# Patient Record
Sex: FEMALE | Race: WHITE | NOT HISPANIC OR LATINO | Employment: OTHER | URBAN - METROPOLITAN AREA
[De-identification: names, ages, dates, MRNs, and addresses within clinical notes are randomized per-mention and may not be internally consistent; named-entity substitution may affect disease eponyms.]

---

## 2017-01-06 ENCOUNTER — OFFICE VISIT (OUTPATIENT)
Dept: ALLERGY | Facility: CLINIC | Age: 47
End: 2017-01-06
Payer: MEDICARE

## 2017-01-06 VITALS
DIASTOLIC BLOOD PRESSURE: 56 MMHG | SYSTOLIC BLOOD PRESSURE: 116 MMHG | HEART RATE: 78 BPM | WEIGHT: 149.06 LBS | OXYGEN SATURATION: 98 % | BODY MASS INDEX: 28.14 KG/M2 | HEIGHT: 61 IN

## 2017-01-06 DIAGNOSIS — J44.89 CHRONIC OBSTRUCTIVE AIRWAY DISEASE WITH ASTHMA: ICD-10-CM

## 2017-01-06 DIAGNOSIS — Z51.6 NEED FOR DESENSITIZATION TO ALLERGENS: ICD-10-CM

## 2017-01-06 DIAGNOSIS — Z72.0 TOBACCO ABUSE: ICD-10-CM

## 2017-01-06 DIAGNOSIS — H10.45 CHRONIC ALLERGIC CONJUNCTIVITIS: Chronic | ICD-10-CM

## 2017-01-06 DIAGNOSIS — L50.8 OTHER SPECIFIED URTICARIA: Chronic | ICD-10-CM

## 2017-01-06 DIAGNOSIS — K21.9 GASTROESOPHAGEAL REFLUX DISEASE, ESOPHAGITIS PRESENCE NOT SPECIFIED: ICD-10-CM

## 2017-01-06 DIAGNOSIS — J30.81 ALLERGIC RHINITIS DUE TO ANIMAL (CAT) (DOG) HAIR AND DANDER: Primary | Chronic | ICD-10-CM

## 2017-01-06 DIAGNOSIS — L29.8 OTHER SPECIFIED PRURITIC CONDITIONS: Chronic | ICD-10-CM

## 2017-01-06 PROBLEM — L29.89 OTHER SPECIFIED PRURITIC CONDITIONS: Chronic | Status: ACTIVE | Noted: 2017-01-06

## 2017-01-06 PROCEDURE — 99214 OFFICE O/P EST MOD 30 MIN: CPT | Mod: S$GLB,,, | Performed by: ALLERGY & IMMUNOLOGY

## 2017-01-06 PROCEDURE — 99999 PR PBB SHADOW E&M-EST. PATIENT-LVL III: CPT | Mod: PBBFAC,,, | Performed by: ALLERGY & IMMUNOLOGY

## 2017-01-06 PROCEDURE — 99499 UNLISTED E&M SERVICE: CPT | Mod: S$GLB,,, | Performed by: ALLERGY & IMMUNOLOGY

## 2017-01-06 PROCEDURE — 1159F MED LIST DOCD IN RCRD: CPT | Mod: S$GLB,,, | Performed by: ALLERGY & IMMUNOLOGY

## 2017-01-06 RX ORDER — EPINEPHRINE 0.3 MG/.3ML
1 INJECTION SUBCUTANEOUS ONCE
Qty: 2 DEVICE | Refills: 1 | Status: ON HOLD | OUTPATIENT
Start: 2017-01-06 | End: 2017-08-04 | Stop reason: HOSPADM

## 2017-01-06 NOTE — PATIENT INSTRUCTIONS
1.  Once again I have reviewed the importance of smoking cessation with this patient.  I think the patient finally understands that cigarette smoke is an important contribution to her level of respiratory symptoms.  And she has voiced the desire to commit to a smoking cessation program.  2.  Patient will start allergen injections when her vaccine is available.  She will continue these injections weekly until her maintenance dose is achieved.  3.  Patient was given the immunotherapy consent to read at her convenience and the risks and benefits were reviewed.  Patient understands that she should not get an allergen injection if she has any wheezing within 48 hours of this injection.  4.  I requested a revisit in 3 months.  The patient will call if there are problems before this revisit.

## 2017-01-06 NOTE — PROGRESS NOTES
Referring physician: No ref. provider found    Primary Care Physician: Emma Subramanian MD    Chief Complaint: Follow-up (discuss allergy injections)    Patient is known to me. The last visit was 11/11/2016  Patient is accompanied: No  Diagnosis at previous visit:  1.  ALLERGIC rhinitis to Dog and cat allergen allergen  2.  Chronic ALLERGIC conjunctivitis  3.  Severe pruritus of both eyes  4.  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  Chronic obstructive airway disease with asthma  6.  Patient is still smoking  7.  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 Spaulding    Subjective:         HPI    Emily Pina is a 46 y.o. female here for a follow-up visit related to discussing the benefits of immunotherapy related to her symptoms.  This patient has multiple respiratory and skin issues which require follow-up.    Patient reports that she is continues to have intermittent bouts of urticaria despite the use of Allegra 180 mg every 24 hours.  These symptoms initially started in September 2016.  She has not been able to identify a trigger for these symptoms.    She reports that her ocular symptoms have been improved using Zaditor and 1% hydrocortisone twice a day.    She reports that with the onset of cold weather today she has been wheezing.  She is also still smoking.  She is currently on Advair /21 2 puffs inhaled twice a day and Singulair 10 mg every 24 hours..  She is still needing her albuterol on a fairly regular basis.    She is currently on Protonix 40 mg every 24 hours.  In addition to this she uses Gaviscon extra strength syrup as needed.  She states that her reflux has been doing better the past several weeks.    Her nasal symptoms have relatively quiescent on Nasonex 2 sprays in each nostril every 24 hours.      Review of Systems  Constitutional: Negative for changes in appetite, unintentional weight loss, fever, chills and fatigue.    HENT: Negative for facial pain, nose bleeds, nasal congestion, postnasal drip, throat clearing, sinus pressure, and voice change. Negative for ear discharge, ear pain, facial swelling, sore throat and trouble swallowing.  Eyes: Negative for occular discharge, redness,and visual disturbance. Postive for itching  Respiratory: Negative for chest tightness, shortness of breath, dyspnea on exertion, sputum production and cough. Positive for wheezing  Cardiovascular: Negative for chest pain, palpatations and leg swelling.  Gastrointestinal: Negative for abdominal distension, abdominal pain, constipation, diarrhea, nausea,and vomiting.   Genitourinary: Negative for difficulty urinating.   Musculoskeletal: Negative for arthralgias, gait problems, joint swelling, myalgias and back pain.   Neurological: Negative for dizziness, syncope, weakness, light-headedness, and headaches.   Hematological: Negative for adenopathy, does not bruise or bleed easily.  Psychiatric/Behavioral: Negative for agitation, anxiety, behavioral problems, confusion, and insomnia.  Skin: Positive for hives and itching    PMH:  Reviewed with the patient and is current for this visit  Unchanged from previous visit.    Family History  Reviewed with the patient and is current for this visit:  Unchanged from previous visit.    Social History:   Reviewed with the patient and is current for this visit  Unchanged from previous visit.  Smoking history: Patient is still smoking    Environmental History:  Patient has a cat and Her mother has a dog.  Environmental history reviewed with the patient and is current for this visit  Unchanged from previous visit.          Objective:      Skin Test results: Positive prick skin test to cat and dog    Immunotherapy: Patient has not been on immunotherapy    Physical Exam  General:patient is well developed and well nourished, in no acute distress.  Mental Status:  Alert, oriented and cooperative  Head and Face:  normocephalic    Allergic shiners: No  Eyes:   Pupils: ERRLA: Scleral conjunctiva: clear; Cornea: clear; Palprebal conjunctiva: normal: Eyelid Skin: normal  Ears:Tympanic membrane Left:intact and normal light reflex; Right:intact and normal light reflex; External canals normal bilaterally.  Nose:  Nares: patent; Mucosa :pink and moist; Nasal septum: midline;Turbinates are of normal size bilaterally and do not occlude the nasal airway.  Mouth/Pharynx: tonsils present; posterior pharyngeal wall normal; tongue normal; teeth normal; voice quality normal.  Neck:  Cervical lymph nodes: small, non-tender, freely moveable both anterior and posterior cervical chain; Trachea: midline; Masses: none  Lungs: Air movement is good; respiratory effort is good; no respiratory distress; breath sounds are vesicular in all lung fields; no wheezing; normal expiratory time; no cough.  Heart: regular rate and rhythm with mild respiratory variation; A1 and P2 are normal; no murmurs or gallops.  Abdomen:exam not done  Extremities: no cyanosis, clubbing or edema  Skin:no rashes or lesions present; skin hydrated and supple.    Skin Test Results:    Laboratory Results:    Radiology Results:          Assessment:       1. Allergic rhinitis due to animal (cat) (dog) hair and dander     2. Chronic allergic conjunctivitis     3. Chronic obstructive airway disease with asthma     4. Tobacco abuse     5. Gastroesophageal reflux disease, esophagitis presence not specified     6. Recurrent urticaria     7. Other specified pruritic conditions     8. Need for desensitization to allergens             Plan:         Return for re-visit: Return in about 3 months (around 4/6/2017).    Immunotherapy: Yes    Lab or X-ray: No    Outside medical records requested: No        Patient Instructions   1.  Once again I have reviewed the importance of smoking cessation with this patient.  I think the patient finally understands that cigarette smoke is an important  contribution to her level of respiratory symptoms.  And she has voiced the desire to commit to a smoking cessation program.  2.  Patient will start allergen injections when her vaccine is available.  She will continue these injections weekly until her maintenance dose is achieved.  3.  Patient was given the immunotherapy consent to read at her convenience and the risks and benefits were reviewed.  Patient understands that she should not get an allergen injection if she has any wheezing within 48 hours of this injection.  4.  I requested a revisit in 3 months.  The patient will call if there are problems before this revisit.      25 minutes spent face to face with this patient. 50% of time spent counseling this patient about the medical conditions (pathophysiology), the options and strategies for treatments, and the risks and benefits of treatments.    Patient education content included: The role of cigarette smoking in the production for respiratory symptoms along with the importance of smoking cessation.  The risks and benefits of immunotherapy were reviewed including local reactions as well as systemic reactions.  Also discussed was the additional risk of asthmatic patients in having a systemic asthmatic reaction.  The immunotherapy protocol was reviewed as well as the importance of bringing her EpiPen to clinic for each injection.        Letter and copy of t.his visit sent to referring physician/PCP:            Ana Mancera MD

## 2017-01-06 NOTE — LETTER
January 6, 2017        Emma Subramanian MD  [address]  [place] [address]             [facility] - Allergy/ Immunology  [address]  [place] [address]  Phone: [phone]   Patient: Emily Pina   MR Number: [id number]   YOB: 1970   Date of Visit: 1/6/2017       Dear Dr. Subramanian:    I saw your patient today for a follow-up visit with respect to the following conditions:    1. Allergic rhinitis due to animal (cat) (dog) hair and dander     2. Chronic allergic conjunctivitis     3. Chronic obstructive airway disease with asthma     4. Tobacco abuse     5. Gastroesophageal reflux disease, esophagitis presence not specified     6. Recurrent urticaria     7. Other specified pruritic conditions     8. Need for desensitization to allergens         I have made the following changes in medications:    Patient Instructions   1.  Once again I have reviewed the importance of smoking cessation with this patient.  I think the patient finally understands that cigarette smoke is an important contribution to her level of respiratory symptoms.  And she has voiced the desire to commit to a smoking cessation program.  2.  Patient will start allergen injections when her vaccine is available.  She will continue these injections weekly until her maintenance dose is achieved.  3.  Patient was given the immunotherapy consent to read at her convenience and the risks and benefits were reviewed.  Patient understands that she should not get an allergen injection if she has any wheezing within 48 hours of this injection.  4.  I requested a revisit in 3 months.  The patient will call if there are problems before this revisit.      I have requested a follow-up visit in 3 months to asess this patient's progress.    I hope you find this information helpful in the care of your patient. If you have questions about the above, please do not hesitate to contact me.    Sincerely,    Ana Mancera MD

## 2017-01-13 DIAGNOSIS — Z12.31 OTHER SCREENING MAMMOGRAM: ICD-10-CM

## 2017-01-23 RX ORDER — PANTOPRAZOLE SODIUM 40 MG/1
TABLET, DELAYED RELEASE ORAL
Qty: 90 TABLET | Refills: 3 | Status: SHIPPED | OUTPATIENT
Start: 2017-01-23 | End: 2018-03-01 | Stop reason: SDUPTHER

## 2017-01-24 ENCOUNTER — TELEPHONE (OUTPATIENT)
Dept: ALLERGY | Facility: CLINIC | Age: 47
End: 2017-01-24

## 2017-01-24 NOTE — TELEPHONE ENCOUNTER
Called patient, no answer, left msg on answering machine that extracts are in at [facility] and provided number for her to call to schedule appt at [facility].

## 2017-02-01 ENCOUNTER — TELEPHONE (OUTPATIENT)
Dept: ALLERGY | Facility: CLINIC | Age: 47
End: 2017-02-01

## 2017-02-01 NOTE — TELEPHONE ENCOUNTER
----- Message from Taylor Hernandez sent at 2/1/2017  3:42 PM CST -----  Contact: Self/[phone]  Pt said that she is calling in regards to she is scheduled to get her first Allergy Injection on 2/3/2017 and she wants to know if she needs to stop any medication in advance. Please call and advise            Thank you

## 2017-02-01 NOTE — TELEPHONE ENCOUNTER
Spoke with patient, informed her she does not need to stop any medication for her injection.  She verbalized understanding.

## 2017-02-16 ENCOUNTER — PATIENT MESSAGE (OUTPATIENT)
Dept: FAMILY MEDICINE | Facility: CLINIC | Age: 47
End: 2017-02-16

## 2017-02-16 ENCOUNTER — OFFICE VISIT (OUTPATIENT)
Dept: FAMILY MEDICINE | Facility: CLINIC | Age: 47
End: 2017-02-16
Payer: MEDICARE

## 2017-02-16 ENCOUNTER — APPOINTMENT (OUTPATIENT)
Dept: RADIOLOGY | Facility: HOSPITAL | Age: 47
End: 2017-02-16
Attending: FAMILY MEDICINE
Payer: MEDICARE

## 2017-02-16 VITALS
HEART RATE: 109 BPM | WEIGHT: 141.13 LBS | TEMPERATURE: 99 F | OXYGEN SATURATION: 97 % | RESPIRATION RATE: 18 BRPM | DIASTOLIC BLOOD PRESSURE: 88 MMHG | SYSTOLIC BLOOD PRESSURE: 124 MMHG | BODY MASS INDEX: 26.65 KG/M2 | HEIGHT: 61 IN

## 2017-02-16 DIAGNOSIS — R05.9 COUGH: ICD-10-CM

## 2017-02-16 DIAGNOSIS — J45.901 ASTHMA EXACERBATION: Primary | ICD-10-CM

## 2017-02-16 PROCEDURE — 99999 PR PBB SHADOW E&M-EST. PATIENT-LVL III: CPT | Mod: PBBFAC,,, | Performed by: FAMILY MEDICINE

## 2017-02-16 PROCEDURE — 99214 OFFICE O/P EST MOD 30 MIN: CPT | Mod: S$GLB,,, | Performed by: FAMILY MEDICINE

## 2017-02-16 PROCEDURE — 71020 XR CHEST PA AND LATERAL: CPT | Mod: 26,,, | Performed by: RADIOLOGY

## 2017-02-16 PROCEDURE — 71020 XR CHEST PA AND LATERAL: CPT | Mod: TC,PN

## 2017-02-16 PROCEDURE — 99499 UNLISTED E&M SERVICE: CPT | Mod: S$GLB,,, | Performed by: FAMILY MEDICINE

## 2017-02-16 RX ORDER — CYCLOBENZAPRINE HCL 5 MG
TABLET ORAL
Refills: 3 | COMMUNITY
Start: 2017-01-10 | End: 2017-10-09 | Stop reason: SDUPTHER

## 2017-02-16 RX ORDER — CETIRIZINE HYDROCHLORIDE 10 MG/1
10 TABLET ORAL DAILY
COMMUNITY
End: 2021-04-05

## 2017-02-16 RX ORDER — AZITHROMYCIN 250 MG/1
250 TABLET, FILM COATED ORAL DAILY
COMMUNITY
End: 2017-03-06 | Stop reason: ALTCHOICE

## 2017-02-16 RX ORDER — BENZONATATE 200 MG/1
200 CAPSULE ORAL 3 TIMES DAILY PRN
COMMUNITY
End: 2017-03-06 | Stop reason: ALTCHOICE

## 2017-02-16 RX ORDER — FEXOFENADINE HCL AND PSEUDOEPHEDRINE HCI 60; 120 MG/1; MG/1
1 TABLET, EXTENDED RELEASE ORAL 2 TIMES DAILY
COMMUNITY
End: 2017-07-18

## 2017-02-16 RX ORDER — PREDNISONE 20 MG/1
60 TABLET ORAL DAILY
Qty: 15 TABLET | Refills: 0 | Status: SHIPPED | OUTPATIENT
Start: 2017-02-16 | End: 2017-02-21

## 2017-02-16 RX ORDER — CODEINE PHOSPHATE AND GUAIFENESIN 10; 100 MG/5ML; MG/5ML
5 SOLUTION ORAL 3 TIMES DAILY PRN
Qty: 118 ML | Refills: 0 | Status: SHIPPED | OUTPATIENT
Start: 2017-02-16 | End: 2017-02-26

## 2017-02-16 NOTE — MR AVS SNAPSHOT
[facility]  [address]  [place] [address]  Phone: [phone]                  Emily Jacobson Yovani   2017 2:40 PM   Office Visit    Description:  Female : 1970   Provider:  Kathy Mcqueen MD   Department:  [facility]           Reason for Visit     Urticaria           Diagnoses this Visit        Comments    Cough    -  Primary     Asthma exacerbation                To Do List           Future Appointments        Provider Department Dept Phone    4/10/2017 1:40 PM Ad Spaulding MD [facility] - Rheumatology [phone]      Goals (5 Years of Data)     None       These Medications        Disp Refills Start End    guaifenesin-codeine 100-10 mg/5 ml (CHERATUSSIN AC)  mg/5 mL syrup 118 mL 0 2017    Take 5 mLs by mouth 3 (three) times daily as needed for Cough. - Oral    Pharmacy: [facility]/pharmacy #[id number] - [place] - [address] Ph #: [phone]       predniSONE (DELTASONE) 20 MG tablet 15 tablet 0 2017    Take 3 tablets (60 mg total) by mouth once daily. - Oral    Pharmacy: [facility]/pharmacy #[id number] - [place] - [address] Ph #: [phone]         [facility] On Call     [facility] On Call Nurse Care Line - 24/7 Assistance  Registered nurses in the [facility] On Call Center provide clinical advisement, health education, appointment booking, and other advisory services.  Call for this free service at [phone].             Medications           Message regarding Medications     Verify the changes and/or additions to your medication regime listed below are the same as discussed with your clinician today.  If any of these changes or additions are incorrect, please notify your healthcare provider.        START taking these NEW medications        Refills    guaifenesin-codeine 100-10 mg/5 ml (CHERATUSSIN AC)  mg/5 mL syrup 0    Sig: Take 5 mLs by mouth 3 (three) times daily as needed for  Cough.    Class: Print    Route: Oral    predniSONE (DELTASONE) 20 MG tablet 0    Sig: Take 3 tablets (60 mg total) by mouth once daily.    Class: Normal    Route: Oral           Verify that the below list of medications is an accurate representation of the medications you are currently taking.  If none reported, the list may be blank. If incorrect, please contact your healthcare provider. Carry this list with you in case of emergency.           Current Medications     albuterol (PROVENTIL) 2.5 mg /3 mL (0.083 %) nebulizer solution Take 3 mLs (2.5 mg total) by nebulization every 6 (six) hours as needed for Wheezing.    aluminum hydrox-magnesium carb (GAVISCON EXTRA STRENGTH) 254-237.5 mg/5 mL Susp Take by mouth as needed.     azithromycin (Z-GIGI) 250 MG tablet Take 250 mg by mouth once daily.    benzonatate (TESSALON) 200 MG capsule Take 200 mg by mouth 3 (three) times daily as needed for Cough.    cetirizine (ZYRTEC) 10 MG tablet Take 10 mg by mouth once daily.    cyclobenzaprine (FLEXERIL) 5 MG tablet TAKE 1 TABLET (5 MG TOTAL) BY MOUTH 3 (THREE) TIMES DAILY AS NEEDED.    desonide (DESOWEN) 0.05 % lotion Apply topically 2 (two) times daily.    epinephrine (EPIPEN 2-GIGI) 0.3 mg/0.3 mL AtIn Inject 0.3 mLs (0.3 mg total) into the muscle once.    fexofenadine-pseudoephedrine  mg (ALLEGRA-D)  mg per tablet Take 1 tablet by mouth 2 (two) times daily.    fluticasone (FLONASE) 50 mcg/actuation nasal spray 2 sprays by Each Nare route once daily.    fluticasone-salmeterol (ADVAIR HFA) 115-21 mcg/actuation HFAA Inhale 2 puffs into the lungs 2 (two) times daily.    gabapentin (NEURONTIN) 600 MG tablet Take 1 tablet (600 mg total) by mouth once daily.    guaifenesin-codeine 100-10 mg/5 ml (CHERATUSSIN AC)  mg/5 mL syrup Take 5 mLs by mouth 3 (three) times daily as needed for Cough.    hydrocodone-acetaminophen 7.5-325mg (NORCO) 7.5-325 mg per tablet Take 1 tablet by mouth every 6 (six) hours as needed.     "hydroxychloroquine (PLAQUENIL) 200 mg tablet Take 1 tablet (200 mg total) by mouth once daily.    LACTOBACILLUS ACIDOPHILUS (PROBIOTIC ORAL) Take 1 tablet by mouth once daily.    levothyroxine (SYNTHROID) 100 MCG tablet TAKE ONE TABLET DAILY EXCEPT ON SUNDAYS TAKE 1/2 TABLET    montelukast (SINGULAIR) 10 mg tablet TAKE 1 TABLET EVERY EVENING    nicotine (NICODERM CQ) 7 mg/24 hr Place 1 patch onto the skin once daily. Please dispense brand name only (Nicoderm CQ).    pantoprazole (PROTONIX) 40 MG tablet TAKE 1 TABLET BY MOUTH ONCE DAILY    predniSONE (DELTASONE) 20 MG tablet Take 3 tablets (60 mg total) by mouth once daily.    PROAIR HFA 90 mcg/actuation inhaler INHALE 2 PUFFS BY MOUTH EVERY 4 HOURS AS NEEDED FOR WHEEZING           Clinical Reference Information           Your Vitals Were     BP Pulse Temp Resp Height Weight    124/88 (BP Location: Left arm, Patient Position: Sitting, BP Method: Manual) 109 98.7 °F (37.1 °C) 18 5' 1" (1.549 m) 64 kg (141 lb 1.5 oz)    Last Period SpO2 BMI          05/14/2014 (Exact Date) 97% 26.66 kg/m2        Blood Pressure          Most Recent Value    BP  124/88      Allergies as of 2/16/2017     Bactrim [Sulfamethoxazole-trimethoprim]      Immunizations Administered on Date of Encounter - 2/16/2017     None      Orders Placed During Today's Visit     Future Labs/Procedures Expected by Expires    X-Ray Chest PA And Lateral  2/16/2017 2/16/2018      Smoking Cessation     If you would like to quit smoking:   You may be eligible for free services if you are a [place] resident and started smoking cigarettes before September 1, 1988.  Call the [employer]oking [employer]essation [employer] ([employer]) toll free at ([phone] [phone] or [phone] [phone].   Call 2-073-QUIT-NOW if you do not meet the above criteria.            Language Assistance Services     ATTENTION: Language assistance services are available, free of charge. Please call [phone].      ATENCIÓN: Si ric ngo a campos disposición " servicios gratuitos de asistencia lingüística. Lesli rick [phone].     [facility] Ý: N?u b?n nói Ti?ng Vi?t, có các d?ch v? h? tr? ngôn ng? mi?n phí dành cho b?n. G?i s? [phone].         [facility] complies with applicable Federal civil rights laws and does not discriminate on the basis of race, color, national origin, age, disability, or sex.

## 2017-02-16 NOTE — PROGRESS NOTES
"Routine Office Visit    Patient Name: Emily Pina    : 1970  MRN: [id number]    Subjective:  Emily is a 46 y.o. female who presents today for:    1. Thursday - started getting hives again.  This has been an on/off issue for the past month.  She's been trying to start allergy shots but she is not able to get them when she has active hives.  Then Saturday - got a deep cough.  Monday - running a fever.  Felt weak, and went to urgent care.  They said she had sinusitis - gave her a steroid shot, zpack - Tuesday, and cough pills.  She ran fevers still, and then today - she hasn't had a fever.  But she has a cough, and worse hives today.  +rattle in her chest, and wheezing. She's been giving herself breathing treatments at night for the last 3-4 nights.  +shortness of breath.  Take zyrtec, singulair.  Allergic to dogs, cats.  She has 2 cats but "they're my babies, I don't have kids so I can't give them away".  Has asthma.  +smoker.    Past Medical History  Past Medical History   Diagnosis Date    Asthma in remission     COPD (chronic obstructive pulmonary disease)     Fibromyalgia     Lupus     Recurrent urticaria 2017    Subacute cutaneous lupus erythematosus        Past Surgical History  Past Surgical History   Procedure Laterality Date    Tonsillectomy         Family History  Family History   Problem Relation Age of Onset    Hypertension Mother     Thyroid disease Mother     Arthritis Father     Hyperlipidemia Father     Cancer Father     Lupus Sister     Asthma Sister     No Known Problems Brother     No Known Problems Maternal Aunt     No Known Problems Maternal Uncle     No Known Problems Paternal Aunt     No Known Problems Paternal Uncle     No Known Problems Maternal Grandmother     No Known Problems Maternal Grandfather     No Known Problems Paternal Grandmother     No Known Problems Paternal Grandfather     Amblyopia Neg Hx     Blindness Neg Hx     Cataracts Neg " Hx     Diabetes Neg Hx     Glaucoma Neg Hx     Macular degeneration Neg Hx     Retinal detachment Neg Hx     Strabismus Neg Hx     Stroke Neg Hx        Social History  Social History     Social History    Marital status:      Spouse name: N/A    Number of children: N/A    Years of education: N/A     Occupational History    Not on file.     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     Drug use: No    Sexual activity: Yes     Partners: Male     Other Topics Concern    Not on file     Social History Narrative       Current Medications  Current Outpatient Prescriptions on File Prior to Visit   Medication Sig Dispense Refill    aluminum hydrox-magnesium carb (GAVISCON EXTRA STRENGTH) 254-237.5 mg/5 mL Susp Take by mouth as needed.       fluticasone (FLONASE) 50 mcg/actuation nasal spray 2 sprays by Each Nare route once daily. 16 g 2    fluticasone-salmeterol (ADVAIR HFA) 115-21 mcg/actuation HFAA Inhale 2 puffs into the lungs 2 (two) times daily. 1 Inhaler 6    gabapentin (NEURONTIN) 600 MG tablet Take 1 tablet (600 mg total) by mouth once daily. 90 tablet 3    hydrocodone-acetaminophen 7.5-325mg (NORCO) 7.5-325 mg per tablet Take 1 tablet by mouth every 6 (six) hours as needed. 60 tablet 0    hydroxychloroquine (PLAQUENIL) 200 mg tablet Take 1 tablet (200 mg total) by mouth once daily. 90 tablet 3    LACTOBACILLUS ACIDOPHILUS (PROBIOTIC ORAL) Take 1 tablet by mouth once daily.      levothyroxine (SYNTHROID) 100 MCG tablet TAKE ONE TABLET DAILY EXCEPT ON SUNDAYS TAKE 1/2 TABLET 85 tablet 1    montelukast (SINGULAIR) 10 mg tablet TAKE 1 TABLET EVERY EVENING 90 tablet 3    nicotine (NICODERM CQ) 7 mg/24 hr Place 1 patch onto the skin once daily. Please dispense brand name only (Nicoderm CQ). 14 patch 1  "   pantoprazole (PROTONIX) 40 MG tablet TAKE 1 TABLET BY MOUTH ONCE DAILY 90 tablet 3    PROAIR HFA 90 mcg/actuation inhaler INHALE 2 PUFFS BY MOUTH EVERY 4 HOURS AS NEEDED FOR WHEEZING 8.5 g 3    albuterol (PROVENTIL) 2.5 mg /3 mL (0.083 %) nebulizer solution Take 3 mLs (2.5 mg total) by nebulization every 6 (six) hours as needed for Wheezing. 180 mL 1    desonide (DESOWEN) 0.05 % lotion Apply topically 2 (two) times daily. 60 mL 0    epinephrine (EPIPEN 2-GIGI) 0.3 mg/0.3 mL AtIn Inject 0.3 mLs (0.3 mg total) into the muscle once. 2 Device 1     No current facility-administered medications on file prior to visit.        Allergies   Review of patient's allergies indicates:   Allergen Reactions    Bactrim [sulfamethoxazole-trimethoprim] Rash       Review of Systems (Pertinent positives)  Constititutional: Weight loss, excess fatigue, chills, fever, night sweats, weakness, loss of appetite  Ears: Earache, ringing in ears, discharge, hearing loss, hearing aid, popping, infection Nose: stuffy nose, mouth breathing, post-nasal drip,   Lungs: Cough, sputum, cough up blood, wheeze, frequent URI, SOA, Asthma  Heart: Chest pain, angina, palpitations, extra heart beats  Stomach/Intestine: Heartburn, Nausea, vomiting, diarrhea, indigestion, bloating, constipation    Visit Vitals    /88 (BP Location: Left arm, Patient Position: Sitting, BP Method: Manual)    Pulse 109    Temp 98.7 °F (37.1 °C)    Resp 18    Ht 5' 1" (1.549 m)    Wt 64 kg (141 lb 1.5 oz)    LMP 05/14/2014 (Exact Date)    SpO2 97%    BMI 26.66 kg/m2       GENERAL APPEARANCE: in no apparent distress and well developed and well nourished  HEENT: PERRLA, EOMI, Sclera clear, anicteric, Oropharynx clear, no lesions, Neck supple with midline trachea  RESPIRATORY: appears well, vitals normal, no respiratory distress, acyanotic, normal RR, chest clear, +mild-mod expiratory wheezing in lower lobes, crepitations, rhonchi, normal symmetric air " entry  HEART: regular rate and rhythm, S1, S2 normal, no murmur, click, rub or gallop.    NEUROLOGIC: normal without focal findings, CN II-XII are intact.    Extremities: warm/well perfused.  No abnormal hair patterns.  No clubbing, cyanosis or edema.    Skin: +erythematous splotchy patches on back, anterior neck, blanching.  PSYCH: Alert, oriented x 3, thought content appropriate, speech normal, pleasant and cooperative, good eye contact, well groomed, recall good, concentration/judgement good and apparently average intelligence.    Assessment/Plan:  Emily Jacobson Yovani is a 46 y.o. female who presents today for :    Emily was seen today for urticaria.    Diagnoses and all orders for this visit:    Asthma exacerbation  -     predniSONE (DELTASONE) 20 MG tablet; Take 3 tablets (60 mg total) by mouth once daily.  -     X-Ray Chest PA And Lateral; Future  -     guaifenesin-codeine 100-10 mg/5 ml (CHERATUSSIN AC)  mg/5 mL syrup; Take 5 mLs by mouth 3 (three) times daily as needed for Cough.  -     Is already on anti-histamines - zyrtec, singulair, daily Advair, already taking zpack (last dose Friday)    Patient also has lupus, and is on plaquenil.  Possibly also hives could be a flare up of lupus or allergies.  In any event, she was told of the long term consequences of taking high doses of steroids - decreased immune system, weight gain, hormonal imbalance. She understands these risks.

## 2017-02-17 ENCOUNTER — TELEPHONE (OUTPATIENT)
Dept: FAMILY MEDICINE | Facility: CLINIC | Age: 47
End: 2017-02-17

## 2017-02-17 NOTE — TELEPHONE ENCOUNTER
----- Message from Stalin Lara sent at 2/16/2017  4:32 PM CST -----  Contact: self  Pt calling to ask if she should continue taking the cough pills along with the syrup.  Please call pt at .    Thanks

## 2017-02-20 ENCOUNTER — TELEPHONE (OUTPATIENT)
Dept: SMOKING CESSATION | Facility: CLINIC | Age: 47
End: 2017-02-20

## 2017-02-21 ENCOUNTER — TELEPHONE (OUTPATIENT)
Dept: SMOKING CESSATION | Facility: CLINIC | Age: 47
End: 2017-02-21

## 2017-02-22 ENCOUNTER — TELEPHONE (OUTPATIENT)
Dept: SMOKING CESSATION | Facility: CLINIC | Age: 47
End: 2017-02-22

## 2017-03-06 ENCOUNTER — OFFICE VISIT (OUTPATIENT)
Dept: FAMILY MEDICINE | Facility: CLINIC | Age: 47
End: 2017-03-06
Payer: MEDICARE

## 2017-03-06 ENCOUNTER — HOSPITAL ENCOUNTER (OUTPATIENT)
Dept: RADIOLOGY | Facility: HOSPITAL | Age: 47
Discharge: HOME | End: 2017-03-06
Attending: FAMILY MEDICINE
Payer: MEDICARE

## 2017-03-06 VITALS
BODY MASS INDEX: 26.91 KG/M2 | SYSTOLIC BLOOD PRESSURE: 140 MMHG | WEIGHT: 142.5 LBS | DIASTOLIC BLOOD PRESSURE: 80 MMHG | HEIGHT: 61 IN | OXYGEN SATURATION: 98 % | HEART RATE: 76 BPM

## 2017-03-06 DIAGNOSIS — Z12.31 OTHER SCREENING MAMMOGRAM: ICD-10-CM

## 2017-03-06 DIAGNOSIS — E03.9 HYPOTHYROIDISM, UNSPECIFIED TYPE: Primary | ICD-10-CM

## 2017-03-06 DIAGNOSIS — R00.2 HEART PALPITATIONS: ICD-10-CM

## 2017-03-06 DIAGNOSIS — Z12.31 ENCOUNTER FOR SCREENING MAMMOGRAM FOR BREAST CANCER: ICD-10-CM

## 2017-03-06 PROCEDURE — 93005 ELECTROCARDIOGRAM TRACING: CPT | Mod: S$GLB,,, | Performed by: INTERNAL MEDICINE

## 2017-03-06 PROCEDURE — 77063 BREAST TOMOSYNTHESIS BI: CPT | Mod: 26,,, | Performed by: RADIOLOGY

## 2017-03-06 PROCEDURE — 93010 ELECTROCARDIOGRAM REPORT: CPT | Mod: S$GLB,,, | Performed by: INTERNAL MEDICINE

## 2017-03-06 PROCEDURE — 77067 SCR MAMMO BI INCL CAD: CPT | Mod: 26,,, | Performed by: RADIOLOGY

## 2017-03-06 PROCEDURE — 99999 PR PBB SHADOW E&M-EST. PATIENT-LVL IV: CPT | Mod: PBBFAC,,, | Performed by: INTERNAL MEDICINE

## 2017-03-06 PROCEDURE — 99214 OFFICE O/P EST MOD 30 MIN: CPT | Mod: S$GLB,,, | Performed by: INTERNAL MEDICINE

## 2017-03-06 PROCEDURE — 77067 SCR MAMMO BI INCL CAD: CPT | Mod: TC

## 2017-03-06 PROCEDURE — 1160F RVW MEDS BY RX/DR IN RCRD: CPT | Mod: S$GLB,,, | Performed by: INTERNAL MEDICINE

## 2017-03-06 PROCEDURE — 99499 UNLISTED E&M SERVICE: CPT | Mod: S$GLB,,, | Performed by: INTERNAL MEDICINE

## 2017-03-06 NOTE — MR AVS SNAPSHOT
[facility]  [address]  [place] [address]  Phone: [phone]  Fax: [phone]                  Emily Tabaresblanc   3/6/2017 1:40 PM   Office Visit    Description:  Female : 1970   Provider:  Sharif Cage MD   Department:  [facility] - [facility]           Reason for Visit     feels like heart racing     Fatigue           Diagnoses this Visit        Comments    Hypothyroidism, unspecified type    -  Primary     Heart palpitations         Encounter for screening mammogram for breast cancer         Other screening mammogram                To Do List           Future Appointments        Provider Department Dept Phone    4/10/2017 1:40 PM Ad Spaulding MD [facility] - Rheumatology [phone]      Goals (5 Years of Data)     None      [facility]     [facility] -  Assistance  Registered nurses in the [facility] Center provide clinical advisement, health education, appointment booking, and other advisory services.  Call for this free service at [phone].             Medications           Message regarding Medications     Verify the changes and/or additions to your medication regime listed below are the same as discussed with your clinician today.  If any of these changes or additions are incorrect, please notify your healthcare provider.        STOP taking these medications     azithromycin (Z-GIGI) 250 MG tablet Take 250 mg by mouth once daily.    benzonatate (TESSALON) 200 MG capsule Take 200 mg by mouth 3 (three) times daily as needed for Cough.    nicotine (NICODERM CQ) 7 mg/24 hr Place 1 patch onto the skin once daily. Please dispense brand name only (Nicoderm CQ).           Verify that the below list of medications is an accurate representation of the medications you are currently taking.  If none reported, the list may be blank. If incorrect, please contact your healthcare provider. Carry this list with you in case  "of emergency.           Current Medications     albuterol (PROVENTIL) 2.5 mg /3 mL (0.083 %) nebulizer solution Take 3 mLs (2.5 mg total) by nebulization every 6 (six) hours as needed for Wheezing.    cetirizine (ZYRTEC) 10 MG tablet Take 10 mg by mouth once daily.    cyclobenzaprine (FLEXERIL) 5 MG tablet TAKE 1 TABLET (5 MG TOTAL) BY MOUTH 3 (THREE) TIMES DAILY AS NEEDED.    fexofenadine-pseudoephedrine  mg (ALLEGRA-D)  mg per tablet Take 1 tablet by mouth 2 (two) times daily.    fluticasone (FLONASE) 50 mcg/actuation nasal spray 2 sprays by Each Nare route once daily.    fluticasone-salmeterol (ADVAIR HFA) 115-21 mcg/actuation HFAA Inhale 2 puffs into the lungs 2 (two) times daily.    gabapentin (NEURONTIN) 600 MG tablet Take 1 tablet (600 mg total) by mouth once daily.    hydrocodone-acetaminophen 7.5-325mg (NORCO) 7.5-325 mg per tablet Take 1 tablet by mouth every 6 (six) hours as needed.    hydroxychloroquine (PLAQUENIL) 200 mg tablet Take 1 tablet (200 mg total) by mouth once daily.    levothyroxine (SYNTHROID) 100 MCG tablet TAKE ONE TABLET DAILY EXCEPT ON SUNDAYS TAKE 1/2 TABLET    montelukast (SINGULAIR) 10 mg tablet TAKE 1 TABLET EVERY EVENING    pantoprazole (PROTONIX) 40 MG tablet TAKE 1 TABLET BY MOUTH ONCE DAILY    aluminum hydrox-magnesium carb (GAVISCON EXTRA STRENGTH) 254-237.5 mg/5 mL Susp Take by mouth as needed.     desonide (DESOWEN) 0.05 % lotion Apply topically 2 (two) times daily.    epinephrine (EPIPEN 2-GIGI) 0.3 mg/0.3 mL AtIn Inject 0.3 mLs (0.3 mg total) into the muscle once.    LACTOBACILLUS ACIDOPHILUS (PROBIOTIC ORAL) Take 1 tablet by mouth once daily.    PROAIR HFA 90 mcg/actuation inhaler INHALE 2 PUFFS BY MOUTH EVERY 4 HOURS AS NEEDED FOR WHEEZING           Clinical Reference Information           Your Vitals Were     BP Pulse Height Weight Last Period SpO2    140/80 (BP Location: Left arm, Patient Position: Sitting) 76 5' 1" (1.549 m) 64.7 kg (142 lb 8.4 oz) 05/14/2014 " (Exact Date) 98%    BMI                26.93 kg/m2          Blood Pressure          Most Recent Value    BP  (!)  140/80      Allergies as of 3/6/2017     Bactrim [Sulfamethoxazole-trimethoprim]      Immunizations Administered on Date of Encounter - 3/6/2017     None      Orders Placed During Today's Visit      Normal Orders This Visit    EKG 12-lead     Mammo Digital Screening Bilat with CAD     Future Labs/Procedures Expected by Expires    Basic metabolic panel  3/6/2017 3/6/2018    CBC auto differential  3/6/2017 3/6/2018    TSH  3/6/2017 3/6/2018      Instructions      What is Holter Monitoring?  Holter monitoring is a painless way to record your heartbeat away from your doctors office. It is a small, battery-operated electrocardiogram (ECG) machine that you carry with you. The device is about the size of a small, digital camera. Holter monitoring records your heartbeat for your doctor to review at a later time. You can receive your heart monitor in a hospital, test center, or doctors office.    Your Holter monitor  When you receive a Holter monitor, your health care provider places small, painless pads (electrodes) on your chest. These connect to the lightweight unit, which attaches to a belt or shoulder strap. You need to keep the device on for at least 24 to 48 hours hours and complete a diary. While wearing the monitor, follow these tips:  · Try to sleep on your back.  · Dont take a shower. A sponge bath is OK.  · Follow your normal routine. Dont avoid stress, work, or exercise. It is important to record your heartbeat during your normal activities.  · If an electrode falls off or the unit makes noise, call your doctor to see what you should do.       When using a Holter monitor  Stay away from electric blankets, magnets, metal detectors, and high-voltage areas such as power lines. They may affect the recording.   Holter monitor diary  · Write in the time of day for each entry you make.  · Note each  change in activity, including when you take medicine.  · Note any symptoms you feel.    Date Last Reviewed: 2/26/2014  © 9209-7598 [employer]. [address] [place] [address], [place] [address]. All rights reserved. This information is not intended as a substitute for professional medical care. Always follow your healthcare professional's instructions.             Smoking Cessation     If you would like to quit smoking:   You may be eligible for free services if you are a [place] resident and started smoking cigarettes before September 1, 1988.  Call the [employer]oking [employer]essation [employer] (S[employer]) toll free at (029) [phone] or (262) 772-2573.   Call 7-468-QUIT-NOW if you do not meet the above criteria.            Language Assistance Services     ATTENTION: Language assistance services are available, free of charge. Please call [phone].      ATENCIÓN: Si habla anny, tiene a campos disposición servicios gratuitos de asistencia lingüística. Llame al [phone].     CHÚ Ý: N?u b?n nói Ti?ng Vi?t, có các d?ch v? h? tr? ngôn ng? mi?n phí dành cho b?n. G?i s? [phone].         [facility] complies with applicable Federal civil rights laws and does not discriminate on the basis of race, color, national origin, age, disability, or sex.

## 2017-03-06 NOTE — PROGRESS NOTES
"Assessment & Plan  Problem List Items Addressed This Visit        Endocrine    Hypothyroidism - Primary - check TSH today.    Relevant Orders    TSH      Other Visit Diagnoses     Heart palpitations    - not orthostatic. normal exam.  Check labs.  If all normal - 48 hour holter monitor.    Relevant Orders    CBC auto differential    Basic metabolic panel    TSH    EKG 12-lead    Encounter for screening mammogram for breast cancer        Other screening mammogram    - mammo today.          Medications Discontinued During This Encounter   Medication Reason    azithromycin (Z-GIGI) 250 MG tablet Therapy completed    benzonatate (TESSALON) 200 MG capsule Therapy completed    nicotine (NICODERM CQ) 7 mg/24 hr Therapy completed       Follow-up: No Follow-up on file.      =================================================================      No chief complaint on file.      HPI  Emily is a 46 y.o. female, last appointment with this clinic was 10/7/2016.    Patient's last menstrual period was 05/14/2014 (exact date).    Feels like her heart is racing.  Makes it hard for her to fall asleep.  Pounding comes and goes - unclear the trigger.  Not related to caffeine.  Can happen at night.  Daytime as well. Frequently around bedtime.  Duration of episodes - maybe a few minutes.  When these epsides occur make her feel tired, dizzy, weak.  Has not been exercising x months.  The last time she was on a treadmill - a few months ago - got dizzy and so she stopped.  Right now she feels "tired".  More than usual.  Feels occasional dizziness/room spinning.  Last episode this morning.  Unsure if this is temporally related to the pounding sensation.  No new meds.  Increasing frequency in the past weeks.  Had a few episodes of some pain in the chest area at rest, lasting a few minutes, no accompanying nausea/diaphoresis, not associated with exertion or meals.    Amenorrheic. Does note occasional blood after intercourse.    Takes Advair " regularly.  Albuterol - more recent with sinusitis.  Did not use today.    Off the CPAP - had stopped it when she got sick and did not restart and the ins stopped coverage.  Off x months.    EtOH - none.  Caffeine 1 a day.      Last TSH 6/2016 WNL.    Patient Care Team:  Emma Subramanian MD as PCP - General (Family Medicine)    Patient Active Problem List    Diagnosis Date Noted    Recurrent urticaria 01/06/2017    Other specified pruritic conditions 01/06/2017    Chronic allergic conjunctivitis 11/13/2016    Eczematous dermatitis of upper and lower eyelids of both eyes 11/13/2016    Allergic rhinitis due to animal (cat) (dog) hair and dander 11/13/2016    Hypothyroidism 11/10/2014    Unspecified hereditary and idiopathic peripheral neuropathy 09/25/2014    Amenorrhea 10/25/2013    Chronic obstructive airway disease with asthma 10/29/2012    Tobacco abuse 10/29/2012    GERD (gastroesophageal reflux disease) 10/29/2012    Subacute cutaneous lupus erythematosus      3/30/16 per Rheumatology History of present illness: 46-year-old female with (subacute cutaneous lupus). She has had no evidence of systemic lupus. She remains on Plaquenil.       Fibromyalgia        PAST MEDICAL HISTORY:  Past Medical History:   Diagnosis Date    Asthma in remission     COPD (chronic obstructive pulmonary disease)     Fibromyalgia     Lupus     Recurrent urticaria 1/6/2017    Subacute cutaneous lupus erythematosus        PAST SURGICAL HISTORY:  Past Surgical History:   Procedure Laterality Date    TONSILLECTOMY         SOCIAL HISTORY:  Social History     Social History    Marital status:      Spouse name: N/A    Number of children: N/A    Years of education: N/A     Occupational History    Not on file.     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     Drug use: No    Sexual activity: Yes     Partners: Male     Other Topics Concern    Not on file     Social History Narrative    No narrative on file       ALLERGIES AND MEDICATIONS: updated and reviewed.  Review of patient's allergies indicates:   Allergen Reactions    Bactrim [sulfamethoxazole-trimethoprim] Rash     Current Outpatient Prescriptions   Medication Sig Dispense Refill    albuterol (PROVENTIL) 2.5 mg /3 mL (0.083 %) nebulizer solution Take 3 mLs (2.5 mg total) by nebulization every 6 (six) hours as needed for Wheezing. 180 mL 1    cetirizine (ZYRTEC) 10 MG tablet Take 10 mg by mouth once daily.      cyclobenzaprine (FLEXERIL) 5 MG tablet TAKE 1 TABLET (5 MG TOTAL) BY MOUTH 3 (THREE) TIMES DAILY AS NEEDED.  3    fexofenadine-pseudoephedrine  mg (ALLEGRA-D)  mg per tablet Take 1 tablet by mouth 2 (two) times daily.      fluticasone (FLONASE) 50 mcg/actuation nasal spray 2 sprays by Each Nare route once daily. 16 g 2    fluticasone-salmeterol (ADVAIR HFA) 115-21 mcg/actuation HFAA Inhale 2 puffs into the lungs 2 (two) times daily. 1 Inhaler 6    gabapentin (NEURONTIN) 600 MG tablet Take 1 tablet (600 mg total) by mouth once daily. 90 tablet 3    hydrocodone-acetaminophen 7.5-325mg (NORCO) 7.5-325 mg per tablet Take 1 tablet by mouth every 6 (six) hours as needed. 60 tablet 0    hydroxychloroquine (PLAQUENIL) 200 mg tablet Take 1 tablet (200 mg total) by mouth once daily. 90 tablet 3    levothyroxine (SYNTHROID) 100 MCG tablet TAKE ONE TABLET DAILY EXCEPT ON SUNDAYS TAKE 1/2 TABLET 85 tablet 1    montelukast (SINGULAIR) 10 mg tablet TAKE 1 TABLET EVERY EVENING 90 tablet 3    pantoprazole (PROTONIX) 40 MG tablet TAKE 1 TABLET BY MOUTH ONCE DAILY 90 tablet 3    aluminum hydrox-magnesium carb (GAVISCON EXTRA STRENGTH) 254-237.5 mg/5 mL Susp Take by mouth as needed.       desonide (DESOWEN) 0.05 % lotion Apply topically 2 (two) times daily.  "60 mL 0    epinephrine (EPIPEN 2-GIGI) 0.3 mg/0.3 mL AtIn Inject 0.3 mLs (0.3 mg total) into the muscle once. 2 Device 1    LACTOBACILLUS ACIDOPHILUS (PROBIOTIC ORAL) Take 1 tablet by mouth once daily.      PROAIR HFA 90 mcg/actuation inhaler INHALE 2 PUFFS BY MOUTH EVERY 4 HOURS AS NEEDED FOR WHEEZING 8.5 g 3     No current facility-administered medications for this visit.        Review of Systems   Constitutional: Negative for fever, malaise/fatigue and weight loss.   HENT: Negative for congestion.    Eyes: Negative for blurred vision and pain.   Respiratory: Negative for shortness of breath and wheezing.    Cardiovascular: Positive for chest pain and palpitations. Negative for leg swelling.   Gastrointestinal: Negative for abdominal pain, blood in stool, constipation, diarrhea and heartburn.   Genitourinary: Negative for dysuria, hematuria and urgency.   Musculoskeletal: Negative for joint pain.   Neurological: Positive for dizziness. Negative for tingling, focal weakness, weakness and headaches.   Psychiatric/Behavioral: Negative for depression. The patient is not nervous/anxious.        Physical Exam   Vitals:    03/06/17 1353 03/06/17 1414 03/06/17 1415   BP: 108/74 120/80 (!) 140/80   BP Location:  Left arm Left arm   Patient Position:  Lying Sitting   Pulse: 84 72 76   SpO2: 98%     Weight: 64.7 kg (142 lb 8.4 oz)     Height: 5' 1" (1.549 m)      There is no height or weight on file to calculate BMI.      Height: 5' 1" (154.9 cm)     Physical Exam   Constitutional: She is oriented to person, place, and time. She appears well-developed and well-nourished. No distress.   Eyes: EOM are normal.   Cardiovascular: Normal rate, regular rhythm and normal heart sounds.    No murmur heard.  No carotid bruits   Pulmonary/Chest: Effort normal and breath sounds normal.   Musculoskeletal: Normal range of motion.   Neurological: She is alert and oriented to person, place, and time. Coordination normal.   CN 2-12 intact.  "  5/5.  Patella DTR 1+ symmetric.  Josefina Hallpike negative   Skin: Skin is warm and dry.   Psychiatric: She has a normal mood and affect. Her behavior is normal. Thought content normal.       EKG done today - personally reviewed - rate 75, normal intervals, no ST/T wave abn, normal rhythm.

## 2017-03-06 NOTE — PATIENT INSTRUCTIONS
What is Holter Monitoring?  Holter monitoring is a painless way to record your heartbeat away from your doctors office. It is a small, battery-operated electrocardiogram (ECG) machine that you carry with you. The device is about the size of a small, digital camera. Holter monitoring records your heartbeat for your doctor to review at a later time. You can receive your heart monitor in a hospital, test center, or doctors office.    Your Holter monitor  When you receive a Holter monitor, your health care provider places small, painless pads (electrodes) on your chest. These connect to the lightweight unit, which attaches to a belt or shoulder strap. You need to keep the device on for at least 24 to 48 hours hours and complete a diary. While wearing the monitor, follow these tips:  · Try to sleep on your back.  · Dont take a shower. A sponge bath is OK.  · Follow your normal routine. Dont avoid stress, work, or exercise. It is important to record your heartbeat during your normal activities.  · If an electrode falls off or the unit makes noise, call your doctor to see what you should do.       When using a Holter monitor  Stay away from electric blankets, magnets, metal detectors, and high-voltage areas such as power lines. They may affect the recording.   Holter monitor diary  · Write in the time of day for each entry you make.  · Note each change in activity, including when you take medicine.  · Note any symptoms you feel.    Date Last Reviewed: 2/26/2014  © 8701-1204 [employer]. [address] [place] [address], [place] [address]. All rights reserved. This information is not intended as a substitute for professional medical care. Always follow your healthcare professional's instructions.

## 2017-03-22 ENCOUNTER — OFFICE VISIT (OUTPATIENT)
Dept: FAMILY MEDICINE | Facility: CLINIC | Age: 47
End: 2017-03-22
Payer: MEDICARE

## 2017-03-22 ENCOUNTER — HOSPITAL ENCOUNTER (OUTPATIENT)
Dept: RADIOLOGY | Facility: HOSPITAL | Age: 47
Discharge: HOME | End: 2017-03-22
Attending: INTERNAL MEDICINE
Payer: MEDICARE

## 2017-03-22 VITALS
HEIGHT: 61 IN | TEMPERATURE: 98 F | HEART RATE: 105 BPM | WEIGHT: 144.5 LBS | SYSTOLIC BLOOD PRESSURE: 115 MMHG | DIASTOLIC BLOOD PRESSURE: 80 MMHG | OXYGEN SATURATION: 99 % | BODY MASS INDEX: 27.28 KG/M2

## 2017-03-22 DIAGNOSIS — S93.602A FOOT SPRAIN, LEFT, INITIAL ENCOUNTER: ICD-10-CM

## 2017-03-22 DIAGNOSIS — S93.602A FOOT SPRAIN, LEFT, INITIAL ENCOUNTER: Primary | ICD-10-CM

## 2017-03-22 PROCEDURE — 99214 OFFICE O/P EST MOD 30 MIN: CPT | Mod: S$GLB,,, | Performed by: INTERNAL MEDICINE

## 2017-03-22 PROCEDURE — 73630 X-RAY EXAM OF FOOT: CPT | Mod: 26,LT,, | Performed by: RADIOLOGY

## 2017-03-22 PROCEDURE — 99499 UNLISTED E&M SERVICE: CPT | Mod: S$GLB,,, | Performed by: INTERNAL MEDICINE

## 2017-03-22 PROCEDURE — 73630 X-RAY EXAM OF FOOT: CPT | Mod: TC,PO,LT

## 2017-03-22 PROCEDURE — 99999 PR PBB SHADOW E&M-EST. PATIENT-LVL III: CPT | Mod: PBBFAC,,, | Performed by: INTERNAL MEDICINE

## 2017-03-22 PROCEDURE — 1160F RVW MEDS BY RX/DR IN RCRD: CPT | Mod: S$GLB,,, | Performed by: INTERNAL MEDICINE

## 2017-03-22 NOTE — PROGRESS NOTES
Assessment & Plan  Foot sprain, left, initial encounter - suspect ligament strain.  Does not seem c/w flexor tendonitis.  XR foot, look for dislocation, occult stress fx, though no swelling.  Recommended OTC post op shoe if necessary; else, rest, NSAIDS OTC, cool packs.  Expect slow but steady improvement.  -     X-Ray Foot Complete Left; Future; Expected date: 3/22/17    palpitations - plan is to let foot improve, then get holter and see if we can catch any arrhythmias/events    Medications Discontinued During This Encounter   Medication Reason    desonide (DESOWEN) 0.05 % lotion Patient no longer taking       Follow-up: No Follow-up on file.      =================================================================      Chief Complaint   Patient presents with    pain in foot     left foot       BRADEN Diaz is a 46 y.o. female, last appointment with this clinic was 3/6/2017.    Patient's last menstrual period was 05/14/2014 (exact date).    I previously saw her earlier this month.  Palpitations, sensation of heart racing.  Labs were normal.  Plan was to monitor.  Hypothyroid, labs done earlier this month were normal.  Amenorrhea.  Sleep apnea off of CPAP.  Smoker.  C/o left foot pain.  No preceding event but possibly started with a popping sensation.  It's the top of the foot and feels like it's deep to the surface.  Pointing to the midfoot.  duration 7 days.  Pain to get out of bed, pain getting in/out of a car, Icing and heating it - no improvement.  No swelling no bruising.  No discoloration.  No previous injury    Has not gotten the holter ordered yet. Was waiting for her foot to heal so she could challenge herself on treadmill to see if that would precipitate palpitations.    Patient Care Team:  Emma Subramanian MD as PCP - General (Family Medicine)    Patient Active Problem List    Diagnosis Date Noted    Recurrent urticaria 01/06/2017    Other specified pruritic conditions 01/06/2017    Chronic allergic  conjunctivitis 11/13/2016    Eczematous dermatitis of upper and lower eyelids of both eyes 11/13/2016    Allergic rhinitis due to animal (cat) (dog) hair and dander 11/13/2016    Hypothyroidism 11/10/2014    Unspecified hereditary and idiopathic peripheral neuropathy 09/25/2014    Amenorrhea 10/25/2013    Chronic obstructive airway disease with asthma 10/29/2012    Tobacco abuse 10/29/2012    GERD (gastroesophageal reflux disease) 10/29/2012    Subacute cutaneous lupus erythematosus      3/30/16 per Rheumatology History of present illness: 46-year-old female with (subacute cutaneous lupus). She has had no evidence of systemic lupus. She remains on Plaquenil.       Fibromyalgia        PAST MEDICAL HISTORY:  Past Medical History:   Diagnosis Date    Asthma in remission     COPD (chronic obstructive pulmonary disease)     Fibromyalgia     Lupus     Recurrent urticaria 1/6/2017    Subacute cutaneous lupus erythematosus        PAST SURGICAL HISTORY:  Past Surgical History:   Procedure Laterality Date    TONSILLECTOMY         SOCIAL HISTORY:  Social History     Social History    Marital status:      Spouse name: N/A    Number of children: N/A    Years of education: N/A     Occupational History    Not on file.     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     Drug use: No    Sexual activity: Yes     Partners: Male     Other Topics Concern    Not on file     Social History Narrative       ALLERGIES AND MEDICATIONS: updated and reviewed.  Review of patient's allergies indicates:   Allergen Reactions    Bactrim [sulfamethoxazole-trimethoprim] Rash     Current Outpatient Prescriptions   Medication Sig Dispense Refill    albuterol (PROVENTIL) 2.5 mg /3 mL (0.083 %) nebulizer solution Take 3 mLs (2.5 mg  total) by nebulization every 6 (six) hours as needed for Wheezing. 180 mL 1    aluminum hydrox-magnesium carb (GAVISCON EXTRA STRENGTH) 254-237.5 mg/5 mL Susp Take by mouth as needed.       cetirizine (ZYRTEC) 10 MG tablet Take 10 mg by mouth once daily.      cyclobenzaprine (FLEXERIL) 5 MG tablet TAKE 1 TABLET (5 MG TOTAL) BY MOUTH 3 (THREE) TIMES DAILY AS NEEDED.  3    fexofenadine-pseudoephedrine  mg (ALLEGRA-D)  mg per tablet Take 1 tablet by mouth 2 (two) times daily.      fluticasone (FLONASE) 50 mcg/actuation nasal spray 2 sprays by Each Nare route once daily. 16 g 2    fluticasone-salmeterol (ADVAIR HFA) 115-21 mcg/actuation HFAA Inhale 2 puffs into the lungs 2 (two) times daily. 1 Inhaler 6    gabapentin (NEURONTIN) 600 MG tablet Take 1 tablet (600 mg total) by mouth once daily. 90 tablet 3    hydrocodone-acetaminophen 7.5-325mg (NORCO) 7.5-325 mg per tablet Take 1 tablet by mouth every 6 (six) hours as needed. 60 tablet 0    hydroxychloroquine (PLAQUENIL) 200 mg tablet Take 1 tablet (200 mg total) by mouth once daily. 90 tablet 3    LACTOBACILLUS ACIDOPHILUS (PROBIOTIC ORAL) Take 1 tablet by mouth once daily.      levothyroxine (SYNTHROID) 100 MCG tablet TAKE ONE TABLET DAILY EXCEPT ON SUNDAYS TAKE 1/2 TABLET 85 tablet 1    montelukast (SINGULAIR) 10 mg tablet TAKE 1 TABLET EVERY EVENING 90 tablet 3    pantoprazole (PROTONIX) 40 MG tablet TAKE 1 TABLET BY MOUTH ONCE DAILY 90 tablet 3    PROAIR HFA 90 mcg/actuation inhaler INHALE 2 PUFFS BY MOUTH EVERY 4 HOURS AS NEEDED FOR WHEEZING 8.5 g 3    epinephrine (EPIPEN 2-GIGI) 0.3 mg/0.3 mL AtIn Inject 0.3 mLs (0.3 mg total) into the muscle once. 2 Device 1     No current facility-administered medications for this visit.        Review of Systems   Neurological: Negative for tingling, sensory change and focal weakness.       Physical Exam   Vitals:    03/22/17 1100   BP: 115/80   Pulse: 105   Temp: 97.5 °F (36.4 °C)   TempSrc: Oral  "  SpO2: 99%   Weight: 65.5 kg (144 lb 8.2 oz)   Height: 5' 1" (1.549 m)    Body mass index is 27.31 kg/(m^2).  Weight: 65.5 kg (144 lb 8.2 oz)   Height: 5' 1" (154.9 cm)     Physical Exam   Constitutional: She appears well-developed and well-nourished. No distress.   Musculoskeletal:   Left ankle normal.  Left foot - the dorsum of the foot is tender on deep palpation and it's tender with pssive toe plantar flexion.  No pain with dorsiflexion against resistance.  Palpation of the midfoot - very tender but decrease in tenderness with supporting the bottom of the foot.  Without support - more pain.  Seems to be most painful over the cuneiform bones   Neurological: She exhibits normal muscle tone.   Skin: Skin is warm and dry. No rash noted.     "

## 2017-03-30 RX ORDER — HYDROCODONE BITARTRATE AND ACETAMINOPHEN 7.5; 325 MG/1; MG/1
1 TABLET ORAL EVERY 6 HOURS PRN
Qty: 60 TABLET | Refills: 0 | Status: SHIPPED | OUTPATIENT
Start: 2017-03-30 | End: 2017-07-07 | Stop reason: SDUPTHER

## 2017-03-30 NOTE — TELEPHONE ENCOUNTER
----- Message from Trish Rios sent at 3/30/2017  2:58 PM CDT -----  Contact: PT  Refill hydrocodone-acetaminophen 7.5-325mg (NORCO) 7.5-325 mg per tablet    Pharmacy: [phone]

## 2017-04-10 ENCOUNTER — OFFICE VISIT (OUTPATIENT)
Dept: RHEUMATOLOGY | Facility: CLINIC | Age: 47
End: 2017-04-10
Payer: MEDICARE

## 2017-04-10 ENCOUNTER — LAB VISIT (OUTPATIENT)
Dept: LAB | Facility: HOSPITAL | Age: 47
End: 2017-04-10
Attending: INTERNAL MEDICINE
Payer: MEDICARE

## 2017-04-10 VITALS
BODY MASS INDEX: 24.78 KG/M2 | SYSTOLIC BLOOD PRESSURE: 121 MMHG | DIASTOLIC BLOOD PRESSURE: 85 MMHG | HEIGHT: 64 IN | HEART RATE: 111 BPM | WEIGHT: 145.13 LBS

## 2017-04-10 DIAGNOSIS — M79.7 FIBROMYALGIA: Primary | ICD-10-CM

## 2017-04-10 DIAGNOSIS — L93.1 SUBACUTE CUTANEOUS LUPUS ERYTHEMATOSUS: ICD-10-CM

## 2017-04-10 DIAGNOSIS — M79.672 FOOT PAIN, LEFT: ICD-10-CM

## 2017-04-10 LAB
C3 SERPL-MCNC: 129 MG/DL
C4 SERPL-MCNC: 20 MG/DL
CRP SERPL-MCNC: 3.7 MG/L
ERYTHROCYTE [SEDIMENTATION RATE] IN BLOOD BY WESTERGREN METHOD: 16 MM/HR

## 2017-04-10 PROCEDURE — 99999 PR PBB SHADOW E&M-EST. PATIENT-LVL III: CPT | Mod: PBBFAC,,, | Performed by: INTERNAL MEDICINE

## 2017-04-10 PROCEDURE — 86160 COMPLEMENT ANTIGEN: CPT | Mod: 59

## 2017-04-10 PROCEDURE — 86225 DNA ANTIBODY NATIVE: CPT

## 2017-04-10 PROCEDURE — 36415 COLL VENOUS BLD VENIPUNCTURE: CPT

## 2017-04-10 PROCEDURE — 85651 RBC SED RATE NONAUTOMATED: CPT

## 2017-04-10 PROCEDURE — 99214 OFFICE O/P EST MOD 30 MIN: CPT | Mod: S$GLB,,, | Performed by: INTERNAL MEDICINE

## 2017-04-10 PROCEDURE — 86160 COMPLEMENT ANTIGEN: CPT

## 2017-04-10 PROCEDURE — 86140 C-REACTIVE PROTEIN: CPT

## 2017-04-10 PROCEDURE — 1160F RVW MEDS BY RX/DR IN RCRD: CPT | Mod: S$GLB,,, | Performed by: INTERNAL MEDICINE

## 2017-04-10 RX ORDER — NAPROXEN 500 MG/1
500 TABLET ORAL 2 TIMES DAILY
Qty: 60 TABLET | Refills: 2 | Status: SHIPPED | OUTPATIENT
Start: 2017-04-10 | End: 2020-06-04 | Stop reason: SDUPTHER

## 2017-04-11 LAB — DSDNA AB SER-ACNC: NORMAL [IU]/ML

## 2017-04-12 ENCOUNTER — PATIENT MESSAGE (OUTPATIENT)
Dept: ALLERGY | Facility: CLINIC | Age: 47
End: 2017-04-12

## 2017-04-12 ENCOUNTER — PATIENT MESSAGE (OUTPATIENT)
Dept: RHEUMATOLOGY | Facility: CLINIC | Age: 47
End: 2017-04-12

## 2017-04-12 ENCOUNTER — PATIENT MESSAGE (OUTPATIENT)
Dept: FAMILY MEDICINE | Facility: CLINIC | Age: 47
End: 2017-04-12

## 2017-04-12 RX ORDER — HYDROXYCHLOROQUINE SULFATE 200 MG/1
TABLET, FILM COATED ORAL
Qty: 90 TABLET | Refills: 3 | Status: SHIPPED | OUTPATIENT
Start: 2017-04-12 | End: 2018-04-18 | Stop reason: SDUPTHER

## 2017-04-12 RX ORDER — GABAPENTIN 600 MG/1
TABLET ORAL
Qty: 90 TABLET | Refills: 3 | Status: SHIPPED | OUTPATIENT
Start: 2017-04-12 | End: 2018-04-18 | Stop reason: SDUPTHER

## 2017-04-12 NOTE — PROGRESS NOTES
"History of present illness: 47-year-old female with chronic pain due to fibromyalgia.  She remains on Flexeril and gabapentin.  4 weeks ago she developed pain in the dorsum of her left foot.  She had been on her feet more than usual prior to the onset.  She felt something "pop" in her foot several days later.  She has had no swelling in the foot.  The pain is worse with activity.  It does not wake her up at night.  She has some pain in the morning.  She has no similar pain on the right side.  The pain does not radiate up the leg.  She has had no similar problem in the past.  She is taking Advil or Aleve with some response.  She has not tried topical medications.  Ice has been helping.  Her overall pain is unchanged.    Her lupus rash has been stable.  She remains on Plaquenil.  Her last eye exam was in June.  She complains of intermittent occipital headaches.  She has had some pain in the right shoulder in the lower back.  She has had no joint swelling.    Physical examination:  Skin: She has livedo pattern on the arms and legs.  She has hyperpigmented macules of the upper arm.  Musculoskeletal: She has pain on range of motion of the cervical spine but has full range of motion of the spine.  Shoulders, elbows, wrists, small joints of the hand have no swelling or tenderness.  Lumbar spine has good range of motion.  Straight leg raising is negative.  Hips and knees are unremarkable.  She has tenderness in the dorsum of the left foot.  She has no swelling.  She has pain on plantar flexion and inversion.  Right ankle is unremarkable.  Small joints of feet are within normal limits.    Assessment:  1.  Subacute cutaneous lupus  2.  Fibromyalgia  3.  Probable tendinitis    Plans:  1.  Laboratory studies are obtained  2.  I placed her on Naprosyn 500 mg twice daily  3.  I have referred her to podiatry for her ankle  4.  Continue other medications as before  5.  Return to see me in 6 months  "

## 2017-04-17 RX ORDER — LEVOTHYROXINE SODIUM 100 UG/1
TABLET ORAL
Qty: 85 TABLET | Refills: 1 | OUTPATIENT
Start: 2017-04-17

## 2017-04-19 NOTE — TELEPHONE ENCOUNTER
This patient is having difficulty and i am not sure how to handle this.  According to [facility] she cannot start until hives are gone.  Please advise.

## 2017-04-19 NOTE — TELEPHONE ENCOUNTER
Patient should take take Zyrtec/ cetirizine 10 mg every 24 hours or twice daily to prevent the hives and swelling so she cab get her injections.

## 2017-04-24 ENCOUNTER — PATIENT MESSAGE (OUTPATIENT)
Dept: FAMILY MEDICINE | Facility: CLINIC | Age: 47
End: 2017-04-24

## 2017-04-24 DIAGNOSIS — E05.90 HYPERTHYROIDISM: Primary | ICD-10-CM

## 2017-04-24 RX ORDER — LEVOTHYROXINE SODIUM 100 UG/1
TABLET ORAL
Qty: 85 TABLET | Refills: 1 | Status: SHIPPED | OUTPATIENT
Start: 2017-04-24 | End: 2017-10-12 | Stop reason: SDUPTHER

## 2017-04-30 ENCOUNTER — TELEPHONE (OUTPATIENT)
Dept: FAMILY MEDICINE | Facility: CLINIC | Age: 47
End: 2017-04-30

## 2017-04-30 ENCOUNTER — PATIENT MESSAGE (OUTPATIENT)
Dept: FAMILY MEDICINE | Facility: CLINIC | Age: 47
End: 2017-04-30

## 2017-04-30 NOTE — TELEPHONE ENCOUNTER
----- Message from Yamileth Alcala sent at 4/28/2017 12:26 PM CDT -----  Contact: Self  Patient called to follow up on her previous message. Please call at [phone]

## 2017-05-23 ENCOUNTER — TELEPHONE (OUTPATIENT)
Dept: FAMILY MEDICINE | Facility: CLINIC | Age: 47
End: 2017-05-23

## 2017-05-23 NOTE — TELEPHONE ENCOUNTER
----- Message from Milagros Landeros sent at 5/23/2017  2:23 PM CDT -----  Contact: self [phone]  Pt is requesting a refill on albuterol for her machine. Pls call [facility] [phone], Thanks.......Mckenzie

## 2017-05-24 RX ORDER — ALBUTEROL SULFATE 0.83 MG/ML
2.5 SOLUTION RESPIRATORY (INHALATION) EVERY 6 HOURS PRN
Qty: 180 ML | Refills: 1 | Status: SHIPPED | OUTPATIENT
Start: 2017-05-24 | End: 2017-05-25 | Stop reason: SDUPTHER

## 2017-05-24 NOTE — TELEPHONE ENCOUNTER
----- Message from Shirley Raymundo sent at 5/24/2017 12:02 PM CDT -----  Contact: Self/[phone]  Patient is following up on her medication refill. She states that she's going out of town tomorrow. Thank you.

## 2017-05-25 RX ORDER — ALBUTEROL SULFATE 0.83 MG/ML
2.5 SOLUTION RESPIRATORY (INHALATION) EVERY 6 HOURS PRN
Qty: 180 ML | Refills: 1 | Status: SHIPPED | OUTPATIENT
Start: 2017-05-25 | End: 2019-01-02 | Stop reason: SDUPTHER

## 2017-05-25 NOTE — TELEPHONE ENCOUNTER
----- Message from Linda Alcala sent at 5/25/2017 11:57 AM CDT -----  Patient needed medication albuterol (PROVENTIL) 2.5 mg /3 mL (0.083 %) nebulizer solution sent to [facility] instead of [facility] mail order. Thank you!

## 2017-07-07 ENCOUNTER — PATIENT MESSAGE (OUTPATIENT)
Dept: RHEUMATOLOGY | Facility: CLINIC | Age: 47
End: 2017-07-07

## 2017-07-07 RX ORDER — HYDROCODONE BITARTRATE AND ACETAMINOPHEN 7.5; 325 MG/1; MG/1
1 TABLET ORAL EVERY 6 HOURS PRN
Qty: 60 TABLET | Refills: 0 | Status: ON HOLD | OUTPATIENT
Start: 2017-07-07 | End: 2017-08-04

## 2017-07-17 ENCOUNTER — HOSPITAL ENCOUNTER (EMERGENCY)
Facility: OTHER | Age: 47
Discharge: HOME | End: 2017-07-17
Attending: INTERNAL MEDICINE
Payer: MEDICARE

## 2017-07-17 VITALS
SYSTOLIC BLOOD PRESSURE: 137 MMHG | OXYGEN SATURATION: 94 % | DIASTOLIC BLOOD PRESSURE: 84 MMHG | RESPIRATION RATE: 16 BRPM | TEMPERATURE: 98 F | HEART RATE: 64 BPM

## 2017-07-17 DIAGNOSIS — K80.20 CALCULUS OF GALLBLADDER WITHOUT CHOLECYSTITIS WITHOUT OBSTRUCTION: Primary | ICD-10-CM

## 2017-07-17 DIAGNOSIS — R10.11 RUQ PAIN: ICD-10-CM

## 2017-07-17 LAB
ALBUMIN SERPL-MCNC: 4.2 G/DL (ref 3.3–5.5)
ALBUMIN SERPL-MCNC: 4.6 G/DL (ref 3.3–5.5)
ALP SERPL-CCNC: 44 U/L (ref 42–141)
ALP SERPL-CCNC: 56 U/L (ref 42–141)
BILIRUB SERPL-MCNC: 0.4 MG/DL (ref 0.2–1.6)
BILIRUB SERPL-MCNC: 0.5 MG/DL (ref 0.2–1.6)
BUN SERPL-MCNC: 8 MG/DL (ref 7–22)
CALCIUM SERPL-MCNC: 9.4 MG/DL (ref 8–10.3)
CHLORIDE SERPL-SCNC: 99 MMOL/L (ref 98–108)
CREAT SERPL-MCNC: 1.1 MG/DL (ref 0.6–1.2)
GLUCOSE SERPL-MCNC: 120 MG/DL (ref 73–118)
POC ALT (SGPT): 19 U/L (ref 10–47)
POC ALT (SGPT): 23 U/L (ref 10–47)
POC AMYLASE: 76 U/L (ref 14–97)
POC AST (SGOT): 30 U/L (ref 11–38)
POC AST (SGOT): 33 U/L (ref 11–38)
POC GGT: 7 U/L (ref 5–65)
POC TCO2: 30 MMOL/L (ref 18–33)
POTASSIUM BLD-SCNC: 4 MMOL/L (ref 3.6–5.1)
PROTEIN, POC: 8.4 G/DL (ref 6.4–8.1)
PROTEIN, POC: 8.6 G/DL (ref 6.4–8.1)
SODIUM BLD-SCNC: 142 MMOL/L (ref 128–145)

## 2017-07-17 PROCEDURE — 80053 COMPREHEN METABOLIC PANEL: CPT

## 2017-07-17 PROCEDURE — 85025 COMPLETE CBC W/AUTO DIFF WBC: CPT

## 2017-07-17 PROCEDURE — 99284 EMERGENCY DEPT VISIT MOD MDM: CPT | Mod: 25

## 2017-07-17 PROCEDURE — 63600175 PHARM REV CODE 636 W HCPCS: Performed by: INTERNAL MEDICINE

## 2017-07-17 PROCEDURE — 96372 THER/PROPH/DIAG INJ SC/IM: CPT

## 2017-07-17 PROCEDURE — 82150 ASSAY OF AMYLASE: CPT

## 2017-07-17 PROCEDURE — 96374 THER/PROPH/DIAG INJ IV PUSH: CPT

## 2017-07-17 RX ORDER — KETOROLAC TROMETHAMINE 10 MG/1
10 TABLET, FILM COATED ORAL 3 TIMES DAILY PRN
Qty: 10 TABLET | Refills: 0 | Status: SHIPPED | OUTPATIENT
Start: 2017-07-17 | End: 2017-07-18 | Stop reason: SDUPTHER

## 2017-07-17 RX ORDER — KETOROLAC TROMETHAMINE 30 MG/ML
30 INJECTION, SOLUTION INTRAMUSCULAR; INTRAVENOUS
Status: COMPLETED | OUTPATIENT
Start: 2017-07-17 | End: 2017-07-17

## 2017-07-17 RX ORDER — ONDANSETRON 2 MG/ML
8 INJECTION INTRAMUSCULAR; INTRAVENOUS
Status: COMPLETED | OUTPATIENT
Start: 2017-07-17 | End: 2017-07-17

## 2017-07-17 RX ORDER — ONDANSETRON 4 MG/1
4 TABLET, FILM COATED ORAL EVERY 6 HOURS PRN
Qty: 12 TABLET | Refills: 0 | Status: SHIPPED | OUTPATIENT
Start: 2017-07-17 | End: 2017-07-18 | Stop reason: SDUPTHER

## 2017-07-17 RX ADMIN — ONDANSETRON 8 MG: 2 INJECTION INTRAMUSCULAR; INTRAVENOUS at 05:07

## 2017-07-17 RX ADMIN — KETOROLAC TROMETHAMINE 30 MG: 30 INJECTION, SOLUTION INTRAMUSCULAR; INTRAVENOUS at 05:07

## 2017-07-17 NOTE — ED NOTES
Pt supine in bed, alert and oriented, reports symptoms for the past few hours, pain located on the rt side, 10/10, pt began actively vomiting immediately after the IV was inserted

## 2017-07-17 NOTE — ED PROVIDER NOTES
Encounter Date: 7/17/2017       History     Chief Complaint   Patient presents with    Abdominal Pain     RUQ abd pain starting at 0200. Pain 8/10, pt reports vomiting 4x PTA     47-year-old female presents to the emergency department with right upper quadrant abdominal pain times one day.  Patient states she vomited 4 times prior to arrival to the emergency department.  Denies diarrhea.  Denies fevers/chills      The history is provided by the patient. No  was used.   Abdominal Pain   Illness onset: At 2 AM. The onset of the illness was gradual. The problem has not changed since onset.The abdominal pain is located in the RUQ. The abdominal pain radiates to the epigastric region. The severity of the abdominal pain is 7/10. The other symptoms of the illness include nausea and vomiting. The other symptoms of the illness do not include diarrhea.   Symptoms associated with the illness do not include constipation.     Review of patient's allergies indicates:   Allergen Reactions    Bactrim [sulfamethoxazole-trimethoprim] Rash     Past Medical History:   Diagnosis Date    Asthma in remission     COPD (chronic obstructive pulmonary disease)     Fibromyalgia     Lupus     Recurrent urticaria 1/6/2017    Subacute cutaneous lupus erythematosus      Past Surgical History:   Procedure Laterality Date    TONSILLECTOMY       Family History   Problem Relation Age of Onset    Hypertension Mother     Thyroid disease Mother     Arthritis Father     Hyperlipidemia Father     Cancer Father     Lupus Sister     Asthma Sister     No Known Problems Brother     No Known Problems Maternal Aunt     No Known Problems Maternal Uncle     No Known Problems Paternal Aunt     No Known Problems Paternal Uncle     No Known Problems Maternal Grandmother     No Known Problems Maternal Grandfather     No Known Problems Paternal Grandmother     No Known Problems Paternal Grandfather     Amblyopia Neg Hx      Blindness Neg Hx     Cataracts Neg Hx     Diabetes Neg Hx     Glaucoma Neg Hx     Macular degeneration Neg Hx     Retinal detachment Neg Hx     Strabismus Neg Hx     Stroke Neg Hx      Social History   Substance Use Topics    Smoking status: Current Every Day Smoker     Packs/day: 2.00     Types: Cigarettes    Smokeless tobacco: Former User     Quit date: 7/8/2013    Alcohol use No      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      Review of Systems   Constitutional: Negative.    Respiratory: Negative.    Cardiovascular: Negative.    Gastrointestinal: Positive for abdominal pain, nausea and vomiting. Negative for blood in stool, constipation and diarrhea.   All other systems reviewed and are negative.      Physical Exam     Initial Vitals [07/17/17 0406]   BP Pulse Resp Temp SpO2   (!) 158/90 87 18 98 °F (36.7 °C) 100 %      MAP       112.67         Physical Exam    Nursing note and vitals reviewed.  Constitutional: She appears well-developed and well-nourished. She is not diaphoretic. No distress.   HENT:   Head: Normocephalic and atraumatic.   Right Ear: External ear normal.   Left Ear: External ear normal.   Nose: Nose normal.   Mouth/Throat: Oropharynx is clear and moist.   Eyes: Conjunctivae and EOM are normal.   Neck: Normal range of motion.   Cardiovascular: Normal rate and regular rhythm.   Pulmonary/Chest: Breath sounds normal. No respiratory distress.   Abdominal: Soft. Bowel sounds are normal. She exhibits no distension and no mass. There is tenderness (Right upper quadrant slight pain upon deep palpation). There is no rebound.   Musculoskeletal: Normal range of motion.   Neurological: She is alert. She has normal strength.   Skin: Skin is warm and dry.   Psychiatric: She has a normal mood and affect.         ED Course   Procedures  Labs Reviewed   POCT LIVER PANEL - Abnormal; Notable for the following:        Result Value    Protein 8.4 (*)     All other components within normal limits   POCT CMP -  Abnormal; Notable for the following:     POC Glucose 120 (*)     Protein 8.6 (*)     All other components within normal limits   POCT CBC   POCT CMP   POCT AMYLASE             Medical Decision Making:   Initial Assessment:   47-year-old female presents to the emergency department with right upper quadrant abdominal pain times one day.  Patient states she vomited 4 times prior to arrival to the emergency department.  Denies diarrhea.  Denies fevers/chills  Differential Diagnosis:   Acute gastroenteritis  Cholelithiasis  Cholecystitis  Hepatitis  ED Management:  CBC and CMP were within normal limits.  Ultrasound of the abdomen was ordered to further evaluate source of pain.  Toradol and Zofran were given intravenously.  Ultrasound revealed multiple gallstones.  Patient was given instructions for cholelithiasis and a prescription for Toradol.  She was advised to follow-up with her primary care physician tomorrow for reevaluation.                   ED Course     Clinical Impression:   The primary diagnosis for this encounter is cholelithiasis  Disposition:   Disposition: Discharged  Condition: Stable                        Albino Appiah MD  07/17/17 0634

## 2017-07-18 ENCOUNTER — OFFICE VISIT (OUTPATIENT)
Dept: FAMILY MEDICINE | Facility: CLINIC | Age: 47
End: 2017-07-18
Payer: MEDICARE

## 2017-07-18 VITALS
DIASTOLIC BLOOD PRESSURE: 80 MMHG | WEIGHT: 144.63 LBS | HEART RATE: 90 BPM | TEMPERATURE: 98 F | OXYGEN SATURATION: 97 % | HEIGHT: 64 IN | SYSTOLIC BLOOD PRESSURE: 112 MMHG | BODY MASS INDEX: 24.69 KG/M2

## 2017-07-18 DIAGNOSIS — K80.20 CALCULUS OF GALLBLADDER WITHOUT CHOLECYSTITIS WITHOUT OBSTRUCTION: Primary | ICD-10-CM

## 2017-07-18 PROCEDURE — 99999 PR PBB SHADOW E&M-EST. PATIENT-LVL IV: CPT | Mod: PBBFAC,,, | Performed by: FAMILY MEDICINE

## 2017-07-18 PROCEDURE — 99214 OFFICE O/P EST MOD 30 MIN: CPT | Mod: S$GLB,,, | Performed by: FAMILY MEDICINE

## 2017-07-18 RX ORDER — KETOROLAC TROMETHAMINE 10 MG/1
10 TABLET, FILM COATED ORAL 3 TIMES DAILY PRN
Qty: 20 TABLET | Refills: 0 | Status: SHIPPED | OUTPATIENT
Start: 2017-07-18 | End: 2017-10-09

## 2017-07-18 RX ORDER — NAPROXEN 500 MG/1
TABLET ORAL
Refills: 2 | COMMUNITY
Start: 2017-06-04 | End: 2017-10-26 | Stop reason: SINTOL

## 2017-07-18 RX ORDER — ONDANSETRON 4 MG/1
4 TABLET, FILM COATED ORAL EVERY 6 HOURS PRN
Qty: 24 TABLET | Refills: 0 | Status: SHIPPED | OUTPATIENT
Start: 2017-07-18 | End: 2017-11-02

## 2017-07-18 NOTE — PATIENT INSTRUCTIONS
Cholecystitis (Confirmed)    Your abdominal pain is due to an inflammation and possible infection in the gallbladder. This is called cholecystitis.  The gallbladder is a small sac under the liver that stores and releases bile. Bile is a fluid that helps you digest fat. Eating fatty food stimulates the gallbladder to contract, and release the bile.  A gallstone may form in this sac. Although most people do not have symptoms, when the stone moves and block the passage of bile out of the bladder, it can cause pain and even an infection. Bile sludge without a stone can also cause cholecystitis.  A number of things increase the risk of developing gallstones:  · Gender. Women are more likely to get them.  · Obesity  · Increasing age  · Losing or gaining weight quickly  · High calorie diet  · Pregnancy  · Hormone therapy  · Diabetes  The most common symptoms are:  · Abdominal pain, cramping, and aching  · Nausea or vomiting  · Fever  Many illnesses can cause these symptoms. This pain usually starts in the upper right side of your abdomen. Sometimes it can radiate to your right shoulder, back, and arm. It usually starts suddenly, becomes more intense quickly, and then gradually decreases and disappears over a couple of hours. Older people and people with diabetes may have difficulty showing where the pain is exactly.  Home care  · Rest in bed and follow a clear liquid diet until the pain, nausea, and vomiting have gone away. Antibiotics and other medicine may be prescribed. Take this exactly as directed.  · You may use ibuprofen or naproxen to control pain, unless another medicine was prescribed.  · You can take acetaminophen or ibuprofen for pain, unless you were given a different pain medicine to use. Note: If you have chronic liver or kidney disease or ever had a stomach ulcer or GI bleeding or are taking blood thinner medications, talk with your healthcare provider before using these medicines.  · Fat in your diet  makes the gallbladder contract and may cause increased pain. So don't have any fat over the next 2 days and follow a low-fat diet after that.  Follow-up care  An infection in the gallbladder is a serious problem and must be watched carefully. See your doctor in the next 1 to 2 days, or as directed. Once cholecystitis has occurred, you usually will need to have your gallbladder removed to keep the condition from happening again. You can discuss this at your follow-up visit.  When to seek medical advice  Call your healthcare provider if any of the following occur:  · Repeated vomiting  · Swelling of the abdomen  · Pain that lasts more than 6 hours  · Fever of 100.4°F (38°C) or higher, or as directed by your healthcare provider  · Weakness, dizziness, or fainting  · Dark urine or light-colored stools  · Yellow color of the skin or eyes  · Chest, arm, back, neck, or jaw pain  Date Last Reviewed: 8/23/2015  © 9053-8640 [employer]. [address] [place] [address], [place] [address]. All rights reserved. This information is not intended as a substitute for professional medical care. Always follow your healthcare professional's instructions.

## 2017-07-18 NOTE — PROGRESS NOTES
Routine Office Visit    Patient Name: Emily Pina    : 1970  MRN: [id number]    Subjective:  Emily is a 47 y.o. female who presents today for     1. Gallstones - pt is here for follow-up ER visit for gallstones. She states this is her 4th episodes of right upper quadrant abdominal pain. She does recall eating some velazquez and eggs prior to the symptoms of severe RUQ abdominal pain and cramping occurred. She was given toradol and ondansetron for her pain and nausea and symptoms have been alleviated. She is going out of town and would like a refill on medication. There is still some associated abdominal pain at this time.     Review of Systems   Constitutional: Negative for chills and fever.   HENT: Negative for congestion.    Eyes: Negative for blurred vision.   Respiratory: Negative for cough.    Cardiovascular: Negative for chest pain.   Gastrointestinal: Positive for abdominal pain. Negative for constipation, diarrhea, heartburn, nausea and vomiting.   Genitourinary: Negative for dysuria.   Musculoskeletal: Negative for myalgias.   Skin: Negative for itching and rash.   Neurological: Negative for dizziness and headaches.   Psychiatric/Behavioral: Negative for depression.       Active Problem List  Patient Active Problem List   Diagnosis    Subacute cutaneous lupus erythematosus    Fibromyalgia    Chronic obstructive airway disease with asthma    Tobacco abuse    GERD (gastroesophageal reflux disease)    Amenorrhea    Unspecified hereditary and idiopathic peripheral neuropathy    Hypothyroidism    Chronic allergic conjunctivitis    Eczematous dermatitis of upper and lower eyelids of both eyes    Allergic rhinitis due to animal (cat) (dog) hair and dander    Recurrent urticaria    Other specified pruritic conditions    Calculus of gallbladder without cholecystitis without obstruction       Past Surgical History  Past Surgical History:   Procedure Laterality Date    TONSILLECTOMY          Family History  Family History   Problem Relation Age of Onset    Hypertension Mother     Thyroid disease Mother     Arthritis Father     Hyperlipidemia Father     Cancer Father     Lupus Sister     Asthma Sister     No Known Problems Brother     No Known Problems Maternal Aunt     No Known Problems Maternal Uncle     No Known Problems Paternal Aunt     No Known Problems Paternal Uncle     No Known Problems Maternal Grandmother     No Known Problems Maternal Grandfather     No Known Problems Paternal Grandmother     No Known Problems Paternal Grandfather     Amblyopia Neg Hx     Blindness Neg Hx     Cataracts Neg Hx     Diabetes Neg Hx     Glaucoma Neg Hx     Macular degeneration Neg Hx     Retinal detachment Neg Hx     Strabismus Neg Hx     Stroke Neg Hx        Social History  Social History     Social History    Marital status:      Spouse name: N/A    Number of children: N/A    Years of education: N/A     Occupational History    Not on file.     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     Drug use: No    Sexual activity: Yes     Partners: Male     Other Topics Concern    Not on file     Social History Narrative    No narrative on file       Medications and Allergies  Reviewed and updated.   Current Outpatient Prescriptions   Medication Sig    albuterol (PROVENTIL) 2.5 mg /3 mL (0.083 %) nebulizer solution Take 3 mLs (2.5 mg total) by nebulization every 6 (six) hours as needed for Wheezing.    aluminum hydrox-magnesium carb (GAVISCON EXTRA STRENGTH) 254-237.5 mg/5 mL Susp Take by mouth as needed.     cetirizine (ZYRTEC) 10 MG tablet Take 10 mg by mouth once daily.    cyclobenzaprine (FLEXERIL) 5 MG tablet TAKE 1 TABLET (5 MG TOTAL) BY MOUTH 3 (THREE) TIMES DAILY AS NEEDED.  "   fluticasone (FLONASE) 50 mcg/actuation nasal spray 2 sprays by Each Nare route once daily.    fluticasone-salmeterol (ADVAIR HFA) 115-21 mcg/actuation HFAA Inhale 2 puffs into the lungs 2 (two) times daily.    gabapentin (NEURONTIN) 600 MG tablet TAKE 1 TABLET ONE TIME DAILY    hydrocodone-acetaminophen 7.5-325mg (NORCO) 7.5-325 mg per tablet Take 1 tablet by mouth every 6 (six) hours as needed.    hydroxychloroquine (PLAQUENIL) 200 mg tablet TAKE 1 TABLET ONE TIME DAILY    ketorolac (TORADOL) 10 mg tablet Take 1 tablet (10 mg total) by mouth 3 (three) times daily as needed for Pain.    LACTOBACILLUS ACIDOPHILUS (PROBIOTIC ORAL) Take 1 tablet by mouth once daily.    levothyroxine (SYNTHROID) 100 MCG tablet TAKE ONE TABLET DAILY EXCEPT ON SUNDAYS TAKE 1/2 TABLET    montelukast (SINGULAIR) 10 mg tablet TAKE 1 TABLET EVERY EVENING    naproxen (NAPROSYN) 500 MG tablet TAKE 1 TABLET (500 MG TOTAL) BY MOUTH 2 (TWO) TIMES DAILY.    ondansetron (ZOFRAN) 4 MG tablet Take 1 tablet (4 mg total) by mouth every 6 (six) hours as needed.    pantoprazole (PROTONIX) 40 MG tablet TAKE 1 TABLET BY MOUTH ONCE DAILY    epinephrine (EPIPEN 2-GIGI) 0.3 mg/0.3 mL AtIn Inject 0.3 mLs (0.3 mg total) into the muscle once.     No current facility-administered medications for this visit.        Physical Exam  /80 (BP Location: Right arm, Patient Position: Sitting, BP Method: Manual)   Pulse 90   Temp 97.9 °F (36.6 °C) (Oral)   Ht 5' 4" (1.626 m)   Wt 65.6 kg (144 lb 10 oz)   LMP 05/14/2014 (Exact Date)   SpO2 97%   BMI 24.82 kg/m²   Physical Exam   Constitutional: She is oriented to person, place, and time. She appears well-developed and well-nourished.   HENT:   Head: Normocephalic and atraumatic.   Eyes: Conjunctivae and EOM are normal. Pupils are equal, round, and reactive to light.   Neck: Normal range of motion. Neck supple.   Cardiovascular: Normal rate, regular rhythm and normal heart sounds.  Exam reveals no " gallop and no friction rub.    No murmur heard.  Pulmonary/Chest: Breath sounds normal. No respiratory distress.   Abdominal: Soft. Bowel sounds are normal. She exhibits no distension. There is tenderness in the right upper quadrant.   Musculoskeletal: Normal range of motion.   Lymphadenopathy:     She has no cervical adenopathy.   Neurological: She is alert and oriented to person, place, and time.   Skin: Skin is warm.   Psychiatric: She has a normal mood and affect.         Assessment/Plan:  Emily Jacobson Yovani is a 47 y.o. female who presents today for :    Calculus of gallbladder without cholecystitis without obstruction  -     ondansetron (ZOFRAN) 4 MG tablet; Take 1 tablet (4 mg total) by mouth every 6 (six) hours as needed.  Dispense: 24 tablet; Refill: 0  -     ketorolac (TORADOL) 10 mg tablet; Take 1 tablet (10 mg total) by mouth 3 (three) times daily as needed for Pain.  Dispense: 20 tablet; Refill: 0  -     Ambulatory referral to General Surgery  Recommend to avoid friend fatty foods   Explain causes of gallstones   Recommend f/u with general surgery for conservative treatment vs operative treatment         Return if symptoms worsen or fail to improve.

## 2017-07-20 ENCOUNTER — TELEPHONE (OUTPATIENT)
Dept: FAMILY MEDICINE | Facility: CLINIC | Age: 47
End: 2017-07-20

## 2017-07-21 ENCOUNTER — TELEPHONE (OUTPATIENT)
Dept: FAMILY MEDICINE | Facility: CLINIC | Age: 47
End: 2017-07-21

## 2017-07-21 ENCOUNTER — OFFICE VISIT (OUTPATIENT)
Dept: SURGERY | Facility: CLINIC | Age: 47
End: 2017-07-21
Payer: MEDICARE

## 2017-07-21 VITALS
HEART RATE: 79 BPM | HEIGHT: 64 IN | DIASTOLIC BLOOD PRESSURE: 70 MMHG | SYSTOLIC BLOOD PRESSURE: 121 MMHG | BODY MASS INDEX: 24.69 KG/M2 | WEIGHT: 144.63 LBS | TEMPERATURE: 98 F

## 2017-07-21 DIAGNOSIS — K80.50 BILIARY COLIC: Primary | ICD-10-CM

## 2017-07-21 DIAGNOSIS — K80.20 GALLSTONES: ICD-10-CM

## 2017-07-21 PROCEDURE — 99203 OFFICE O/P NEW LOW 30 MIN: CPT | Mod: S$GLB,,, | Performed by: SURGERY

## 2017-07-21 PROCEDURE — 3008F BODY MASS INDEX DOCD: CPT | Mod: S$GLB,,, | Performed by: SURGERY

## 2017-07-21 PROCEDURE — 99999 PR PBB SHADOW E&M-EST. PATIENT-LVL IV: CPT | Mod: PBBFAC,,, | Performed by: SURGERY

## 2017-07-21 NOTE — LETTER
July 24, 2017      Megan Rice MD  [address]  [place] [address]           [facility]  [address]  [place] [address]  Phone: [phone]          Patient: Emily Pina   MR Number: [id number]   YOB: 1970   Date of Visit: 7/21/2017       Dear Dr. Megan Rice:    Thank you for referring Emily Pina to me for evaluation. Attached you will find relevant portions of my assessment and plan of care.    If you have questions, please do not hesitate to call me. I look forward to following Emily Pina along with you.    Sincerely,        Enclosure  CC:  No Recipients    If you would like to receive this communication electronically, please contact [email or web address] or [phone] to request more information on [facility] access.    For providers and/or their staff who would like to refer a patient to [facility], please contact us through our one-stop-shop provider referral line, Norberto Urena, at [phone].    If you feel you have received this communication in error or would no longer like to receive these types of communications, please e-mail [email or web address]

## 2017-07-21 NOTE — PATIENT INSTRUCTIONS
Gallstones with Biliary Colic    You have abdominal pain due to irritation and spasm of the gallbladder. This is called biliary colic. The gallbladder is a small sac under the liver, which stores and releases a fluid that aids in the digestion of fat. A collection of crystals may form stones inside the gallbladder (gallstones). Gallstones can cause the gallbladder to spasm. If they block the duct out of the gallbladder, they can cause pain and even an infection.   A number of factors increase the risk for having gallstones:  · Being female  · Being severely overweight (obese)  · Older age  · Losing or gaining weight quickly  · Eating a high-calorie diet  · Being pregnant  · Taking hormone therapy  · Having diabetes  Home care  · Rest in bed.  · Drink only clear liquids until you feel better.  · You may have been prescribed medicine for pain or nausea. Take these as directed.  · Fat in your diet makes the gallbladder contract and may cause increased pain. Avoid foods that are high in fat (such as full-fat dairy, fried foods, and fatty meats) for at least two days.  · If you are overweight, talk to your healthcare provider about losing weight.  Follow-up care  Follow up with your healthcare provider or as advised. There is a chance that you will have another episode of pain from your gallstones at some point. Removal of the gallbladder is an option to prevent this. Talk with your healthcare provider about your treatment options.  When to seek medical advice  Call your healthcare provider if any of the following occur:  · Worsening pain or pain lasting for longer than 6 hours  · Pain moving to the right lower abdomen  · Repeated vomiting  · Swollen abdomen  · Fever of 100.4ºF (38ºC) or higher, or as directed by your healthcare provider  · Very dark urine, light colored stools, or yellow color of the skin or eyes  · Chest, arm, back, neck or jaw pain  Date Last Reviewed: 6/18/2015  © 3523-4261 [employer],  [place]. [address] [place] [address]. All rights reserved. This information is not intended as a substitute for professional medical care. Always follow your healthcare professional's instructions.        Cholecystectomy     Clips close off the duct connecting the gallbladder to the bile duct. The gallbladder is then removed.     Youve had painful attacks caused by gallstones. To treat the problem, your healthcare provider wants to remove your gallbladder. This surgery is called cholecystectomy. Removing the gallbladder can relieve pain. It will also prevent future attacks. You can live a healthy life without your gallbladder. You may also be able to go back to eating foods you enjoyed before your gallbladder problems started.  Before your surgery  Be prepared:  · Tell your provider what medicines you take. Include those bought over the counter. Also include herbs or supplements. Be sure to mention if you take prescription blood thinners. This includes warfarin, clopidogrel, and aspirin.  · Have any tests your provider asks for, such as blood tests.  · Dont eat or drink after midnight, the night before your surgery. This includes water, coffee, and mints. However, you may need to take some medicine with sips of water--talk with your healthcare provider.  The day of surgery  When you arrive, you will prepare for surgery:  · An IV line will be put into a vein in your arm or hand. This gives you fluids and medicine.  · An anesthesiologist will talk with you about anesthesia. This is medicine used to prevent pain. You will receive general anesthesia. This puts you into a state like deep sleep through the procedure.  During surgery  There are 2 methods for removing the gallbladder. Your healthcare provider will choose which method is best for you:  · Laparoscopic cholecystectomy. This is most common. During surgery, 2 to 4 small incisions are made. A thin tube with a camera is used. This is called a  laparoscope. The scope is put through one of the incisions. It sends images to a video screen. Surgical tools are put through other incisions. The gallbladder is removed using the scope and these tools.  · Open cholecystectomy. One larger incision is made. The surgeon sees and works through this incision. Open surgery is most often used when scarring or other factors make it a better choice for you.  In some cases, safety requires a change from laparoscopic to open surgery during the procedure.  After surgery  You will be sent to a room to wake up from the anesthesia. You will likely go home the same day. In some cases, an overnight stay is needed. If you had open cholecystectomy, you may need to stay in the hospital for a few days. When you are released to go home, have a family member or friend ready to drive you.  Risks and possible complications of gallbladder surgery  All surgeries have risks. The risks of gallbladder surgery include:  · Bleeding  · Infection  · Injury to the common bile duct or nearby organs  · Blood clots in the legs  · Bile leaks  · Hernia at incision site  · Pnemonia   Date Last Reviewed: 7/1/2016 © 2000-2016 [employer] [address] [place] [address]. All rights reserved. This information is not intended as a substitute for professional medical care. Always follow your healthcare professional's instructions.        Having Laparoscopic Cholecystectomy  Small incisions are made in your belly (abdomen). The scope is put through one of the incisions. Surgical tools are put through other incisions.  Small clips are used to close off the connection between the gallbladder and the bile duct. The gallbladder is then detached from the liver.  The gallbladder is removed through one of the incisions. Bile still flows from the liver to the small intestine.  When the surgery is done, all tools are removed. Incisions are closed with stitches (sutures) or staples. Sometimes, a  laparoscopic surgery may need to be changed to an open surgery. This method uses one large incision. This change may happen because of scar tissue, unusual anatomy, or for some other reason.     Possible incision sites.   Youve had painful attacks caused by gallstones. Because of this, you are having surgery to remove your gallbladder. This is called cholecystectomy. A method called laparoscopy will be used. This allows surgery to be done through a few small cuts (incisions).  Before your surgery  · Tell your provider what medicines you take. This includes prescription medicines, over-the-counter medicines, street drugs, herbs, vitamins, and other supplements. Be sure to mention if you take prescription blood thinners. This includes warfarin, clopidogrel, ibuprofen, and aspirin.  · If you drink alcohol, tell your provider how much you drink. This is very important if you are a heavy drinker or have had alcohol withdrawal symptoms in the past. Alcohol withdrawal can be dangerous. But the symptoms can be safely managed if your healthcare provider knows your alcohol history.  · Have any tests your provider asks for, such as blood tests.  · Dont eat or drink after midnight the night before your surgery. This includes water, coffee, and mints.  The day of surgery  · Your provider may have you take your normal medicine with a sip of water. Check with your provider.   When you arrive, you will prepare for surgery:  · An IV (intravenous) line will be put into a vein in your arm or hand. This gives you fluids and medicine.  · An anesthesiologist will talk with you about anesthesia. This is medicine used to prevent pain. You will receive general anesthesia. This puts you into a deep sleep-like state through the procedure.  During laparoscopic surgery  For this surgery, a thin tube with a tiny camera is used. This is called a laparoscope. The scope sends images from inside your body to a video screen. This lets the surgeon  view and work on your gallbladder:  · Small incisions are made in your belly (abdomen). The scope is put through one of the incisions. Surgical tools are put through other incisions.  · Small clips are used to close off the connection between the gallbladder and the bile duct. The gallbladder is then detached from the liver.  · The gallbladder is removed through one of the incisions. Bile still flows from the liver to the small intestine.  · When the surgery is done, all tools are removed. Incisions are closed with stitches (sutures) or staples. Sometimes, a laparoscopic surgery may need to be changed to an open surgery. This method uses one large incision. This change may happen because of scar tissue, unusual anatomy, or for some other reason.  After surgery  You will be sent to a room to wake up from the anesthesia. You will likely go home the same day. In some cases, an overnight stay is needed. When you are released to go home, have a family member or friend ready to drive you.  Risks and possible complications of gallbladder surgery  All surgeries have risks. The risks of gallbladder surgery include:  · Bleeding  · Infection  · Injury to the common bile duct or nearby organs  · Blood clots in the legs  · Bile leaks  · Hernia at incision site  · Pneumonia   Date Last Reviewed: 7/1/2016  © 9169-2384 [employer], [place]. [address] [place] [address], [place] [address]. All rights reserved. This information is not intended as a substitute for professional medical care. Always follow your healthcare professional's instructions.        After Gallbladder Surgery  You can usually go home the same day as your surgery. In some cases, you may need to stay overnight. After open laparoscopic surgery, you will usually need to stay in the hospital for 2 to 5 days. Once youre at home, be sure to follow all your healthcare providers instructions.  In the hospital  Bandages will cover your incisions and you may have special  boots on your legs to prevent blood clots. To aid recovery, youll be asked to get up and move as soon as possible. You may also be asked to use a device that helps promote deep breathing to keep your lungs clear.  At home  You can get back to your normal routine as soon as you feel able. To speed healing:  · Take any prescribed pain medicines as directed.  · Follow your healthcare providers instructions about bathing and caring for your incisions.  · Walk and move around as often as possible, but follow your healthcare provider's instructions on how much weight you can lift.   · Ask your healthcare provider about driving and going back to work. This is often about 5 to 10 days after surgery.  Eating normally again  Removing the gallbladder doesnt mean you have to be on a special diet. But you may want to start with light meals. It can also take a few weeks for your digestion to adjust. You may have indigestion, loose stools, or diarrhea. This is common and should go away in time.  Following up  Keep follow-up appointments during your recovery. These allow your healthcare provider to check your progress and answer any questions. Be sure to mention if you have any new symptoms. Also mention if you have diarrhea that doesnt go away.     Call your healthcare provider  Contact your healthcare provider if you have any of the following:  · Fever of 100.4º F (38.0ºC) or higher, or as directed by your healthcare provider  · Chills  · Increasing pain, redness, or drainage at an incision site  · Vomiting or nausea that lasts more than 12 hours  · Prolonged diarrhea  · Belly pain  · Leg swelling or trouble breathing  · Difficulty urinating  · Bleeding from the rectum   Date Last Reviewed: 7/1/2016  © 7413-3395 [employer]. [address] [place] [address], [place] [address]. All rights reserved. This information is not intended as a substitute for professional medical care. Always follow your healthcare professional's  instructions.

## 2017-07-21 NOTE — TELEPHONE ENCOUNTER
----- Message from Yamileth Alcala sent at 7/21/2017  1:40 PM CDT -----  Contact: Self   Patient says her insurance is not covering her medication and would like to know if she can get something else. Please call patient  at [phone]    ketorolac (TORADOL) 10 mg tablet

## 2017-07-24 RX ORDER — IBUPROFEN 800 MG/1
800 TABLET ORAL 3 TIMES DAILY
Qty: 40 TABLET | Refills: 0 | Status: SHIPPED | OUTPATIENT
Start: 2017-07-24 | End: 2017-10-26

## 2017-07-31 DIAGNOSIS — K80.50 BILIARY COLIC: ICD-10-CM

## 2017-07-31 RX ORDER — SODIUM CHLORIDE 9 MG/ML
INJECTION, SOLUTION INTRAVENOUS CONTINUOUS
Status: CANCELLED | OUTPATIENT
Start: 2017-07-31

## 2017-08-01 ENCOUNTER — TELEPHONE (OUTPATIENT)
Dept: SURGERY | Facility: CLINIC | Age: 47
End: 2017-08-01

## 2017-08-01 NOTE — TELEPHONE ENCOUNTER
----- Message from Alvarezadán Mathew sent at 8/1/2017 12:57 PM CDT -----  [phone]//pt states that she needs to speak with nurse in ref to having a cold and needs to know what she can take before her surgery//please call//thank you

## 2017-08-01 NOTE — TELEPHONE ENCOUNTER
Pt called to report that she has been feeling like she is getting a sinus infection or a cold.  She has been having a headache as well.  She denies having a fever.  She will call back on Thursday 8/3/17 to discuss if she needs to cancel her Lap Michelle on Friday 8/4/17.

## 2017-08-03 ENCOUNTER — TELEPHONE (OUTPATIENT)
Dept: SURGERY | Facility: CLINIC | Age: 47
End: 2017-08-03

## 2017-08-04 ENCOUNTER — ANESTHESIA (OUTPATIENT)
Dept: SURGERY | Facility: HOSPITAL | Age: 47
End: 2017-08-04
Payer: MEDICARE

## 2017-08-04 ENCOUNTER — SURGERY (OUTPATIENT)
Age: 47
End: 2017-08-04

## 2017-08-04 ENCOUNTER — ANESTHESIA EVENT (OUTPATIENT)
Dept: SURGERY | Facility: HOSPITAL | Age: 47
End: 2017-08-04
Payer: MEDICARE

## 2017-08-04 ENCOUNTER — HOSPITAL ENCOUNTER (OUTPATIENT)
Facility: HOSPITAL | Age: 47
Discharge: HOME | End: 2017-08-04
Attending: SURGERY | Admitting: SURGERY
Payer: MEDICARE

## 2017-08-04 VITALS
RESPIRATION RATE: 18 BRPM | DIASTOLIC BLOOD PRESSURE: 60 MMHG | SYSTOLIC BLOOD PRESSURE: 110 MMHG | TEMPERATURE: 98 F | OXYGEN SATURATION: 100 % | HEART RATE: 61 BPM

## 2017-08-04 DIAGNOSIS — Z90.49 S/P LAPAROSCOPIC CHOLECYSTECTOMY: Primary | ICD-10-CM

## 2017-08-04 DIAGNOSIS — K80.50 BILIARY COLIC: ICD-10-CM

## 2017-08-04 PROCEDURE — D9220A PRA ANESTHESIA: Mod: ANES,,, | Performed by: ANESTHESIOLOGY

## 2017-08-04 PROCEDURE — 25000003 PHARM REV CODE 250: Performed by: PHYSICIAN ASSISTANT

## 2017-08-04 PROCEDURE — 71000039 HC RECOVERY, EACH ADD'L HOUR: Performed by: SURGERY

## 2017-08-04 PROCEDURE — S0020 INJECTION, BUPIVICAINE HYDRO: HCPCS | Performed by: SURGERY

## 2017-08-04 PROCEDURE — 25000003 PHARM REV CODE 250

## 2017-08-04 PROCEDURE — 36000708 HC OR TIME LEV III 1ST 15 MIN: Performed by: SURGERY

## 2017-08-04 PROCEDURE — D9220A PRA ANESTHESIA: Mod: CRNA,,, | Performed by: NURSE ANESTHETIST, CERTIFIED REGISTERED

## 2017-08-04 PROCEDURE — 63600175 PHARM REV CODE 636 W HCPCS: Performed by: NURSE ANESTHETIST, CERTIFIED REGISTERED

## 2017-08-04 PROCEDURE — 25000003 PHARM REV CODE 250: Performed by: SURGERY

## 2017-08-04 PROCEDURE — 63600175 PHARM REV CODE 636 W HCPCS: Performed by: ANESTHESIOLOGY

## 2017-08-04 PROCEDURE — 37000008 HC ANESTHESIA 1ST 15 MINUTES: Performed by: SURGERY

## 2017-08-04 PROCEDURE — 71000016 HC POSTOP RECOV ADDL HR: Performed by: SURGERY

## 2017-08-04 PROCEDURE — 25000003 PHARM REV CODE 250: Performed by: NURSE ANESTHETIST, CERTIFIED REGISTERED

## 2017-08-04 PROCEDURE — 71000033 HC RECOVERY, INTIAL HOUR: Performed by: SURGERY

## 2017-08-04 PROCEDURE — 37000009 HC ANESTHESIA EA ADD 15 MINS: Performed by: SURGERY

## 2017-08-04 PROCEDURE — 47562 LAPAROSCOPIC CHOLECYSTECTOMY: CPT | Mod: ,,, | Performed by: SURGERY

## 2017-08-04 PROCEDURE — 88304 TISSUE EXAM BY PATHOLOGIST: CPT | Mod: 26,,,

## 2017-08-04 PROCEDURE — 63600175 PHARM REV CODE 636 W HCPCS

## 2017-08-04 PROCEDURE — 71000015 HC POSTOP RECOV 1ST HR: Performed by: SURGERY

## 2017-08-04 PROCEDURE — 94760 N-INVAS EAR/PLS OXIMETRY 1: CPT

## 2017-08-04 PROCEDURE — 88304 TISSUE EXAM BY PATHOLOGIST: CPT

## 2017-08-04 PROCEDURE — 27201423 OPTIME MED/SURG SUP & DEVICES STERILE SUPPLY: Performed by: SURGERY

## 2017-08-04 PROCEDURE — 36000709 HC OR TIME LEV III EA ADD 15 MIN: Performed by: SURGERY

## 2017-08-04 RX ORDER — HYDROCODONE BITARTRATE AND ACETAMINOPHEN 7.5; 325 MG/1; MG/1
1 TABLET ORAL EVERY 6 HOURS PRN
Qty: 25 TABLET | Refills: 0 | Status: SHIPPED | OUTPATIENT
Start: 2017-08-04 | End: 2017-09-05 | Stop reason: SDUPTHER

## 2017-08-04 RX ORDER — DEXAMETHASONE SODIUM PHOSPHATE 4 MG/ML
INJECTION, SOLUTION INTRA-ARTICULAR; INTRALESIONAL; INTRAMUSCULAR; INTRAVENOUS; SOFT TISSUE
Status: DISCONTINUED | OUTPATIENT
Start: 2017-08-04 | End: 2017-08-04

## 2017-08-04 RX ORDER — ROCURONIUM BROMIDE 10 MG/ML
INJECTION, SOLUTION INTRAVENOUS
Status: DISCONTINUED | OUTPATIENT
Start: 2017-08-04 | End: 2017-08-04

## 2017-08-04 RX ORDER — MIDAZOLAM HYDROCHLORIDE 1 MG/ML
INJECTION, SOLUTION INTRAMUSCULAR; INTRAVENOUS
Status: DISCONTINUED | OUTPATIENT
Start: 2017-08-04 | End: 2017-08-04

## 2017-08-04 RX ORDER — FENTANYL CITRATE 50 UG/ML
INJECTION, SOLUTION INTRAMUSCULAR; INTRAVENOUS
Status: DISCONTINUED | OUTPATIENT
Start: 2017-08-04 | End: 2017-08-04

## 2017-08-04 RX ORDER — HYDROCODONE BITARTRATE AND ACETAMINOPHEN 5; 325 MG/1; MG/1
1 TABLET ORAL EVERY 4 HOURS PRN
Status: DISCONTINUED | OUTPATIENT
Start: 2017-08-04 | End: 2017-08-04 | Stop reason: HOSPADM

## 2017-08-04 RX ORDER — HYDROMORPHONE HYDROCHLORIDE 1 MG/ML
0.2 INJECTION, SOLUTION INTRAMUSCULAR; INTRAVENOUS; SUBCUTANEOUS EVERY 5 MIN PRN
Status: DISCONTINUED | OUTPATIENT
Start: 2017-08-04 | End: 2017-08-04 | Stop reason: HOSPADM

## 2017-08-04 RX ORDER — HYDROCODONE BITARTRATE AND ACETAMINOPHEN 7.5; 325 MG/1; MG/1
1 TABLET ORAL EVERY 6 HOURS PRN
Qty: 60 TABLET | Refills: 0 | Status: SHIPPED | OUTPATIENT
Start: 2017-08-04 | End: 2017-08-23

## 2017-08-04 RX ORDER — PROPOFOL 10 MG/ML
VIAL (ML) INTRAVENOUS
Status: DISCONTINUED | OUTPATIENT
Start: 2017-08-04 | End: 2017-08-04

## 2017-08-04 RX ORDER — LIDOCAINE HYDROCHLORIDE 10 MG/ML
INJECTION, SOLUTION INTRAVENOUS
Status: DISCONTINUED | OUTPATIENT
Start: 2017-08-04 | End: 2017-08-04

## 2017-08-04 RX ORDER — SODIUM CHLORIDE 9 MG/ML
INJECTION, SOLUTION INTRAVENOUS CONTINUOUS
Status: DISCONTINUED | OUTPATIENT
Start: 2017-08-04 | End: 2017-08-04 | Stop reason: HOSPADM

## 2017-08-04 RX ORDER — SODIUM CHLORIDE 9 MG/ML
INJECTION, SOLUTION INTRAVENOUS CONTINUOUS PRN
Status: DISCONTINUED | OUTPATIENT
Start: 2017-08-04 | End: 2017-08-04

## 2017-08-04 RX ORDER — HYDROCODONE BITARTRATE AND ACETAMINOPHEN 5; 325 MG/1; MG/1
TABLET ORAL
Status: DISCONTINUED
Start: 2017-08-04 | End: 2017-08-04 | Stop reason: HOSPADM

## 2017-08-04 RX ORDER — NEOSTIGMINE METHYLSULFATE 1 MG/ML
INJECTION, SOLUTION INTRAVENOUS
Status: DISCONTINUED | OUTPATIENT
Start: 2017-08-04 | End: 2017-08-04

## 2017-08-04 RX ORDER — LIDOCAINE HYDROCHLORIDE 10 MG/ML
1 INJECTION, SOLUTION EPIDURAL; INFILTRATION; INTRACAUDAL; PERINEURAL ONCE
Status: COMPLETED | OUTPATIENT
Start: 2017-08-04 | End: 2017-08-04

## 2017-08-04 RX ORDER — ONDANSETRON 2 MG/ML
INJECTION INTRAMUSCULAR; INTRAVENOUS
Status: DISCONTINUED | OUTPATIENT
Start: 2017-08-04 | End: 2017-08-04

## 2017-08-04 RX ORDER — ACETAMINOPHEN 10 MG/ML
INJECTION, SOLUTION INTRAVENOUS
Status: DISCONTINUED | OUTPATIENT
Start: 2017-08-04 | End: 2017-08-04

## 2017-08-04 RX ORDER — SODIUM CHLORIDE 0.9 % (FLUSH) 0.9 %
3 SYRINGE (ML) INJECTION
Status: DISCONTINUED | OUTPATIENT
Start: 2017-08-04 | End: 2017-08-04 | Stop reason: HOSPADM

## 2017-08-04 RX ORDER — BUPIVACAINE HYDROCHLORIDE 5 MG/ML
INJECTION, SOLUTION EPIDURAL; INTRACAUDAL
Status: DISCONTINUED | OUTPATIENT
Start: 2017-08-04 | End: 2017-08-04 | Stop reason: HOSPADM

## 2017-08-04 RX ORDER — HYDROMORPHONE HYDROCHLORIDE 1 MG/ML
INJECTION, SOLUTION INTRAMUSCULAR; INTRAVENOUS; SUBCUTANEOUS
Status: COMPLETED
Start: 2017-08-04 | End: 2017-08-04

## 2017-08-04 RX ORDER — GLYCOPYRROLATE 0.2 MG/ML
INJECTION INTRAMUSCULAR; INTRAVENOUS
Status: DISCONTINUED | OUTPATIENT
Start: 2017-08-04 | End: 2017-08-04

## 2017-08-04 RX ADMIN — SODIUM CHLORIDE: 0.9 INJECTION, SOLUTION INTRAVENOUS at 09:08

## 2017-08-04 RX ADMIN — BUPIVACAINE HYDROCHLORIDE 6 ML: 5 INJECTION, SOLUTION EPIDURAL; INTRACAUDAL; PERINEURAL at 10:08

## 2017-08-04 RX ADMIN — DEXAMETHASONE SODIUM PHOSPHATE 4 MG: 4 INJECTION, SOLUTION INTRAMUSCULAR; INTRAVENOUS at 09:08

## 2017-08-04 RX ADMIN — HYDROMORPHONE HYDROCHLORIDE 0.2 MG: 1 INJECTION, SOLUTION INTRAMUSCULAR; INTRAVENOUS; SUBCUTANEOUS at 11:08

## 2017-08-04 RX ADMIN — NEOSTIGMINE METHYLSULFATE 4 MG: 1 INJECTION INTRAVENOUS at 10:08

## 2017-08-04 RX ADMIN — HYDROMORPHONE HYDROCHLORIDE 0.2 MG: 1 INJECTION, SOLUTION INTRAMUSCULAR; INTRAVENOUS; SUBCUTANEOUS at 10:08

## 2017-08-04 RX ADMIN — FENTANYL CITRATE 100 MCG: 50 INJECTION, SOLUTION INTRAMUSCULAR; INTRAVENOUS at 09:08

## 2017-08-04 RX ADMIN — SODIUM CHLORIDE: 0.9 INJECTION, SOLUTION INTRAVENOUS at 08:08

## 2017-08-04 RX ADMIN — LIDOCAINE HYDROCHLORIDE 1 MG: 10 INJECTION, SOLUTION EPIDURAL; INFILTRATION; INTRACAUDAL; PERINEURAL at 08:08

## 2017-08-04 RX ADMIN — LIDOCAINE HYDROCHLORIDE 100 MG: 10 INJECTION, SOLUTION INTRAVENOUS at 09:08

## 2017-08-04 RX ADMIN — ACETAMINOPHEN 1000 MG: 10 INJECTION, SOLUTION INTRAVENOUS at 09:08

## 2017-08-04 RX ADMIN — HYDROCODONE BITARTRATE AND ACETAMINOPHEN 1 TABLET: 5; 325 TABLET ORAL at 11:08

## 2017-08-04 RX ADMIN — ONDANSETRON 4 MG: 2 INJECTION INTRAMUSCULAR; INTRAVENOUS at 09:08

## 2017-08-04 RX ADMIN — ROCURONIUM BROMIDE 50 MG: 10 INJECTION, SOLUTION INTRAVENOUS at 09:08

## 2017-08-04 RX ADMIN — PROPOFOL 200 MG: 10 INJECTION, EMULSION INTRAVENOUS at 09:08

## 2017-08-04 RX ADMIN — GLYCOPYRROLATE 0.4 MG: 0.2 INJECTION, SOLUTION INTRAMUSCULAR; INTRAVENOUS at 10:08

## 2017-08-04 RX ADMIN — MIDAZOLAM HYDROCHLORIDE 2 MG: 1 INJECTION, SOLUTION INTRAMUSCULAR; INTRAVENOUS at 09:08

## 2017-08-04 NOTE — OP NOTE
DATE OF PROCEDURE:  08/04/2017    PREOPERATIVE DIAGNOSIS:  Biliary colic.    POSTOPERATIVE DIAGNOSIS:  Biliary colic.    PROCEDURE:  Laparoscopic cholecystectomy.    SURGEON:  Nik Shrestha M.D.    ASSISTANT:  Jef Lamar M.D. (RES)    ANESTHESIA:  General.    CLINICAL NOTE:  The patient is a 47-year-old female with biliary colic secondary   to gallstones.    PROCEDURE NOTE:  Following induction of adequate general anesthesia, the   patient's abdomen was prepped and draped with ChloraPrep.  I made an   infraumbilical incision, dissected down the linea alba, which was grasped   between clamps and then we incised the linea alba.  I entered the peritoneal   cavity under direct vision and then placed a balloon tip trocar through this   site.  We insufflated creating a pneumoperitoneum of 15 mmHg intra-abdominal   pressure.  I then placed the laparoscope through the port into the peritoneum,   which documented our proper positioning and allowed under direct vision, the   placement of an additional upper midline 11 mm port and two 5 mm subcostal   ports.  Then, the assistant through the subcostal ports retracted a very   distended gallbladder over the dome of the liver and the  through the   upper midline port dissected free the hepatoduodenal ligament.  We clearly   identified both the cystic duct and artery, and obtained the critical view.  We   doubly clipped each of these structures and then transected them.  We then   removed the gallbladder from its attachments to the liver bed with cautery and   brought out through the upper midline port.  We then irrigated and obtained   final hemostasis in the liver bed with cautery and then removed all instruments   and relieved the pneumoperitoneum.  We closed the fascia of both midline port   sites with 0 Vicryl suture and then we infiltrated Marcaine for postoperative   pain control and closed all skin incisions with subcuticular 4-0 Monocryl   suture.  Sterile  dressings were then applied and the procedure was terminated   with the patient tolerating it well.    ESTIMATED BLOOD LOSS:  20 mL.      MCT/YANI  dd: 08/04/2017 11:19:38 (CDT)  td: 08/04/2017 12:46:50 (CDT)  Doc ID   #[id number]  Job ID #[id number]    CC:

## 2017-08-04 NOTE — PROGRESS NOTES
Dr Garza notified regarding intermittent bradycardia. Pt asymptomatic and pt returns to NSR when awake. MD okay with sending to phase 2.

## 2017-08-04 NOTE — ANESTHESIA POSTPROCEDURE EVALUATION
Anesthesia Post Evaluation    Patient: Emily Pina    Procedure(s) Performed: Procedure(s) (LRB):  CHOLECYSTECTOMY-LAPAROSCOPIC (N/A)    Final Anesthesia Type: general  Patient location during evaluation: PACU  Patient participation: Yes- Able to Participate  Level of consciousness: awake and alert and oriented  Post-procedure vital signs: reviewed and stable  Pain management: adequate  Airway patency: patent  PONV status at discharge: No PONV  Anesthetic complications: no      Cardiovascular status: blood pressure returned to baseline  Respiratory status: unassisted  Hydration status: euvolemic  Follow-up not needed.        Visit Vitals  BP (!) 111/59   Pulse (!) 52   Temp 36.4 °C (97.5 °F) (Oral)   Resp 19   LMP 05/14/2014 (Exact Date)   SpO2 96%   Breastfeeding? No       Pain/Qiana Score: Pain Assessment Performed: Yes (8/4/2017 11:56 AM)  Presence of Pain: complains of pain/discomfort (8/4/2017 11:56 AM)  Pain Rating Prior to Med Admin: 7 (8/4/2017 11:19 AM)  Qiana Score: 10 (8/4/2017 11:56 AM)

## 2017-08-04 NOTE — H&P (VIEW-ONLY)
History & Physical    SUBJECTIVE:     History of Present Illness:  Patient is a 47 y.o. female presents with RUQ pain, nausea and vomiting. Onset of symptoms was abrupt starting 1 month ago with gradually worsening course since that time. Patient denies fever or jaundice. Symptoms are aggravated by food. Symptoms improve with time.      Chief Complaint   Patient presents with    Consult       Review of patient's allergies indicates:   Allergen Reactions    Bactrim [sulfamethoxazole-trimethoprim] Rash       No current facility-administered medications for this visit.      No current outpatient prescriptions on file.     Facility-Administered Medications Ordered in Other Visits   Medication Dose Route Frequency Provider Last Rate Last Dose    0.9%  NaCl infusion   Intravenous Continuous Delmy LINDSAY Escobar PA-C        CEFAZOLIN 2G/20 ML SYRINGE (PYXIS) IV syringe 2 g 20 mL  2 g Intravenous On Call Procedure Delmy Escobar PA-C        lidocaine (PF) 10 mg/ml (1%) injection 10 mg  1 mL Intradermal Once Nik Shrestha MD           Past Medical History:   Diagnosis Date    Asthma in remission     COPD (chronic obstructive pulmonary disease)     Fibromyalgia     Lupus     Recurrent urticaria 1/6/2017    Subacute cutaneous lupus erythematosus      Past Surgical History:   Procedure Laterality Date    TONSILLECTOMY       Family History   Problem Relation Age of Onset    Hypertension Mother     Thyroid disease Mother     Arthritis Father     Hyperlipidemia Father     Cancer Father     Lupus Sister     Asthma Sister     No Known Problems Brother     No Known Problems Maternal Aunt     No Known Problems Maternal Uncle     No Known Problems Paternal Aunt     No Known Problems Paternal Uncle     No Known Problems Maternal Grandmother     No Known Problems Maternal Grandfather     No Known Problems Paternal Grandmother     No Known Problems Paternal Grandfather     Amblyopia Neg Hx     Blindness  "Neg Hx     Cataracts Neg Hx     Diabetes Neg Hx     Glaucoma Neg Hx     Macular degeneration Neg Hx     Retinal detachment Neg Hx     Strabismus Neg Hx     Stroke Neg Hx      Social History   Substance Use Topics    Smoking status: Current Every Day Smoker     Packs/day: 2.00     Types: Cigarettes    Smokeless tobacco: Former User     Quit date: 7/8/2013    Alcohol use No      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         Review of Systems:           Review of Systems  Anesthesia Hx:  No problems with previous Anesthesia   Social:  Non-Smoker    Hematology/Oncology:  Hematology Normal   Oncology Normal     EENT/Dental:EENT/Dental Normal   Cardiovascular:  Cardiovascular Normal     Renal/:  Renal/ Normal     Hepatic/GI:  Hepatic/GI Normal    Musculoskeletal:  Musculoskeletal Normal    Neurological:   Neuromuscular Disease,    Dermatological:  Skin Normal    Psych:  Psychiatric Normal           OBJECTIVE:     Vital Signs (Most Recent)  Temp: 98.4 °F (36.9 °C) (07/21/17 1051)  Pulse: 79 (07/21/17 1051)  BP: 121/70 (07/21/17 1051)  5' 4" (1.626 m)  65.6 kg (144 lb 10 oz)     Physical Exam:    Physical Exam  General:  Well nourished    Airway/Jaw/Neck:  AIRWAY FINDINGS: Normal      Eyes/Ears/Nose:  EYES/EARS/NOSE FINDINGS: Normal   Dental:  DENTAL FINDINGS: Normal   Chest/Lungs:  Chest/Lungs Clear    Heart/Vascular:  Heart Findings: Normal Vascular Findings: Normal    Abdomen:  Abdomen Findings:  Tenderness     Musculoskeletal:  Musculoskeletal Findings: Normal   Skin:  Skin Findings: Normal         Laboratory  none    Diagnostic Results:  US: Reviewed  gallstones    ASSESSMENT/PLAN:     Biliary colic    PLAN:Plan     laparosciopic cholecystectomy       "

## 2017-08-04 NOTE — ANESTHESIA PREPROCEDURE EVALUATION
"                                                                                                             08/04/2017  Emily Pina is a 47 y.o., female.    Anesthesia Evaluation    I have reviewed the Patient Summary Reports.    I have reviewed the Nursing Notes.      Review of Systems  Anesthesia Hx:  No problems with previous Anesthesia    Social:  Smoker    Cardiovascular:   Exercise tolerance: good Denies Hypertension.  Denies CAD.     Denies Angina.        Pulmonary:   Asthma (Last inhaler use "several weeks ago") asymptomatic and mild Denies Shortness of breath. Sleep Apnea    Renal/:   Denies Chronic Renal Disease.     Hepatic/GI:   GERD, well controlled Denies Liver Disease. gallstones   Musculoskeletal:   Arthritis  Lupus - only having MSK symptoms, no other disease involvement   Neurological:   Denies CVA. Denies Seizures.    Endocrine:   Denies Diabetes. Hypothyroidism        Physical Exam  General:  Well nourished    Airway/Jaw/Neck:  Airway Findings: Mallampati: II TM Distance: Normal, at least 6 cm  Jaw/Neck Findings:  Neck ROM: Normal ROM       Chest/Lungs:  Chest/Lungs Findings: Normal Respiratory Rate, Clear to auscultation     Heart/Vascular:  Heart Findings: Rate: Normal        Mental Status:  Mental Status Findings:  Cooperative, Alert and Oriented         Anesthesia Plan  Type of Anesthesia, risks & benefits discussed:  Anesthesia Type:  general  Patient's Preference: GA  Intra-op Monitoring Plan: standard ASA monitors  Intra-op Monitoring Plan Comments:   Post Op Pain Control Plan: multimodal analgesia, IV/PO Opiods PRN and per primary service following discharge from PACU  Post Op Pain Control Plan Comments:   Induction:   IV  Beta Blocker:  Patient is not currently on a Beta-Blocker (No further documentation required).       Informed Consent: Patient understands risks and agrees with Anesthesia plan.  Questions answered. Anesthesia consent signed with patient.  ASA " Score: 2     Day of Surgery Review of History & Physical:    H&P update referred to the surgeon.     Anesthesia Plan Notes: The patient's PMH was reviewed and PE was performed  Informed consent obtained  Plan for GETA        Ready For Surgery From Anesthesia Perspective.

## 2017-08-04 NOTE — TRANSFER OF CARE
Anesthesia Transfer of Care Note    Patient: Emily Pina    Procedure(s) Performed: Procedure(s) (LRB):  CHOLECYSTECTOMY-LAPAROSCOPIC (N/A)    Patient location: PACU    Anesthesia Type: general    Transport from OR: Transported from OR on room air with adequate spontaneous ventilation. Transported from OR on 6-10 L/min O2 by face mask with adequate spontaneous ventilation    Post pain: adequate analgesia    Post assessment: no apparent anesthetic complications and tolerated procedure well    Post vital signs: stable    Level of consciousness: awake and alert    Nausea/Vomiting: no nausea/vomiting    Complications: none    Transfer of care protocol was followed      Last vitals:   Visit Vitals  LMP 05/14/2014 (Exact Date)   Breastfeeding? No

## 2017-08-04 NOTE — BRIEF OP NOTE
[facility]  Brief Operative Note     SUMMARY     Surgery Date: 8/4/2017     Surgeon(s) and Role:     * Jef Lamar MD - Resident - Assisting     * Tania Vora MD - Resident - Assisting     * Nik Shrestha MD - Primary      Pre-op Diagnosis:  Biliary colic [K80.50]  Gallstones [K80.20]    Post-op Diagnosis:  Post-Op Diagnosis Codes:     * Biliary colic [K80.50]     * Gallstones [K80.20]    Procedure(s) (LRB):  CHOLECYSTECTOMY-LAPAROSCOPIC (N/A)    Anesthesia: General    Description of the findings of the procedure: laparoscopic cholecystectomy    Findings/Key Components: chronic cholecystitis with significant number of gallstone, cystic duct and artery triply clipped    Estimated Blood Loss: * No values recorded between 8/4/2017  9:33 AM and 8/4/2017 10:31 AM *         Specimens:   Specimen (12h ago through future)    Start     Ordered    08/04/17 1013  Specimen to Pathology - Surgery  Once     Comments:  1.) Gallbladder - PERMANENT      08/04/17 1013          Discharge Note    SUMMARY     Admit Date: 8/4/2017    Discharge Date and Time:  08/04/2017 10:34 AM    Hospital Course (synopsis of major diagnoses, care, treatment, and services provided during the course of the hospital stay): The patient underwent Procedure(s) (LRB):  CHOLECYSTECTOMY-LAPAROSCOPIC (N/A). The patient tolerated the outpatient procedure without issue and was discharged home.       Final Diagnosis: Post-Op Diagnosis Codes:     * Biliary colic [K80.50]     * Gallstones [K80.20]    Disposition: Home or Self Care    Follow Up/Patient Instructions: see below    Medications:  Reconciled Home Medications:   Current Discharge Medication List      START taking these medications    Details   !! hydrocodone-acetaminophen 7.5-325mg (NORCO) 7.5-325 mg per tablet Take 1 tablet by mouth every 6 (six) hours as needed for Pain.  Qty: 25 tablet, Refills: 0       !! - Potential duplicate medications found. Please  discuss with provider.      CONTINUE these medications which have NOT CHANGED    Details   albuterol (PROVENTIL) 2.5 mg /3 mL (0.083 %) nebulizer solution Take 3 mLs (2.5 mg total) by nebulization every 6 (six) hours as needed for Wheezing.  Qty: 180 mL, Refills: 1      cetirizine (ZYRTEC) 10 MG tablet Take 10 mg by mouth once daily.      fluticasone (FLONASE) 50 mcg/actuation nasal spray 2 sprays by Each Nare route once daily.  Qty: 16 g, Refills: 2    Associated Diagnoses: Chronic allergic rhinitis      fluticasone-salmeterol (ADVAIR HFA) 115-21 mcg/actuation HFAA Inhale 2 puffs into the lungs 2 (two) times daily.  Qty: 1 Inhaler, Refills: 6    Associated Diagnoses: Chronic obstructive asthma, unspecified      gabapentin (NEURONTIN) 600 MG tablet TAKE 1 TABLET ONE TIME DAILY  Qty: 90 tablet, Refills: 3      !! hydrocodone-acetaminophen 7.5-325mg (NORCO) 7.5-325 mg per tablet Take 1 tablet by mouth every 6 (six) hours as needed.  Qty: 60 tablet, Refills: 0      hydroxychloroquine (PLAQUENIL) 200 mg tablet TAKE 1 TABLET ONE TIME DAILY  Qty: 90 tablet, Refills: 3      ketorolac (TORADOL) 10 mg tablet Take 1 tablet (10 mg total) by mouth 3 (three) times daily as needed for Pain.  Qty: 20 tablet, Refills: 0    Associated Diagnoses: Calculus of gallbladder without cholecystitis without obstruction      LACTOBACILLUS ACIDOPHILUS (PROBIOTIC ORAL) Take 1 tablet by mouth once daily.      levothyroxine (SYNTHROID) 100 MCG tablet TAKE ONE TABLET DAILY EXCEPT ON SUNDAYS TAKE 1/2 TABLET  Qty: 85 tablet, Refills: 1    Associated Diagnoses: Hyperthyroidism      montelukast (SINGULAIR) 10 mg tablet TAKE 1 TABLET EVERY EVENING  Qty: 90 tablet, Refills: 3    Associated Diagnoses: Rhinitis, unspecified type      ondansetron (ZOFRAN) 4 MG tablet Take 1 tablet (4 mg total) by mouth every 6 (six) hours as needed.  Qty: 24 tablet, Refills: 0    Associated Diagnoses: Calculus of gallbladder without cholecystitis without obstruction       pantoprazole (PROTONIX) 40 MG tablet TAKE 1 TABLET BY MOUTH ONCE DAILY  Qty: 90 tablet, Refills: 3      aluminum hydrox-magnesium carb (GAVISCON EXTRA STRENGTH) 254-237.5 mg/5 mL Susp Take by mouth as needed.       cyclobenzaprine (FLEXERIL) 5 MG tablet TAKE 1 TABLET (5 MG TOTAL) BY MOUTH 3 (THREE) TIMES DAILY AS NEEDED.  Refills: 3      ibuprofen (ADVIL,MOTRIN) 800 MG tablet Take 1 tablet (800 mg total) by mouth 3 (three) times daily.  Qty: 40 tablet, Refills: 0      naproxen (NAPROSYN) 500 MG tablet TAKE 1 TABLET (500 MG TOTAL) BY MOUTH 2 (TWO) TIMES DAILY.  Refills: 2    Associated Diagnoses: Calculus of gallbladder without cholecystitis without obstruction       !! - Potential duplicate medications found. Please discuss with provider.      STOP taking these medications       epinephrine (EPIPEN 2-GIGI) 0.3 mg/0.3 mL AtIn Comments:   Reason for Stopping:               Discharge Procedure Orders  Diet general     Other restrictions (specify):   Order Comments: May resume diet as tolerated.   No heavy lifting or strenuous exercise until cleared by physician.  May shower in 48 hours; no baths or submerging in water (swimming,etc) for at least 2 weeks  Keep incision clean with soap and water.  Leave steri strips in place they will fall off on their own  Monitor for temp > 101.4, bleeding, redness, purulent drainage, or any extreme pain. If any occur, please call MD or go to ER.  Please resume all home meds and take newly prescribed meds.  Follow up with Dr. Shrestha in 2 weeks in clinic for post-op check. If no appointment made in 1 week, please call clinic.     Call MD for:  temperature >100.4     Call MD for:  persistent nausea and vomiting or diarrhea     Call MD for:  severe uncontrolled pain     Call MD for:  redness, tenderness, or signs of infection (pain, swelling, redness, odor or green/yellow discharge around incision site)     Remove dressing in 48 hours     Call MD for:  difficulty breathing or increased  cough       Follow-up Information     Nik Shrestha MD. Schedule an appointment as soon as possible for a visit in 2 weeks.    Specialty:  General Surgery  Why:  s/p akiko carrera  Contact information:  [address]  [facility] [address] [phone]

## 2017-08-04 NOTE — H&P
[facility]  General Surgery  History & Physical    Patient Name: Emily Pina  MRN: [id number]  Admission Date: 8/4/2017  Attending Physician: Nik Shrestha MD   Primary Care Provider: Emma Subramanian MD    Patient information was obtained from patient, past medical records and ER records.     Subjective:     Chief Complaint/Reason for Admission: cholelithiasis    History of Present Illness:  Patient is a 47 y.o. female with history of fibromyalgia who on 7/17 presented to the ED after an episode of RUQ abdominal pain for 1 day with associated nausea and vomiting. She underwent an U/S then which showed 5 mm GB wall thickness with cholelithasis. CBD 0.3 cm. She was d/c from the ED and set up for laparoscopic cholecystectomy for today.    No prior surgical history.     No current facility-administered medications on file prior to encounter.      Current Outpatient Prescriptions on File Prior to Encounter   Medication Sig    albuterol (PROVENTIL) 2.5 mg /3 mL (0.083 %) nebulizer solution Take 3 mLs (2.5 mg total) by nebulization every 6 (six) hours as needed for Wheezing.    aluminum hydrox-magnesium carb (GAVISCON EXTRA STRENGTH) 254-237.5 mg/5 mL Susp Take by mouth as needed.     cetirizine (ZYRTEC) 10 MG tablet Take 10 mg by mouth once daily.    cyclobenzaprine (FLEXERIL) 5 MG tablet TAKE 1 TABLET (5 MG TOTAL) BY MOUTH 3 (THREE) TIMES DAILY AS NEEDED.    epinephrine (EPIPEN 2-GIGI) 0.3 mg/0.3 mL AtIn Inject 0.3 mLs (0.3 mg total) into the muscle once.    fluticasone (FLONASE) 50 mcg/actuation nasal spray 2 sprays by Each Nare route once daily.    fluticasone-salmeterol (ADVAIR HFA) 115-21 mcg/actuation HFAA Inhale 2 puffs into the lungs 2 (two) times daily.    gabapentin (NEURONTIN) 600 MG tablet TAKE 1 TABLET ONE TIME DAILY    hydrocodone-acetaminophen 7.5-325mg (NORCO) 7.5-325 mg per tablet Take 1 tablet by mouth every 6 (six) hours as needed.     hydroxychloroquine (PLAQUENIL) 200 mg tablet TAKE 1 TABLET ONE TIME DAILY    ketorolac (TORADOL) 10 mg tablet Take 1 tablet (10 mg total) by mouth 3 (three) times daily as needed for Pain.    LACTOBACILLUS ACIDOPHILUS (PROBIOTIC ORAL) Take 1 tablet by mouth once daily.    levothyroxine (SYNTHROID) 100 MCG tablet TAKE ONE TABLET DAILY EXCEPT ON SUNDAYS TAKE 1/2 TABLET    montelukast (SINGULAIR) 10 mg tablet TAKE 1 TABLET EVERY EVENING    naproxen (NAPROSYN) 500 MG tablet TAKE 1 TABLET (500 MG TOTAL) BY MOUTH 2 (TWO) TIMES DAILY.    ondansetron (ZOFRAN) 4 MG tablet Take 1 tablet (4 mg total) by mouth every 6 (six) hours as needed.    pantoprazole (PROTONIX) 40 MG tablet TAKE 1 TABLET BY MOUTH ONCE DAILY       Review of patient's allergies indicates:   Allergen Reactions    Bactrim [sulfamethoxazole-trimethoprim] Rash       Past Medical History:   Diagnosis Date    Asthma in remission     COPD (chronic obstructive pulmonary disease)     Fibromyalgia     Lupus     Recurrent urticaria 1/6/2017    Subacute cutaneous lupus erythematosus      Past Surgical History:   Procedure Laterality Date    TONSILLECTOMY       Family History     Problem Relation (Age of Onset)    Arthritis Father    Asthma Sister    Cancer Father    Hyperlipidemia Father    Hypertension Mother    Lupus Sister    No Known Problems Brother, Maternal Aunt, Maternal Uncle, Paternal Aunt, Paternal Uncle, Maternal Grandmother, Maternal Grandfather, Paternal Grandmother, Paternal Grandfather    Thyroid disease Mother        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     Drug use: No    Sexual activity: Yes     Partners: Male     Review of Systems   Constitutional: Positive for appetite change. Negative for activity change, chills and fever.   HENT:  Negative.    Respiratory: Negative for chest tightness and shortness of breath.    Cardiovascular: Negative.  Negative for chest pain and palpitations.   Gastrointestinal: Positive for abdominal distention and abdominal pain. Negative for blood in stool, nausea and vomiting.   Musculoskeletal: Positive for myalgias. Negative for arthralgias.   Skin: Negative.    All other systems reviewed and are negative.    Objective:     Vital Signs (Most Recent):    Vital Signs (24h Range):           There is no height or weight on file to calculate BMI.    Physical Exam   Constitutional: She is oriented to person, place, and time. She appears well-developed and well-nourished. No distress.   HENT:   Head: Normocephalic.   Neck: Normal range of motion. Neck supple.   Cardiovascular: Normal rate and regular rhythm.    Pulmonary/Chest: Effort normal.   Abdominal: Soft. Bowel sounds are normal. She exhibits no distension. There is no tenderness.   Musculoskeletal: Normal range of motion.   Neurological: She is alert and oriented to person, place, and time.   Nursing note and vitals reviewed.      Significant Labs:  CBC: No results for input(s): WBC, RBC, HGB, HCT, PLT, MCV, MCH, MCHC in the last 168 hours.  BMP: No results for input(s): GLU, NA, K, CL, CO2, BUN, CREATININE, CALCIUM, MG in the last 168 hours.  CMP: No results for input(s): GLU, CALCIUM, ALBUMIN, PROT, NA, K, CO2, CL, BUN, CREATININE, ALKPHOS, ALT, AST, BILITOT in the last 168 hours.    Significant Diagnostics:  I have reviewed all pertinent imaging results/findings within the past 24 hours.    Assessment/Plan:     Active Diagnoses:    Diagnosis Date Noted POA    Biliary colic [K80.50] 08/04/2017 Yes      Problems Resolved During this Admission:    Diagnosis Date Noted Date Resolved POA     VTE Risk Mitigation         Ordered     Place sequential compression device  Until discontinued      08/04/17 0805     Place DAIJA hose  Until discontinued      08/04/17 0805       48 y/o female with symptomatic cholelithiasis    - To the OR for laparoscopic cholecystectomy  - Consents obtained  The procedure was described in detail to the patient and all questions were answered. I did inform the patient of the risks that are the most common risks and that there are other less likely risks that are too numerous to elaborate. The patient is aware and has agreed to undergo the procedure as detailed on the consent form.        Jef Lamar MD  General Surgery  [facility]

## 2017-08-04 NOTE — DISCHARGE INSTRUCTIONS
May resume diet as tolerated.   No heavy lifting or strenuous exercise until cleared by physician.  May shower in 48 hours; no baths or submerging in water (swimming,etc) for at least 2 weeks  Keep incision clean with soap and water.  Leave steri strips in place they will fall off on their own  Monitor for temp > 101.4, bleeding, redness, purulent drainage, or any extreme pain. If any occur, please call MD or go to ER.  Please resume all home meds and take newly prescribed meds.  Follow up with Dr. Shrestha in 2 weeks in clinic for post-op check. If no appointment made in 1 week, please call clinic.        Discharge Instructions for Laparoscopic Cholecystectomy  You have had a procedure known as a laparoscopic cholecystectomy. A laparoscopic cholecystectomy is a procedure to remove your gallbladder. People who have this procedure usually recover more quickly and have less pain than with open gallbladder surgery (called open cholecystectomy). Many surgeons recommend a low-fat diet, avoiding fried food in particular, for the first month after surgery.   You can live a full and healthy life without your gallbladder. This includes eating the foods and doing the things you enjoyed before your gallbladder problems started.  Home care  Recommendations for home care include the following:   · Ask someone to drive you to your appointments for the next 3 days. Dont drive until you are no longer taking pain medicine and are able to step on the brake pedal without hesitation.   · Wash the skin around your incision daily with mild soap and water. It's OK to shower the day after your surgery.  · Eat your regular diet. It is wise to stay away from rich, greasy, or spicy food for a few days.  · Remember, it takes at least 1 week for you to get most of your strength and energy back.  · Make an office visit to talk to your healthcare provider if the following symptoms dont go away within a week after your  surgery:  ¨ Fatigue  ¨ Pain around the incision  ¨ Diarrhea or constipation  ¨ Loss of appetite     When to call your healthcare provider  Call your healthcare provider immediately if you have any of the following:  · Yellowing of your eyes or skin (jaundice)  · Chills  · Fever of 100.4°F (38.0°C) or higher, or as directed by your healthcare provider   · Redness, swelling, increasing pain, pus, or a foul smell at the incision site  · Dark or rust-colored urine  · Stool that is jennifer-colored or light in color instead of brown  · Increasing belly pain  · Rectal bleeding  · Leg swelling or shortness of breath   Date Last Reviewed: 7/1/2016  © 1774-7738 [employer]. [address] [place] [address], [place] [address]. All rights reserved. This information is not intended as a substitute for professional medical care. Always follow your healthcare professional's instructions.

## 2017-08-04 NOTE — PLAN OF CARE
Problem: Patient Care Overview  Goal: Plan of Care Review  Outcome: Ongoing (interventions implemented as appropriate)  Vital signs stable but bradycardic at times. Pain controlled with PRN medications. Pain controlled with PRN medications. Denies nausea. Surgical dressings remain CDI.

## 2017-08-04 NOTE — ANESTHESIA RELEASE NOTE
Anesthesia Release from PACU Note    Patient: Emily CentenoKyungAditya    Procedure(s) Performed: Procedure(s) (LRB):  CHOLECYSTECTOMY-LAPAROSCOPIC (N/A)    Anesthesia type: general    Post pain: Adequate analgesia    Post assessment: no apparent anesthetic complications, tolerated procedure well and no evidence of recall    Last Vitals:   Visit Vitals  BP (!) 111/59   Pulse (!) 52   Temp 36.4 °C (97.5 °F) (Oral)   Resp 19   LMP 05/14/2014 (Exact Date)   SpO2 96%   Breastfeeding? No       Post vital signs: stable    Level of consciousness: awake, alert  and oriented    Nausea/Vomiting: no nausea/no vomiting    Complications: none    Airway Patency: patent    Respiratory: unassisted    Cardiovascular: stable and blood pressure at baseline    Hydration: euvolemic

## 2017-08-04 NOTE — PLAN OF CARE
Discharge instructions reviewed with patient and . Verbalizes understanding. Copies of instructions given to . Prescriptions given to . Tolerating PO fluids. Denies nausea. States pain is tolerable.

## 2017-08-06 ENCOUNTER — PATIENT MESSAGE (OUTPATIENT)
Dept: FAMILY MEDICINE | Facility: CLINIC | Age: 47
End: 2017-08-06

## 2017-08-07 ENCOUNTER — TELEPHONE (OUTPATIENT)
Dept: SURGERY | Facility: CLINIC | Age: 47
End: 2017-08-07

## 2017-08-07 NOTE — TELEPHONE ENCOUNTER
Pt called regarding PO instructions.  She may shower directly over the steri-strips on her incision sites.  She is s/p Lap Michelle from last week.  Keep the sites clean and dry and cover with a clean bandage as needed.  She also states that she has not had a BM since surgery.  She reports + flatus, denies nausea/vomiting, or abdominal pain.  She will increase her fluid intake, advance her diet as tolerated, and continue taking stool softeners with the narcotic pain medication.  She verbalized understanding and is agreeable to this plan of care.

## 2017-08-07 NOTE — TELEPHONE ENCOUNTER
----- Message from Shameka Dennis sent at 8/7/2017  1:51 PM CDT -----  Contact: self   Emily States that she needs a call returned by a nurse in reference to how long she can go without a bowel movement, Does she need to cover her strips with gauze?  Please call Emily [phone]. Thanks :)

## 2017-08-21 ENCOUNTER — OFFICE VISIT (OUTPATIENT)
Dept: SURGERY | Facility: CLINIC | Age: 47
End: 2017-08-21
Payer: MEDICARE

## 2017-08-21 VITALS
SYSTOLIC BLOOD PRESSURE: 126 MMHG | WEIGHT: 138.44 LBS | HEART RATE: 78 BPM | TEMPERATURE: 98 F | BODY MASS INDEX: 26.14 KG/M2 | HEIGHT: 61 IN | DIASTOLIC BLOOD PRESSURE: 67 MMHG

## 2017-08-21 DIAGNOSIS — Z09 POSTOP CHECK: Primary | ICD-10-CM

## 2017-08-21 PROCEDURE — 99024 POSTOP FOLLOW-UP VISIT: CPT | Mod: S$GLB,,, | Performed by: SURGERY

## 2017-08-21 PROCEDURE — 99999 PR PBB SHADOW E&M-EST. PATIENT-LVL III: CPT | Mod: PBBFAC,,, | Performed by: SURGERY

## 2017-08-21 RX ORDER — ALBUTEROL SULFATE 90 UG/1
AEROSOL, METERED RESPIRATORY (INHALATION)
Refills: 2 | COMMUNITY
Start: 2017-08-11 | End: 2018-01-03 | Stop reason: SDUPTHER

## 2017-08-21 NOTE — PROGRESS NOTES
"Emily Pina is a 47 y.o. female patient.   1. Postop check      Past Medical History:   Diagnosis Date    Asthma in remission     COPD (chronic obstructive pulmonary disease)     Fibromyalgia     Lupus     Recurrent urticaria 1/6/2017    Subacute cutaneous lupus erythematosus      Past Surgical History Pertinent Negatives:   Procedure Date Noted    ADENOIDECTOMY 10/14/2016    CATARACT EXTRACTION 05/22/2015    CORNEAL TRANSPLANT 05/22/2015    RETINAL DETACHMENT SURGERY 05/22/2015    SINUS SURGERY 10/14/2016    STRABISMUS SURGERY 05/22/2015    TYMPANOSTOMY TUBE PLACEMENT 10/14/2016     Scheduled Meds:  Continuous Infusions:  PRN Meds:    Review of patient's allergies indicates:   Allergen Reactions    Bactrim [sulfamethoxazole-trimethoprim] Rash     There are no hospital problems to display for this patient.    Blood pressure 126/67, pulse 78, temperature 98.2 °F (36.8 °C), height 5' 1" (1.549 m), weight 62.8 kg (138 lb 7.2 oz), last menstrual period 05/14/2014.    Subjective   Doing well since surgery -- reports she hasn't tried fatty foods yet.  States she hasn't been doing her normal activities and is taking it easy at home mostly    Objective   Vitals:    08/21/17 0918   BP: 126/67   Pulse: 78   Temp: 98.2 °F (36.8 °C)     NAD, AAOx3  RRR, no r/m/g  CTAB, no w/r/r  Abd s/nt/nd, BS normoactive  Ext 2+ DPs x4, no c/c/e  Incisions clean and dry -- steris removed    Path:  Chronic cholecystitis     Assessment & Plan     Cont lifting restrictions for 2 more weeks  F/u PRN  Jonathan Schoen, MD  8/21/2017  "

## 2017-08-23 RX ORDER — HYDROCODONE BITARTRATE AND ACETAMINOPHEN 7.5; 325 MG/1; MG/1
1 TABLET ORAL EVERY 6 HOURS PRN
Qty: 25 TABLET | Refills: 0 | OUTPATIENT
Start: 2017-08-23

## 2017-08-23 NOTE — TELEPHONE ENCOUNTER
There are 2 prescriptions in the computer for hydrocodone on August 4th one for 60 tabs of one for 25 tabs.  The staff will inform patient that it is too soon to receive refills.  If she has any concerns she can discuss this with Dr. Spaulding when he returns on Monday.

## 2017-08-30 ENCOUNTER — TELEPHONE (OUTPATIENT)
Dept: SMOKING CESSATION | Facility: CLINIC | Age: 47
End: 2017-08-30

## 2017-08-31 ENCOUNTER — CLINICAL SUPPORT (OUTPATIENT)
Dept: SMOKING CESSATION | Facility: CLINIC | Age: 47
End: 2017-08-31
Payer: COMMERCIAL

## 2017-08-31 DIAGNOSIS — F17.200 NICOTINE DEPENDENCE: Primary | ICD-10-CM

## 2017-08-31 PROCEDURE — 99407 BEHAV CHNG SMOKING > 10 MIN: CPT | Mod: S$GLB,,, | Performed by: INTERNAL MEDICINE

## 2017-09-05 ENCOUNTER — TELEPHONE (OUTPATIENT)
Dept: RHEUMATOLOGY | Facility: CLINIC | Age: 47
End: 2017-09-05

## 2017-09-05 RX ORDER — HYDROCODONE BITARTRATE AND ACETAMINOPHEN 7.5; 325 MG/1; MG/1
1 TABLET ORAL EVERY 6 HOURS PRN
Qty: 60 TABLET | Refills: 0 | Status: SHIPPED | OUTPATIENT
Start: 2017-09-05 | End: 2017-10-05

## 2017-09-15 RX ORDER — CYCLOBENZAPRINE HCL 5 MG
5 TABLET ORAL 3 TIMES DAILY PRN
Qty: 90 TABLET | Refills: 3 | Status: SHIPPED | OUTPATIENT
Start: 2017-09-15 | End: 2019-01-07 | Stop reason: SDUPTHER

## 2017-10-04 DIAGNOSIS — J31.0 RHINITIS: ICD-10-CM

## 2017-10-04 RX ORDER — MONTELUKAST SODIUM 10 MG/1
TABLET ORAL
Qty: 30 TABLET | Refills: 2 | OUTPATIENT
Start: 2017-10-04

## 2017-10-09 ENCOUNTER — OFFICE VISIT (OUTPATIENT)
Dept: RHEUMATOLOGY | Facility: CLINIC | Age: 47
End: 2017-10-09
Payer: MEDICARE

## 2017-10-09 VITALS
WEIGHT: 135.13 LBS | DIASTOLIC BLOOD PRESSURE: 81 MMHG | BODY MASS INDEX: 25.53 KG/M2 | HEART RATE: 98 BPM | RESPIRATION RATE: 18 BRPM | SYSTOLIC BLOOD PRESSURE: 114 MMHG

## 2017-10-09 DIAGNOSIS — M79.7 FIBROMYALGIA: Primary | ICD-10-CM

## 2017-10-09 DIAGNOSIS — L93.1 SUBACUTE CUTANEOUS LUPUS ERYTHEMATOSUS: ICD-10-CM

## 2017-10-09 PROCEDURE — 99999 PR PBB SHADOW E&M-EST. PATIENT-LVL III: CPT | Mod: PBBFAC,,, | Performed by: INTERNAL MEDICINE

## 2017-10-09 PROCEDURE — 99213 OFFICE O/P EST LOW 20 MIN: CPT | Mod: S$GLB,,, | Performed by: INTERNAL MEDICINE

## 2017-10-09 ASSESSMENT — ROUTINE ASSESSMENT OF PATIENT INDEX DATA (RAPID3)
PSYCHOLOGICAL DISTRESS SCORE: 6.6
PAIN SCORE: 9
AM STIFFNESS SCORE: 1, YES
TOTAL RAPID3 SCORE: 7.22
FATIGUE SCORE: 9.5
MDHAQ FUNCTION SCORE: 1.4
PATIENT GLOBAL ASSESSMENT SCORE: 8
WHEN YOU AWAKENED IN THE MORNING OVER THE LAST WEEK, PLEASE INDICATE THE AMOUNT OF TIME IT TAKES UNTIL YOU ARE AS LIMBER AS YOU WILL BE FOR THE DAY: 8 HRS

## 2017-10-11 NOTE — PROGRESS NOTES
History of present illness: 47-year-old female with subacute cutaneous lupus.  She remains on hydroxychloroquine.  Her rashes been doing well.  She underwent a cholecystectomy since her last visit.  She is recovered from that.  I follow her for fibromyalgia.  She complains of increased pain in her hands and feet.  She is still having the foot problem.  She never saw podiatry.  I also placed her on Naprosyn last visit.  She only took it for a month.  She thinks it might of been helping and she tolerated it.  She is taking Flexeril only as needed.  She does take gabapentin every night.  She has not been sleeping well.  She has had no joint swelling.  She has no other recent medical problems.    Physical examination:  Musculoskeletal: Full range of motion of all joints.  No synovitis.  No tender areas to palpation.    Assessment:  1.  Subacute cutaneous lupus  2.  Fibromyalgia    Plans:  1.  Resume Naprosyn 500 mg twice daily  2.  I recommend she take her Flexeril every night  3.  Continue gabapentin  4.  Continue Plaquenil.  She does need her Plaquenil eye exam  5.  I recommend soaking her feet  6.  Return to see me in 6 months

## 2017-10-12 DIAGNOSIS — E05.90 HYPERTHYROIDISM: ICD-10-CM

## 2017-10-13 ENCOUNTER — PATIENT MESSAGE (OUTPATIENT)
Dept: FAMILY MEDICINE | Facility: CLINIC | Age: 47
End: 2017-10-13

## 2017-10-13 RX ORDER — LEVOTHYROXINE SODIUM 100 UG/1
TABLET ORAL
Qty: 15 TABLET | Refills: 0 | Status: SHIPPED | OUTPATIENT
Start: 2017-10-13 | End: 2017-11-07 | Stop reason: SDUPTHER

## 2017-10-21 DIAGNOSIS — J31.0 RHINITIS: ICD-10-CM

## 2017-10-22 RX ORDER — MONTELUKAST SODIUM 10 MG/1
TABLET ORAL
Qty: 30 TABLET | Refills: 2 | OUTPATIENT
Start: 2017-10-22

## 2017-10-25 DIAGNOSIS — E05.90 HYPERTHYROIDISM: ICD-10-CM

## 2017-10-25 RX ORDER — LEVOTHYROXINE SODIUM 100 UG/1
TABLET ORAL
Qty: 15 TABLET | Refills: 0 | OUTPATIENT
Start: 2017-10-25

## 2017-10-26 ENCOUNTER — PATIENT MESSAGE (OUTPATIENT)
Dept: RHEUMATOLOGY | Facility: CLINIC | Age: 47
End: 2017-10-26

## 2017-10-26 RX ORDER — MELOXICAM 15 MG/1
15 TABLET ORAL DAILY
Qty: 30 TABLET | Refills: 6 | Status: SHIPPED | OUTPATIENT
Start: 2017-10-26 | End: 2017-11-25

## 2017-10-26 NOTE — TELEPHONE ENCOUNTER
----- Message from Jason Ryan sent at 10/25/2017  4:14 PM CDT -----  Contact: self [phone]  Patient requesting a call back from the office to discuss when can she start her allergy injection. Please advise . Thanks  ! montelukast (SINGULAIR) 10 mg tablet refill sent to [facility] .

## 2017-10-31 ENCOUNTER — LAB VISIT (OUTPATIENT)
Dept: LAB | Facility: HOSPITAL | Age: 47
End: 2017-10-31
Attending: PODIATRIST
Payer: MEDICARE

## 2017-10-31 DIAGNOSIS — B35.1 ONYCHOMYCOSIS: Primary | ICD-10-CM

## 2017-10-31 LAB
ALBUMIN SERPL BCP-MCNC: 4 G/DL
ALP SERPL-CCNC: 64 U/L
ALT SERPL W/O P-5'-P-CCNC: 16 U/L
AST SERPL-CCNC: 19 U/L
BILIRUB DIRECT SERPL-MCNC: 0.2 MG/DL
BILIRUB SERPL-MCNC: 0.4 MG/DL
PROT SERPL-MCNC: 8.2 G/DL

## 2017-10-31 PROCEDURE — 36415 COLL VENOUS BLD VENIPUNCTURE: CPT | Mod: PO

## 2017-10-31 PROCEDURE — 80076 HEPATIC FUNCTION PANEL: CPT

## 2017-11-01 RX ORDER — MONTELUKAST SODIUM 10 MG/1
10 TABLET ORAL NIGHTLY
Qty: 90 TABLET | Refills: 3 | Status: SHIPPED | OUTPATIENT
Start: 2017-11-01 | End: 2017-11-02 | Stop reason: SDUPTHER

## 2017-11-02 ENCOUNTER — OFFICE VISIT (OUTPATIENT)
Dept: ALLERGY | Facility: CLINIC | Age: 47
End: 2017-11-02
Payer: MEDICARE

## 2017-11-02 VITALS — OXYGEN SATURATION: 99 % | WEIGHT: 136.88 LBS | HEART RATE: 64 BPM | HEIGHT: 61 IN | BODY MASS INDEX: 25.84 KG/M2

## 2017-11-02 DIAGNOSIS — H01.134 ECZEMATOUS DERMATITIS OF UPPER AND LOWER EYELIDS OF BOTH EYES: Chronic | ICD-10-CM

## 2017-11-02 DIAGNOSIS — H10.45 CHRONIC ALLERGIC CONJUNCTIVITIS: Chronic | ICD-10-CM

## 2017-11-02 DIAGNOSIS — H01.131 ECZEMATOUS DERMATITIS OF UPPER AND LOWER EYELIDS OF BOTH EYES: Chronic | ICD-10-CM

## 2017-11-02 DIAGNOSIS — H01.135 ECZEMATOUS DERMATITIS OF UPPER AND LOWER EYELIDS OF BOTH EYES: Chronic | ICD-10-CM

## 2017-11-02 DIAGNOSIS — L93.1 SUBACUTE CUTANEOUS LUPUS ERYTHEMATOSUS: Chronic | ICD-10-CM

## 2017-11-02 DIAGNOSIS — Z72.0 TOBACCO ABUSE: Chronic | ICD-10-CM

## 2017-11-02 DIAGNOSIS — J30.81 CHRONIC ALLERGIC RHINITIS DUE TO ANIMAL HAIR AND DANDER: Chronic | ICD-10-CM

## 2017-11-02 DIAGNOSIS — J45.41 MODERATE PERSISTENT ASTHMA WITH ACUTE EXACERBATION: Primary | Chronic | ICD-10-CM

## 2017-11-02 DIAGNOSIS — H01.132 ECZEMATOUS DERMATITIS OF UPPER AND LOWER EYELIDS OF BOTH EYES: Chronic | ICD-10-CM

## 2017-11-02 DIAGNOSIS — K21.9 GASTROESOPHAGEAL REFLUX DISEASE, ESOPHAGITIS PRESENCE NOT SPECIFIED: Chronic | ICD-10-CM

## 2017-11-02 DIAGNOSIS — Z51.6 NEED FOR DESENSITIZATION TO ALLERGENS: ICD-10-CM

## 2017-11-02 DIAGNOSIS — L29.8 OTHER PRURITUS: Chronic | ICD-10-CM

## 2017-11-02 PROCEDURE — 99214 OFFICE O/P EST MOD 30 MIN: CPT | Mod: S$GLB,,, | Performed by: ALLERGY & IMMUNOLOGY

## 2017-11-02 PROCEDURE — 99499 UNLISTED E&M SERVICE: CPT | Mod: S$GLB,,, | Performed by: ALLERGY & IMMUNOLOGY

## 2017-11-02 PROCEDURE — 99999 PR PBB SHADOW E&M-EST. PATIENT-LVL III: CPT | Mod: PBBFAC,,, | Performed by: ALLERGY & IMMUNOLOGY

## 2017-11-02 RX ORDER — IBUPROFEN 800 MG/1
TABLET ORAL
Refills: 2 | COMMUNITY
Start: 2017-08-25 | End: 2018-03-27 | Stop reason: SDUPTHER

## 2017-11-02 RX ORDER — MONTELUKAST SODIUM 10 MG/1
10 TABLET ORAL NIGHTLY
Qty: 90 TABLET | Refills: 3 | Status: SHIPPED | OUTPATIENT
Start: 2017-11-02 | End: 2018-11-02

## 2017-11-02 RX ORDER — HYDROCODONE BITARTRATE AND ACETAMINOPHEN 7.5; 325 MG/1; MG/1
1 TABLET ORAL EVERY 6 HOURS PRN
Refills: 0 | COMMUNITY
Start: 2017-08-04 | End: 2017-11-06 | Stop reason: SDUPTHER

## 2017-11-02 RX ORDER — MONTELUKAST SODIUM 10 MG/1
10 TABLET ORAL NIGHTLY
Qty: 30 TABLET | Refills: 3 | Status: SHIPPED | OUTPATIENT
Start: 2017-11-02 | End: 2017-11-02 | Stop reason: SDUPTHER

## 2017-11-02 RX ORDER — EFINACONAZOLE 100 MG/ML
SOLUTION TOPICAL
Refills: 10 | COMMUNITY
Start: 2017-10-31 | End: 2019-08-28

## 2017-11-02 NOTE — LETTER
November 12, 2017        Emma Subramanian MD  [address] [facility] [place] [address]             [place] [facility]  [address]  [place] [address]  Phone: [phone]   Patient: Emily Pina   MR Number: [id number]   YOB: 1970   Date of Visit: 11/2/2017       Dear Dr. Subramanian:    I saw your patient today for a follow-up visit with respect to the following conditions:    1. Moderate persistent asthma with acute exacerbation     2. Current smoker     3. Chronic allergic rhinitis due to animal hair and dander     4. Chronic allergic conjunctivitis     5. Eczematous dermatitis of upper and lower eyelids of both eyes     6. Pruritus: Responds to Zyrtec     7. Gastroesophageal reflux disease: On Protonix     8. 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 Need for desensitization to allergens         I have made the following changes in medications:    Patient Instructions   1.  Once again I have reviewed the importance of smoking cessation with this patient.  I think the patient finally understands that cigarette smoke is an important contribution to her level of respiratory symptoms.  And she has voiced the desire to commit to a smoking cessation program.  2.  Patient will start allergen injections when her vaccine is available.  She will continue these injections weekly until her maintenance dose is achieved.  3.  Patient was given the immunotherapy consent to read at her convenience and the risks and benefits were reviewed.  Patient understands that she should not get an allergen injection if she has any wheezing within 48 hours of this injection.  4.  The patient is to continue Advair 115/21 HFA 2 puffs inhaled twice a day as well as Singulair 10 mg every 24 hours.  She has Ventolin to use if she needs it.  5.  Patient is to continue Protonix 40 mg every 24 hours; if she feels that her reflux is causing chest symptoms, I have instructed her to  increase this to twice a day.  6.  I requested a revisit in 3 months.  The patient will call if there are problems before this revisit.         I have requested a follow-up visit in 3 months to assess this patient's progress.    I hope you find this information helpful in the care of your patient. If you have questions about the above, please do not hesitate to contact me.    Sincerely,    MD Delphine Sureshosure

## 2017-11-06 ENCOUNTER — TELEPHONE (OUTPATIENT)
Dept: RHEUMATOLOGY | Facility: CLINIC | Age: 47
End: 2017-11-06

## 2017-11-06 RX ORDER — HYDROCODONE BITARTRATE AND ACETAMINOPHEN 7.5; 325 MG/1; MG/1
1 TABLET ORAL EVERY 6 HOURS PRN
Qty: 30 TABLET | Refills: 0 | Status: SHIPPED | OUTPATIENT
Start: 2017-11-06 | End: 2017-12-07 | Stop reason: SDUPTHER

## 2017-11-07 ENCOUNTER — LAB VISIT (OUTPATIENT)
Dept: LAB | Facility: HOSPITAL | Age: 47
End: 2017-11-07
Attending: FAMILY MEDICINE
Payer: MEDICARE

## 2017-11-07 ENCOUNTER — TELEPHONE (OUTPATIENT)
Dept: FAMILY MEDICINE | Facility: CLINIC | Age: 47
End: 2017-11-07

## 2017-11-07 ENCOUNTER — OFFICE VISIT (OUTPATIENT)
Dept: FAMILY MEDICINE | Facility: CLINIC | Age: 47
End: 2017-11-07
Payer: MEDICARE

## 2017-11-07 VITALS
TEMPERATURE: 99 F | HEIGHT: 61 IN | BODY MASS INDEX: 25.64 KG/M2 | WEIGHT: 135.81 LBS | DIASTOLIC BLOOD PRESSURE: 80 MMHG | OXYGEN SATURATION: 97 % | HEART RATE: 72 BPM | SYSTOLIC BLOOD PRESSURE: 138 MMHG

## 2017-11-07 DIAGNOSIS — E03.9 HYPOTHYROIDISM, UNSPECIFIED TYPE: Primary | ICD-10-CM

## 2017-11-07 DIAGNOSIS — E03.9 HYPOTHYROIDISM, UNSPECIFIED TYPE: ICD-10-CM

## 2017-11-07 DIAGNOSIS — Z23 NEED FOR IMMUNIZATION AGAINST INFLUENZA: ICD-10-CM

## 2017-11-07 LAB
T4 FREE SERPL-MCNC: 1.31 NG/DL
TSH SERPL DL<=0.005 MIU/L-ACNC: 2.67 UIU/ML

## 2017-11-07 PROCEDURE — 84443 ASSAY THYROID STIM HORMONE: CPT

## 2017-11-07 PROCEDURE — 84439 ASSAY OF FREE THYROXINE: CPT

## 2017-11-07 PROCEDURE — 99499 UNLISTED E&M SERVICE: CPT | Mod: S$GLB,,, | Performed by: FAMILY MEDICINE

## 2017-11-07 PROCEDURE — 90686 IIV4 VACC NO PRSV 0.5 ML IM: CPT | Mod: S$GLB,,, | Performed by: FAMILY MEDICINE

## 2017-11-07 PROCEDURE — 99999 PR PBB SHADOW E&M-EST. PATIENT-LVL IV: CPT | Mod: PBBFAC,,, | Performed by: FAMILY MEDICINE

## 2017-11-07 PROCEDURE — 36415 COLL VENOUS BLD VENIPUNCTURE: CPT | Mod: PO

## 2017-11-07 PROCEDURE — G0008 ADMIN INFLUENZA VIRUS VAC: HCPCS | Mod: S$GLB,,, | Performed by: FAMILY MEDICINE

## 2017-11-07 PROCEDURE — 99214 OFFICE O/P EST MOD 30 MIN: CPT | Mod: S$GLB,,, | Performed by: FAMILY MEDICINE

## 2017-11-07 RX ORDER — LEVOTHYROXINE SODIUM 100 UG/1
TABLET ORAL
Qty: 90 TABLET | Refills: 3 | Status: SHIPPED | OUTPATIENT
Start: 2017-11-07 | End: 2018-12-27 | Stop reason: SDUPTHER

## 2017-11-07 RX ORDER — FLUOROMETHOLONE 1 MG/ML
SUSPENSION/ DROPS OPHTHALMIC
Refills: 0 | COMMUNITY
Start: 2017-11-02

## 2017-11-07 RX ORDER — LEVOTHYROXINE SODIUM 100 UG/1
TABLET ORAL
Qty: 30 TABLET | Refills: 0 | Status: SHIPPED | OUTPATIENT
Start: 2017-11-07 | End: 2017-11-07 | Stop reason: SDUPTHER

## 2017-11-07 RX ORDER — HYDROCHLOROTHIAZIDE 25 MG/1
TABLET ORAL
Refills: 6 | COMMUNITY
Start: 2017-10-26 | End: 2019-08-28

## 2017-11-07 RX ORDER — TERBINAFINE HYDROCHLORIDE 250 MG/1
TABLET ORAL
Refills: 0 | COMMUNITY
Start: 2017-10-30 | End: 2018-06-08

## 2017-11-07 NOTE — PROGRESS NOTES
[facility]  Progress Note    SUBJECTIVE:     Chief Complaint   Patient presents with    Thyroid Problem       HPI   Emily Pina  is a 47 y.o. female here for follow up of her thyroid. She has run out of thyroid medications. Doing well otherwise and has no new complaints/problems at this time.     Review of patient's allergies indicates:   Allergen Reactions    Bactrim [sulfamethoxazole-trimethoprim] Rash       Past Medical History:   Diagnosis Date    Asthma in remission     COPD (chronic obstructive pulmonary disease)     Fibromyalgia     Lupus     Recurrent urticaria 1/6/2017    Subacute cutaneous lupus erythematosus      Past Surgical History:   Procedure Laterality Date    TONSILLECTOMY       Family History   Problem Relation Age of Onset    Hypertension Mother     Thyroid disease Mother     Arthritis Father     Hyperlipidemia Father     Cancer Father     Lupus Sister     Asthma Sister     No Known Problems Brother     No Known Problems Maternal Aunt     No Known Problems Maternal Uncle     No Known Problems Paternal Aunt     No Known Problems Paternal Uncle     No Known Problems Maternal Grandmother     No Known Problems Maternal Grandfather     No Known Problems Paternal Grandmother     No Known Problems Paternal Grandfather     Amblyopia Neg Hx     Blindness Neg Hx     Cataracts Neg Hx     Diabetes Neg Hx     Glaucoma Neg Hx     Macular degeneration Neg Hx     Retinal detachment Neg Hx     Strabismus Neg Hx     Stroke Neg Hx      Social History   Substance Use Topics    Smoking status: Current Every Day Smoker     Packs/day: 2.00     Types: Cigarettes    Smokeless tobacco: Former User     Quit date: 7/8/2013    Alcohol use No      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         Review of Systems   Constitutional: Negative for chills, fever and malaise/fatigue.   HENT: Negative.    Respiratory: Negative.  Negative for cough and shortness of breath.     Cardiovascular: Negative.  Negative for chest pain.   Gastrointestinal: Negative.  Negative for abdominal pain, nausea and vomiting.   Genitourinary: Negative.    Neurological: Negative for weakness and headaches.   All other systems reviewed and are negative.    OBJECTIVE:     Vitals:    11/07/17 1410   BP: 138/80   Pulse: 72   Temp: 98.5 °F (36.9 °C)     Body mass index is 25.66 kg/m².    Physical Exam   Constitutional: She is oriented to person, place, and time and well-developed, well-nourished, and in no distress. No distress.   HENT:   Head: Normocephalic and atraumatic.   Nose: Nose normal.   Eyes: Conjunctivae and EOM are normal.   Cardiovascular: Normal rate, regular rhythm and normal heart sounds.  Exam reveals no gallop and no friction rub.    No murmur heard.  Pulmonary/Chest: Effort normal and breath sounds normal. No respiratory distress. She has no wheezes. She has no rales. She exhibits no tenderness.   Abdominal: Soft. Bowel sounds are normal. She exhibits no distension. There is no tenderness. There is no rebound.   Neurological: She is alert and oriented to person, place, and time.   Skin: Skin is warm. She is not diaphoretic.       Old records were reviewed. Labs and/or images were independently reviewed.    ASSESSMENT     1. Hypothyroidism, unspecified type    2. Need for immunization against influenza        PLAN:     Hypothyroidism, unspecified type  -     levothyroxine (SYNTHROID) 100 MCG tablet; Take 1 tablet by mouth Monday - Saturday and take 1/2 tablet on sunday  Dispense: 30 tablet; Refill: 0  -     TSH; Future  -     T4, free; Future  -      Will adjust medications as necessary depending on lab results. Continue current regimen for now. 30 day supply to local pharmacy. If labs look good, will send to [facility] pharmacy for future refills.    Need for immunization against influenza  -     Influenza - Quadrivalent (3 years & older) (PF)      RTC PRFELICE Dodge MD  11/07/2017 3:28  PM

## 2017-11-07 NOTE — PROGRESS NOTES
Influenza vaccine administered, amy well. Instructed to wait 15mins for observation, no reaction noted @ time of discharge.

## 2017-11-12 NOTE — PATIENT INSTRUCTIONS
1.  Once again I have reviewed the importance of smoking cessation with this patient.  I think the patient finally understands that cigarette smoke is an important contribution to her level of respiratory symptoms.  And she has voiced the desire to commit to a smoking cessation program.  2.  Patient will start allergen injections when her vaccine is available.  She will continue these injections weekly until her maintenance dose is achieved.  3.  Patient was given the immunotherapy consent to read at her convenience and the risks and benefits were reviewed.  Patient understands that she should not get an allergen injection if she has any wheezing within 48 hours of this injection.  4.  The patient is to continue Advair 115/21 HFA 2 puffs inhaled twice a day as well as Singulair 10 mg every 24 hours.  She has Ventolin to use if she needs it.  5.  Patient is to continue Protonix 40 mg every 24 hours; if she feels that her reflux is causing chest symptoms, I have instructed her to increase this to twice a day.  6.  I requested a revisit in 3 months.  The patient will call if there are problems before this revisit.

## 2017-11-12 NOTE — PROGRESS NOTES
Referring physician: No ref. provider found    Primary Care Physician: Emma Subramanian MD    Chief Complaint: Follow-up and Asthma (not controlled very well currently,for about a week now. out of singulair about 3 weeks.)    Patient is known to me. The last visit was 1/6/2017  Patient is accompanied: No  Diagnosis at previous visit:will  1. Allergic rhinitis due to animal (cat) (dog) hair and dander      2. Chronic allergic conjunctivitis      3. Chronic obstructive airway disease with asthma      4. Tobacco abuse      5. Gastroesophageal reflux disease, esophagitis presence not specified      6. Recurrent urticaria      7. Other specified pruritic conditions      8. Need for desensitization to allergens         Subjective:         HPI    Emily Pina is a 47 y.o. female here for a follow-up visit related to chronic respiratory symptoms and the desire to start allergen specific immunotherapy.  Patient has not been able to start allergen specific immunotherapy because she has several medical issues which needed to be removed resolved including his shoulder surgery.  She states that she is now very to start immunotherapy.  However she states that her asthma is not under good control at this time.  Her current ACT score is 11.  She states that her asthma is keeping her from getting things done.  She also states that she's had shortness of breath more than once a day for the past 4 weeks.  She is also had wheezing which woke her up at least once a week.  She has had to use her rescue medicine 1 or 2 times a day.  Her current medications include Advair 115/21 HFA 2 puffs inhaled twice a day, Singulair 10 mg every 24 hours, Ventolin HFA 2 puffs every 4-6 hours as needed, Protonix 1 tablet every 24 hours, and Flonase 2 sprays in each nostril every 24 hours.  She also takes Zyrtec 10 mg every 24.  She also was on Plaquenil for her cutaneous lupus.  She takes Synthroid 100 µg every day      Summary of  1/6/2017 visit with me:  Emily CentenoKyungAditya is a 46 y.o. female here for a follow-up visit related to discussing the benefits of immunotherapy related to her symptoms.  This patient has multiple respiratory and skin issues which require follow-up.  Patient reports that she is continues to have intermittent bouts of urticaria despite the use of Allegra 180 mg every 24 hours.  These symptoms initially started in September 2016.  She has not been able to identify a trigger for these symptoms.   She reports that her ocular symptoms have been improved using Zaditor and 1% hydrocortisone twice a day.  She reports that with the onset of cold weather today she has been wheezing.  She is also still smoking.  She is currently on Advair /21 2 puffs inhaled twice a day and Singulair 10 mg every 24 hours..  She is still needing her albuterol on a fairly regular basis.  She is currently on Protonix 40 mg every 24 hours.  In addition to this she uses Gaviscon extra strength syrup as needed.  She states that her reflux has been doing better the past several weeks.  Her nasal symptoms have relatively quiescent on Nasonex 2 sprays in each nostril every 24 hours.  Assessment:  1. Allergic rhinitis due to animal (cat) (dog) hair and dander      2. Chronic allergic conjunctivitis      3. Chronic obstructive airway disease with asthma      4. Tobacco abuse      5. Gastroesophageal reflux disease, esophagitis presence not specified      6. Recurrent urticaria      7. Other specified pruritic conditions      8. Need for desensitization to allergens      Plan:  1.  Once again I have reviewed the importance of smoking cessation with this patient.  I think the patient finally understands that cigarette smoke is an important contribution to her level of respiratory symptoms.  And she has voiced the desire to commit to a smoking cessation program.  2.  Patient will start allergen injections when her vaccine is available.  She  will continue these injections weekly until her maintenance dose is achieved.  3.  Patient was given the immunotherapy consent to read at her convenience and the risks and benefits were reviewed.  Patient understands that she should not get an allergen injection if she has any wheezing within 48 hours of this injection.  4.  I requested a revisit in 3 months.  The patient will call if there are problems before this revisit.     Summary of visit 11/11/2016:  Emily Pina is a 46 y.o. female here for a follow-up visit related to  skin testing for her chronic respiratory symptoms.  Patient reports that on prednisone 3 times a day for 6 days her respiratory symptoms dramatically improved.  However when she stopped her Zaditor ophthalmic drops and the 1% hydrocortisone cream to her eyelids 5 days ago her ocular symptoms have worsened.  She currently complains of having eyelid itching, ocular itching and her eyelid skin has become drier and flaky.  She is still smoking but still trying to stop.  Her chest symptoms are controlled by her Advair 1:15/21 HFA which was increased to 2 puffs twice a day.  Her nasal symptoms are now controlled on Nasacort 1 spray in each nostril twice a day.  She has also continued her Singulair.  At this visit she denies having nasal symptoms including nasal congestion, rhinorrhea, sneezing, postnasal drip, and throat clearing as well as sinus pressure or headache.  At this visit she also denies having any shortness of breath, cough, wheezing, or exertional related symptoms.  Assessment:  1.  ALLERGIC rhinitis to Dog and cat allergen allergen  2.  Chronic ALLERGIC conjunctivitis  3.  Severe pruritus of both eyes  4.  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  Chronic obstructive airway disease with asthma  6.  Patient is still smoking  7.  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 Spaulding  Plan:  1.  She is to restart her Zaditor  OTC 2 drops in each eye twice a day.  She understands that she may use this 3 times a day if needed.  2.  She is also to restart her 1% hydrocortisone to her eyelids twice a day.  3.  Patient understands that she may restart her Allegra 180 every 24 hours to reduce her pruritus.  4.  She is to continue her Advair 115/21 HFA 2 puffs inhaled twice a day.  She is to continue to rinse her mouth afterwards.  She is also to continue Nasacort 1 spray in each nostril twice a day.  Additionally she is to continue Singulair 10 mg every 24 hours.  5.  Since this patient has daily contact with animal dander, I have advised her to consider starting allergen immunotherapy.  6.  I've also advised her that it is important for her to stop smoking.  7.  She is to call if there are problems prior to her next visit.    Summary of 10/24/2016 visit with me:  Emily Pina is a 46 y.o. female here for evaluation of several medical issues.  Last month she she developed acute urticaria with associated pruritus.  She was treated with oral antihistamines daily but several days into this bout of urticaria she also developed a swollen lip.  She was given oral prednisone for this with resolution of her symptoms.  However last week she had day when she developed several hives again.  She took antihistamines for this and again it resolved.  There are no identifiable triggers related to her urticaria.  This is her only episode of urticaria and other than a reaction to Bactrim in the past. (Possibly related to her hives,  she has a diagnosis of lupus, initially diagnosis discoid lupus at age 20.  However it is subsequently become systemic with involvement of joints and muscles.  She on Plaquenil long-term for this and is followed by Dr. Spaulding.)  She also reports that for this past several months she has been having watery, itchy eyes.  She also reports having a persistent eyelid rash with thickening of the eyelid skin and associated  intermittent peeling.  She also reports persistent pruritus with this. She also reports that the lower lids do also peel and she has intermittent suborbital puffiness.  She also has a long history of persistent nasal symptoms which wax and wane.  These symptoms started in her late 20s or early 30s.  She has nasal congestion, sneezing, clear rhinorrhea, postnasal drip, and frequent throat clearing.  She states that they have been getting worse over time.  She has tried Flonase in the past but has had to discontinue this medication because of nosebleeds.  She states Nasacort has not caused a similar problem when she uses in the past.  The patient is a current smoker.  She has been smoking since age 18 and has smoked one half packs per day.  Over the past 4 months she has been trying to quit smoking and she is currently reduced her smoking to 10 cigarettes per day.  She currently has frequent lower respiratory symptoms which include shortness of breath, and frequent wheezing.  She has a dry cough.  And she has nocturnal awakening with shortness of breath.  She also describes exercise-induced wheezing and dyspnea.  She is currently on Advair /21 however she is only using one puff twice a day.  She is currently using her albuterol at least twice a day.  And she has recently been started on Singulair.  Patient states that her chest symptoms are persistent but do seem to worsen with change in seasons.  She states that October is always a bad month for her and is usually ends up in the emergency room around [facility] related to an asthma exacerbation.  Triggers for her respiratory symptoms include change in the season, possibly, and possibly feather pillows.  Assessment:  1. Urticaria: etiology unclear but probably idiopathic      2. Angioedema (initial encounter): associated with urticaria      3. Conjunctivitis of both eyes, unspecified conjunctivitis type      4. Eyelid dermatitis, eczematous, bilateral      5.  Pruritus associated with occular symptoms      6. Chronic rhinitis  montelukast (SINGULAIR) 10 mg tablet   7. Chronic obstructive airway disease with asthma: related to smoking      8. Tobacco use currently still smoking      9. Gastroesophageal reflux disease: symtpomatic despite medication      10. Systemic lupus erythematosus: On Plaquenil     Plan:  1.  Prednisone pulse 20 mg 3 times a day for 6 days.  2.  Continue Advair 115/21 HFA with an increased dose of 2 puffs inhaled twice a day.  Patient has been told to rinse her mouth afterwards.  3.  Start Nasacort 1 spray in each nostril twice a day.  4.  Trial of Zatidor ophthalmic OTC 2 drops in each eye twice a day.  5.  Continue Singulair 10 mg every 24 hours.  6.  Trial of 1% hydrocortisone cream OTC to eyelids twice a day.  7.  Skin testing at revisit.  Patient understands to stop Allegra 5 days prior to this visit.          Review of Systems  Constitutional: Negative for changes in appetite, unintentional weight loss, fever, chills and fatigue.   HENT: Negative for facial pain, nose bleeds, nasal congestion, postnasal drip, throat clearing, sinus pressure, and voice change. Negative for ear discharge, ear pain, facial swelling, sore throat and trouble swallowing.  Eyes: Negative for occular discharge, redness, itching and visual disturbance.  Respiratory: Negative for dyspnea on exertion, sputum production and cough.   Positive for chest tightness, shortness of breath, and wheezing.  Cardiovascular: Negative for chest pain, palpatations and leg swelling.  Gastrointestinal: Negative for abdominal distension, abdominal pain, constipation, diarrhea, nausea,and vomiting.   Genitourinary: Negative for difficulty urinating.   Musculoskeletal: Negative for arthralgias, gait problems, joint swelling, myalgias and back pain.   Neurological: Negative for dizziness, syncope, weakness, light-headedness, and headaches.   Hematological: Negative for adenopathy, does not  bruise or bleed easily.  Psychiatric/Behavioral: Negative for agitation, anxiety, behavioral problems, confusion, and insomnia.  Skin: Negative for rash.     PMH:  Reviewed with the patient and current for this visit    Family History:  Reviewed with the patient and current for this visit    Social History:  Reviewed with the patient and current for this visit     Environmental History:  Reviewed with the patient and current for this visit          Objective:      Skin Test results: prick skin test demonstrated positive skin test to cat dog allergens.    Immunotherapy: although vaccines were mixed, the patient never started allergen specific immunotherapy.    Physical Exam  General:patient is well developed and well nourished, in no acute distress.  Mental Status:  Alert, oriented and cooperative  Head and Face: normocephalic   Allergic shiners: No  Eyes:   Pupils: ERRLA: Scleral conjunctiva: clear; Cornea: clear; Palprebal conjunctiva: normal: Eyelid Skin: normal  Ears:Tympanic membrane Left:intact and normal light reflex; Right:intact and normal light reflex; External canals normal bilaterally.  Nose:  Nares: patent; Mucosa :pink and moist; Nasal septum: midline;Turbinates are of normal size bilaterally and do not occlude the nasal airway.  Mouth/Pharynx: tonsils present; posterior pharyngeal wall normal; tongue normal; teeth normal; voice quality normal.  Neck:  Cervical lymph nodes: small, non-tender, freely moveable both anterior and posterior cervical chain; Trachea: midline; Masses: none  Lungs: Air movement is good; respiratory effort is good; no respiratory distress; breath sounds are vesicular in all lung fields; no wheezing; normal expiratory time; no cough.  Heart: regular rate and rhythm with mild respiratory variation; A1 and P2 are normal; no murmurs or gallops.  Abdomen:exam not done  Extremities: no cyanosis, clubbing or edema  Skin:no rashes or lesions present; skin hydrated and supple.          Assessment:       1. Moderate persistent asthma with acute exacerbation     2. Current smoker     3. Chronic allergic rhinitis due to animal hair and dander     4. Chronic allergic conjunctivitis     5. Eczematous dermatitis of upper and lower eyelids of both eyes     6. Pruritus: Responds to Zyrtec     7. Gastroesophageal reflux disease: On Protonix     8. 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 Need for desensitization to allergens             Plan:         Return for re-visit: Return in about 3 months (around 2018).    Immunotherapy: Yes; I will put a remix into XPS for her vaccine because her current vaccine is about to .  She wishes to continue her allergen immunotherapy at [facility] since [place]    Lab or X-ray: No    Outside medical records requested: No        Patient Instructions   1.  Once again I have reviewed the importance of smoking cessation with this patient.  I think the patient finally understands that cigarette smoke is an important contribution to her level of respiratory symptoms.  And she has voiced the desire to commit to a smoking cessation program.  2.  Patient will start allergen injections when her vaccine is available.  She will continue these injections weekly until her maintenance dose is achieved.  3.  Patient was given the immunotherapy consent to read at her convenience and the risks and benefits were reviewed.  Patient understands that she should not get an allergen injection if she has any wheezing within 48 hours of this injection.  4.  The patient is to continue Advair 115/21 HFA 2 puffs inhaled twice a day as well as Singulair 10 mg every 24 hours.  She has Ventolin to use if she needs it.  5.  Patient is to continue Protonix 40 mg every 24 hours; if she feels that her reflux is causing chest symptoms, I have instructed her to increase this to twice a day.  6.  I requested a revisit in 3 months.  The patient will call if  there are problems before this revisit.       25 minutes spent face to face with this patient. 50% of time spent counseling this patient about the medical conditions (pathophysiology), the options and strategies for treatments, and the risks and benefits of treatments.    Patient education content included:  Asthma pathophysiology was reviewed.  The role of inflammatory changes in the mucosal lining in producing smooth muscle contraction and thus airway narrowing was reviewed.  The role of an inhaled corticosteroids in reducing this airway inflammation was reviewed. Reduction of airway inflammation results in the reduction smooth muscle contraction and thus eliminates airway narrowing and compromise.  The use of albuterol as a rescue inhaler was reviewed; if asthma is well controlled, the issues should be less than 2 puffs inhaled every 2 weeks.  If more albuterol than this is needed, it is an indication that asthma is not well controlled.  The various triggers for asthma symptoms was reviewed including allergen exposure, irritant exposure, viral infections, sinusitis, and reflux.  The handout for understanding asthma and asthma medications was reviewed with this patient.  The patient expressed understanding of these concepts and questions were answered.  Risks and benefits of immunotherapy in consent reviewed with this patient.  Large local reactions are most common, however systemic reactions can occur. Serious, life threatening reactions may occur and result in death. Therefore is important bring an EpiPen to each injection visit.  Progressive increase in allergen dose at each injection until high dose maintenance amount achieved reviewed; explained that this is weekly interval until maintenance dose achieved. Once maintenance dose achieved then the interval may be increased ultimately to monthly.Patient advised to remain on allergen immunotherapy for at least 2 to 2 1/2 years after benefit noted before stopping  injections. Patient voiced understanding of this information and questions were answered.  Consent will be signed prior to first injection.    Letter and copy of this visit sent to referring physician/PCP:            Ana Mancera MD

## 2017-12-05 ENCOUNTER — OFFICE VISIT (OUTPATIENT)
Dept: FAMILY MEDICINE | Facility: CLINIC | Age: 47
End: 2017-12-05
Payer: MEDICARE

## 2017-12-05 VITALS
DIASTOLIC BLOOD PRESSURE: 80 MMHG | WEIGHT: 137.69 LBS | OXYGEN SATURATION: 97 % | SYSTOLIC BLOOD PRESSURE: 138 MMHG | HEART RATE: 73 BPM | BODY MASS INDEX: 26 KG/M2 | HEIGHT: 61 IN

## 2017-12-05 DIAGNOSIS — Z00.00 ENCOUNTER FOR PREVENTIVE HEALTH EXAMINATION: Primary | ICD-10-CM

## 2017-12-05 DIAGNOSIS — L93.1 SUBACUTE CUTANEOUS LUPUS ERYTHEMATOSUS: ICD-10-CM

## 2017-12-05 DIAGNOSIS — J44.89 CHRONIC OBSTRUCTIVE AIRWAY DISEASE WITH ASTHMA: ICD-10-CM

## 2017-12-05 DIAGNOSIS — E03.9 HYPOTHYROIDISM, UNSPECIFIED TYPE: ICD-10-CM

## 2017-12-05 DIAGNOSIS — D89.9 DISORDER OF IMMUNE SYSTEM: ICD-10-CM

## 2017-12-05 DIAGNOSIS — Z72.0 TOBACCO ABUSE: ICD-10-CM

## 2017-12-05 PROCEDURE — 99499 UNLISTED E&M SERVICE: CPT | Mod: S$GLB,,, | Performed by: NURSE PRACTITIONER

## 2017-12-05 PROCEDURE — G0439 PPPS, SUBSEQ VISIT: HCPCS | Mod: S$GLB,,, | Performed by: NURSE PRACTITIONER

## 2017-12-05 PROCEDURE — 99999 PR PBB SHADOW E&M-EST. PATIENT-LVL V: CPT | Mod: PBBFAC,,, | Performed by: NURSE PRACTITIONER

## 2017-12-05 RX ORDER — MELOXICAM 15 MG/1
15 TABLET ORAL DAILY
COMMUNITY
End: 2018-04-18 | Stop reason: ALTCHOICE

## 2017-12-05 NOTE — PATIENT INSTRUCTIONS
Counseling and Referral of Other Preventative  (Italic type indicates deductible and co-insurance are waived)    Patient Name: Emily Pina  Today's Date: 12/5/2017      SERVICE LIMITATIONS RECOMMENDATION    Vaccines    · Pneumococcal (once after 65)    · Influenza (annually)    · Hepatitis B (if medium/high risk)    · Prevnar 13      Hepatitis B medium/high risk factors:       - End-stage renal disease       - Hemophiliacs who received Factor VII or         IX concentrates       - Clients of institutions for the mentally             retarded       - Persons who live in the same house as          a HepB carrier       - Homosexual men       - Illicit injectable drug abusers     Pneumococcal: Done, no repeat necessary     Influenza: Done, repeat in one year     Hepatitis B: N/A     Prevnar 13: N/A    Mammogram (biennial age 50-74)  Annually (age 40 or over)  Done this year, repeat every year    Pap (up to age 70 and after 70 if unknown history or abnormal study last 10 years)    Per patient and PCP (or OB/GYN specialist) recommendations or if experiencing clinical symptoms.             Colorectal cancer screening (to age 75)    · Fecal occult blood test (annual)  · Flexible sigmoidoscopy (5y)  · Screening colonoscopy (10y)  · Barium enema   completed by [facility] April 2016, repeat in 5 years.     Diabetes self-management training (no USPSTF recommendations)  Requires referral by treating physician for patient with diabetes or renal disease. 10 hours of initial DSMT sessions of no less than 30 minutes each in a continuous 12-month period. 2 hours of follow-up DSMT in subsequent years.  followed by [facility]'s endocrinology dept     Bone mass measurements (age 65 & older, biennial)  Requires diagnosis related to osteoporosis or estrogen deficiency. Biennial benefit unless patient has history of long-term glucocorticoid  N/A    Glaucoma screening (no USPSTF recommendation)  Diabetes mellitus, family history    , age 50 or over    American, age 65 or over  Recommend follow up with eye care professional regularly    Medical nutrition therapy for diabetes or renal disease (no recommended schedule)  Requires referral by treating physician for patient with diabetes or renal disease or kidney transplant within the past 3 years.  Can be provided in same year as diabetes self-management training (DSMT), and CMS recommends medical nutrition therapy take place after DSMT. Up to 3 hours for initial year and 2 hours in subsequent years.  N/A    Cardiovascular screening blood tests (every 5 years)  · Fasting lipid panel  Order as a panel if possible  completed June 2016    Diabetes screening tests (at least every 3 years, Medicare covers annually or at 6-month intervals for prediabetic patients)  · Fasting blood sugar (FBS) or glucose tolerance test (GTT)  Patient must be diagnosed with one of the following:       - Hypertension       - Dyslipidemia       - Obesity (BMI 30kg/m2)       - Previous elevated impaired FBS or GTT       ... or any two of the following:       - Overweight (BMI 25 but <30)       - Family history of diabetes       - Age 65 or older       - History of gestational diabetes or birth of baby weighing more than 9 pounds  completed FBS October 2012, follow up CMP and Lipid panel to obtain fasting blood sugar.     HIV screening (annually for increased risk patients)  · HIV-1 and HIV-2 by EIA, or EDIN, rapid antibody test or oral mucosa transudate  Patients must be at increased risk for HIV infection per USPSTF guidelines or pregnant. Tests covered annually for patient at increased risk or as requested by the patient. Pregnant patients may receive up to 3 tests during pregnancy.  no clinical risk factors      Smoking cessation counseling (up to 8 sessions per year)  Patients must be asymptomatic of tobacco-related conditions to receive as a preventative service.  she's working on quitting.      Subsequent annual wellness visit  At least 12 months since last AWV  Return in one year     The following information is provided to all patients.  This information is to help you find resources for any of the problems found today that may be affecting your health:                Living healthy guide: www.[facility].[email or web address].[facility]      Understanding Diabetes: www.diabetes.org      Eating healthy: www.cdc.gov/healthyweight      CDC home safety checklist: www.cdc.gov/steadi/patient.html      Agency on Aging: www.goea.[place].[facility]      Alcoholics anonymous (AA): www.aa.org      Physical Activity: www.shi.nih.gov/vr5mbza      Tobacco use: www.quitwithusla.org

## 2017-12-06 DIAGNOSIS — J30.9 CHRONIC ALLERGIC RHINITIS: ICD-10-CM

## 2017-12-06 PROBLEM — D89.9 DISORDER OF IMMUNE SYSTEM: Status: ACTIVE | Noted: 2017-12-06

## 2017-12-06 NOTE — PROGRESS NOTES
"Emily Pina presented for a  Medicare AWV and comprehensive Health Risk Assessment today. The following components were reviewed and updated:    · Medical history  · Family History  · Social history  · Allergies and Current Medications  · Health Risk Assessment  · Health Maintenance  · Care Team     ** See Completed Assessments for Annual Wellness Visit within the encounter summary.**       The following assessments were completed:  · Living Situation  · CAGE  · Depression Screening  · Timed Get Up and Go  · Whisper Test  · Cognitive Function Screening  · Nutrition Screening  · ADL Screening  · PAQ Screening    Vitals:    12/05/17 1521   BP: 138/80   BP Location: Left arm   Patient Position: Sitting   BP Method: Medium (Manual)   Pulse: 73   SpO2: 97%   Weight: 62.5 kg (137 lb 10.8 oz)   Height: 5' 1" (1.549 m)     Body mass index is 26.01 kg/m².  Physical Exam   Constitutional: She is oriented to person, place, and time.   Cardiovascular: Normal rate and regular rhythm.    Pulmonary/Chest: Effort normal and breath sounds normal.   Musculoskeletal: Normal range of motion.   Neurological: She is alert and oriented to person, place, and time.   Skin: Skin is warm.   Psychiatric: She has a normal mood and affect. Her behavior is normal. Thought content normal.   Vitals reviewed.        Diagnoses and health risks identified today and associated recommendations/orders:    1. Encounter for preventive health examination  Education provided about preventive health examinations and procedures; addressed and discussed patient's health concerns. Additionally, reviewed medical record for risk factors and documented the results during this encounter.    2. Chronic obstructive airway disease with asthma  Stable, managed with albuterol and advair inhalers. We discussed monitoring for worsening symptoms, using home pulse oximeter as a gauge to determine worsening complications.  Continue as advised regarding dietary and " lifestyle modifications.     3. Disorder of immune system  Stable, followed by [facility], denies worsening symptoms; continue as advised.     4. Subacute cutaneous lupus erythematosus  Stable, followed by [facility], denies worsening symptoms; continue as advised.     5. Hypothyroidism, unspecified type  Clinically euthyroid 2.671 (November 2017). Presently at goal. Continue as advised regarding dietary and lifestyle modifications.     6. Tobacco abuse  Reports she will return to the smoking cessation class but not at this time. I will put in a referral.     Reviewed health maintenance with patient, educated about recommended examinations, procedures (labs & images), and immunizations. Reminded patient of "[employer]'s Silver Sneakers benefits with access to fitness centers and classes.       Provided Emily with a 5-10 year written screening schedule and personal prevention plan. Recommendations were developed using the USPSTF age appropriate recommendations. Education, counseling, and referrals were provided as needed. After Visit Summary printed and given to patient which includes a list of additional screenings\tests needed.    No Follow-up on file.    Baljit Tan Jr, NP

## 2017-12-07 RX ORDER — FLUTICASONE PROPIONATE 50 MCG
SPRAY, SUSPENSION (ML) NASAL
Qty: 48 G | Refills: 2 | Status: SHIPPED | OUTPATIENT
Start: 2017-12-07 | End: 2018-08-02 | Stop reason: SINTOL

## 2017-12-07 RX ORDER — HYDROCODONE BITARTRATE AND ACETAMINOPHEN 7.5; 325 MG/1; MG/1
1 TABLET ORAL EVERY 6 HOURS PRN
Qty: 60 TABLET | Refills: 0 | Status: SHIPPED | OUTPATIENT
Start: 2017-12-07 | End: 2018-01-06

## 2017-12-12 ENCOUNTER — CLINICAL SUPPORT (OUTPATIENT)
Dept: ALLERGY | Facility: CLINIC | Age: 47
End: 2017-12-12
Payer: MEDICARE

## 2017-12-12 DIAGNOSIS — J44.89 CHRONIC OBSTRUCTIVE AIRWAY DISEASE WITH ASTHMA: ICD-10-CM

## 2017-12-12 DIAGNOSIS — J30.81 CHRONIC ALLERGIC RHINITIS DUE TO ANIMAL HAIR AND DANDER: Chronic | ICD-10-CM

## 2017-12-12 DIAGNOSIS — H10.45 CHRONIC ALLERGIC CONJUNCTIVITIS: Chronic | ICD-10-CM

## 2017-12-12 PROCEDURE — 99999 PR PBB SHADOW E&M-EST. PATIENT-LVL I: CPT | Mod: PBBFAC,,,

## 2017-12-12 PROCEDURE — 99499 UNLISTED E&M SERVICE: CPT | Mod: S$GLB,,, | Performed by: ALLERGY & IMMUNOLOGY

## 2017-12-12 PROCEDURE — 95165 ANTIGEN THERAPY SERVICES: CPT | Mod: S$GLB,,, | Performed by: ALLERGY & IMMUNOLOGY

## 2017-12-19 DIAGNOSIS — J44.89 ASTHMA-CHRONIC OBSTRUCTIVE PULMONARY DISEASE OVERLAP SYNDROME: ICD-10-CM

## 2017-12-19 RX ORDER — FLUTICASONE PROPIONATE AND SALMETEROL XINAFOATE 115; 21 UG/1; UG/1
2 AEROSOL, METERED RESPIRATORY (INHALATION) 2 TIMES DAILY
Qty: 12 G | Refills: 6 | Status: SHIPPED | OUTPATIENT
Start: 2017-12-19 | End: 2019-11-05 | Stop reason: SDUPTHER

## 2017-12-28 NOTE — PROGRESS NOTES
Patient is candidate for allergen specific immunotherapy because of continuous need for medications to control symptoms, inability to avoid allergen exposure and the desire to avoid the need for long term medications. Risks and benefits of immunotherapy review with patient including the possibility of death. Patient understands the need to have an Epi Pen available for each injection. Written consent review with patient and a copy given to review at home. Patient will sign consent prior to the institution of injections. Vaccine formula entered into XPS for mixing by [facility]. One Vaccine with 52 doses to be mixed.

## 2018-01-03 RX ORDER — ALBUTEROL SULFATE 90 UG/1
AEROSOL, METERED RESPIRATORY (INHALATION)
Qty: 18 INHALER | Refills: 0 | Status: SHIPPED | OUTPATIENT
Start: 2018-01-03 | End: 2018-10-02 | Stop reason: SDUPTHER

## 2018-01-31 ENCOUNTER — OFFICE VISIT (OUTPATIENT)
Dept: FAMILY MEDICINE | Facility: CLINIC | Age: 48
End: 2018-01-31
Payer: MEDICARE

## 2018-01-31 ENCOUNTER — PATIENT MESSAGE (OUTPATIENT)
Dept: ALLERGY | Facility: CLINIC | Age: 48
End: 2018-01-31

## 2018-01-31 VITALS
SYSTOLIC BLOOD PRESSURE: 118 MMHG | WEIGHT: 141 LBS | HEART RATE: 107 BPM | OXYGEN SATURATION: 98 % | DIASTOLIC BLOOD PRESSURE: 80 MMHG | BODY MASS INDEX: 26.62 KG/M2 | TEMPERATURE: 98 F | HEIGHT: 61 IN

## 2018-01-31 DIAGNOSIS — R21 RASH AND NONSPECIFIC SKIN ERUPTION: ICD-10-CM

## 2018-01-31 DIAGNOSIS — T78.40XA ALLERGIC REACTION, INITIAL ENCOUNTER: Primary | ICD-10-CM

## 2018-01-31 PROCEDURE — 99999 PR PBB SHADOW E&M-EST. PATIENT-LVL IV: CPT | Mod: PBBFAC,,, | Performed by: NURSE PRACTITIONER

## 2018-01-31 PROCEDURE — 3008F BODY MASS INDEX DOCD: CPT | Mod: S$GLB,,, | Performed by: NURSE PRACTITIONER

## 2018-01-31 PROCEDURE — 99214 OFFICE O/P EST MOD 30 MIN: CPT | Mod: S$GLB,,, | Performed by: NURSE PRACTITIONER

## 2018-01-31 RX ORDER — METHYLPREDNISOLONE 4 MG/1
TABLET ORAL
Qty: 1 PACKAGE | Refills: 0 | Status: SHIPPED | OUTPATIENT
Start: 2018-01-31 | End: 2018-03-27

## 2018-01-31 RX ORDER — HYDROXYZINE HYDROCHLORIDE 25 MG/1
25 TABLET, FILM COATED ORAL EVERY 12 HOURS PRN
Qty: 30 TABLET | Refills: 0 | Status: SHIPPED | OUTPATIENT
Start: 2018-01-31 | End: 2018-02-27 | Stop reason: SDUPTHER

## 2018-01-31 RX ORDER — DEXAMETHASONE SODIUM PHOSPHATE 4 MG/ML
8 INJECTION, SOLUTION INTRA-ARTICULAR; INTRALESIONAL; INTRAMUSCULAR; INTRAVENOUS; SOFT TISSUE
Status: DISCONTINUED | OUTPATIENT
Start: 2018-01-31 | End: 2019-11-05

## 2018-01-31 NOTE — PATIENT INSTRUCTIONS
General Allergic Reactions  An allergic reaction is a set of symptoms caused by an allergen. An allergen is something that causes a persons immune system to react. When a person comes in contact with an allergen, it causes the body to release chemicals. These include the chemical histamine. Histamine causes swelling and itching. It may affect the entire body. This is called a general allergic reaction. Often symptoms affect only 1 part of the body. This is called a local allergic reaction.  You are having an allergic reaction. Almost anything can cause one. Different people are allergic to different things. It is usually something that you ate or swallowed, came into contact with by getting or putting it on your skin or clothes, or something you breathed in the air. This can be very annoying and sometimes scary.  Most of us think of allergic reactions when we have a rash or itchy skin. Symptoms can include:  · Itching of the eyes, nose, and roof of the mouth  · Runny or stuffy nose  · Watery eyes   · Sneezing or coughing   · A blocked feeling in the ear  · Red, itchy rash called hives  · Red and purple spots  · Rash, redness, welts, blisters  · Itching, burning, stinging, pain  · Dry, flaky, cracking, scaly skin  Severe symptoms include:  · Swelling of the face, lips, or other parts of the body  · Hoarse voice  · Trouble swallowing, feeling like your throat is closing  · Trouble breathing, wheezing  · Nausea, vomiting, diarrhea, stomach cramps  · Feeling faint or lightheaded, rapid heart rate  Sometimes the cause may be obvious. But there are so many things that can cause a reaction that you may not be able to figure out. The most important things to help find your allergen are:  · Remembering when it started  · What you were doing at the time or just before that  · Any activities you were involved in  · Any new products or contacts  Below are some common causes. But remember that almost anything can cause a  reaction. You may not even be aware that you came into contact with one of these things:  · Dust, mold, pollen  · Plants (common ones are poison ivy and poison oak, but there are many others)   · Animals  · Foods such as shrimp, shellfish, peanuts, milk products, gluten, and eggs. Also food colorings, flavorings, and additives.  · Insect bites or stings such as bees, mosquitos, fleas, ticks  · Medicines such as penicillin, sulfa medicines, amoxicillin, aspirin, and ibuprofen. But any medicine can cause a reaction.  · Jewelry such as nickel or gold. This can be new, or something youve worn for a while, including zippers and buttons.  · Latex such as in gloves, clothes, toys, balloons, or some tapes. Some people allergic to latex may also have problems with foods like bananas, avocados, kiwi, papaya, or chestnuts.  · Lotions, perfumes, cosmetics, soaps, shampoos, skincare products, nail products  · Chemicals or dyes in clothing, linen, , hair dyes, soaps, iodine  Many viruses and common colds can cause a rash that is not an allergic reaction. Sometimes it is hard to tell the difference between allergies, sensitivity, or an intolerance to something. This is especially true with food. Many things can cause diarrhea, vomiting, stomach cramps, and skin irritation.  Home care    The goal of treatment is to help relieve the symptoms and get you feeling better. The rash will usually fade over several days. But it can sometimes last a couple of weeks. Over the next couple of days, there may be times when it is gets a little worse, and then better again. Here are some things to do:  · If you know what you are allergic to, stay away from it. Future reactions could be worse than this one.  · Avoid tight clothing and anything that heats up your skin (hot showers or baths, direct sunlight). Heat will make itching worse.  · An ice pack will relieve local areas of intense itching and redness. To make an ice pack, put ice  cubes in a plastic bag that seals at the top. Wrap it in a thin, clean towel. Dont put the ice directly on the skin because it can damage the skin.  · Oral diphenhydramine is an over-the-counter antihistamine sold at pharmacy and grocery stores. Unless a prescription antihistamine was given, diphenhydramine may be used to reduce itching if large areas of the skin are involved. It may make you sleepy. So be careful using it in the daytime or when going to school, working, or driving. Note: Dont use diphenhydramine if you have glaucoma or if you are a man with trouble urinating due to an enlarged prostate. There are other antihistamines that wont make you so sleepy. These are good choices for daytime use. Ask your pharmacist for suggestions.  · Dont use diphenhydramine cream on your skin. It can cause a further reaction in some people.  · To help prevent an infection, don't scratch the affected area. Scratching may worsen the reaction and damage your skin. It can also lead to an infection. Always check the affected for signs of an infection.  · Call your healthcare provider and ask what you can use to help decrease the itching.  · To decrease allergic reactions, try the following:    · Use heat-steam to clean your home  · Use high-efficiency particulate (HEPA) vacuums and filters  · Stay away from food and pet triggers  · Kill any cockroaches  · Clean your house often  Follow-up care  Follow up with your healthcare provider, or as advised. If you had a severe reaction today, or if you have had several mild to medium allergic reactions in the past, ask your provider about allergy testing. This can help you find out what you are allergic to. If your reaction included dizziness, fainting, or trouble breathing or swallowing, ask your provider about carrying auto-injectable epinephrine.  Call 911  Call 911 if any of these occur:  · Trouble breathing or swallowing, wheezing  · Cool, moist, pale skin  · Shortness of  breath  · Hoarse voice or trouble speaking  · Confused   · Very drowsy or trouble awakening  · Fainting or loss of consciousness  · Rapid heart rate  · Feeling of dizziness or weakness or a sudden drop in blood pressure  · Feeling of doom  · Feeling lightheaded  · Severe nausea or vomiting, or diarrhea  · Seizure  · Swelling in the face, eyelids, lips, mouth, throat or tongue  · Drooling  When to seek medical advice  Call your healthcare provider right away if any of these occur:  · Spreading areas of itching, redness or swelling  · Nausea or stomach cramps or abdominal pain  · Continuing or recurring symptoms  · Spreading areas of redness, swelling, or itching  · Signs of infection at the affected site:  ¨ Spreading redness  ¨ Increased pain or swelling  ¨ Fluid or colored drainage from the site  ¨ Fever of 100.4°F (38°C) or above lasting for 24 to 48 hours, or as directed by your provider  Date Last Reviewed: 3/1/2017  © 9903-7411 [employer] [address] [place] [address], [place] [address]. All rights reserved. This information is not intended as a substitute for professional medical care. Always follow your healthcare professional's instructions.

## 2018-01-31 NOTE — PROGRESS NOTES
"Subjective:       Patient ID: Emily Pina is a 47 y.o. female.    Chief Complaint: Urticaria (X 1 week ago and now dizzy,weak and SOB)    Urticaria   This is a new problem. The current episode started 1 to 4 weeks ago. The problem has been gradually worsening since onset. The rash is diffuse. The rash is characterized by redness and itchiness. She was exposed to nothing. Associated symptoms include shortness of breath. Past treatments include antihistamine, topical steroids, anti-itch cream and oral steroids.       Past Medical History:   Diagnosis Date    Asthma in remission     COPD (chronic obstructive pulmonary disease)     Fibromyalgia     Hypothyroidism     Immune disorder     Lupus     Recurrent urticaria 1/6/2017    Subacute cutaneous lupus erythematosus        Social History     Social History    Marital status:      Spouse name: N/A    Number of children: N/A    Years of education: N/A     Occupational History    Not on file.     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     Drug use: No    Sexual activity: Yes     Partners: Male     Other Topics Concern    Not on file     Social History Narrative    No narrative on file       Past Surgical History:   Procedure Laterality Date    CHOLECYSTECTOMY         Review of Systems   Respiratory: Positive for shortness of breath. Negative for wheezing.    Skin: Positive for rash.   All other systems reviewed and are negative.      Objective:   /80 (BP Location: Right arm, Patient Position: Sitting, BP Method: Medium (Manual))   Pulse 107   Temp 98.3 °F (36.8 °C) (Oral)   Ht 5' 1" (1.549 m)   Wt 64 kg (140 lb 15.8 oz)   LMP 05/14/2014 (LMP Unknown)   SpO2 98%   BMI 26.64 kg/m²      Physical Exam   Constitutional: She is " oriented to person, place, and time. She appears well-developed and well-nourished.   HENT:   Head: Normocephalic and atraumatic.   Right Ear: A middle ear effusion is present.   Cardiovascular: Normal rate, regular rhythm and normal heart sounds.    Pulmonary/Chest: Effort normal and breath sounds normal. No respiratory distress. She has no decreased breath sounds. She has no wheezes. She has no rhonchi. She has no rales.   Neurological: She is alert and oriented to person, place, and time.   Skin: Rash noted. Rash is urticarial.   Psychiatric: She has a normal mood and affect. Her speech is normal and behavior is normal.       Assessment:       1. Allergic reaction, initial encounter    2. Rash and nonspecific skin eruption        Plan:       Emily was seen today for urticaria.    Diagnoses and all orders for this visit:    Allergic reaction, initial encounter    Rash and nonspecific skin eruption  -     methylPREDNISolone (MEDROL DOSEPACK) 4 mg tablet; use as directed  -     hydrOXYzine HCl (ATARAX) 25 MG tablet; Take 1 tablet (25 mg total) by mouth every 12 (twelve) hours as needed for Itching.  -     dexamethasone injection 8 mg; Inject 2 mLs (8 mg total) into the muscle one time.  · If you know what you are allergic to, stay away from it. Future reactions could be worse than this one.  · Avoid tight clothing and anything that heats up your skin (hot showers or baths, direct sunlight). Heat will make itching worse.  · An ice pack will relieve local areas of intense itching and redness. To make an ice pack, put ice cubes in a plastic bag that seals at the top. Wrap it in a thin, clean towel. Dont put the ice directly on the skin because it can damage the skin.  · Oral diphenhydramine is an over-the-counter antihistamine sold at pharmacy and grocery stores. Unless a prescription antihistamine was given, diphenhydramine may be used to reduce itching if large areas of the skin are involved. It may make you sleepy.  So be careful using it in the daytime or when going to school, working, or driving. Note: Dont use diphenhydramine if you have glaucoma or if you are a man with trouble urinating due to an enlarged prostate. There are other antihistamines that wont make you so sleepy. These are good choices for daytime use. Ask your pharmacist for suggestions.  · Dont use diphenhydramine cream on your skin. It can cause a further reaction in some people.  · To help prevent an infection, don't scratch the affected area. Scratching may worsen the reaction and damage your skin. It can also lead to an infection. Always check the affected for signs of an infection.  · Call your healthcare provider and ask what you can use to help decrease the itching.  · To decrease allergic reactions, try the following:    · Use heat-steam to clean your home  · Use high-efficiency particulate (HEPA) vacuums and filters  · Stay away from food and pet triggers  · Kill any cockroaches  · Clean your house often      Follow-up if symptoms worsen or fail to improve.

## 2018-02-26 ENCOUNTER — PATIENT MESSAGE (OUTPATIENT)
Dept: ALLERGY | Facility: CLINIC | Age: 48
End: 2018-02-26

## 2018-02-26 DIAGNOSIS — J30.1 CHRONIC SEASONAL ALLERGIC RHINITIS DUE TO POLLEN: Primary | ICD-10-CM

## 2018-02-27 ENCOUNTER — OFFICE VISIT (OUTPATIENT)
Dept: FAMILY MEDICINE | Facility: CLINIC | Age: 48
End: 2018-02-27
Payer: MEDICARE

## 2018-02-27 VITALS
BODY MASS INDEX: 26.82 KG/M2 | SYSTOLIC BLOOD PRESSURE: 108 MMHG | WEIGHT: 142.06 LBS | HEART RATE: 83 BPM | DIASTOLIC BLOOD PRESSURE: 76 MMHG | HEIGHT: 61 IN | TEMPERATURE: 98 F | OXYGEN SATURATION: 96 %

## 2018-02-27 DIAGNOSIS — F32.9 REACTIVE DEPRESSION: Primary | ICD-10-CM

## 2018-02-27 PROCEDURE — 99214 OFFICE O/P EST MOD 30 MIN: CPT | Mod: S$GLB,,, | Performed by: NURSE PRACTITIONER

## 2018-02-27 PROCEDURE — 99499 UNLISTED E&M SERVICE: CPT | Mod: S$GLB,,, | Performed by: NURSE PRACTITIONER

## 2018-02-27 PROCEDURE — 99999 PR PBB SHADOW E&M-EST. PATIENT-LVL V: CPT | Mod: PBBFAC,,, | Performed by: NURSE PRACTITIONER

## 2018-02-27 PROCEDURE — 3008F BODY MASS INDEX DOCD: CPT | Mod: S$GLB,,, | Performed by: NURSE PRACTITIONER

## 2018-02-27 RX ORDER — OXYCODONE AND ACETAMINOPHEN 5; 325 MG/1; MG/1
1 TABLET ORAL EVERY 4 HOURS PRN
Refills: 0 | COMMUNITY
Start: 2017-11-30 | End: 2018-03-27 | Stop reason: SDUPTHER

## 2018-02-27 RX ORDER — IBUPROFEN 600 MG/1
TABLET ORAL
Refills: 0 | COMMUNITY
Start: 2017-11-30 | End: 2018-04-18

## 2018-02-27 RX ORDER — SERTRALINE HYDROCHLORIDE 50 MG/1
50 TABLET, FILM COATED ORAL DAILY
Qty: 30 TABLET | Refills: 2 | Status: SHIPPED | OUTPATIENT
Start: 2018-02-27 | End: 2018-03-27 | Stop reason: SDUPTHER

## 2018-02-27 RX ORDER — HYDROXYZINE HYDROCHLORIDE 25 MG/1
25 TABLET, FILM COATED ORAL EVERY 12 HOURS PRN
Qty: 30 TABLET | Refills: 0 | Status: SHIPPED | OUTPATIENT
Start: 2018-02-27 | End: 2018-05-08 | Stop reason: SDUPTHER

## 2018-02-27 NOTE — PATIENT INSTRUCTIONS
Depression  Depression is one of the most common mental health problems today. It is not just a state of unhappiness or sadness. It is a true disease. The cause seems to be related to a decrease in chemicals that transmit signals in the brain. Having a family history of depression, alcoholism, or suicide increases the risk. Chronic illness, chronic pain, migraine headaches and high emotional stress also increase the risk.  Depression is something we tend to recognize in others, but may have a hard time seeing in ourselves. It can show in many physical and emotional ways:  · Loss of appetite  · Over-eating  · Not being able to sleep  · Sleeping too much  · Tiredness not related to physical exertion  · Restlessness or irritability  · Slowness of movement or speech  · Feeling depressed or withdrawn  · Loss of interest in things you once enjoyed  · Trouble concentrating, poor memory, trouble making decisions  · Thoughts of harming or killing oneself, or thoughts that life is not worth living  · Low self-esteem  The treatment for depression may include both medicine and psychotherapy. Antidepressants can reduce suffering and can improve the ability to function during the depressed period. Therapy can offer emotional support and help you understand emotional factors that may be causing the depression.  Home care  · On-going care and support helps people manage this disease.  Find a healthcare provider and therapist who meet your needs. Seek help when you feel like you may be getting ill.  · Be kind to yourself. Make it a point to do things that you enjoy (gardening, walking in nature, going to a movie, etc.). Reward yourself for small successes.  · Take care of your physical body. Eat a balanced diet (low in saturated fat and high in fruits and vegetables). Exercise at least 3 times a week for 30 minutes. Even mild-moderate exercise (like brisk walking) can make you feel better.  · Avoid alcohol, which can make  depression worse.  · Take medicine as prescribed.  · Tell each of your healthcare providers about all of the prescription drugs, over-the-counter medicines, vitamins, and supplements you take. Certain supplements interact with medicines and can result in dangerous side effects. Ask your pharmacist when you have questions about drug interactions.  · Talk with your family and trusted friends about your feelings and thoughts. Ask them to help you recognize behavior changes early so you can get help and, if needed, medicine can be adjusted.  Follow-up care  Follow up with your healthcare provider, or as advised.  Call 911  Call 911 if you:  · Have suicidal thoughts, a suicide plan, and the means to carry out the plan  · Have trouble breathing  · Are very confused  · Feel very drowsy or have trouble awakening  · Faint or lose consciousness  · Have new chest pain that becomes more severe, lasts longer, or spreads into your shoulder, arm, neck, jaw or back  When to seek medical advice  Call your healthcare provider right away if any of these occur:  · Feeling extreme depression, fear, anxiety, or anger toward yourself or others  · Feeling out of control  · Feeling that you may try to harm yourself or another  · Hearing voices that others do not hear  · Seeing things that others do not see  · Cant sleep or eat for 3 days in a row  · Friends or family express concern over your behavior and ask you to seek help  Date Last Reviewed: 9/29/2015  © 2990-6855 [employer]. [address] [place] [id number]. All rights reserved. This information is not intended as a substitute for professional medical care. Always follow your healthcare professional's instructions.        Depression Affects Your Mind and Body  Everyone feels sad or blue from time to time for a few days or weeks. Depression is when these feelings don't go away and they interfere with daily life.  Depression is a real illness that can develop  at any age. It is one of the most common mental health problems in the [place] Depression makes you feel sad, helpless, and hopeless. It gets in the way of your life and relationships. It inhibits your ability to think and act. But, with help, you can feel better again.     When I was depressed, I felt awful. I was so tired all the time I could hardly think, but at night I couldnt fall asleep. My head hurt. My stomach hurt. I didnt know what was wrong with me.   Depression affects your whole body  Brain chemicals affect your body as well as your mood. So depression may do more than just make you feel low. You may also feel bad physically. Depression can:  · Cause trouble with mental tasks such as remembering, concentrating, or making decisions  · Make you feel nervous and jumpy  · Cause trouble sleeping. Or you may sleep too much  · Change your appetite  · Cause headaches, stomachaches, or other aches and pains  · Drain your body of energy  Depression and other illness  It is common for people who have chronic health problems to also have depression. It can often be hard to tell which one caused the other. A person might become depressed after finding out they have a health problem. But some studies suggest being depressed may make certain health problems more likely. And some depressed people stop taking care of themselves. This may make them more likely to get sick.  Date Last Reviewed: 1/1/2017  © 0979-9403 [employer]. [address] [place] [address], [place] [address]. All rights reserved. This information is not intended as a substitute for professional medical care. Always follow your healthcare professional's instructions.        Know the Signs and Symptoms of Depression  Everyone feels down at times. The blues are a natural part of life. But an unhappy period thats intense or lasts for more than a couple of weeks can be a sign of depression.  Depression is a serious illness. It is not a sign of  "weakness or a "character flaw," and it is not something you can "snap out of." In fact, most people with depression need treatment to get better. Depression can disrupt the lives of family and friends. If you know someone you think may be depressed, find out what you can do to help.    Recognizing signs of depression  People who are depressed may:  · Feel unhappy, sad, blue, down, or miserable nearly every day  · Feel helpless, hopeless, or worthless  · Lose interest in hobbies, friends, and activities that used to give pleasure  · Not sleep well or sleep too much  · Gain or lose weight  · Feel low on energy or constantly tired  · Have a hard time concentrating or making decisions  · Lose interest in sex  · Have physical symptoms, such as stomachaches, headaches, or backaches  Know the serious signals  Never ignore a person's comments about suicide or behaviors that can lead to self-harm. Warning signals for suicide include:  · Threats or talk of suicide  · Statements such as I wont be a problem much longer or Nothing matters  · Giving away possessions or making a will or  arrangements  · Buying a gun or other weapon  · Sudden, unexplained cheerfulness or calm after a period of depression  If you notice any of these signs, get help right away. Call a healthcare professional, mental health clinic, or suicide hotline and ask what action to take. In an emergency, dont hesitate to call the police.  Resources:  · National Institutes of Mental Tljtme338-045-8860mlq.[facility].nih.gov  · National [place] on Mental Saldwgl720-619-1000mtx.jerardo.org   · Mental Health Myqrcgb882-658-7261fqg.nmha.org  · National Suicide Xfkcluy255-155-1668 (800-SUICIDE)  · National Suicide Prevention Tvhmiacw465-322-8462 (792-704-CJJQ)www.suicidepreventionlifeline.org   Date Last Reviewed: 2017  © 6513-3148 [employer]. [address] [place] [address], [place] [address]. All rights reserved. This information is not intended as a " substitute for professional medical care. Always follow your healthcare professional's instructions.        Sertraline tablets  What is this medicine?  SERTRALINE (SER tra jennifer) is used to treat depression. It may also be used to treat obsessive compulsive disorder, panic disorder, post-trauma stress, premenstrual dysphoric disorder (PMDD) or social anxiety.  How should I use this medicine?  Take this medicine by mouth with a glass of water. Follow the directions on the prescription label. You can take it with or without food. Take your medicine at regular intervals. Do not take your medicine more often than directed. Do not stop taking this medicine suddenly except upon the advice of your doctor. Stopping this medicine too quickly may cause serious side effects or your condition may worsen.  A special MedGuide will be given to you by the pharmacist with each prescription and refill. Be sure to read this information carefully each time.  Talk to your pediatrician regarding the use of this medicine in children. While this drug may be prescribed for children as young as 7 years for selected conditions, precautions do apply.  What side effects may I notice from receiving this medicine?  Side effects that you should report to your doctor or health care professional as soon as possible:  · allergic reactions like skin rash, itching or hives, swelling of the face, lips, or tongue  · black or bloody stools, blood in the urine or vomit  · fast, irregular heartbeat  · feeling faint or lightheaded, falls  · hallucination, loss of contact with reality  · seizures  · suicidal thoughts or other mood changes  · unusual bleeding or bruising  · unusually weak or tired  · vomiting  Side effects that usually do not require medical attention (report to your doctor or health care professional if they continue or are bothersome):  · change in appetite  · change in sex drive or performance  · diarrhea  · increased  sweating  · indigestion, nausea  · tremors  What may interact with this medicine?  Do not take this medicine with any of the following medications:  · certain medicines for fungal infections like fluconazole, itraconazole, ketoconazole, posaconazole, voriconazole  · cisapride  · disulfiram  · dofetilide  · linezolid  · MAOIs like Carbex, Eldepryl, Marplan, Nardil, and Parnate  · metronidazole  · methylene blue (injected into a vein)  · pimozide  · thioridazine  · ziprasidone  This medicine may also interact with the following medications:  · alcohol  · aspirin and aspirin-like medicines  · certain medicines for depression, anxiety, or psychotic disturbances  · certain medicines for irregular heart beat like flecainide, propafenone  · certain medicines for migraine headaches like almotriptan, eletriptan, frovatriptan, naratriptan, rizatriptan, sumatriptan, zolmitriptan  · certain medicines for sleep  · certain medicines for seizures like carbamazepine, valproic acid, phenytoin  · certain medicines that treat or prevent blood clots like warfarin, enoxaparin, dalteparin  · cimetidine  · digoxin  · diuretics  · fentanyl  · furazolidone  · isoniazid  · lithium  · NSAIDs, medicines for pain and inflammation, like ibuprofen or naproxen  · other medicines that prolong the QT interval (cause an abnormal heart rhythm)  · procarbazine  · rasagiline  · supplements like Joshua's wort, kava kava, valerian  · tolbutamide  · tramadol  · tryptophan  What if I miss a dose?  If you miss a dose, take it as soon as you can. If it is almost time for your next dose, take only that dose. Do not take double or extra doses.  Where should I keep my medicine?  Keep out of the reach of children.  Store at room temperature between 15 and 30 degrees C (59 and 86 degrees F). Throw away any unused medicine after the expiration date.  What should I tell my health care provider before I take this medicine?  They need to know if you have any of  these conditions:  · bipolar disorder or a family history of bipolar disorder  · diabetes  · glaucoma  · heart disease  · high blood pressure  · history of irregular heartbeat  · history of low levels of calcium, magnesium, or potassium in the blood  · if you often drink alcohol  · liver disease  · receiving electroconvulsive therapy  · seizures  · suicidal thoughts, plans, or attempt; a previous suicide attempt by you or a family member  · thyroid disease  · an unusual or allergic reaction to sertraline, other medicines, foods, dyes, or preservatives  · pregnant or trying to get pregnant  · breast-feeding  What should I watch for while using this medicine?  Tell your doctor if your symptoms do not get better or if they get worse. Visit your doctor or health care professional for regular checks on your progress. Because it may take several weeks to see the full effects of this medicine, it is important to continue your treatment as prescribed by your doctor.  Patients and their families should watch out for new or worsening thoughts of suicide or depression. Also watch out for sudden changes in feelings such as feeling anxious, agitated, panicky, irritable, hostile, aggressive, impulsive, severely restless, overly excited and hyperactive, or not being able to sleep. If this happens, especially at the beginning of treatment or after a change in dose, call your health care professional.  You may get drowsy or dizzy. Do not drive, use machinery, or do anything that needs mental alertness until you know how this medicine affects you. Do not stand or sit up quickly, especially if you are an older patient. This reduces the risk of dizzy or fainting spells. Alcohol may interfere with the effect of this medicine. Avoid alcoholic drinks.  Your mouth may get dry. Chewing sugarless gum or sucking hard candy, and drinking plenty of water may help. Contact your doctor if the problem does not go away or is severe.  NOTE:This  sheet is a summary. It may not cover all possible information. If you have questions about this medicine, talk to your doctor, pharmacist, or health care provider. Copyright© 2017 [employer]

## 2018-02-27 NOTE — PROGRESS NOTES
"Subjective:       Patient ID: Emily Pina is a 47 y.o. female.    Chief Complaint: Stress (pt states stress, overwhelmed and depression)    46 yo female reports overwhelmed and stressed. She reports taking care of her mother-N-law with dementia and alzheimer's, dad has multiple myeloma and mother recently diagnosed cancerous tumor in her duodenum. She has to care for them and takes them to their appointments. She reports more crying and stress. Denies thoughts of HI/SI.         Past Medical History:   Diagnosis Date    Asthma in remission     COPD (chronic obstructive pulmonary disease)     Fibromyalgia     Hypothyroidism     Immune disorder     Lupus     Recurrent urticaria 1/6/2017    Subacute cutaneous lupus erythematosus        Social History     Social History    Marital status:      Spouse name: N/A    Number of children: N/A    Years of education: N/A     Occupational History    Not on file.     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     Drug use: No    Sexual activity: Yes     Partners: Male     Other Topics Concern    Not on file     Social History Narrative    No narrative on file       Past Surgical History:   Procedure Laterality Date    CHOLECYSTECTOMY         Review of Systems   Psychiatric/Behavioral: Positive for sleep disturbance. Negative for confusion, decreased concentration, hallucinations, self-injury and suicidal ideas. The patient is nervous/anxious.        Objective:   /76 (BP Location: Right arm, Patient Position: Sitting, BP Method: Medium (Manual))   Pulse 83   Temp 98.4 °F (36.9 °C) (Oral)   Ht 5' 1" (1.549 m)   Wt 64.4 kg (142 lb 1.4 oz)   LMP 05/14/2014 (LMP Unknown)   SpO2 96%   BMI 26.85 kg/m²      Physical Exam   Constitutional: She is " oriented to person, place, and time. She appears well-developed and well-nourished.   HENT:   Head: Normocephalic and atraumatic.   Cardiovascular: Normal rate, regular rhythm and normal heart sounds.    Pulmonary/Chest: Effort normal and breath sounds normal. No respiratory distress. She has no decreased breath sounds. She has no wheezes. She has no rhonchi. She has no rales.   Neurological: She is alert and oriented to person, place, and time.   Skin: She is not diaphoretic. No pallor.   Psychiatric: Her speech is normal and behavior is normal. She exhibits a depressed mood (crying).       Assessment:       1. Reactive depression        Plan:       Emily was seen today for stress.    Diagnoses and all orders for this visit:    Reactive depression  -     sertraline (ZOLOFT) 50 MG tablet; Take 1 tablet (50 mg total) by mouth once daily.  Home care  · On-going care and support helps people manage this disease.  Find a healthcare provider and therapist who meet your needs. Seek help when you feel like you may be getting ill.  · Be kind to yourself. Make it a point to do things that you enjoy (gardening, walking in nature, going to a movie, etc.). Reward yourself for small successes.  · Take care of your physical body. Eat a balanced diet (low in saturated fat and high in fruits and vegetables). Exercise at least 3 times a week for 30 minutes. Even mild-moderate exercise (like brisk walking) can make you feel better.  · Avoid alcohol, which can make depression worse.  · Take medicine as prescribed.  · Tell each of your healthcare providers about all of the prescription drugs, over-the-counter medicines, vitamins, and supplements you take. Certain supplements interact with medicines and can result in dangerous side effects. Ask your pharmacist when you have questions about drug interactions.  · Talk with your family and trusted friends about your feelings and thoughts. Ask them to help you recognize behavior changes  early so you can get help and, if needed, medicine can be adjusted.    Follow-up if symptoms worsen or fail to improve.

## 2018-02-28 RX ORDER — EPINEPHRINE 0.3 MG/.3ML
1 INJECTION SUBCUTANEOUS ONCE
Qty: 0.3 ML | Refills: 0 | Status: SHIPPED | OUTPATIENT
Start: 2018-02-28 | End: 2018-02-28

## 2018-03-01 RX ORDER — PANTOPRAZOLE SODIUM 40 MG/1
TABLET, DELAYED RELEASE ORAL
Qty: 90 TABLET | Refills: 3 | Status: SHIPPED | OUTPATIENT
Start: 2018-03-01 | End: 2018-10-03 | Stop reason: SDUPTHER

## 2018-03-08 ENCOUNTER — TELEPHONE (OUTPATIENT)
Dept: ALLERGY | Facility: CLINIC | Age: 48
End: 2018-03-08

## 2018-03-08 RX ORDER — EPINEPHRINE 0.3 MG/.3ML
1 INJECTION SUBCUTANEOUS ONCE
Qty: 2 DEVICE | Refills: 1 | Status: SHIPPED | OUTPATIENT
Start: 2018-03-08 | End: 2018-03-15

## 2018-03-08 NOTE — TELEPHONE ENCOUNTER
----- Message from Radha Schwab sent at 3/7/2018  2:21 PM CST -----  Contact: [facility]/[phone]  Pharmacy is calling to get verification on the medication EPINEPHrine (AUVI-Q) 0.3 mg/0.3 mL AtIn. They need to know the quantity of this medication if it's going to be 1 or 2. Please call and advise.        Thank you

## 2018-03-08 NOTE — TELEPHONE ENCOUNTER
[facility] does not accept medicare for Auvi Qand rx will be $1100.00. Can you send in EPI PEN or generic epinephrine to another pharmacist.

## 2018-03-14 ENCOUNTER — OFFICE VISIT (OUTPATIENT)
Dept: FAMILY MEDICINE | Facility: CLINIC | Age: 48
End: 2018-03-14
Payer: MEDICARE

## 2018-03-14 VITALS
WEIGHT: 140.44 LBS | HEIGHT: 61 IN | BODY MASS INDEX: 26.51 KG/M2 | SYSTOLIC BLOOD PRESSURE: 114 MMHG | TEMPERATURE: 98 F | DIASTOLIC BLOOD PRESSURE: 70 MMHG | OXYGEN SATURATION: 97 % | HEART RATE: 108 BPM

## 2018-03-14 DIAGNOSIS — N95.1 VAGINAL DRYNESS, MENOPAUSAL: Primary | ICD-10-CM

## 2018-03-14 PROCEDURE — 99212 OFFICE O/P EST SF 10 MIN: CPT | Mod: S$GLB,,, | Performed by: NURSE PRACTITIONER

## 2018-03-14 PROCEDURE — 99999 PR PBB SHADOW E&M-EST. PATIENT-LVL V: CPT | Mod: PBBFAC,,, | Performed by: NURSE PRACTITIONER

## 2018-03-14 RX ORDER — CYCLOBENZAPRINE HCL 5 MG
TABLET ORAL
Refills: 3 | COMMUNITY
Start: 2018-03-01 | End: 2019-05-15

## 2018-03-14 NOTE — PROGRESS NOTES
"Subjective:       Patient ID: Emily Pina is a 47 y.o. female.    Chief Complaint: Painful Stowell    Vaginal Pain   This is a chronic problem. The current episode started more than 1 year ago. The problem occurs constantly (with sexualy intercourse). The problem has been gradually worsening. Associated symptoms include painful intercourse. The symptoms are aggravated by intercourse. Treatments tried: replens. The treatment provided no relief (initially it worked, but not anymore). She is sexually active. She uses nothing for contraception. Her past medical history is significant for ovarian cysts. There is no history of miscarriage or an STD.       Past Medical History:   Diagnosis Date    Asthma in remission     COPD (chronic obstructive pulmonary disease)     Fibromyalgia     Hypothyroidism     Immune disorder     Lupus     Recurrent urticaria 1/6/2017    Subacute cutaneous lupus erythematosus        Social History     Social History    Marital status:      Spouse name: N/A    Number of children: N/A    Years of education: N/A     Occupational History    Not on file.     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     Drug use: No    Sexual activity: Yes     Partners: Male     Other Topics Concern    Not on file     Social History Narrative    No narrative on file       Past Surgical History:   Procedure Laterality Date    CHOLECYSTECTOMY         Review of Systems   Genitourinary: Positive for vaginal pain. Negative for genital sores and vaginal bleeding.   All other systems reviewed and are negative.      Objective:   /70 (BP Location: Right arm, Patient Position: Sitting, BP Method: Medium (Manual))   Pulse 108   Temp 97.8 °F (36.6 °C) (Oral)   Ht 5' 1" (1.549 m)   Wt " 63.7 kg (140 lb 6.9 oz)   LMP 05/14/2014 (LMP Unknown)   SpO2 97%   BMI 26.53 kg/m²      Physical Exam   Constitutional: She is oriented to person, place, and time. She appears well-developed and well-nourished.   HENT:   Head: Normocephalic and atraumatic.   Cardiovascular: Normal rate, regular rhythm and normal heart sounds.    Pulmonary/Chest: Effort normal and breath sounds normal. No respiratory distress. She has no decreased breath sounds. She has no wheezes. She has no rhonchi. She has no rales.   Neurological: She is alert and oriented to person, place, and time.   Skin: She is not diaphoretic. No pallor.   Psychiatric: She has a normal mood and affect. Her speech is normal and behavior is normal.       Assessment:       1. Vaginal dryness, menopausal        Plan:       Emily was seen today for painful intercourse.    Diagnoses and all orders for this visit:    Vaginal dryness, menopausal  -     Ambulatory referral to Gynecology  -     US Transvaginal Non OB; Future  Likely due to menopause, will send to GYN for eval.

## 2018-03-14 NOTE — PATIENT INSTRUCTIONS
Hormone Changes During Menopause  Menopause is not a sudden change. During the months or years before menopause (perimenopause), your ovaries begin to run out of eggs. Your body makes less estrogen and progesterone. This may bring on symptoms such as hot flashes. Youve reached menopause when you have not had a period for 1 year. From that point on, you are in postmenopause.  Perimenopause  In the years leading up to menopause, your ovaries make less estrogen. You release fewer eggs and your periods become less regular.  Symptoms you may have:  · Heavier or lighter periods  · Longer or shorter time between periods  · Hot flashes  · Mood swings  · Night sweats  · Insomnia  · Vaginal dryness  · Urinary changes including incontinence and frequency  Postmenopause  After menopause, you make very little estrogen. As a result, the uterine lining does not thicken and your periods have ended.  Symptoms you may have:  · No periods  · Vaginal dryness  · Hot flashes  · Mood swings  · Night sweats  · Insomnia  Surgical menopause  Menopause can occur after a surgical removal of the uterus (hysterectomy) if the ovaries are also removed. Estrogen and progesterone levels decrease quickly. This may cause sudden and severe symptoms.   Date Last Reviewed: 5/13/2015  © 9034-6422 [employer]. [address] [place] [address]. All rights reserved. This information is not intended as a substitute for professional medical care. Always follow your healthcare professional's instructions.        Understanding Menopause  Menopause marks the point where youve gone 12 months in a row without a period. The average age for this is around 51, but it can happen at younger or older ages. During the months or years before menopause, your body goes through many changes. It may be helpful to understand these changes and what you can do about the symptoms that result.     Use a portable fan to help stay cool.     Symptoms  Perimenopause is sometimes called the menopause transition. It happens in the months or years before menopause. It may begin when you reach your mid-40s. During this time, your estrogen levels go up and down and then decrease. As a result, you may notice some of the following symptoms:  · Menstrual periods that come more or less often than usual  · Menstrual periods that are lighter or heavier than normal  · Increased premenstrual syndrome (PMS) symptoms  · Hot flashes  · Night sweats  · Mood swings  · Vaginal dryness with possible painful sexual activity  · Difficulty going to sleep or staying asleep  · Decreased sexual drive and function  · Urinating frequently  It is important to remember that you could become pregnant until 12 months have passed since your last menstrual period. Ask your healthcare provider about birth control choices.   Controlling symptoms  Your healthcare provider may suggest pills or an intrauterine device (IUD) that contain the hormone progesterone. This can make your periods more regular and prevent excess bleeding. If you have symptoms due to lower estrogen levels, your healthcare provider may suggest pills that contain estrogen and/or progesterone. This is called hormone therapy (HT).  There are also other prescription medicines that help control some of the bothersome symptoms, like hot flashes, mood swings, and vaginal dryness.  Other ways for you to deal with symptoms are listed below.  · Hot flashes. Wear layers that you can remove. Try all-cotton clothing, sheets, and blankets. Keep a glass of cold water by your bed.  · Pain during sex. You can buy a water-based lubricant or vaginal moisturizer in the drugstore that may help. Your healthcare provider may also prescribe an estrogen cream for your vagina.  · Mood swings. Talking to friends who are going through the same changes can sometimes help.  Date Last Reviewed: 12/1/2016  © 7738-9967 [employer]. [address]  [place] [address]. All rights reserved. This information is not intended as a substitute for professional medical care. Always follow your healthcare professional's instructions.

## 2018-03-15 ENCOUNTER — PATIENT MESSAGE (OUTPATIENT)
Dept: ALLERGY | Facility: CLINIC | Age: 48
End: 2018-03-15

## 2018-03-15 ENCOUNTER — PATIENT MESSAGE (OUTPATIENT)
Dept: FAMILY MEDICINE | Facility: CLINIC | Age: 48
End: 2018-03-15

## 2018-03-15 DIAGNOSIS — Z51.6 ALLERGY DESENSITIZATION THERAPY: Primary | ICD-10-CM

## 2018-03-15 RX ORDER — EPINEPHRINE 0.3 MG/.3ML
1 INJECTION SUBCUTANEOUS ONCE
Qty: 2 DEVICE | Refills: 5 | Status: SHIPPED | OUTPATIENT
Start: 2018-03-15 | End: 2018-03-15

## 2018-03-23 ENCOUNTER — PATIENT MESSAGE (OUTPATIENT)
Dept: RHEUMATOLOGY | Facility: CLINIC | Age: 48
End: 2018-03-23

## 2018-03-23 ENCOUNTER — PATIENT MESSAGE (OUTPATIENT)
Dept: ALLERGY | Facility: CLINIC | Age: 48
End: 2018-03-23

## 2018-03-25 RX ORDER — EPINEPHRINE 0.3 MG/.3ML
1 INJECTION SUBCUTANEOUS ONCE
Qty: 2 DEVICE | Refills: 0 | Status: SHIPPED | OUTPATIENT
Start: 2018-03-25 | End: 2018-12-21 | Stop reason: SDUPTHER

## 2018-03-27 ENCOUNTER — OFFICE VISIT (OUTPATIENT)
Dept: FAMILY MEDICINE | Facility: CLINIC | Age: 48
End: 2018-03-27
Payer: MEDICARE

## 2018-03-27 ENCOUNTER — HOSPITAL ENCOUNTER (OUTPATIENT)
Dept: RADIOLOGY | Facility: HOSPITAL | Age: 48
Discharge: HOME | End: 2018-03-27
Attending: FAMILY MEDICINE
Payer: MEDICARE

## 2018-03-27 VITALS
SYSTOLIC BLOOD PRESSURE: 114 MMHG | OXYGEN SATURATION: 99 % | DIASTOLIC BLOOD PRESSURE: 80 MMHG | HEIGHT: 61 IN | WEIGHT: 141.31 LBS | BODY MASS INDEX: 26.68 KG/M2 | HEART RATE: 99 BPM | TEMPERATURE: 98 F

## 2018-03-27 DIAGNOSIS — L93.1 SUBACUTE CUTANEOUS LUPUS ERYTHEMATOSUS: ICD-10-CM

## 2018-03-27 DIAGNOSIS — M79.674 PAIN OF TOE OF RIGHT FOOT: ICD-10-CM

## 2018-03-27 DIAGNOSIS — D89.9 DISORDER OF IMMUNE SYSTEM: ICD-10-CM

## 2018-03-27 DIAGNOSIS — J44.89 CHRONIC OBSTRUCTIVE AIRWAY DISEASE WITH ASTHMA: ICD-10-CM

## 2018-03-27 DIAGNOSIS — M79.674 PAIN OF TOE OF RIGHT FOOT: Primary | ICD-10-CM

## 2018-03-27 PROCEDURE — 99999 PR PBB SHADOW E&M-EST. PATIENT-LVL III: CPT | Mod: PBBFAC,,, | Performed by: FAMILY MEDICINE

## 2018-03-27 PROCEDURE — 99214 OFFICE O/P EST MOD 30 MIN: CPT | Mod: 25,S$GLB,, | Performed by: FAMILY MEDICINE

## 2018-03-27 PROCEDURE — 99499 UNLISTED E&M SERVICE: CPT | Mod: S$GLB,,, | Performed by: FAMILY MEDICINE

## 2018-03-27 PROCEDURE — 96372 THER/PROPH/DIAG INJ SC/IM: CPT | Mod: S$GLB,,, | Performed by: FAMILY MEDICINE

## 2018-03-27 PROCEDURE — 73630 X-RAY EXAM OF FOOT: CPT | Mod: 26,RT,, | Performed by: RADIOLOGY

## 2018-03-27 PROCEDURE — 73630 X-RAY EXAM OF FOOT: CPT | Mod: TC,FY,PO,RT

## 2018-03-27 RX ORDER — IBUPROFEN 800 MG/1
TABLET ORAL
Qty: 30 TABLET | Refills: 2 | Status: SHIPPED | OUTPATIENT
Start: 2018-03-27 | End: 2018-10-12 | Stop reason: SDUPTHER

## 2018-03-27 RX ORDER — HYDROCODONE BITARTRATE AND ACETAMINOPHEN 7.5; 325 MG/1; MG/1
1 TABLET ORAL EVERY 6 HOURS PRN
Qty: 15 TABLET | Refills: 0 | Status: SHIPPED | OUTPATIENT
Start: 2018-03-27 | End: 2018-03-29 | Stop reason: SDUPTHER

## 2018-03-27 RX ORDER — OXYCODONE AND ACETAMINOPHEN 5; 325 MG/1; MG/1
1 TABLET ORAL EVERY 4 HOURS PRN
Refills: 0 | Status: CANCELLED | OUTPATIENT
Start: 2018-03-27

## 2018-03-27 RX ORDER — KETOROLAC TROMETHAMINE 30 MG/ML
30 INJECTION, SOLUTION INTRAMUSCULAR; INTRAVENOUS ONCE
Status: COMPLETED | OUTPATIENT
Start: 2018-03-27 | End: 2018-03-27

## 2018-03-27 RX ORDER — OXYCODONE AND ACETAMINOPHEN 5; 325 MG/1; MG/1
1 TABLET ORAL EVERY 8 HOURS PRN
Qty: 20 TABLET | Refills: 0 | Status: SHIPPED | OUTPATIENT
Start: 2018-03-27 | End: 2018-03-27

## 2018-03-27 RX ORDER — SERTRALINE HYDROCHLORIDE 50 MG/1
50 TABLET, FILM COATED ORAL DAILY
Qty: 90 TABLET | Refills: 0 | Status: SHIPPED | OUTPATIENT
Start: 2018-03-27 | End: 2018-10-03 | Stop reason: SDUPTHER

## 2018-03-27 RX ADMIN — KETOROLAC TROMETHAMINE 30 MG: 30 INJECTION, SOLUTION INTRAMUSCULAR; INTRAVENOUS at 11:03

## 2018-03-27 NOTE — PROGRESS NOTES
Office Visit    Patient Name: Emily Pina    : 1970  MRN: [id number]    Subjective:  Emily is a 48 y.o. female who presents today for:    Depression and Toe Pain      This patient has multiple medical diagnoses as noted below.  This patient is known to me and to this clinic.   She reports increased toe pain.  It has increased cracking.  Pain is worsening.  Constant pain in the toe.  Big toe pain on the right foot.  No swelling in the toe.  Pain can increase with applied.  Maternal grandpa had gout.  Constant pain.  No injury.        Patient Active Problem List   Diagnosis    Subacute cutaneous lupus erythematosus    Fibromyalgia    Chronic obstructive airway disease with asthma    Tobacco abuse    GERD (gastroesophageal reflux disease)    Amenorrhea    Unspecified hereditary and idiopathic peripheral neuropathy    Hypothyroidism    Chronic allergic conjunctivitis    Chronic allergic rhinitis due to animal hair and dander    Recurrent urticaria    Other pruritus    Calculus of gallbladder without cholecystitis without obstruction    Biliary colic    S/P laparoscopic cholecystectomy    Disorder of immune system       Past Surgical History:   Procedure Laterality Date    CHOLECYSTECTOMY         Family History   Problem Relation Age of Onset    Hypertension Mother     Thyroid disease Mother     Arthritis Father     Hyperlipidemia Father     Cancer Father      Multiple Myeloma     Cirrhosis Father     Lupus Sister     Asthma Sister     No Known Problems Brother     No Known Problems Maternal Aunt     No Known Problems Maternal Uncle     No Known Problems Paternal Aunt     No Known Problems Paternal Uncle     No Known Problems Maternal Grandmother     No Known Problems Maternal Grandfather     No Known Problems Paternal Grandmother     No Known Problems Paternal Grandfather     Scoliosis Sister     Amblyopia Neg Hx     Blindness Neg Hx     Cataracts Neg Hx      Diabetes Neg Hx     Glaucoma Neg Hx     Macular degeneration Neg Hx     Retinal detachment Neg Hx     Strabismus Neg Hx     Stroke Neg Hx        Social History     Social History    Marital status:      Spouse name: N/A    Number of children: N/A    Years of education: N/A     Occupational History    Not on file.     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     Drug use: No    Sexual activity: Yes     Partners: Male     Other Topics Concern    Not on file     Social History Narrative    No narrative on file       Current Medications  Medications reviewed and updated.     Allergies   Review of patient's allergies indicates:   Allergen Reactions    Bactrim [sulfamethoxazole-trimethoprim] Rash         Labs  No results found for: LABA1C, HGBA1C  Lab Results   Component Value Date     03/06/2017    K 4.0 03/06/2017     03/06/2017    CO2 28 03/06/2017    BUN 9 03/06/2017    CREATININE 0.8 03/06/2017    CALCIUM 9.1 03/06/2017    ANIONGAP 8 03/06/2017    ESTGFRAFRICA >60.0 03/06/2017    EGFRNONAA >60.0 03/06/2017     Lab Results   Component Value Date    CHOL 186 06/02/2016    CHOL 194 10/24/2014    CHOL 171 10/21/2014     Lab Results   Component Value Date    HDL 50 06/02/2016    HDL 49 10/24/2014    HDL 47 10/21/2014     Lab Results   Component Value Date    LDLCALC 100.2 06/02/2016    LDLCALC 103.2 10/24/2014    LDLCALC 78.4 10/21/2014     Lab Results   Component Value Date    TRIG 179 (H) 06/02/2016    TRIG 209 (H) 10/24/2014    TRIG 228 (H) 10/21/2014     Lab Results   Component Value Date    CHOLHDL 26.9 06/02/2016    CHOLHDL 25.3 10/24/2014    CHOLHDL 27.5 10/21/2014     Last set of blood work has been reviewed as noted above.    Review of Systems   Constitutional: Negative for activity change,  "appetite change, fatigue, fever and unexpected weight change.   HENT: Negative.  Negative for ear discharge, ear pain, rhinorrhea and sore throat.    Eyes: Negative.    Respiratory: Negative for apnea, cough, chest tightness, shortness of breath and wheezing.    Cardiovascular: Negative for chest pain, palpitations and leg swelling.   Gastrointestinal: Negative for abdominal distention, abdominal pain, constipation, diarrhea and vomiting.   Endocrine: Negative for cold intolerance, heat intolerance, polydipsia and polyuria.   Genitourinary: Negative for decreased urine volume, menstrual problem, urgency, vaginal bleeding, vaginal discharge and vaginal pain.   Musculoskeletal: Positive for arthralgias and gait problem.   Skin: Negative for rash.   Neurological: Negative for dizziness and headaches.   Hematological: Does not bruise/bleed easily.   Psychiatric/Behavioral: Negative for agitation, sleep disturbance and suicidal ideas.       /80 (BP Location: Left arm, Patient Position: Sitting, BP Method: Small (Manual))   Pulse 99   Temp 98.3 °F (36.8 °C) (Oral)   Ht 5' 1" (1.549 m)   Wt 64.1 kg (141 lb 5 oz)   LMP 05/14/2014 (LMP Unknown)   SpO2 99%   BMI 26.70 kg/m²      Physical Exam   Constitutional: She is oriented to person, place, and time. She appears well-developed and well-nourished.   HENT:   Head: Normocephalic.   Eyes: Conjunctivae and EOM are normal. Pupils are equal, round, and reactive to light.   Neck: Normal range of motion.   Musculoskeletal:        Feet:    Neurological: She is alert and oriented to person, place, and time.   Skin: Skin is warm and dry.   Psychiatric: She has a normal mood and affect. Her behavior is normal.   Vitals reviewed.      Health Maintenance  Health Maintenance       Date Due Completion Date    Pap Smear with HPV Cotest 01/20/2019 1/20/2016    Mammogram 03/06/2019 3/6/2017    Colonoscopy 04/18/2021 4/18/2016 (Done)    Override on 4/18/2016: Done ( " [facility])    Lipid Panel 06/02/2021 6/2/2016    TETANUS VACCINE 09/02/2026 9/2/2016    Pneumococcal PPSV23 (Medium Risk) (2) 03/26/2037 10/29/2012          Assessment/Plan:  Emily Pina is a 48 y.o. female who presents today for :    1. Pain of toe of right foot    2. Disorder of immune system    3. Chronic obstructive airway disease with asthma    4. Subacute cutaneous lupus erythematosus        Problem List Items Addressed This Visit        Unprioritized    Chronic obstructive airway disease with asthma    Current Assessment & Plan     No noted acute changes           Disorder of immune system    Overview     Followed by Dr. Spaulding ([facility] de[facility]) for subacute cutaneous lupus erythematosus (10-9-2017)          Subacute cutaneous lupus erythematosus    Overview     3/30/16 per Rheumatology History of present illness: 46-year-old female with (subacute cutaneous lupus). She has had no evidence of systemic lupus. She remains on Plaquenil.          Current Assessment & Plan     No change at this time.  Has appointment with rheumatology in may            Other Visit Diagnoses     Pain of toe of right foot    -  Primary    Relevant Medications    ibuprofen (ADVIL,MOTRIN) 800 MG tablet    ketorolac injection 30 mg (Completed)    Other Relevant Orders    Uric acid    X-Ray Foot Complete Right (Completed)      temporary refill on medication for lupus symptoms.  Provided by rheumatology     Follow-up in about 6 months (around 9/27/2018).     This note was created by combination of typed  and Dragon dictation.  Transcription errors may be present.  If there are any questions, please contact me.

## 2018-03-28 ENCOUNTER — HOSPITAL ENCOUNTER (OUTPATIENT)
Dept: RADIOLOGY | Facility: HOSPITAL | Age: 48
Discharge: HOME | End: 2018-03-28
Attending: NURSE PRACTITIONER
Payer: MEDICARE

## 2018-03-28 DIAGNOSIS — N95.1 VAGINAL DRYNESS, MENOPAUSAL: ICD-10-CM

## 2018-03-28 PROCEDURE — 76856 US EXAM PELVIC COMPLETE: CPT | Mod: 26,,, | Performed by: RADIOLOGY

## 2018-03-28 PROCEDURE — 76830 TRANSVAGINAL US NON-OB: CPT | Mod: 26,,, | Performed by: RADIOLOGY

## 2018-03-28 PROCEDURE — 76830 TRANSVAGINAL US NON-OB: CPT | Mod: TC

## 2018-03-29 RX ORDER — HYDROCODONE BITARTRATE AND ACETAMINOPHEN 7.5; 325 MG/1; MG/1
1 TABLET ORAL EVERY 6 HOURS PRN
Qty: 15 TABLET | Refills: 0 | Status: SHIPPED | OUTPATIENT
Start: 2018-03-29 | End: 2018-05-07 | Stop reason: SDUPTHER

## 2018-04-03 ENCOUNTER — OFFICE VISIT (OUTPATIENT)
Dept: OBSTETRICS AND GYNECOLOGY | Facility: CLINIC | Age: 48
End: 2018-04-03
Payer: MEDICARE

## 2018-04-03 VITALS
WEIGHT: 141 LBS | SYSTOLIC BLOOD PRESSURE: 128 MMHG | DIASTOLIC BLOOD PRESSURE: 81 MMHG | BODY MASS INDEX: 26.62 KG/M2 | HEIGHT: 61 IN

## 2018-04-03 DIAGNOSIS — Z12.31 ENCOUNTER FOR SCREENING MAMMOGRAM FOR BREAST CANCER: Primary | ICD-10-CM

## 2018-04-03 DIAGNOSIS — N95.2 VAGINAL ATROPHY: ICD-10-CM

## 2018-04-03 DIAGNOSIS — Z72.0 TOBACCO ABUSE: ICD-10-CM

## 2018-04-03 PROCEDURE — 99999 PR PBB SHADOW E&M-EST. PATIENT-LVL IV: CPT | Mod: PBBFAC,,, | Performed by: OBSTETRICS & GYNECOLOGY

## 2018-04-03 PROCEDURE — 99203 OFFICE O/P NEW LOW 30 MIN: CPT | Mod: S$GLB,,, | Performed by: OBSTETRICS & GYNECOLOGY

## 2018-04-03 RX ORDER — ESTRADIOL 0.1 MG/G
CREAM VAGINAL
Qty: 42.5 G | Refills: 5 | Status: SHIPPED | OUTPATIENT
Start: 2018-04-03 | End: 2018-04-03 | Stop reason: SDUPTHER

## 2018-04-03 RX ORDER — ESTRADIOL 0.1 MG/G
CREAM VAGINAL
Qty: 42.5 G | Refills: 5 | Status: SHIPPED | OUTPATIENT
Start: 2018-04-03 | End: 2020-06-30 | Stop reason: SDUPTHER

## 2018-04-03 NOTE — PROGRESS NOTES
SUBJECTIVE:   48 y.o. female No obstetric history on file.  for vaginal dryness    Patient's last menstrual period was 2014 (approximate)..     Menopause @ age 43, denies HRT  Intermittent hot flashes, not bothersome  Has vaginal dryness, tried lubrication with no relief. Sometimes has postcoital bleeding, rarely.  Recent pelvic us shows normal uterus with ES of 2mm.  She has lupus, which is well controlled. Unknown h/o APS, denies h/o DVT/PE    OB History    Para Term  AB Living   0 0 0 0 0 0   SAB TAB Ectopic Multiple Live Births   0 0 0 0 0         Obstetric Comments   Denies std   Denies abnl mmg, last one    Denies abnl pap, last one      Past Medical History:   Diagnosis Date    Asthma in remission     COPD (chronic obstructive pulmonary disease)     Fibromyalgia     Hypothyroidism     Immune disorder     Lupus     Recurrent urticaria 2017    Subacute cutaneous lupus erythematosus      Past Surgical History:   Procedure Laterality Date    CHOLECYSTECTOMY       Social History     Social History    Marital status:      Spouse name: N/A    Number of children: N/A    Years of education: N/A     Occupational History    Not on file.     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     Drug use: No    Sexual activity: Yes     Partners: Male     Other Topics Concern    Not on file     Social History Narrative    No narrative on file     Family History   Problem Relation Age of Onset    Hypertension Mother     Thyroid disease Mother     Arthritis Father     Hyperlipidemia Father     Cancer Father      Multiple Myeloma     Cirrhosis Father     Lupus Sister     Asthma Sister     No Known Problems Brother     No Known Problems Maternal Aunt     No Known Problems Maternal  Uncle     No Known Problems Paternal Aunt     No Known Problems Paternal Uncle     No Known Problems Maternal Grandmother     No Known Problems Maternal Grandfather     No Known Problems Paternal Grandmother     No Known Problems Paternal Grandfather     Scoliosis Sister     Amblyopia Neg Hx     Blindness Neg Hx     Cataracts Neg Hx     Diabetes Neg Hx     Glaucoma Neg Hx     Macular degeneration Neg Hx     Retinal detachment Neg Hx     Strabismus Neg Hx     Stroke Neg Hx          Current Outpatient Prescriptions   Medication Sig Dispense Refill    albuterol (PROVENTIL) 2.5 mg /3 mL (0.083 %) nebulizer solution Take 3 mLs (2.5 mg total) by nebulization every 6 (six) hours as needed for Wheezing. 180 mL 1    aluminum hydrox-magnesium carb (GAVISCON EXTRA STRENGTH) 254-237.5 mg/5 mL Susp Take by mouth as needed.       cetirizine (ZYRTEC) 10 MG tablet Take 10 mg by mouth once daily.      cyclobenzaprine (FLEXERIL) 5 MG tablet TAKE 1 TABLET (5 MG TOTAL) BY MOUTH 3 (THREE) TIMES DAILY AS NEEDED.  3    fluorometholone 0.1% (FML) 0.1 % DrpS 1 DROP IN BOTH EYES TWICE A DAY FOR 2 WEEKS THEN DAILY FOR 2 WEEKS  0    fluticasone (FLONASE) 50 mcg/actuation nasal spray USE 2 SPRAYS IN EACH NOSTRIL ONE TIME DAILY 48 g 2    fluticasone-salmeterol (ADVAIR HFA) 115-21 mcg/actuation HFAA Inhale 2 puffs into the lungs 2 (two) times daily. 12 g 6    gabapentin (NEURONTIN) 600 MG tablet TAKE 1 TABLET ONE TIME DAILY 90 tablet 3    hydroCHLOROthiazide (HYDRODIURIL) 25 MG tablet TAKE 1 TABLET BY MOUTH AS DIRECTED TAKE ONE TABLET BY MOUTH ONLY ON SATURDAY AND SUNDAY  6    hydrocodone-acetaminophen 7.5-325mg (NORCO) 7.5-325 mg per tablet Take 1 tablet by mouth every 6 (six) hours as needed. 15 tablet 0    hydroxychloroquine (PLAQUENIL) 200 mg tablet TAKE 1 TABLET ONE TIME DAILY 90 tablet 3    hydrOXYzine HCl (ATARAX) 25 MG tablet Take 1 tablet (25 mg total) by mouth every 12 (twelve) hours as needed for Itching. 30  tablet 0    ibuprofen (ADVIL,MOTRIN) 600 MG tablet TAKE 1TABLET BY MOUTH EVERY 6 HOURS AS NEEDED FOR PAIN  0    ibuprofen (ADVIL,MOTRIN) 800 MG tablet TAKE 1 TABLET BY MOUTH EVERY SIX TO EIGHT HOURS AS NEEDED 30 tablet 2    JUBLIA 10 % Dawn APPLY ONE DROP TO ALL SMALLER TOES AND 2 DROPS TO GREATER TOES DAILY  10    levothyroxine (SYNTHROID) 100 MCG tablet Take 1 tablet by mouth Monday - Saturday and take 1/2 tablet on sunday 90 tablet 3    meloxicam (MOBIC) 15 MG tablet Take 15 mg by mouth once daily.      montelukast (SINGULAIR) 10 mg tablet Take 1 tablet (10 mg total) by mouth every evening. 90 tablet 3    pantoprazole (PROTONIX) 40 MG tablet TAKE 1 TABLET BY MOUTH ONCE DAILY 90 tablet 3    sertraline (ZOLOFT) 50 MG tablet Take 1 tablet (50 mg total) by mouth once daily. 90 tablet 0    terbinafine HCl (LAMISIL) 250 mg tablet 1 TABLET(S) BY MOUTH DAILY  0    EPINEPHrine (EPIPEN 2-GIGI) 0.3 mg/0.3 mL AtIn Inject 0.3 mLs (0.3 mg total) into the muscle once. 2 Device 0    VENTOLIN HFA 90 mcg/actuation inhaler TAKE 2 PUFFS INTO THE LUNGS EVERY 4 HOURS AS NEEDED 18 Inhaler 0     Current Facility-Administered Medications   Medication Dose Route Frequency Provider Last Rate Last Dose    dexamethasone injection 8 mg  8 mg Intramuscular 1 time in Clinic/HOD Pepito Appiah, FNP-C         Allergies: Bactrim [sulfamethoxazole-trimethoprim]       ROS:  GENERAL: Denies weight gain or weight loss. Feeling well overall.   SKIN: Denies rash or lesions.   HEAD: Denies head injury or headache.   NODES: Denies enlarged lymph nodes.   CHEST: Denies chest pain or shortness of breath.   CARDIOVASCULAR: Denies palpitations or left sided chest pain.   ABDOMEN: No abdominal pain, constipation, diarrhea, nausea, vomiting or rectal bleeding.   URINARY: No frequency, dysuria, hematuria, or burning on urination.  REPRODUCTIVE: see HPI  BREASTS: The patient performs breast self-examination and denies pain, lumps, or nipple  "discharge.   HEMATOLOGIC: No easy bruisability or excessive bleeding.  MUSCULOSKELETAL: Denies joint pain or swelling.   NEUROLOGIC: Denies syncope or weakness.   PSYCHIATRIC: Denies depression, anxiety or mood swings.      OBJECTIVE:   /81   Ht 5' 1" (1.549 m)   Wt 64 kg (141 lb)   LMP 05/14/2014 (Approximate)   BMI 26.64 kg/m²   The patient appears well, alert, oriented x 3, in no distress.  NECK: negative, no thyromegaly, trachea midline  SKIN: normal, good color, good turgor and no acne, striae, hirsutism  BREAST EXAM: not examined  ABDOMEN: soft, non-tender; bowel sounds normal; no masses,  no organomegaly and no hernias, masses, or hepatosplenomegaly  GENITALIA: normal external genitalia, no erythema, no discharge  URETHRA: normal appearing urethra with no masses, tenderness or lesions and normal urethra, normal urethral meatus  VAGINA: mucosal atrophy  CERVIX: no lesions or cervical motion tenderness  UTERUS: normal size, contour, position, consistency, mobility, non-tender  ADNEXA: no mass, fullness, tenderness      ASSESSMENT:   1. Vaginal atrophy:  Pt without contraindication.  Rx for vaginal estrace. Recommend continue lubrication prn intercourse.  2.  2. Tobacco abuse:  Discussed health risks of smoking and counseled on cessation.  Pt had referral to smoking cessation clinic.  3.  MMG ordered  4..  RTC for wwe  "

## 2018-04-03 NOTE — LETTER
April 3, 2018      Pepito Appiah, FNP-C  [address] [facility] [address]           [facility]  [address] [facility] [address]  [facility] [address]  Phone: [phone]          Patient: Emily Pina   MR Number: [id number]   YOB: 1970   Date of Visit: 4/3/2018       Dear Pepito Appiah:    Thank you for referring Emily Pina to me for evaluation. Attached you will find relevant portions of my assessment and plan of care.    If you have questions, please do not hesitate to call me. I look forward to following Emily Pina along with you.    Sincerely,    Wesly Curtis MD    Enclosure  CC:  No Recipients    If you would like to receive this communication electronically, please contact [email or web address] or [phone] to request more information on [employer] access.    For providers and/or their staff who would like to refer a patient to [facility], please contact us through our one-stop-shop provider referral line, [facility], at [phone].    If you feel you have received this communication in error or would no longer like to receive these types of communications, please e-mail [email or web address]

## 2018-04-18 ENCOUNTER — OFFICE VISIT (OUTPATIENT)
Dept: RHEUMATOLOGY | Facility: CLINIC | Age: 48
End: 2018-04-18
Payer: MEDICARE

## 2018-04-18 ENCOUNTER — LAB VISIT (OUTPATIENT)
Dept: LAB | Facility: HOSPITAL | Age: 48
End: 2018-04-18
Attending: INTERNAL MEDICINE
Payer: MEDICARE

## 2018-04-18 VITALS
HEART RATE: 95 BPM | SYSTOLIC BLOOD PRESSURE: 149 MMHG | WEIGHT: 142 LBS | BODY MASS INDEX: 26.81 KG/M2 | HEIGHT: 61 IN | DIASTOLIC BLOOD PRESSURE: 94 MMHG

## 2018-04-18 DIAGNOSIS — L93.1 SUBACUTE CUTANEOUS LUPUS ERYTHEMATOSUS: Primary | ICD-10-CM

## 2018-04-18 DIAGNOSIS — L50.9 HIVES: ICD-10-CM

## 2018-04-18 DIAGNOSIS — M79.7 FIBROMYALGIA: ICD-10-CM

## 2018-04-18 DIAGNOSIS — L93.1 SUBACUTE CUTANEOUS LUPUS ERYTHEMATOSUS: ICD-10-CM

## 2018-04-18 LAB
ALBUMIN SERPL BCP-MCNC: 4.1 G/DL
ALP SERPL-CCNC: 74 U/L
ALT SERPL W/O P-5'-P-CCNC: 18 U/L
ANION GAP SERPL CALC-SCNC: 12 MMOL/L
AST SERPL-CCNC: 20 U/L
BASOPHILS # BLD AUTO: 0.02 K/UL
BASOPHILS NFR BLD: 0.5 %
BILIRUB SERPL-MCNC: 0.2 MG/DL
BUN SERPL-MCNC: 9 MG/DL
CALCIUM SERPL-MCNC: 9.2 MG/DL
CHLORIDE SERPL-SCNC: 104 MMOL/L
CO2 SERPL-SCNC: 26 MMOL/L
CREAT SERPL-MCNC: 0.8 MG/DL
CRP SERPL-MCNC: 2.6 MG/L
DIFFERENTIAL METHOD: ABNORMAL
EOSINOPHIL # BLD AUTO: 0 K/UL
EOSINOPHIL NFR BLD: 0.7 %
ERYTHROCYTE [DISTWIDTH] IN BLOOD BY AUTOMATED COUNT: 11.9 %
ERYTHROCYTE [SEDIMENTATION RATE] IN BLOOD BY WESTERGREN METHOD: 28 MM/HR
EST. GFR  (AFRICAN AMERICAN): >60 ML/MIN/1.73 M^2
EST. GFR  (NON AFRICAN AMERICAN): >60 ML/MIN/1.73 M^2
GLUCOSE SERPL-MCNC: 84 MG/DL
HCT VFR BLD AUTO: 39.5 %
HGB BLD-MCNC: 12.7 G/DL
IGE SERPL-ACNC: 256 IU/ML
IMM GRANULOCYTES # BLD AUTO: 0.02 K/UL
IMM GRANULOCYTES NFR BLD AUTO: 0.5 %
LYMPHOCYTES # BLD AUTO: 1.5 K/UL
LYMPHOCYTES NFR BLD: 34.4 %
MCH RBC QN AUTO: 28.5 PG
MCHC RBC AUTO-ENTMCNC: 32.2 G/DL
MCV RBC AUTO: 89 FL
MONOCYTES # BLD AUTO: 0.2 K/UL
MONOCYTES NFR BLD: 5.1 %
NEUTROPHILS # BLD AUTO: 2.6 K/UL
NEUTROPHILS NFR BLD: 58.8 %
NRBC BLD-RTO: 0 /100 WBC
PLATELET # BLD AUTO: 359 K/UL
PMV BLD AUTO: 9.4 FL
POTASSIUM SERPL-SCNC: 3.9 MMOL/L
PROT SERPL-MCNC: 8.5 G/DL
RBC # BLD AUTO: 4.46 M/UL
SODIUM SERPL-SCNC: 142 MMOL/L
WBC # BLD AUTO: 4.33 K/UL

## 2018-04-18 PROCEDURE — 82785 ASSAY OF IGE: CPT

## 2018-04-18 PROCEDURE — 36415 COLL VENOUS BLD VENIPUNCTURE: CPT

## 2018-04-18 PROCEDURE — 86225 DNA ANTIBODY NATIVE: CPT

## 2018-04-18 PROCEDURE — 85025 COMPLETE CBC W/AUTO DIFF WBC: CPT

## 2018-04-18 PROCEDURE — 99214 OFFICE O/P EST MOD 30 MIN: CPT | Mod: S$GLB,,, | Performed by: INTERNAL MEDICINE

## 2018-04-18 PROCEDURE — 86140 C-REACTIVE PROTEIN: CPT

## 2018-04-18 PROCEDURE — 86160 COMPLEMENT ANTIGEN: CPT

## 2018-04-18 PROCEDURE — 99999 PR PBB SHADOW E&M-EST. PATIENT-LVL III: CPT | Mod: PBBFAC,,, | Performed by: INTERNAL MEDICINE

## 2018-04-18 PROCEDURE — 86160 COMPLEMENT ANTIGEN: CPT | Mod: 59

## 2018-04-18 PROCEDURE — 80053 COMPREHEN METABOLIC PANEL: CPT

## 2018-04-18 PROCEDURE — 85651 RBC SED RATE NONAUTOMATED: CPT

## 2018-04-18 RX ORDER — GABAPENTIN 600 MG/1
TABLET ORAL
Qty: 90 TABLET | Refills: 3 | Status: SHIPPED | OUTPATIENT
Start: 2018-04-18 | End: 2019-02-24 | Stop reason: SDUPTHER

## 2018-04-18 RX ORDER — HYDROXYCHLOROQUINE SULFATE 200 MG/1
TABLET, FILM COATED ORAL
Qty: 90 TABLET | Refills: 3 | Status: SHIPPED | OUTPATIENT
Start: 2018-04-18 | End: 2019-02-24 | Stop reason: SDUPTHER

## 2018-04-18 NOTE — PROGRESS NOTES
History of present illness: 48-year-old female I have been following since 2005.  She presented that time with a diagnosis of lupus and was already on Plaquenil.  I had only found evidence of skin involvement.  She had a positive SSA antibody.  I felt she most likely had subacute cutaneous lupus.  She may have had a previous skin biopsy.  She had macular lesions on the arms.  These have apparently disappeared.  I have been following her semiannually.  She has chronic pain due to fibromyalgia.  She had been on Naprosyn, Flexeril, and gabapentin.  She was still taking Plaquenil.  She called me after her last visit because of GI problems with Naprosyn.  I changed her to meloxicam.  She comes back today for routine follow-up.    She has been having trouble with hives.  It began 3 weeks ago.  She apparently had a similar problem in 2016.  The last time her hives were bad she also had problems with her asthma, her asthma is not bothering her now.  She had been following with an allergist and was supposed to start ALLERGY shots.  She has a history of ALLERGIC conjunctivitis.  The hives have been occurring on a daily basis.  She had no response to antihistamine.  She started taking ibuprofen, which she was given for foot pain.  This does seem to be helping her hives.  It is also working better than meloxicam.  She stopped the meloxicam.    She was having pain in the left foot.  She was seen by Dr. Villareal.  She subsequently developed pain in the right great toe.  She was seen in urgent care.  She had no swelling.  She did not follow-up with podiatry.  She was told it was probably arthritis.  This is when she was changed to ibuprofen.  Heat and ice have not been helping.  Her overall pain has been stable.    She has had no unexplained fevers.  She denies any headaches.  She has no oral ulcers, dry eye or mouth, pleurisy, vaginal discharge or ulcers.  She does report that her hands will turn white on exposure to the cold  but not blue or red.  She has had some pain in the wrist and hands.  She has no numbness or tingling.    Physical examination:  Skin: She has no hives at this time.  She does have livedo pattern around her knees.  ENT: Decreased tears and saliva.  No conjunctival injection or oral ulcers.  Chest: Clear to auscultation and percussion  Cardiac: No murmurs, gallops, rubs  Abdomen: No organomegaly or masses.  She does have tenderness in the right upper quadrant  Extremities: No sclerodactyly  Musculoskeletal: Shoulders, elbows, wrists, small joints of the hands are unremarkable.  Knees and ankles have no synovitis.  She has full range of motion.  She is tender over the MTP bilaterally.  There is early bony hypertrophy.  She has pain on range of motion of the right great toe, not the left.    Assessment:  1.  She carries a diagnosis of subacute cutaneous lupus based on prior skin rash and positive SSA antibody.  I wonder if she isn't evolving into a different connective tissue disease  2.  Hives that do sound more ALLERGIC than immunologic  3.  Foot pain, likely due to early osteoarthritis  4.  Fibromyalgia    Plans:  1.  Laboratory studies are obtained  2.  I am making no change in her medication at this time  3.  She needs follow-ups with ALLERGY, ophthalmology, and podiatry.  4.  I will see her in follow-up in 3 months.

## 2018-04-19 LAB
C3 SERPL-MCNC: 140 MG/DL
C4 SERPL-MCNC: 24 MG/DL
DSDNA AB SER-ACNC: NORMAL [IU]/ML

## 2018-05-07 ENCOUNTER — PATIENT MESSAGE (OUTPATIENT)
Dept: RHEUMATOLOGY | Facility: CLINIC | Age: 48
End: 2018-05-07

## 2018-05-07 RX ORDER — HYDROCODONE BITARTRATE AND ACETAMINOPHEN 7.5; 325 MG/1; MG/1
1 TABLET ORAL EVERY 6 HOURS PRN
Qty: 30 TABLET | Refills: 0 | Status: SHIPPED | OUTPATIENT
Start: 2018-05-07 | End: 2018-06-06

## 2018-05-08 ENCOUNTER — PATIENT MESSAGE (OUTPATIENT)
Dept: RHEUMATOLOGY | Facility: CLINIC | Age: 48
End: 2018-05-08

## 2018-05-08 RX ORDER — HYDROXYZINE HYDROCHLORIDE 25 MG/1
25 TABLET, FILM COATED ORAL EVERY 12 HOURS PRN
Qty: 30 TABLET | Refills: 0 | Status: SHIPPED | OUTPATIENT
Start: 2018-05-08 | End: 2018-06-08 | Stop reason: SDUPTHER

## 2018-05-08 RX ORDER — HYDROXYZINE HYDROCHLORIDE 25 MG/1
25 TABLET, FILM COATED ORAL EVERY 12 HOURS PRN
Qty: 30 TABLET | Refills: 0 | Status: SHIPPED | OUTPATIENT
Start: 2018-05-08 | End: 2018-05-08 | Stop reason: SDUPTHER

## 2018-05-08 NOTE — TELEPHONE ENCOUNTER
----- Message from Bhumi Trevino sent at 5/8/2018 12:04 PM CDT -----  Contact: self  Pt calling to request a refill of hydrOXYzine HCl (ATARAX) 25 MG tablet to be sent to [facility]. Pt can be reached at [phone]

## 2018-05-24 ENCOUNTER — TELEPHONE (OUTPATIENT)
Dept: FAMILY MEDICINE | Facility: CLINIC | Age: 48
End: 2018-05-24

## 2018-05-24 NOTE — TELEPHONE ENCOUNTER
Patient states she has been having hives & is not sure if she should get her allergy shots. She has scheduled an appointment with Dr. Turner for evaluation

## 2018-06-07 ENCOUNTER — TELEPHONE (OUTPATIENT)
Dept: ALLERGY | Facility: CLINIC | Age: 48
End: 2018-06-07

## 2018-06-08 ENCOUNTER — OFFICE VISIT (OUTPATIENT)
Dept: ALLERGY | Facility: CLINIC | Age: 48
End: 2018-06-08
Payer: MEDICARE

## 2018-06-08 VITALS
BODY MASS INDEX: 27.18 KG/M2 | SYSTOLIC BLOOD PRESSURE: 132 MMHG | HEIGHT: 61 IN | WEIGHT: 143.94 LBS | DIASTOLIC BLOOD PRESSURE: 66 MMHG

## 2018-06-08 DIAGNOSIS — H01.131 ECZEMATOUS DERMATITIS OF UPPER AND LOWER EYELIDS OF BOTH EYES: ICD-10-CM

## 2018-06-08 DIAGNOSIS — J44.89 CHRONIC OBSTRUCTIVE AIRWAY DISEASE WITH ASTHMA: ICD-10-CM

## 2018-06-08 DIAGNOSIS — F17.200 SMOKING: ICD-10-CM

## 2018-06-08 DIAGNOSIS — H01.135 ECZEMATOUS DERMATITIS OF UPPER AND LOWER EYELIDS OF BOTH EYES: ICD-10-CM

## 2018-06-08 DIAGNOSIS — J30.81 CHRONIC ALLERGIC RHINITIS DUE TO ANIMAL HAIR AND DANDER: ICD-10-CM

## 2018-06-08 DIAGNOSIS — H01.132 ECZEMATOUS DERMATITIS OF UPPER AND LOWER EYELIDS OF BOTH EYES: ICD-10-CM

## 2018-06-08 DIAGNOSIS — L50.9 URTICARIA: Primary | ICD-10-CM

## 2018-06-08 DIAGNOSIS — Z29.89 NEED FOR PROPHYLACTIC IMMUNOTHERAPY: ICD-10-CM

## 2018-06-08 DIAGNOSIS — H01.134 ECZEMATOUS DERMATITIS OF UPPER AND LOWER EYELIDS OF BOTH EYES: ICD-10-CM

## 2018-06-08 DIAGNOSIS — H10.45 CHRONIC ALLERGIC CONJUNCTIVITIS: ICD-10-CM

## 2018-06-08 PROCEDURE — 99214 OFFICE O/P EST MOD 30 MIN: CPT | Mod: S$GLB,,, | Performed by: STUDENT IN AN ORGANIZED HEALTH CARE EDUCATION/TRAINING PROGRAM

## 2018-06-08 PROCEDURE — 99499 UNLISTED E&M SERVICE: CPT | Mod: S$GLB,,, | Performed by: STUDENT IN AN ORGANIZED HEALTH CARE EDUCATION/TRAINING PROGRAM

## 2018-06-08 PROCEDURE — 3008F BODY MASS INDEX DOCD: CPT | Mod: CPTII,S$GLB,, | Performed by: STUDENT IN AN ORGANIZED HEALTH CARE EDUCATION/TRAINING PROGRAM

## 2018-06-08 PROCEDURE — 99999 PR PBB SHADOW E&M-EST. PATIENT-LVL III: CPT | Mod: PBBFAC,,, | Performed by: STUDENT IN AN ORGANIZED HEALTH CARE EDUCATION/TRAINING PROGRAM

## 2018-06-08 RX ORDER — HYDROXYZINE HYDROCHLORIDE 25 MG/1
TABLET, FILM COATED ORAL
Qty: 240 TABLET | Refills: 1 | Status: SHIPPED | OUTPATIENT
Start: 2018-06-08 | End: 2020-03-31

## 2018-06-08 RX ORDER — FLUTICASONE PROPIONATE 250 UG/1
1 POWDER, METERED RESPIRATORY (INHALATION) 2 TIMES DAILY
Qty: 1 EACH | Refills: 3 | Status: SHIPPED | OUTPATIENT
Start: 2018-06-08 | End: 2019-06-08

## 2018-06-08 NOTE — PROGRESS NOTES
"Allergy Clinic Note  [facility]    Subjective:          Chief Complaint: Urticaria      Allergy problem list  AR:  Cat, dog  Conjunct with uncontrolled itching  "chronic obstructive airway disease with asthma"  Active smoking  Eyelid dermatitis  Lupus treated with Plaquenil  Urticaria, intermittent  GERD    History of Present Illness: Emily Pina is a 48 y.o. female with allergic rhino conjunctivitis and asthma is here for turnover visit complaining of increasing urticaria.  She was previously seen by Dr. Mancera with the last visit 01/06/2017.      At last visit, patient was going to begin immunotherapy.  She also received smoking cessation counseling in detail.    Today she presents complaining of increased frequency of urticaria, now occurring about every other day.  Itching is not adequately controlled on Atarax 25 mg twice a day prescribed by her PCP in addition to her usual Zyrtec 10 mg daily.  She is also using hydrocortisone 1% spray.    She admits that her rhinitis has worsened over the same 2 months as her worsening hives.    She also reports chest tightness this morning.  She reports improvement following the addition of Singulair to her regimen last visit. She admits to difficulty using her controller inhaler.    She admits continues smoking at a little more than 1 pack per day.  She started smoking as a teenager. She is in the contemplated of stage of quitting. Patient states she plans to enroll in this institution's smoking cessation program in the near future    She is still interested in subcutaneous immunotherapy.    Additional asthma history:  Onset as young adult  3-4 ED visits, none in the last year.  No ADM.  Unable to quantify frequency of asthma Sx  Triggers:  Fall/October, cats, ?smoke, fumes especially scents, URI.  Denies stress as a trigger.    Related medications  Advair  2p BID  Singulair 10 mg daily  Albuterol MDI or neb p.r.n.  r " fluticasone  Zyrtec 10 mg daily  Atarax 25 mg b.i.d. p.r.n. itching  EpiPen    No new problems or complaints.    Additional History:   Interval Hx is otherwise unremarkable.. Client  reports that she has been smoking Cigarettes.  She has been smoking about 2.00 packs per day. She quit smokeless tobacco use about 4 years ago.   Past medical, family, and social histories are unchanged.       Patient Active Problem List   Diagnosis    Subacute cutaneous lupus erythematosus    Fibromyalgia    Chronic obstructive airway disease with asthma    Tobacco abuse    GERD (gastroesophageal reflux disease)    Unspecified hereditary and idiopathic peripheral neuropathy    Hypothyroidism    Chronic allergic conjunctivitis    Chronic allergic rhinitis due to animal hair and dander    Recurrent urticaria    Other pruritus    Calculus of gallbladder without cholecystitis without obstruction    Biliary colic    S/P laparoscopic cholecystectomy    Disorder of immune system     Current Outpatient Prescriptions on File Prior to Visit   Medication Sig Dispense Refill    albuterol (PROVENTIL) 2.5 mg /3 mL (0.083 %) nebulizer solution Take 3 mLs (2.5 mg total) by nebulization every 6 (six) hours as needed for Wheezing. 180 mL 1    aluminum hydrox-magnesium carb (GAVISCON EXTRA STRENGTH) 254-237.5 mg/5 mL Susp Take by mouth as needed.       cetirizine (ZYRTEC) 10 MG tablet Take 10 mg by mouth once daily.      cyclobenzaprine (FLEXERIL) 5 MG tablet TAKE 1 TABLET (5 MG TOTAL) BY MOUTH 3 (THREE) TIMES DAILY AS NEEDED.  3    estradiol (ESTRACE) 0.01 % (0.1 mg/gram) vaginal cream Insert 2 g daily intravaginally for 2 weeks, then 1 g twice weekly 42.5 g 5    fluorometholone 0.1% (FML) 0.1 % DrpS 1 DROP IN BOTH EYES TWICE A DAY FOR 2 WEEKS THEN DAILY FOR 2 WEEKS  0    fluticasone (FLONASE) 50 mcg/actuation nasal spray USE 2 SPRAYS IN EACH NOSTRIL ONE TIME DAILY 48 g 2    fluticasone-salmeterol (ADVAIR HFA) 115-21 mcg/actuation  HFAA Inhale 2 puffs into the lungs 2 (two) times daily. 12 g 6    gabapentin (NEURONTIN) 600 MG tablet TAKE 1 TABLET ONE TIME DAILY 90 tablet 3    hydroCHLOROthiazide (HYDRODIURIL) 25 MG tablet TAKE 1 TABLET BY MOUTH AS DIRECTED TAKE ONE TABLET BY MOUTH ONLY ON SATURDAY AND SUNDAY  6    hydroxychloroquine (PLAQUENIL) 200 mg tablet TAKE 1 TABLET ONE TIME DAILY 90 tablet 3    ibuprofen (ADVIL,MOTRIN) 800 MG tablet TAKE 1 TABLET BY MOUTH EVERY SIX TO EIGHT HOURS AS NEEDED 30 tablet 2    JUBLIA 10 % Dawn APPLY ONE DROP TO ALL SMALLER TOES AND 2 DROPS TO GREATER TOES DAILY  10    levothyroxine (SYNTHROID) 100 MCG tablet Take 1 tablet by mouth Monday - Saturday and take 1/2 tablet on sunday 90 tablet 3    montelukast (SINGULAIR) 10 mg tablet Take 1 tablet (10 mg total) by mouth every evening. 90 tablet 3    pantoprazole (PROTONIX) 40 MG tablet TAKE 1 TABLET BY MOUTH ONCE DAILY 90 tablet 3    sertraline (ZOLOFT) 50 MG tablet Take 1 tablet (50 mg total) by mouth once daily. 90 tablet 0    VENTOLIN HFA 90 mcg/actuation inhaler TAKE 2 PUFFS INTO THE LUNGS EVERY 4 HOURS AS NEEDED 18 Inhaler 0    [DISCONTINUED] hydrOXYzine HCl (ATARAX) 25 MG tablet Take 1 tablet (25 mg total) by mouth every 12 (twelve) hours as needed for Itching. 30 tablet 0    EPINEPHrine (EPIPEN 2-GIGI) 0.3 mg/0.3 mL AtIn Inject 0.3 mLs (0.3 mg total) into the muscle once. 2 Device 0    [DISCONTINUED] terbinafine HCl (LAMISIL) 250 mg tablet 1 TABLET(S) BY MOUTH DAILY  0     Current Facility-Administered Medications on File Prior to Visit   Medication Dose Route Frequency Provider Last Rate Last Dose    dexamethasone injection 8 mg  8 mg Intramuscular 1 time in Clinic/HOD MARLO Marquez             Review of Systems   Constitutional: Negative for chills and fever.   HENT: Negative for ear discharge and nosebleeds.    Eyes: Negative for discharge and redness.   Respiratory: Negative for hemoptysis, sputum production and stridor.   "  Gastrointestinal: Negative for blood in stool, melena and vomiting.   Genitourinary: Negative for hematuria.   Skin: Negative for itching and rash.   Neurological: Negative for seizures and loss of consciousness.       Objective:   /66 (BP Location: Left arm, Patient Position: Sitting, BP Method: Medium (Manual))   Ht 5' 1" (1.549 m)   Wt 65.3 kg (143 lb 15.4 oz)   LMP 05/14/2014 (Approximate)   BMI 27.20 kg/m²       Physical Exam   Constitutional: She is oriented to person, place, and time and well-developed, well-nourished, and in no distress.   HENT:   Head: Normocephalic and atraumatic.   Nose: Nose normal.   Eyes: Conjunctivae are normal. No scleral icterus.   Neck: Neck supple.   Cardiovascular: Normal rate, regular rhythm and intact distal pulses.    Pulmonary/Chest: Effort normal. No stridor. No respiratory distress.   Abdominal: She exhibits no distension.   Musculoskeletal: She exhibits no edema or deformity.   Neurological: She is alert and oriented to person, place, and time.   Skin: No rash noted. No erythema.   Psychiatric: Affect and judgment normal.       Data: Peak flow today 350-320-240  Total IgE 256 (04/18/2018) by rheum  Skin Test results (by Dr. Mancera): Positive prick skin test to cat and dog.    Chest x-ray (PA and lateral, 02/16/2017):  Normal  No spirometry found in Epic.  Assessment:     1. Urticaria    2. Chronic obstructive airway disease with asthma    3. Chronic allergic rhinitis due to animal hair and dander    4. Chronic allergic conjunctivitis    5. Eczematous dermatitis of upper and lower eyelids of both eyes    6. Smoking    7. Need for prophylactic immunotherapy        Plan:     Medical Decision Making:  Client is presenting with asthma that is not fully controlled.  I suspect technical difficulties with inhaler use and possible non adherence due to side effects.  I offered and she accepted change to discus device and removal of the long-acting bronchodilator.  Client " understands this may lead to worsening asthma and we may need to add a LAMA in the near future.         Patient is ready to begin immunotherapy as soon as her asthma is fully controlled.  She understands that she is not to receive any injections and was seen been asthma free for at least 48 hr.  Will check baseline spirometry prior to SCIT.   Her worsening urticaria is probably allergic.  I will focus on controlling itching.    Emily was seen today for urticaria.    Diagnoses and all orders for this visit:    Urticaria -with uncontrolled itching       -      AM:  Zyrtec 1-2 tabs  -     PM:  hydrOXYzine HCl (ATARAX) 25 MG tablet; 1-8 tablets at bedtime for itching.      Chronic obstructive airway disease with asthma with exacerbation  Discontinue Advair MDI   Begin Flovent 250 1 puff twice a day  -     fluticasone (FLOVENT DISKUS) 250 mcg/actuation DsDv; Inhale 1 puff into the lungs 2 (two) times daily. Controller  -     Spirometry with/without bronchodilator; Future    Chronic allergic rhinitis due to animal hair and dander  Chronic allergic conjunctivitis  Patient unwilling to limit cat exposure    Eczematous dermatitis of upper and lower eyelids of both eyes - quiescent    Smoking - contemplative stage  Patient states she plans to enroll in this institution's smoking cessation program in the near future  Acknowledged difficulty  Mentioned Nicorette gum            Patient Instructions   Testing  Breathing test at [facility], [facility]        Treatment    Skin:  Zyrtec in AM  Hydroxizine in pm:  1-8 tablets   Causes drowsiness              Start with 3 tablets tonight              Increase or decrease by one table nightly until you find the best dose for you.  Ok to take extra Zyrtec if needed.    Asthma:  Begin orange disc:  Flovent 250: 1 puff morning and night NO MATTER WHAT  Stop purple inhaler  Continue Singulair  Continue machine or rescue inhaler as  needed.    Allergies:  Continue nose spray  Begin allergy vaccines    Return 6 weeks            Follow-up in about 6 weeks (around 7/20/2018).    Francoise Turner MD

## 2018-06-08 NOTE — PATIENT INSTRUCTIONS
Testing  Breathing test at [facility], [address] [facility]        Treatment    Skin:  Zyrtec in AM  Hydroxizine in pm:  1-8 tablets   Causes drowsiness              Start with 3 tablets tonight              Increase or decrease by one table nightly until you find the best dose for you.  Ok to take extra Zyrtec if needed.    Asthma:  Begin orange disc:  Flovent 250: 1 puff morning and night NO MATTER WHAT  Stop purple inhaler  Continue Singulair  Continue machine or rescue inhaler as needed.    Allergies:  Continue nose spray  Begin allergy vaccines    Return 6 weeks

## 2018-06-18 ENCOUNTER — HOSPITAL ENCOUNTER (OUTPATIENT)
Dept: RESPIRATORY THERAPY | Facility: HOSPITAL | Age: 48
Discharge: HOME | End: 2018-06-18
Attending: STUDENT IN AN ORGANIZED HEALTH CARE EDUCATION/TRAINING PROGRAM
Payer: MEDICARE

## 2018-06-18 VITALS — OXYGEN SATURATION: 98 % | RESPIRATION RATE: 18 BRPM | HEART RATE: 76 BPM

## 2018-06-18 DIAGNOSIS — J44.89 CHRONIC OBSTRUCTIVE AIRWAY DISEASE WITH ASTHMA: ICD-10-CM

## 2018-06-18 PROCEDURE — 94010 BREATHING CAPACITY TEST: CPT

## 2018-06-18 PROCEDURE — 94640 AIRWAY INHALATION TREATMENT: CPT | Mod: 59

## 2018-06-18 PROCEDURE — 25000242 PHARM REV CODE 250 ALT 637 W/ HCPCS: Performed by: STUDENT IN AN ORGANIZED HEALTH CARE EDUCATION/TRAINING PROGRAM

## 2018-06-18 RX ORDER — ALBUTEROL SULFATE 2.5 MG/.5ML
2.5 SOLUTION RESPIRATORY (INHALATION) ONCE
Status: COMPLETED | OUTPATIENT
Start: 2018-06-18 | End: 2018-06-18

## 2018-06-18 RX ADMIN — ALBUTEROL SULFATE 2.5 MG: 2.5 SOLUTION RESPIRATORY (INHALATION) at 09:06

## 2018-06-22 RX ORDER — HYDROCODONE BITARTRATE AND ACETAMINOPHEN 7.5; 325 MG/1; MG/1
1 TABLET ORAL EVERY 6 HOURS PRN
Qty: 30 TABLET | Refills: 0 | Status: SHIPPED | OUTPATIENT
Start: 2018-06-22 | End: 2018-09-19 | Stop reason: SDUPTHER

## 2018-06-28 ENCOUNTER — OFFICE VISIT (OUTPATIENT)
Dept: RHEUMATOLOGY | Facility: CLINIC | Age: 48
End: 2018-06-28
Payer: MEDICARE

## 2018-06-28 VITALS
DIASTOLIC BLOOD PRESSURE: 73 MMHG | HEART RATE: 79 BPM | SYSTOLIC BLOOD PRESSURE: 105 MMHG | WEIGHT: 147 LBS | BODY MASS INDEX: 27.78 KG/M2

## 2018-06-28 DIAGNOSIS — L93.1 SUBACUTE CUTANEOUS LUPUS ERYTHEMATOSUS: ICD-10-CM

## 2018-06-28 DIAGNOSIS — M25.562 LEFT ANTERIOR KNEE PAIN: ICD-10-CM

## 2018-06-28 DIAGNOSIS — M79.7 FIBROMYALGIA: Primary | ICD-10-CM

## 2018-06-28 PROCEDURE — 99999 PR PBB SHADOW E&M-EST. PATIENT-LVL III: CPT | Mod: PBBFAC,,, | Performed by: INTERNAL MEDICINE

## 2018-06-28 PROCEDURE — 99213 OFFICE O/P EST LOW 20 MIN: CPT | Mod: S$GLB,,, | Performed by: INTERNAL MEDICINE

## 2018-06-28 PROCEDURE — 3008F BODY MASS INDEX DOCD: CPT | Mod: CPTII,S$GLB,, | Performed by: INTERNAL MEDICINE

## 2018-06-29 NOTE — PROGRESS NOTES
Present illness:  48-year-old female with subacute cutaneous lupus on chronic Plaquenil therapy.  The rash she originally had is no longer present.  She developed urticaria.  This was a recurrent problem.  I ordered laboratory studies to see if she might be developing systemic lupus.  Laboratory studies were negative but her IgE level was elevated.  I referred her to Allergy.  She is planning on starting allergy shots.    She also has chronic pain due to fibromyalgia.  In general her pain is been stable.  She continues to take ibuprofen once or twice a week.  She also takes hydrocodone several times weekly.  She remains on gabapentin and Flexeril.  She is supposed to be taking them regularly but she forgets some doses.    Her new problem is pain in the left knee.  This occurs intermittently.  It started 2 weeks ago.  It is in the anterior portion of the knee.  It occurs with weight bearing, especially when he eats she gets her knee in certain positions.  She has no swelling.  The pain does not radiate.  It last up to 30 min.  It does not wake her up at night.  She has no similar problem in the right knee.  She had no history of antecedent trauma.  The pain is bothersome but does not interfere with activity.    Physical examination:  Skin:  She has livedo reticularis around her knees but no other rashes.  Musculoskeletal:  She has tenderness to palpation of the left patella with her knee bent.  She has no swelling in the joint line or anteriorly.  She has no pain with range of motion.  She has full range of motion of the knee.  She has no ligamentous laxity.  She does have clicking on range of motion of both knees.  Other joints are unremarkable.    Assessment:  1.  No evidence of active lupus  2.  Stable fibromyalgia  Three.  Her knee problem is most likely tendinitis    Plans:  1.  Continue medications as before  2.  Use heat to the knee  3.  If her pain persists she should see Orthopedics  4.  Return to see me in  6 months

## 2018-07-09 ENCOUNTER — PES CALL (OUTPATIENT)
Dept: ADMINISTRATIVE | Facility: CLINIC | Age: 48
End: 2018-07-09

## 2018-07-31 ENCOUNTER — PATIENT MESSAGE (OUTPATIENT)
Dept: ALLERGY | Facility: CLINIC | Age: 48
End: 2018-07-31

## 2018-07-31 ENCOUNTER — PATIENT MESSAGE (OUTPATIENT)
Dept: FAMILY MEDICINE | Facility: CLINIC | Age: 48
End: 2018-07-31

## 2018-08-02 DIAGNOSIS — J31.0 RHINITIS, UNSPECIFIED TYPE: Primary | ICD-10-CM

## 2018-08-02 RX ORDER — MOMETASONE FUROATE 50 UG/1
2 SPRAY, METERED NASAL 2 TIMES DAILY
Qty: 2 EACH | Refills: 5 | Status: SHIPPED | OUTPATIENT
Start: 2018-08-02 | End: 2019-08-28

## 2018-08-30 ENCOUNTER — PATIENT MESSAGE (OUTPATIENT)
Dept: FAMILY MEDICINE | Facility: CLINIC | Age: 48
End: 2018-08-30

## 2018-08-30 ENCOUNTER — PATIENT MESSAGE (OUTPATIENT)
Dept: ALLERGY | Facility: CLINIC | Age: 48
End: 2018-08-30

## 2018-09-19 RX ORDER — HYDROCODONE BITARTRATE AND ACETAMINOPHEN 7.5; 325 MG/1; MG/1
1 TABLET ORAL EVERY 6 HOURS PRN
Qty: 30 TABLET | Refills: 0 | Status: SHIPPED | OUTPATIENT
Start: 2018-09-19 | End: 2018-11-09 | Stop reason: SDUPTHER

## 2018-09-28 DIAGNOSIS — E03.9 HYPOTHYROIDISM, UNSPECIFIED TYPE: ICD-10-CM

## 2018-09-28 RX ORDER — LEVOTHYROXINE SODIUM 100 UG/1
TABLET ORAL
Qty: 84 TABLET | Refills: 3 | OUTPATIENT
Start: 2018-09-28

## 2018-10-01 RX ORDER — FLUTICASONE PROPIONATE 50 MCG
SPRAY, SUSPENSION (ML) NASAL
Qty: 48 G | Refills: 0 | Status: SHIPPED | OUTPATIENT
Start: 2018-10-01 | End: 2018-12-13 | Stop reason: SDUPTHER

## 2018-10-02 RX ORDER — ALBUTEROL SULFATE 90 UG/1
AEROSOL, METERED RESPIRATORY (INHALATION)
Qty: 18 INHALER | Refills: 0 | Status: SHIPPED | OUTPATIENT
Start: 2018-10-02 | End: 2019-02-08 | Stop reason: SDUPTHER

## 2018-10-03 RX ORDER — SERTRALINE HYDROCHLORIDE 50 MG/1
50 TABLET, FILM COATED ORAL DAILY
Qty: 90 TABLET | Refills: 0 | Status: SHIPPED | OUTPATIENT
Start: 2018-10-03 | End: 2018-12-07 | Stop reason: SDUPTHER

## 2018-10-03 RX ORDER — PANTOPRAZOLE SODIUM 40 MG/1
40 TABLET, DELAYED RELEASE ORAL DAILY
Qty: 90 TABLET | Refills: 3 | Status: SHIPPED | OUTPATIENT
Start: 2018-10-03 | End: 2019-10-24 | Stop reason: SDUPTHER

## 2018-10-08 ENCOUNTER — OFFICE VISIT (OUTPATIENT)
Dept: FAMILY MEDICINE | Facility: CLINIC | Age: 48
End: 2018-10-08
Payer: MEDICARE

## 2018-10-08 VITALS
HEART RATE: 100 BPM | BODY MASS INDEX: 27.6 KG/M2 | DIASTOLIC BLOOD PRESSURE: 76 MMHG | OXYGEN SATURATION: 97 % | SYSTOLIC BLOOD PRESSURE: 118 MMHG | HEIGHT: 61 IN | WEIGHT: 146.19 LBS | TEMPERATURE: 98 F

## 2018-10-08 DIAGNOSIS — J44.89 CHRONIC OBSTRUCTIVE AIRWAY DISEASE WITH ASTHMA: ICD-10-CM

## 2018-10-08 DIAGNOSIS — E03.9 HYPOTHYROIDISM, UNSPECIFIED TYPE: ICD-10-CM

## 2018-10-08 DIAGNOSIS — J20.9 ACUTE BRONCHITIS, UNSPECIFIED ORGANISM: Primary | ICD-10-CM

## 2018-10-08 PROCEDURE — 3008F BODY MASS INDEX DOCD: CPT | Mod: CPTII,,, | Performed by: FAMILY MEDICINE

## 2018-10-08 PROCEDURE — 99215 OFFICE O/P EST HI 40 MIN: CPT | Mod: PBBFAC,PO,25 | Performed by: FAMILY MEDICINE

## 2018-10-08 PROCEDURE — 96372 THER/PROPH/DIAG INJ SC/IM: CPT | Mod: PBBFAC,59,PO

## 2018-10-08 PROCEDURE — 94640 AIRWAY INHALATION TREATMENT: CPT | Mod: PBBFAC,PO

## 2018-10-08 PROCEDURE — 99214 OFFICE O/P EST MOD 30 MIN: CPT | Mod: S$PBB,,, | Performed by: FAMILY MEDICINE

## 2018-10-08 PROCEDURE — 99999 PR PBB SHADOW E&M-EST. PATIENT-LVL V: CPT | Mod: PBBFAC,,, | Performed by: FAMILY MEDICINE

## 2018-10-08 RX ORDER — PREDNISONE 20 MG/1
40 TABLET ORAL DAILY
Qty: 8 TABLET | Refills: 0 | Status: SHIPPED | OUTPATIENT
Start: 2018-10-08 | End: 2018-10-12

## 2018-10-08 RX ORDER — CODEINE PHOSPHATE AND GUAIFENESIN 10; 100 MG/5ML; MG/5ML
5 SOLUTION ORAL EVERY 6 HOURS PRN
Qty: 150 ML | Refills: 0 | Status: SHIPPED | OUTPATIENT
Start: 2018-10-08 | End: 2018-10-19 | Stop reason: SDUPTHER

## 2018-10-08 RX ORDER — IPRATROPIUM BROMIDE AND ALBUTEROL SULFATE 2.5; .5 MG/3ML; MG/3ML
3 SOLUTION RESPIRATORY (INHALATION)
Status: COMPLETED | OUTPATIENT
Start: 2018-10-08 | End: 2018-10-08

## 2018-10-08 RX ORDER — DEXAMETHASONE SODIUM PHOSPHATE 4 MG/ML
8 INJECTION, SOLUTION INTRA-ARTICULAR; INTRALESIONAL; INTRAMUSCULAR; INTRAVENOUS; SOFT TISSUE ONCE
Status: COMPLETED | OUTPATIENT
Start: 2018-10-08 | End: 2018-10-08

## 2018-10-08 RX ADMIN — DEXAMETHASONE SODIUM PHOSPHATE 8 MG: 4 INJECTION, SOLUTION INTRA-ARTICULAR; INTRALESIONAL; INTRAMUSCULAR; INTRAVENOUS; SOFT TISSUE at 08:10

## 2018-10-08 RX ADMIN — IPRATROPIUM BROMIDE AND ALBUTEROL SULFATE 3 ML: .5; 2.5 SOLUTION RESPIRATORY (INHALATION) at 08:10

## 2018-10-08 NOTE — PROGRESS NOTES
Routine Office Visit    Patient Name: Emily Pina    : 1970  MRN: [id number]    Subjective:  Emily is a 48 y.o. female who presents today for:     1.   Chief Complaint   Patient presents with    URI     cough,congestion,grey mucus,runny nose       Started 3 days ago. Symptoms have been gradually worsening since that time. The cough is productive of gray and yellow sputum and is aggravated by  talking. Associated symptoms include: change in voice, postnasal drip and sputum production. Patient does have a history of asthma. Patient does have a history of smoking. Patient does not have a history of environmental allergens. Patient has not traveled recently.   Patient has not had the flu vaccine. Patient has tried inhalers with some relief of symptoms. Sick contacts include: none.     Review of Systems   Constitutional: Negative for chills and fever.   HENT: Positive for congestion.    Eyes: Negative for blurred vision.   Respiratory: Positive for cough, sputum production and wheezing. Negative for shortness of breath.    Cardiovascular: Negative for chest pain.   Gastrointestinal: Negative for abdominal pain, constipation, diarrhea, heartburn, nausea and vomiting.   Genitourinary: Negative for dysuria.   Musculoskeletal: Negative for myalgias.   Skin: Negative for itching and rash.   Neurological: Negative for dizziness and headaches.   Psychiatric/Behavioral: Negative for depression.        Active Problem List  Patient Active Problem List   Diagnosis    Subacute cutaneous lupus erythematosus    Fibromyalgia    Chronic obstructive airway disease with asthma    Tobacco abuse    GERD (gastroesophageal reflux disease)    Unspecified hereditary and idiopathic peripheral neuropathy    Hypothyroidism    Chronic allergic conjunctivitis    Chronic allergic rhinitis due to animal hair and dander    Recurrent urticaria    Other pruritus    Calculus of gallbladder without cholecystitis without  obstruction    Biliary colic    S/P laparoscopic cholecystectomy    Disorder of immune system       Past Medical History  Past Medical History:   Diagnosis Date    Asthma in remission     COPD (chronic obstructive pulmonary disease)     Fibromyalgia     Hypothyroidism     Immune disorder     Lupus     Recurrent urticaria 1/6/2017    Subacute cutaneous lupus erythematosus        Past Surgical History  Past Surgical History:   Procedure Laterality Date    CHOLECYSTECTOMY      CHOLECYSTECTOMY-LAPAROSCOPIC N/A 8/4/2017    Performed by Nik Shrestha MD at [facility] OR [address] [place] [address]       Family History  Family History   Problem Relation Age of Onset    Hypertension Mother     Thyroid disease Mother     Arthritis Father     Hyperlipidemia Father     Cancer Father         Multiple Myeloma     Cirrhosis Father     Lupus Sister     Asthma Sister     No Known Problems Brother     No Known Problems Maternal Aunt     No Known Problems Maternal Uncle     No Known Problems Paternal Aunt     No Known Problems Paternal Uncle     No Known Problems Maternal Grandmother     No Known Problems Maternal Grandfather     No Known Problems Paternal Grandmother     No Known Problems Paternal Grandfather     Scoliosis Sister     Amblyopia Neg Hx     Blindness Neg Hx     Cataracts Neg Hx     Diabetes Neg Hx     Glaucoma Neg Hx     Macular degeneration Neg Hx     Retinal detachment Neg Hx     Strabismus Neg Hx     Stroke Neg Hx        Social History  Social History     Socioeconomic History    Marital status:      Spouse name: Not on file    Number of children: Not on file    Years of education: Not on file    Highest education level: Not on file   Social Needs    Financial resource strain: Not on file    Food insecurity - worry: Not on file    Food insecurity - inability: Not on file    Transportation needs - medical: Not on file    Transportation needs - non-medical: Not on file  "  Occupational History    Not on file   Tobacco Use    Smoking status: Current Every Day Smoker     Packs/day: 2.00     Types: Cigarettes    Smokeless tobacco: Former User     Quit date: 7/8/2013   Substance and Sexual Activity    Alcohol use: No     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     Drug use: No    Sexual activity: Yes     Partners: Male   Other Topics Concern    Not on file   Social History Narrative    Not on file       Medications and Allergies  Reviewed and updated.       Physical Exam  /76 (BP Location: Right arm, Patient Position: Sitting, BP Method: Medium (Manual))   Pulse 100   Temp 98 °F (36.7 °C) (Oral)   Ht 5' 1" (1.549 m)   Wt 66.3 kg (146 lb 2.6 oz)   LMP 05/14/2014 (Approximate)   SpO2 97%   BMI 27.62 kg/m²   Physical Exam   Constitutional: She is oriented to person, place, and time. She appears well-developed and well-nourished.   HENT:   Head: Normocephalic and atraumatic.   Eyes: Conjunctivae and EOM are normal. Pupils are equal, round, and reactive to light.   Neck: Normal range of motion. Neck supple.   Cardiovascular: Normal rate, regular rhythm and normal heart sounds. Exam reveals no gallop and no friction rub.   No murmur heard.  Pulmonary/Chest: Breath sounds normal. No respiratory distress.   Abdominal: Soft. Bowel sounds are normal. She exhibits no distension. There is no tenderness.   Musculoskeletal: Normal range of motion.   Lymphadenopathy:     She has no cervical adenopathy.   Neurological: She is alert and oriented to person, place, and time.   Skin: Skin is warm.   Psychiatric: She has a normal mood and affect.       Assessment/Plan:  Emily Jacobson Yovani is a 48 y.o. female who presents today for :    Acute bronchitis, unspecified organism  -     albuterol-ipratropium 2.5 mg-0.5 mg/3 mL nebulizer solution 3 mL; Take 3 mLs by nebulization one time.  -     dexamethasone injection 8 mg; Inject 2 mLs (8 mg total) into the muscle once.  -     " predniSONE (DELTASONE) 20 MG tablet; Take 2 tablets (40 mg total) by mouth once daily. for 4 days  Dispense: 8 tablet; Refill: 0  -     guaifenesin-codeine 100-10 mg/5 ml (TUSSI-ORGANIDIN NR)  mg/5 mL syrup; Take 5 mLs by mouth every 6 (six) hours as needed.  Dispense: 150 mL; Refill: 0  No antibiotics needed at this time  Breathing improved after pt received breathing treatment in clinic   Continue albuterol at home   Common side effects of this medication were discussed with the patient. Questions regarding medications were discussed during this visit.     Chronic obstructive airway disease with asthma  Noted in chart  Continue flovent     Hypothyroidism, unspecified type  The current medical regimen is effective;  continue present plan and medications.        Follow-up if symptoms worsen or fail to improve.

## 2018-10-08 NOTE — PATIENT INSTRUCTIONS
Bronchitis, Viral (Adult)    You have a viral bronchitis. Bronchitis is inflammation and swelling of the lining of the lungs. This is often caused by an infection. Symptoms include a dry, hacking cough that is worse at night. The cough may bring up yellow-green mucus. You may also feel short of breath or wheeze. Other symptoms may include tiredness, chest discomfort, and chills.  Bronchitis that is caused by a virus is not treated with antibiotics. Instead, medicines may be given to help relieve symptoms. Symptoms can last up to 2 weeks, although the cough may last much longer.  This illness is contagious during the first few days and is spread through the air by coughing and sneezing, or by direct contact (touching the sick person and then touching your own eyes, nose, or mouth).  Most viral illnesses resolve within 10 to 14 days with rest and simple home remedies, although they may sometimes last for several weeks.  Home care  · If symptoms are severe, rest at home for the first 2 to 3 days. When you go back to your usual activities, don't let yourself get too tired.  · Do not smoke. Also avoid being exposed to secondhand smoke.  · You may use over-the-counter medicine to control fever or pain, unless another pain medicine was prescribed. (Note: If you have chronic liver or kidney disease or have ever had a stomach ulcer or gastrointestinal bleeding, talk with your healthcare provider before using these medicines. Also talk to your provider if you are taking medicine to prevent blood clots.) Aspirin should never be given to anyone younger than 18 years of age who is ill with a viral infection or fever. It may cause severe liver or brain damage.  · Your appetite may be poor, so a light diet is fine. Avoid dehydration by drinking 6 to 8 glasses of fluids per day (such as water, soft drinks, sports drinks, juices, tea, or soup). Extra fluids will help loosen secretions in the nose and lungs.  · Over-the-counter  cough, cold, and sore-throat medicines will not shorten the length of the illness, but they may help to reduce symptoms. (Note: Do not use decongestants if you have high blood pressure.)  Follow-up care  Follow up with your healthcare provider, or as advised. If you had an X-ray or ECG (electrocardiogram), a specialist will review it. You will be notified of any new findings that may affect your care.  Note: If you are age 65 or older, or if you have a chronic lung disease or condition that affects your immune system, or you smoke, talk to your healthcare provider about having pneumococcal vaccinations and a yearly influenza vaccination (flu shot).  When to seek medical advice  Call your healthcare provider right away if any of these occur:  · Fever of 100.4°F (38°C) or higher  · Coughing up increased amounts of colored sputum  · Weakness, drowsiness, headache, facial pain, ear pain, or a stiff neck  Call 911, or get immediate medical care  Contact emergency services right away if any of these occur:  · Coughing up blood  · Worsening weakness, drowsiness, headache, or stiff neck  · Trouble breathing, wheezing, or pain with breathing  Date Last Reviewed: 9/13/2015  © 7760-1231 [employer]. [address] [place] [address], [place] [address]. All rights reserved. This information is not intended as a substitute for professional medical care. Always follow your healthcare professional's instructions.

## 2018-10-12 ENCOUNTER — PATIENT MESSAGE (OUTPATIENT)
Dept: FAMILY MEDICINE | Facility: CLINIC | Age: 48
End: 2018-10-12

## 2018-10-12 DIAGNOSIS — M79.674 PAIN OF TOE OF RIGHT FOOT: ICD-10-CM

## 2018-10-12 RX ORDER — IBUPROFEN 800 MG/1
TABLET ORAL
Qty: 30 TABLET | Refills: 0 | Status: SHIPPED | OUTPATIENT
Start: 2018-10-12 | End: 2019-01-17 | Stop reason: SDUPTHER

## 2018-10-19 ENCOUNTER — PATIENT MESSAGE (OUTPATIENT)
Dept: FAMILY MEDICINE | Facility: CLINIC | Age: 48
End: 2018-10-19

## 2018-10-19 DIAGNOSIS — J20.9 ACUTE BRONCHITIS, UNSPECIFIED ORGANISM: ICD-10-CM

## 2018-10-19 RX ORDER — CODEINE PHOSPHATE AND GUAIFENESIN 10; 100 MG/5ML; MG/5ML
5 SOLUTION ORAL EVERY 6 HOURS PRN
Qty: 150 ML | Refills: 0 | Status: SHIPPED | OUTPATIENT
Start: 2018-10-19 | End: 2018-10-22 | Stop reason: SDUPTHER

## 2018-10-22 DIAGNOSIS — J20.9 ACUTE BRONCHITIS, UNSPECIFIED ORGANISM: ICD-10-CM

## 2018-10-22 RX ORDER — CODEINE PHOSPHATE AND GUAIFENESIN 10; 100 MG/5ML; MG/5ML
5 SOLUTION ORAL EVERY 6 HOURS PRN
Qty: 150 ML | Refills: 0 | Status: SHIPPED | OUTPATIENT
Start: 2018-10-22 | End: 2018-11-01

## 2018-10-22 NOTE — TELEPHONE ENCOUNTER
----- Message from Hira Foster sent at 10/19/2018  3:54 PM CDT -----  Contact: Self/[phone]  The previous RX was sent to the incorrect pharmacy    Refill: guaifenesin-codeine 100-10 mg/5 ml (TUSSI-ORGANIDIN NR)  mg/5 mL        [id number] - [place], [place] - [address] [facility] [phone] (Phone)  [phone] (Fax)    Thank you

## 2018-11-09 RX ORDER — HYDROCODONE BITARTRATE AND ACETAMINOPHEN 7.5; 325 MG/1; MG/1
1 TABLET ORAL EVERY 6 HOURS PRN
Qty: 30 TABLET | Refills: 0 | Status: SHIPPED | OUTPATIENT
Start: 2018-11-09 | End: 2018-12-27

## 2018-11-12 ENCOUNTER — TELEPHONE (OUTPATIENT)
Dept: RHEUMATOLOGY | Facility: CLINIC | Age: 48
End: 2018-11-12

## 2018-12-07 RX ORDER — SERTRALINE HYDROCHLORIDE 50 MG/1
50 TABLET, FILM COATED ORAL DAILY
Qty: 90 TABLET | Refills: 0 | Status: SHIPPED | OUTPATIENT
Start: 2018-12-07 | End: 2019-02-24 | Stop reason: SDUPTHER

## 2018-12-13 RX ORDER — FLUTICASONE PROPIONATE 50 MCG
SPRAY, SUSPENSION (ML) NASAL
Qty: 48 G | Refills: 0 | Status: SHIPPED | OUTPATIENT
Start: 2018-12-13 | End: 2019-02-24 | Stop reason: SDUPTHER

## 2018-12-21 RX ORDER — EPINEPHRINE 0.3 MG/.3ML
1 INJECTION SUBCUTANEOUS ONCE
Qty: 2 DEVICE | Refills: 0 | Status: SHIPPED | OUTPATIENT
Start: 2018-12-21 | End: 2023-07-18

## 2018-12-27 ENCOUNTER — PATIENT MESSAGE (OUTPATIENT)
Dept: FAMILY MEDICINE | Facility: CLINIC | Age: 48
End: 2018-12-27

## 2018-12-27 ENCOUNTER — PATIENT MESSAGE (OUTPATIENT)
Dept: RHEUMATOLOGY | Facility: CLINIC | Age: 48
End: 2018-12-27

## 2018-12-27 DIAGNOSIS — E03.9 HYPOTHYROIDISM, UNSPECIFIED TYPE: ICD-10-CM

## 2018-12-27 RX ORDER — HYDROCODONE BITARTRATE AND ACETAMINOPHEN 7.5; 325 MG/1; MG/1
1 TABLET ORAL EVERY 6 HOURS PRN
Qty: 30 TABLET | Refills: 0 | Status: SHIPPED | OUTPATIENT
Start: 2018-12-27 | End: 2019-02-04 | Stop reason: SDUPTHER

## 2018-12-27 RX ORDER — LEVOTHYROXINE SODIUM 100 UG/1
TABLET ORAL
Qty: 90 TABLET | Refills: 0 | Status: SHIPPED | OUTPATIENT
Start: 2018-12-27 | End: 2020-01-02

## 2018-12-28 ENCOUNTER — PATIENT MESSAGE (OUTPATIENT)
Dept: FAMILY MEDICINE | Facility: CLINIC | Age: 48
End: 2018-12-28

## 2018-12-28 ENCOUNTER — TELEPHONE (OUTPATIENT)
Dept: FAMILY MEDICINE | Facility: CLINIC | Age: 48
End: 2018-12-28

## 2018-12-28 NOTE — TELEPHONE ENCOUNTER
----- Message from Carissa Conte sent at 12/28/2018 12:48 PM CST -----  Contact: self   Pt returning call. [phone]

## 2018-12-29 DIAGNOSIS — J20.9 ACUTE BRONCHITIS, UNSPECIFIED ORGANISM: ICD-10-CM

## 2018-12-31 ENCOUNTER — PATIENT MESSAGE (OUTPATIENT)
Dept: FAMILY MEDICINE | Facility: CLINIC | Age: 48
End: 2018-12-31

## 2018-12-31 RX ORDER — CODEINE PHOSPHATE AND GUAIFENESIN 10; 100 MG/5ML; MG/5ML
5 SOLUTION ORAL EVERY 6 HOURS PRN
Qty: 150 ML | Refills: 0 | OUTPATIENT
Start: 2018-12-31 | End: 2019-01-10

## 2019-01-02 RX ORDER — ALBUTEROL SULFATE 0.83 MG/ML
2.5 SOLUTION RESPIRATORY (INHALATION) EVERY 6 HOURS PRN
Qty: 180 ML | Refills: 1 | Status: SHIPPED | OUTPATIENT
Start: 2019-01-02 | End: 2019-07-10 | Stop reason: SDUPTHER

## 2019-01-07 RX ORDER — CYCLOBENZAPRINE HCL 5 MG
5 TABLET ORAL 3 TIMES DAILY PRN
Qty: 90 TABLET | Refills: 2 | Status: SHIPPED | OUTPATIENT
Start: 2019-01-07 | End: 2019-03-18 | Stop reason: SDUPTHER

## 2019-01-17 DIAGNOSIS — M79.674 PAIN OF TOE OF RIGHT FOOT: ICD-10-CM

## 2019-01-21 RX ORDER — IBUPROFEN 800 MG/1
TABLET ORAL
Qty: 30 TABLET | Refills: 0 | Status: SHIPPED | OUTPATIENT
Start: 2019-01-21 | End: 2019-05-15

## 2019-01-22 ENCOUNTER — PATIENT MESSAGE (OUTPATIENT)
Dept: FAMILY MEDICINE | Facility: CLINIC | Age: 49
End: 2019-01-22

## 2019-02-04 ENCOUNTER — PATIENT MESSAGE (OUTPATIENT)
Dept: RHEUMATOLOGY | Facility: CLINIC | Age: 49
End: 2019-02-04

## 2019-02-04 ENCOUNTER — TELEPHONE (OUTPATIENT)
Dept: RHEUMATOLOGY | Facility: CLINIC | Age: 49
End: 2019-02-04

## 2019-02-04 RX ORDER — HYDROCODONE BITARTRATE AND ACETAMINOPHEN 7.5; 325 MG/1; MG/1
1 TABLET ORAL
Qty: 30 TABLET | Refills: 0 | Status: SHIPPED | OUTPATIENT
Start: 2019-02-04 | End: 2019-03-15 | Stop reason: SDUPTHER

## 2019-02-04 NOTE — TELEPHONE ENCOUNTER
Dr. Spaulding pt. Please schedule overdue f/u appt with anyone, and also pain clinic for chronic pain Thanks CHAPINCITO

## 2019-02-05 ENCOUNTER — TELEPHONE (OUTPATIENT)
Dept: RHEUMATOLOGY | Facility: CLINIC | Age: 49
End: 2019-02-05

## 2019-02-08 RX ORDER — ALBUTEROL SULFATE 90 UG/1
AEROSOL, METERED RESPIRATORY (INHALATION)
Qty: 18 INHALER | Refills: 0 | Status: SHIPPED | OUTPATIENT
Start: 2019-02-08 | End: 2019-03-19 | Stop reason: SDUPTHER

## 2019-02-24 DIAGNOSIS — L93.1 SUBACUTE CUTANEOUS LUPUS ERYTHEMATOSUS: ICD-10-CM

## 2019-02-24 DIAGNOSIS — M79.7 FIBROMYALGIA: Primary | ICD-10-CM

## 2019-02-25 RX ORDER — MONTELUKAST SODIUM 10 MG/1
10 TABLET ORAL NIGHTLY
Qty: 30 TABLET | Refills: 0 | Status: SHIPPED | OUTPATIENT
Start: 2019-02-25 | End: 2019-08-28 | Stop reason: SDUPTHER

## 2019-02-25 RX ORDER — SERTRALINE HYDROCHLORIDE 50 MG/1
TABLET, FILM COATED ORAL
Qty: 90 TABLET | Refills: 0 | Status: SHIPPED | OUTPATIENT
Start: 2019-02-25 | End: 2019-06-27 | Stop reason: SDUPTHER

## 2019-02-25 RX ORDER — FLUTICASONE PROPIONATE 50 MCG
SPRAY, SUSPENSION (ML) NASAL
Qty: 48 G | Refills: 0 | Status: SHIPPED | OUTPATIENT
Start: 2019-02-25 | End: 2019-06-27 | Stop reason: SDUPTHER

## 2019-02-25 RX ORDER — MONTELUKAST SODIUM 10 MG/1
10 TABLET ORAL NIGHTLY
COMMUNITY
End: 2019-02-25 | Stop reason: SDUPTHER

## 2019-02-25 NOTE — TELEPHONE ENCOUNTER
Client last seen in Allergy by Dr Mancera March 2018.  Will refill 1 month only.  Client needs to make apt with any of our current providers.

## 2019-03-03 RX ORDER — HYDROXYCHLOROQUINE SULFATE 200 MG/1
TABLET, FILM COATED ORAL
Qty: 90 TABLET | Refills: 0 | Status: SHIPPED | OUTPATIENT
Start: 2019-03-03 | End: 2019-03-18 | Stop reason: SDUPTHER

## 2019-03-03 RX ORDER — ALBUTEROL SULFATE 90 UG/1
AEROSOL, METERED RESPIRATORY (INHALATION)
Qty: 18 INHALER | Refills: 0 | OUTPATIENT
Start: 2019-03-03

## 2019-03-03 RX ORDER — GABAPENTIN 600 MG/1
TABLET ORAL
Qty: 90 TABLET | Refills: 0 | Status: SHIPPED | OUTPATIENT
Start: 2019-03-03 | End: 2019-03-18 | Stop reason: SDUPTHER

## 2019-03-12 RX ORDER — HYDROCODONE BITARTRATE AND ACETAMINOPHEN 7.5; 325 MG/1; MG/1
1 TABLET ORAL
Qty: 30 TABLET | Refills: 0 | OUTPATIENT
Start: 2019-03-12 | End: 2019-04-11

## 2019-03-15 ENCOUNTER — PATIENT MESSAGE (OUTPATIENT)
Dept: RHEUMATOLOGY | Facility: CLINIC | Age: 49
End: 2019-03-15

## 2019-03-15 DIAGNOSIS — G89.4 CHRONIC PAIN SYNDROME: Primary | ICD-10-CM

## 2019-03-15 RX ORDER — HYDROCODONE BITARTRATE AND ACETAMINOPHEN 7.5; 325 MG/1; MG/1
1 TABLET ORAL
Qty: 30 TABLET | Refills: 0 | Status: SHIPPED | OUTPATIENT
Start: 2019-03-15 | End: 2019-03-18 | Stop reason: SDUPTHER

## 2019-03-18 ENCOUNTER — LAB VISIT (OUTPATIENT)
Dept: LAB | Facility: HOSPITAL | Age: 49
End: 2019-03-18
Payer: MEDICARE

## 2019-03-18 ENCOUNTER — OFFICE VISIT (OUTPATIENT)
Dept: RHEUMATOLOGY | Facility: CLINIC | Age: 49
End: 2019-03-18
Payer: MEDICARE

## 2019-03-18 VITALS
WEIGHT: 140.88 LBS | BODY MASS INDEX: 26.6 KG/M2 | HEART RATE: 77 BPM | SYSTOLIC BLOOD PRESSURE: 133 MMHG | HEIGHT: 61 IN | DIASTOLIC BLOOD PRESSURE: 82 MMHG

## 2019-03-18 DIAGNOSIS — Z72.0 TOBACCO ABUSE: ICD-10-CM

## 2019-03-18 DIAGNOSIS — M79.7 FIBROMYALGIA: ICD-10-CM

## 2019-03-18 DIAGNOSIS — G89.4 CHRONIC PAIN SYNDROME: ICD-10-CM

## 2019-03-18 DIAGNOSIS — L93.1 SUBACUTE CUTANEOUS LUPUS ERYTHEMATOSUS: Primary | ICD-10-CM

## 2019-03-18 DIAGNOSIS — Z92.25 HISTORY OF IMMUNOSUPPRESSION THERAPY: ICD-10-CM

## 2019-03-18 DIAGNOSIS — Z12.39 BREAST CANCER SCREENING: ICD-10-CM

## 2019-03-18 DIAGNOSIS — L93.1 SUBACUTE CUTANEOUS LUPUS ERYTHEMATOSUS: ICD-10-CM

## 2019-03-18 LAB
BILIRUB UR QL STRIP: NEGATIVE
CLARITY UR REFRACT.AUTO: CLEAR
COLOR UR AUTO: COLORLESS
CREAT UR-MCNC: 14 MG/DL
GLUCOSE UR QL STRIP: NEGATIVE
HGB UR QL STRIP: NEGATIVE
KETONES UR QL STRIP: NEGATIVE
LEUKOCYTE ESTERASE UR QL STRIP: NEGATIVE
NITRITE UR QL STRIP: NEGATIVE
PH UR STRIP: 8 [PH] (ref 5–8)
PROT UR QL STRIP: NEGATIVE
PROT UR-MCNC: <7 MG/DL
PROT/CREAT UR: ABNORMAL MG/G{CREAT}
SP GR UR STRIP: 1 (ref 1–1.03)
URN SPEC COLLECT METH UR: ABNORMAL

## 2019-03-18 PROCEDURE — 99214 PR OFFICE/OUTPT VISIT, EST, LEVL IV, 30-39 MIN: ICD-10-PCS | Mod: HCNC,GC,S$GLB, | Performed by: STUDENT IN AN ORGANIZED HEALTH CARE EDUCATION/TRAINING PROGRAM

## 2019-03-18 PROCEDURE — 3008F PR BODY MASS INDEX (BMI) DOCUMENTED: ICD-10-PCS | Mod: HCNC,CPTII,GC,S$GLB | Performed by: STUDENT IN AN ORGANIZED HEALTH CARE EDUCATION/TRAINING PROGRAM

## 2019-03-18 PROCEDURE — 99999 PR PBB SHADOW E&M-EST. PATIENT-LVL V: CPT | Mod: PBBFAC,HCNC,GC, | Performed by: STUDENT IN AN ORGANIZED HEALTH CARE EDUCATION/TRAINING PROGRAM

## 2019-03-18 PROCEDURE — 81003 URINALYSIS AUTO W/O SCOPE: CPT | Mod: HCNC

## 2019-03-18 PROCEDURE — 99214 OFFICE O/P EST MOD 30 MIN: CPT | Mod: HCNC,GC,S$GLB, | Performed by: STUDENT IN AN ORGANIZED HEALTH CARE EDUCATION/TRAINING PROGRAM

## 2019-03-18 PROCEDURE — 99999 PR PBB SHADOW E&M-EST. PATIENT-LVL V: ICD-10-PCS | Mod: PBBFAC,HCNC,GC, | Performed by: STUDENT IN AN ORGANIZED HEALTH CARE EDUCATION/TRAINING PROGRAM

## 2019-03-18 PROCEDURE — 82570 ASSAY OF URINE CREATININE: CPT | Mod: HCNC

## 2019-03-18 PROCEDURE — 3008F BODY MASS INDEX DOCD: CPT | Mod: HCNC,CPTII,GC,S$GLB | Performed by: STUDENT IN AN ORGANIZED HEALTH CARE EDUCATION/TRAINING PROGRAM

## 2019-03-18 RX ORDER — HYDROCODONE BITARTRATE AND ACETAMINOPHEN 7.5; 325 MG/1; MG/1
1 TABLET ORAL
Qty: 30 TABLET | Refills: 0 | Status: SHIPPED | OUTPATIENT
Start: 2019-03-18 | End: 2019-04-22 | Stop reason: SDUPTHER

## 2019-03-18 RX ORDER — HYDROXYCHLOROQUINE SULFATE 200 MG/1
400 TABLET, FILM COATED ORAL DAILY
Qty: 180 TABLET | Refills: 0 | Status: SHIPPED | OUTPATIENT
Start: 2019-03-18 | End: 2019-06-27 | Stop reason: SDUPTHER

## 2019-03-18 RX ORDER — TRIAMCINOLONE ACETONIDE 5 MG/G
OINTMENT TOPICAL
Refills: 0 | COMMUNITY
Start: 2019-02-07 | End: 2019-08-28 | Stop reason: SDUPTHER

## 2019-03-18 RX ORDER — GABAPENTIN 600 MG/1
TABLET ORAL
Qty: 90 TABLET | Refills: 0 | Status: SHIPPED | OUTPATIENT
Start: 2019-03-18 | End: 2019-06-27 | Stop reason: SDUPTHER

## 2019-03-18 ASSESSMENT — ROUTINE ASSESSMENT OF PATIENT INDEX DATA (RAPID3)
PAIN SCORE: 8.5
PATIENT GLOBAL ASSESSMENT SCORE: 7.5
AM STIFFNESS SCORE: 1, YES
TOTAL RAPID3 SCORE: 7.22
PSYCHOLOGICAL DISTRESS SCORE: 3.3
FATIGUE SCORE: 1
MDHAQ FUNCTION SCORE: 1.7

## 2019-03-18 NOTE — PROGRESS NOTES
I have personally taken the history and examined the patient to verify the fellow's notation, and I agree with the impression and plan stated.    She has hx of cutaneous lupus; not clear if she ever had systemic lupus.   Recently developed new skin lesions that are different from old ones. She reports no new constitutional sx except for persistent sx of known FMS.  PE skin lesions look like SCLE  We will get lab to reassess for possible systemic lupus while increasing HCQ for skin lesions pending derm assessment.

## 2019-03-18 NOTE — PROGRESS NOTES
RHEUMATOLOGY CLINIC FOLLOW UP VISIT  Chief complaints:-  To follow up for lupus and fibromyalgia     HPI:-  Emily Blanco a 48 y.o. 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     Lupus was diagnosed at age 20 - via skin biopsy of the sface.  Developed facial rash.  Was on steroid and then was started on the plaquenil after development of arthralgia.  Last eye examination was 1-2 years ago.  About 3 weeks ago, rash developed in bilateral elbows and behind R knee.  Went to dermatologist given topical steroid cream.  +Raynaud's    Fibromyalgia (was diagnosed is managed with ibuprofen 1-2x/week.  She is also taking hydrocodone 7.5mg, gabapentin 600mg daily, flexeril.  Pain of the hands, feet, knees.     + Fatigue/maliase.  Sleep 8 hours every night.  Have multiple night time awakening.  LMP was >6 years ago.  +night sweats, mood swings, bloated    Depressed (on Zoloft - prescribed by PCP. Started a few months ago).  Does not want to increase the medication.  Previously tried it before (~20 years ago) and increasing caused her to be numb.  Never tried Trazadone, Cymbalta, or amitrypline    Smokes tobacco (1ppd x 30 years), denies EtOh or recreational drugs/medications/supplements.     Had bronchitis for the last 3 months                                   Widespread pain index  Note the areas which the patient has had pain over the last week:                   Shoulder-girdle, left                Shoulder-girdle, right                         Upper arm left                        Upper arm right                         Lower arm left x                       Lower arm right x     Hip (buttock, trochanter) left  Hip (buttock, trochanter) right                           Upper leg, left                         Upper leg, right                            Lower leg, left                         Lower leg, right                                     Jaw, left                                   Jaw, right                                        Chest                                  Abdomen                               Upper back                              Lower back x                                         Neck x   Score will be from 0-19:                                         Symptom severity score  Fatigue 3  Waking Unrefreshed 3   Cognitive Symptoms 2    0 = no problem, 1=slight or mild problem 2= moderate; considerable problems often present and/or at a moderate level, 3 = severe, pervasive, continuous, life disturbing problem  For each of the 3 symptoms, indicate the level of severity over the past week using the Scale.  The symptom severity score is the sum of the severity of the 3 symptoms (fatigue, waking unrefreshed, and cognitive symptoms) plus the number of the following symptoms occurring during the previous 6 months:   Headaches   Pain or cramps in the lower abdomen1   Depression1  The final score is between 0 and 12 10                                           Criteria  Patient has fibromyalgia if the following 3 conditions are met:  1.  Widespread pain index greater than or equal to 7 and symptom severity score greater than or equal to 5 or widespread pain index between 3- 6, and symptom severity score greater than or equal to 9.    2.  Symptoms have been present in a similar level for at least 3 months  3.  The patient does not have a disorder that would otherwise sufficiently explain the pain    Review of Systems   Constitutional: Positive for malaise/fatigue. Negative for chills, diaphoresis, fever and weight loss.   HENT: Negative for congestion, ear discharge, ear pain, hearing loss, nosebleeds, sinus pain and tinnitus.    Eyes: Negative for photophobia, pain, discharge and redness.   Respiratory: Negative for cough, hemoptysis, sputum  production, shortness of breath, wheezing and stridor.    Cardiovascular: Negative for chest pain, palpitations, orthopnea, claudication, leg swelling and PND.   Gastrointestinal: Negative for abdominal pain, constipation, diarrhea, heartburn, nausea and vomiting.   Genitourinary: Negative for dysuria, frequency, hematuria and urgency.   Musculoskeletal: Negative for back pain, joint pain, myalgias and neck pain.   Skin: Positive for rash.   Neurological: Negative for dizziness, tingling, tremors, weakness and headaches.   Endo/Heme/Allergies: Does not bruise/bleed easily.   Psychiatric/Behavioral: Negative for depression, hallucinations and suicidal ideas. The patient is not nervous/anxious and does not have insomnia.        Past Medical History:   Diagnosis Date    Asthma in remission     COPD (chronic obstructive pulmonary disease)     Fibromyalgia     Hypothyroidism     Immune disorder     Lupus     Recurrent urticaria 1/6/2017    Subacute cutaneous lupus erythematosus        Past Surgical History:   Procedure Laterality Date    CHOLECYSTECTOMY      CHOLECYSTECTOMY-LAPAROSCOPIC N/A 8/4/2017    Performed by Nik Shrestha MD at [facility] OR [address] [place] [address]        Social History     Tobacco Use    Smoking status: Current Every Day Smoker     Packs/day: 2.00     Types: Cigarettes    Smokeless tobacco: Former User     Quit date: 7/8/2013   Substance Use Topics    Alcohol use: No     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     Drug use: No       Family History   Problem Relation Age of Onset    Hypertension Mother     Thyroid disease Mother     Arthritis Father     Hyperlipidemia Father     Cancer Father         Multiple Myeloma     Cirrhosis Father     Lupus Sister     Asthma Sister     No Known Problems Brother     No Known Problems Maternal Aunt     No Known Problems Maternal Uncle     No Known Problems Paternal Aunt     No Known Problems Paternal Uncle     No Known Problems Maternal Grandmother   "   No Known Problems Maternal Grandfather     No Known Problems Paternal Grandmother     No Known Problems Paternal Grandfather     Scoliosis Sister     Amblyopia Neg Hx     Blindness Neg Hx     Cataracts Neg Hx     Diabetes Neg Hx     Glaucoma Neg Hx     Macular degeneration Neg Hx     Retinal detachment Neg Hx     Strabismus Neg Hx     Stroke Neg Hx        Review of patient's allergies indicates:   Allergen Reactions    Bactrim [sulfamethoxazole-trimethoprim] Rash       Vitals:    03/18/19 1059   BP: 133/82   Pulse: 77   Weight: 63.9 kg (140 lb 14 oz)   Height: 5' 1" (1.549 m)   PainSc:   4       Physical Exam   Constitutional: She is oriented to person, place, and time and well-developed, well-nourished, and in no distress.   HENT:   Head: Normocephalic and atraumatic.   No mouth ulcers    Eyes: EOM are normal. Pupils are equal, round, and reactive to light.   Neck: Normal range of motion. Neck supple.   Cardiovascular: Normal rate, regular rhythm and normal heart sounds.   Pulmonary/Chest: Effort normal and breath sounds normal.   Abdominal: Soft. Bowel sounds are normal.   Musculoskeletal: Normal range of motion.   ROM intact  Strength intact in all major joints  No signs of synovitis     Neurological: She is alert and oriented to person, place, and time. Gait normal. GCS score is 15.   Skin: Skin is warm and dry. No rash noted. No erythema.   healing ulcerated rash of bilateral elbow extensor and behind R patella.      Psychiatric: Mood, memory, affect and judgment normal.       Medication List with Changes/Refills   Current Medications    ALBUTEROL (PROVENTIL) 2.5 MG /3 ML (0.083 %) NEBULIZER SOLUTION    Take 3 mLs (2.5 mg total) by nebulization every 6 (six) hours as needed for Wheezing.    ALUMINUM HYDROX-MAGNESIUM CARB (GAVISCON EXTRA STRENGTH) 254-237.5 MG/5 ML SUSP    Take by mouth as needed.     CETIRIZINE (ZYRTEC) 10 MG TABLET    Take 10 mg by mouth once daily.    CYCLOBENZAPRINE " (FLEXERIL) 5 MG TABLET    TAKE 1 TABLET (5 MG TOTAL) BY MOUTH 3 (THREE) TIMES DAILY AS NEEDED.    EPINEPHRINE (EPIPEN 2-GIGI) 0.3 MG/0.3 ML ATIN    Inject 0.3 mLs (0.3 mg total) into the muscle once. for 1 dose    ESTRADIOL (ESTRACE) 0.01 % (0.1 MG/GRAM) VAGINAL CREAM    Insert 2 g daily intravaginally for 2 weeks, then 1 g twice weekly    FLUOROMETHOLONE 0.1% (FML) 0.1 % DRPS    1 DROP IN BOTH EYES TWICE A DAY FOR 2 WEEKS THEN DAILY FOR 2 WEEKS    FLUTICASONE (FLONASE) 50 MCG/ACTUATION NASAL SPRAY    USE 2 SPRAYS IN EACH NOSTRIL ONE TIME DAILY    FLUTICASONE (FLOVENT DISKUS) 250 MCG/ACTUATION DSDV    Inhale 1 puff into the lungs 2 (two) times daily. Controller    FLUTICASONE-SALMETEROL (ADVAIR HFA) 115-21 MCG/ACTUATION HFAA    Inhale 2 puffs into the lungs 2 (two) times daily.    HYDROCHLOROTHIAZIDE (HYDRODIURIL) 25 MG TABLET    TAKE 1 TABLET BY MOUTH AS DIRECTED TAKE ONE TABLET BY MOUTH ONLY ON SATURDAY AND SUNDAY    HYDROXYZINE HCL (ATARAX) 25 MG TABLET    1-8 tablets at bedtime for itching.    IBUPROFEN (ADVIL,MOTRIN) 800 MG TABLET    TAKE 1 TABLET BY MOUTH EVERY SIX TO EIGHT HOURS AS NEEDED    JUBLIA 10 % MANDEEP    APPLY ONE DROP TO ALL SMALLER TOES AND 2 DROPS TO GREATER TOES DAILY    LEVOTHYROXINE (SYNTHROID) 100 MCG TABLET    Take 1 tablet by mouth Monday - Saturday and take 1/2 tablet on sunday    MOMETASONE (NASONEX) 50 MCG/ACTUATION NASAL SPRAY    2 sprays by Nasal route 2 (two) times daily.    MONTELUKAST (SINGULAIR) 10 MG TABLET    Take 1 tablet (10 mg total) by mouth every evening.    PANTOPRAZOLE (PROTONIX) 40 MG TABLET    Take 1 tablet (40 mg total) by mouth once daily.    SERTRALINE (ZOLOFT) 50 MG TABLET    TAKE 1 TABLET EVERY DAY    TRIAMCINOLONE (KENALOG) 0.5 % OINTMENT    APPLY TO AFFECTED AREA TWICE A DAY FOR 7 DAYS    VENTOLIN HFA 90 MCG/ACTUATION INHALER    TAKE 2 PUFFS INTO THE LUNGS EVERY 4 HOURS AS NEEDED   Changed and/or Refilled Medications    Modified Medication Previous Medication     GABAPENTIN (NEURONTIN) 600 MG TABLET gabapentin (NEURONTIN) 600 MG tablet       TAKE 1 TABLET  BY MOUTH EVERY DAY    TAKE 1 TABLET EVERY DAY    HYDROCODONE-ACETAMINOPHEN (NORCO) 7.5-325 MG PER TABLET HYDROcodone-acetaminophen (NORCO) 7.5-325 mg per tablet       Take 1 tablet by mouth every 24 hours as needed for Pain.    Take 1 tablet by mouth every 24 hours as needed for Pain.    HYDROXYCHLOROQUINE (PLAQUENIL) 200 MG TABLET hydroxychloroquine (PLAQUENIL) 200 mg tablet       Take 2 tablets (400 mg total) by mouth once daily. TAKE 1 TABLET EVERY DAY    TAKE 1 TABLET EVERY DAY   Discontinued Medications    CYCLOBENZAPRINE (FLEXERIL) 5 MG TABLET    TAKE 1 TABLET (5 MG TOTAL) BY MOUTH 3 (THREE) TIMES DAILY AS NEEDED.       Assessment/Plans:-  48 y.o. 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  Went to dermatologist and was treated topically.  No history of similar presentations. Rash healing.  Currently complaining of worsening fatigue and malaise that is not alleviated with rest.     Physical examination remarkable for healing ulcerations in bilateral elbow extensors and R calf.     Previous labs were unremarkable.    At this time, concern for cutaneous lupus flare.  Will do labs and increase plaquenil at this time.     Plan  - referral to dermatologist for evaluation   - referral to ophtholomalogy for monitoring of plaquenil toxicity  - referral to PM&R for pain management   - increase plaquenil to 400mg daily until follow up with dermatologist  - refill provided on gabapentin and norco  - CBC, CMP, ESR, CRP, C3, C4, UA, Up/c, dsDNA - to be done today   - recommendation for daily usage of sunscreen      # Follow-up in about 4 weeks (around  4/15/2019).

## 2019-03-19 ENCOUNTER — PATIENT MESSAGE (OUTPATIENT)
Dept: RHEUMATOLOGY | Facility: CLINIC | Age: 49
End: 2019-03-19

## 2019-03-19 RX ORDER — ALBUTEROL SULFATE 90 UG/1
AEROSOL, METERED RESPIRATORY (INHALATION)
Qty: 18 INHALER | Refills: 0 | Status: SHIPPED | OUTPATIENT
Start: 2019-03-19 | End: 2019-04-29 | Stop reason: SDUPTHER

## 2019-03-20 DIAGNOSIS — Z79.899 LONG-TERM USE OF PLAQUENIL: Primary | ICD-10-CM

## 2019-03-20 DIAGNOSIS — L93.1 SUBACUTE CUTANEOUS LUPUS ERYTHEMATOSUS: ICD-10-CM

## 2019-04-03 ENCOUNTER — OFFICE VISIT (OUTPATIENT)
Dept: DERMATOLOGY | Facility: CLINIC | Age: 49
End: 2019-04-03
Payer: MEDICARE

## 2019-04-03 DIAGNOSIS — L93.2 CUTANEOUS LUPUS ERYTHEMATOSUS: ICD-10-CM

## 2019-04-03 DIAGNOSIS — L30.9 ECZEMA, UNSPECIFIED TYPE: Primary | ICD-10-CM

## 2019-04-03 PROCEDURE — 99499 RISK ADDL DX/OHS AUDIT: ICD-10-PCS | Mod: HCNC,S$GLB,, | Performed by: DERMATOLOGY

## 2019-04-03 PROCEDURE — 99202 PR OFFICE/OUTPT VISIT, NEW, LEVL II, 15-29 MIN: ICD-10-PCS | Mod: HCNC,S$GLB,, | Performed by: DERMATOLOGY

## 2019-04-03 PROCEDURE — 99499 UNLISTED E&M SERVICE: CPT | Mod: HCNC,S$GLB,, | Performed by: DERMATOLOGY

## 2019-04-03 PROCEDURE — 99999 PR PBB SHADOW E&M-EST. PATIENT-LVL II: ICD-10-PCS | Mod: PBBFAC,HCNC,, | Performed by: DERMATOLOGY

## 2019-04-03 PROCEDURE — 99999 PR PBB SHADOW E&M-EST. PATIENT-LVL II: CPT | Mod: PBBFAC,HCNC,, | Performed by: DERMATOLOGY

## 2019-04-03 PROCEDURE — 99202 OFFICE O/P NEW SF 15 MIN: CPT | Mod: HCNC,S$GLB,, | Performed by: DERMATOLOGY

## 2019-04-03 RX ORDER — HYDROXYZINE HYDROCHLORIDE 25 MG/1
25 TABLET, FILM COATED ORAL NIGHTLY
Qty: 30 TABLET | Refills: 2 | Status: SHIPPED | OUTPATIENT
Start: 2019-04-03 | End: 2019-07-08 | Stop reason: SDUPTHER

## 2019-04-03 RX ORDER — TRIAMCINOLONE ACETONIDE 5 MG/G
OINTMENT TOPICAL 2 TIMES DAILY
Qty: 15 G | Refills: 5 | Status: SHIPPED | OUTPATIENT
Start: 2019-04-03 | End: 2020-06-01

## 2019-04-03 RX ORDER — HYDROCORTISONE 25 MG/G
OINTMENT TOPICAL 2 TIMES DAILY
Qty: 28 G | Refills: 2 | Status: SHIPPED | OUTPATIENT
Start: 2019-04-03 | End: 2019-10-09 | Stop reason: SDUPTHER

## 2019-04-03 NOTE — LETTER
April 9, 2019      Ashley Stewart MD  [address]  [facility] [address]           [facility] - Dermatology  [address]  [facility] [address]  Phone: [phone]  Fax: [phone]          Patient: Emily Pina   MR Number: [id number]   YOB: 1970   Date of Visit: 4/3/2019       Dear Dr. Ashley Stewart:    Thank you for referring Emily Pina to me for evaluation. Attached you will find relevant portions of my assessment and plan of care.    If you have questions, please do not hesitate to call me. I look forward to following Emily Pina along with you.    Sincerely,    Jessica Thomas MD    Enclosure  CC:  No Recipients    If you would like to receive this communication electronically, please contact [email or web address] or [phone] to request more information on [employer] access.    For providers and/or their staff who would like to refer a patient to [facility], please contact us through our one-stop-shop provider referral line, [facility], at [phone].    If you feel you have received this communication in error or would no longer like to receive these types of communications, please e-mail [email or web address]

## 2019-04-03 NOTE — PROGRESS NOTES
Subjective:       Patient ID:  Emily Pina is a 49 y.o. female who presents for   Chief Complaint   Patient presents with    Skin Problem     arms & legs     50 yo female present today for skin issues due to lupus.     50 yo female with hx of SCLE and fibromyalgia, currently on Plaquenil 200 mg po bid, here for evaluation of skin rashes.  She developed a new rash on her chest and arms that appeared to be ulcerated and punched out; clinically rheumatology concerned for SCLE and increased her plaquenil to bid (had been qd prior).  She was also treated with topical steroids and these have now healed over with white marks remaining.    She also has an itchy flaky eruption on the AC fossa, hands, eyelids - hx of seasonal allergies, and asthma.     Review of Systems   Skin: Negative for daily sunscreen use, activity-related sunscreen use, recent sunburn and wears hat.   Hematologic/Lymphatic: Bruises/bleeds easily.        Objective:    Physical Exam   Constitutional: She appears well-developed and well-nourished. No distress.   Neurological: She is alert and oriented to person, place, and time. She is not disoriented.   Psychiatric: She has a normal mood and affect.   Skin:   Areas Examined (abnormalities noted in diagram):   Head / Face Inspection Performed  Neck Inspection Performed  Chest / Axilla Inspection Performed  RUE Inspected  LUE Inspection Performed  Nails and Digits Inspection Performed                   Diagram Legend     Erythematous scaling macule/papule c/w actinic keratosis       Vascular papule c/w angioma      Pigmented verrucoid papule/plaque c/w seborrheic keratosis      Yellow umbilicated papule c/w sebaceous hyperplasia      Irregularly shaped tan macule c/w lentigo     1-2 mm smooth white papules consistent with Milia      Movable subcutaneous cyst with punctum c/w epidermal inclusion cyst      Subcutaneous movable cyst c/w pilar cyst      Firm pink to brown papule c/w  dermatofibroma      Pedunculated fleshy papule(s) c/w skin tag(s)      Evenly pigmented macule c/w junctional nevus     Mildly variegated pigmented, slightly irregular-bordered macule c/w mildly atypical nevus      Flesh colored to evenly pigmented papule c/w intradermal nevus       Pink pearly papule/plaque c/w basal cell carcinoma      Erythematous hyperkeratotic cursted plaque c/w SCC      Surgical scar with no sign of skin cancer recurrence      Open and closed comedones      Inflammatory papules and pustules      Verrucoid papule consistent consistent with wart     Erythematous eczematous patches and plaques     Dystrophic onycholytic nail with subungual debris c/w onychomycosis     Umbilicated papule    Erythematous-base heme-crusted tan verrucoid plaque consistent with inflamed seborrheic keratosis     Erythematous Silvery Scaling Plaque c/w Psoriasis     See annotation      Assessment / Plan:        Eczema  -     hydrOXYzine HCl (ATARAX) 25 MG tablet; Take 1 tablet (25 mg total) by mouth every evening. Prn pruritus. Do not drive or operate machinery while taking this medicine.  Dispense: 30 tablet; Refill: 2  -     hydrocortisone 2.5 % ointment; Apply topically 2 (two) times daily. For eyelid rash PRN  Dispense: 28 g; Refill: 2  -TAC 0.05 ointment prn flares body  Good skin care regimen discussed including limiting to one bath or shower/day, using lukewarm water with mild soap and moisturizing cream to skin 1 - 2x/day. Brochure was provided and reviewed with patient.    Cutaneous lupus erythematosus - agree with Plaquenil 200 mg po bid; advise more diligent sun protection    -     triamcinolone (KENALOG) 0.5 % ointment; Apply topically 2 (two) times daily. For lupus skin flares on body  Dispense: 15 g; Refill: 5    Patient instructed in importance in daily sun protection of at least spf 30. Sun avoidance and topical protection discussed.   Patient encouraged to wear hat for all outdoor exposure.   Also  discussed sun protective clothing.           Follow up if symptoms worsen or fail to improve.

## 2019-04-16 ENCOUNTER — TELEPHONE (OUTPATIENT)
Dept: RHEUMATOLOGY | Facility: CLINIC | Age: 49
End: 2019-04-16

## 2019-04-17 ENCOUNTER — PATIENT MESSAGE (OUTPATIENT)
Dept: RHEUMATOLOGY | Facility: CLINIC | Age: 49
End: 2019-04-17

## 2019-04-22 ENCOUNTER — PATIENT MESSAGE (OUTPATIENT)
Dept: RHEUMATOLOGY | Facility: CLINIC | Age: 49
End: 2019-04-22

## 2019-04-22 DIAGNOSIS — Z72.0 TOBACCO ABUSE: ICD-10-CM

## 2019-04-22 DIAGNOSIS — G89.4 CHRONIC PAIN SYNDROME: ICD-10-CM

## 2019-04-22 DIAGNOSIS — M79.7 FIBROMYALGIA: ICD-10-CM

## 2019-04-22 DIAGNOSIS — L93.1 SUBACUTE CUTANEOUS LUPUS ERYTHEMATOSUS: ICD-10-CM

## 2019-04-22 DIAGNOSIS — Z92.25 HISTORY OF IMMUNOSUPPRESSION THERAPY: ICD-10-CM

## 2019-04-23 ENCOUNTER — PATIENT MESSAGE (OUTPATIENT)
Dept: RHEUMATOLOGY | Facility: CLINIC | Age: 49
End: 2019-04-23

## 2019-04-23 ENCOUNTER — TELEPHONE (OUTPATIENT)
Dept: PAIN MEDICINE | Facility: CLINIC | Age: 49
End: 2019-04-23

## 2019-04-23 NOTE — TELEPHONE ENCOUNTER
Nurse called patient regarding her up coming appt with . Nurse explained IPM and stated we would be more that happy to eval her. Nurse informed her that  are unable to re-fill her Norco 7.5 she will have to follow up with the prescribing MD.    ----- Message from Ken Rowell sent at 4/23/2019  2:37 PM CDT -----  Contact: self/[phone]  Patient called to speak with your office about her upcoming appointment for next week.    She wants to know if the doctor can refill her pain medication before she sees him since she will be out this week.    Please call to discuss today.

## 2019-04-24 ENCOUNTER — PATIENT MESSAGE (OUTPATIENT)
Dept: RHEUMATOLOGY | Facility: CLINIC | Age: 49
End: 2019-04-24

## 2019-04-24 RX ORDER — MELOXICAM 7.5 MG/1
7.5 TABLET ORAL DAILY
Qty: 30 TABLET | Refills: 1 | Status: SHIPPED | OUTPATIENT
Start: 2019-04-24 | End: 2019-05-24

## 2019-04-24 RX ORDER — HYDROCODONE BITARTRATE AND ACETAMINOPHEN 7.5; 325 MG/1; MG/1
1 TABLET ORAL
Qty: 30 TABLET | Refills: 0 | Status: SHIPPED | OUTPATIENT
Start: 2019-04-24 | End: 2019-05-15 | Stop reason: SDUPTHER

## 2019-04-26 ENCOUNTER — HOSPITAL ENCOUNTER (OUTPATIENT)
Dept: RADIOLOGY | Facility: HOSPITAL | Age: 49
Discharge: HOME | End: 2019-04-26
Attending: ANESTHESIOLOGY
Payer: MEDICARE

## 2019-04-26 DIAGNOSIS — M54.5 CHRONIC BILATERAL LOW BACK PAIN, WITH SCIATICA PRESENCE UNSPECIFIED: ICD-10-CM

## 2019-04-26 DIAGNOSIS — G89.29 CHRONIC BILATERAL LOW BACK PAIN, WITH SCIATICA PRESENCE UNSPECIFIED: ICD-10-CM

## 2019-04-26 PROCEDURE — 72100 X-RAY EXAM L-S SPINE 2/3 VWS: CPT | Mod: TC,HCNC,FY,PO

## 2019-04-26 PROCEDURE — 72120 XR LUMBAR SPINE AP AND LAT WITH FLEX/EXT: ICD-10-PCS | Mod: 26,HCNC,, | Performed by: RADIOLOGY

## 2019-04-26 PROCEDURE — 72100 XR LUMBAR SPINE AP AND LAT WITH FLEX/EXT: ICD-10-PCS | Mod: 26,HCNC,, | Performed by: RADIOLOGY

## 2019-04-26 PROCEDURE — 72120 X-RAY BEND ONLY L-S SPINE: CPT | Mod: 26,HCNC,, | Performed by: RADIOLOGY

## 2019-04-26 PROCEDURE — 72100 X-RAY EXAM L-S SPINE 2/3 VWS: CPT | Mod: 26,HCNC,, | Performed by: RADIOLOGY

## 2019-04-29 ENCOUNTER — OFFICE VISIT (OUTPATIENT)
Dept: PAIN MEDICINE | Facility: CLINIC | Age: 49
End: 2019-04-29
Attending: ANESTHESIOLOGY
Payer: MEDICARE

## 2019-04-29 VITALS
SYSTOLIC BLOOD PRESSURE: 120 MMHG | HEART RATE: 70 BPM | HEIGHT: 61 IN | BODY MASS INDEX: 25.23 KG/M2 | WEIGHT: 133.63 LBS | DIASTOLIC BLOOD PRESSURE: 69 MMHG

## 2019-04-29 DIAGNOSIS — M54.50 CHRONIC BILATERAL LOW BACK PAIN WITHOUT SCIATICA: Primary | ICD-10-CM

## 2019-04-29 DIAGNOSIS — M47.816 FACET ARTHRITIS OF LUMBAR REGION: ICD-10-CM

## 2019-04-29 DIAGNOSIS — G89.29 CHRONIC BILATERAL LOW BACK PAIN WITHOUT SCIATICA: Primary | ICD-10-CM

## 2019-04-29 DIAGNOSIS — G89.29 CHRONIC BILATERAL LOW BACK PAIN, WITH SCIATICA PRESENCE UNSPECIFIED: ICD-10-CM

## 2019-04-29 DIAGNOSIS — M54.5 CHRONIC BILATERAL LOW BACK PAIN, WITH SCIATICA PRESENCE UNSPECIFIED: ICD-10-CM

## 2019-04-29 PROCEDURE — 3008F PR BODY MASS INDEX (BMI) DOCUMENTED: ICD-10-PCS | Mod: HCNC,CPTII,S$GLB, | Performed by: ANESTHESIOLOGY

## 2019-04-29 PROCEDURE — 3008F BODY MASS INDEX DOCD: CPT | Mod: HCNC,CPTII,S$GLB, | Performed by: ANESTHESIOLOGY

## 2019-04-29 PROCEDURE — 99204 PR OFFICE/OUTPT VISIT, NEW, LEVL IV, 45-59 MIN: ICD-10-PCS | Mod: HCNC,S$GLB,, | Performed by: ANESTHESIOLOGY

## 2019-04-29 PROCEDURE — 99204 OFFICE O/P NEW MOD 45 MIN: CPT | Mod: HCNC,S$GLB,, | Performed by: ANESTHESIOLOGY

## 2019-04-29 RX ORDER — ALBUTEROL SULFATE 90 UG/1
AEROSOL, METERED RESPIRATORY (INHALATION)
Qty: 18 INHALER | Refills: 0 | Status: SHIPPED | OUTPATIENT
Start: 2019-04-29 | End: 2020-07-01 | Stop reason: SDUPTHER

## 2019-04-29 NOTE — LETTER
April 29, 2019      Mary Kay Wilson MD  [address]  [facility] [address]           [facility] - Pain Management  [address]  [facility] [address]  Phone: [phone]  Fax: [phone]          Patient: Emily Pina   MR Number: [id number]   YOB: 1970   Date of Visit: 4/29/2019       Dear Dr. Mary Kay Wilson:    Thank you for referring Emily Pina to me for evaluation. Attached you will find relevant portions of my assessment and plan of care.    If you have questions, please do not hesitate to call me. I look forward to following Emily Pina along with you.    Sincerely,    Eliu Peterson III, MD    Enclosure  CC:  No Recipients    If you would like to receive this communication electronically, please contact [email or web address] or [phone] to request more information on [employer] access.    For providers and/or their staff who would like to refer a patient to [facility], please contact us through our one-stop-shop provider referral line, [facility], at [phone].    If you feel you have received this communication in error or would no longer like to receive these types of communications, please e-mail [email or web address]

## 2019-04-29 NOTE — PROGRESS NOTES
Chronic Pain - New Consult    Referring Physician: MaryK ay Wilson *      Chief Complaint   Patient presents with    Back Pain        SUBJECTIVE:    Emily Pina is a 50 y/o female with hx of fibromyalgia who presents to the clinic for the evaluation of low back pain. The back pain started >10 years ago and symptoms have been progressively worsening. She denies recent trauma or inciting event. The pain is located in the lower back area at the midline. She denies radiation down the legs at this time.  She does report experiencing a pain radiating down the posterior aspect of the right leg last week which has now subsubsided.  The pain is described as sharp and stabbing and is rated as 5/10. The pain is rated with a score of  2/10 on the BEST day and a score of 10/10 on the WORST day.  Symptoms interfere with daily activity and sleeping. The pain is exacerbated by standing, bending, walking, getting out of bed/chair and activity.  The pain is mitigated by heat and medication. The patient reports 3 hours of uninterrupted sleep per night.    Patient denies night fever/night sweats, urinary incontinence, bowel incontinence and significant weight loss.    Physical Therapy/Home Exercise: no      Pain Disability Index Review:  Last 3 PDI Scores 4/29/2019   Pain Disability Index (PDI) 69       Pain Medications:    - Opioids: Norco   - Adjuvant Medications: Advil,Motrin ( Ibuprofen), Mobic (Meloxicam) and Neurontin (Gabapentin)     report:  Reviewed    Pain Procedures: None on file     Imaging:     X-Ray Lumbar Spine Ap Lateral w/Flex Ext (4/26/2019):    FINDINGS:  Bones are fairly well mineralized.  Vertebral body heights disc spaces and alignment is satisfactory.  No subluxation.    Past Medical History:   Diagnosis Date    Asthma in remission     COPD (chronic obstructive pulmonary disease)     Fibromyalgia     Hypothyroidism     Immune disorder     Lupus     Recurrent urticaria 1/6/2017     Subacute cutaneous lupus erythematosus      Past Surgical History:   Procedure Laterality Date    CHOLECYSTECTOMY      CHOLECYSTECTOMY-LAPAROSCOPIC N/A 8/4/2017    Performed by Nik Shrestha MD at [facility] OR [address] [place] [address]     Social History     Socioeconomic History    Marital status:      Spouse name: Not on file    Number of children: Not on file    Years of education: Not on file    Highest education level: Not on file   Occupational History    Not on file   Social Needs    Financial resource strain: Not on file    Food insecurity:     Worry: Not on file     Inability: Not on file    Transportation needs:     Medical: Not on file     Non-medical: Not on file   Tobacco Use    Smoking status: Current Every Day Smoker     Packs/day: 2.00     Types: Cigarettes    Smokeless tobacco: Former User     Quit date: 7/8/2013   Substance and Sexual Activity    Alcohol use: No     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     Drug use: No    Sexual activity: Yes     Partners: Male   Lifestyle    Physical activity:     Days per week: Not on file     Minutes per session: Not on file    Stress: Not on file   Relationships    Social connections:     Talks on phone: Not on file     Gets together: Not on file     Attends Christian service: Not on file     Active member of club or organization: Not on file     Attends meetings of clubs or organizations: Not on file     Relationship status: Not on file   Other Topics Concern    Not on file   Social History Narrative    Not on file     Family History   Problem Relation Age of Onset    Hypertension Mother     Thyroid disease Mother     Arthritis Father     Hyperlipidemia Father     Cancer Father         Multiple Myeloma     Cirrhosis Father     Lupus Sister     Asthma Sister     No Known Problems Brother     No Known Problems Maternal Aunt     No Known Problems Maternal Uncle     No Known Problems Paternal Aunt     No Known Problems Paternal Uncle      No Known Problems Maternal Grandmother     No Known Problems Maternal Grandfather     No Known Problems Paternal Grandmother     No Known Problems Paternal Grandfather     Scoliosis Sister     Amblyopia Neg Hx     Blindness Neg Hx     Cataracts Neg Hx     Diabetes Neg Hx     Glaucoma Neg Hx     Macular degeneration Neg Hx     Retinal detachment Neg Hx     Strabismus Neg Hx     Stroke Neg Hx        Review of patient's allergies indicates:   Allergen Reactions    Bactrim [sulfamethoxazole-trimethoprim] Rash       Current Outpatient Medications   Medication Sig    albuterol (PROVENTIL) 2.5 mg /3 mL (0.083 %) nebulizer solution Take 3 mLs (2.5 mg total) by nebulization every 6 (six) hours as needed for Wheezing.    aluminum hydrox-magnesium carb (GAVISCON EXTRA STRENGTH) 254-237.5 mg/5 mL Susp Take by mouth as needed.     cetirizine (ZYRTEC) 10 MG tablet Take 10 mg by mouth once daily.    cyclobenzaprine (FLEXERIL) 5 MG tablet TAKE 1 TABLET (5 MG TOTAL) BY MOUTH 3 (THREE) TIMES DAILY AS NEEDED.    EPINEPHrine (EPIPEN 2-GIGI) 0.3 mg/0.3 mL AtIn Inject 0.3 mLs (0.3 mg total) into the muscle once. for 1 dose    estradiol (ESTRACE) 0.01 % (0.1 mg/gram) vaginal cream Insert 2 g daily intravaginally for 2 weeks, then 1 g twice weekly    fluorometholone 0.1% (FML) 0.1 % DrpS 1 DROP IN BOTH EYES TWICE A DAY FOR 2 WEEKS THEN DAILY FOR 2 WEEKS    fluticasone (FLONASE) 50 mcg/actuation nasal spray USE 2 SPRAYS IN EACH NOSTRIL ONE TIME DAILY    fluticasone (FLOVENT DISKUS) 250 mcg/actuation DsDv Inhale 1 puff into the lungs 2 (two) times daily. Controller    fluticasone-salmeterol (ADVAIR HFA) 115-21 mcg/actuation HFAA Inhale 2 puffs into the lungs 2 (two) times daily.    gabapentin (NEURONTIN) 600 MG tablet TAKE 1 TABLET  BY MOUTH EVERY DAY    hydroCHLOROthiazide (HYDRODIURIL) 25 MG tablet TAKE 1 TABLET BY MOUTH AS DIRECTED TAKE ONE TABLET BY MOUTH ONLY ON SATURDAY AND SUNDAY     HYDROcodone-acetaminophen (NORCO) 7.5-325 mg per tablet Take 1 tablet by mouth every 24 hours as needed for Pain.    hydrocortisone 2.5 % ointment Apply topically 2 (two) times daily. For eyelid rash PRN    hydroxychloroquine (PLAQUENIL) 200 mg tablet Take 2 tablets (400 mg total) by mouth once daily. TAKE 1 TABLET EVERY DAY    hydrOXYzine HCl (ATARAX) 25 MG tablet 1-8 tablets at bedtime for itching.    hydrOXYzine HCl (ATARAX) 25 MG tablet Take 1 tablet (25 mg total) by mouth every evening. Prn pruritus. Do not drive or operate machinery while taking this medicine.    ibuprofen (ADVIL,MOTRIN) 800 MG tablet TAKE 1 TABLET BY MOUTH EVERY SIX TO EIGHT HOURS AS NEEDED    JUBLIA 10 % Dawn APPLY ONE DROP TO ALL SMALLER TOES AND 2 DROPS TO GREATER TOES DAILY    levothyroxine (SYNTHROID) 100 MCG tablet Take 1 tablet by mouth Monday - Saturday and take 1/2 tablet on sunday    meloxicam (MOBIC) 7.5 MG tablet Take 1 tablet (7.5 mg total) by mouth once daily.    mometasone (NASONEX) 50 mcg/actuation nasal spray 2 sprays by Nasal route 2 (two) times daily.    montelukast (SINGULAIR) 10 mg tablet Take 1 tablet (10 mg total) by mouth every evening.    pantoprazole (PROTONIX) 40 MG tablet Take 1 tablet (40 mg total) by mouth once daily.    sertraline (ZOLOFT) 50 MG tablet TAKE 1 TABLET EVERY DAY    soy isofl/blk coh/gr tea/yerba (ESTROVEN ENERGY ORAL) Take by mouth.    soy isofla/blk cohosh/mag bark (ESTROVEN ORAL) Take by mouth.    triamcinolone (KENALOG) 0.5 % ointment APPLY TO AFFECTED AREA TWICE A DAY FOR 7 DAYS    triamcinolone (KENALOG) 0.5 % ointment Apply topically 2 (two) times daily. For lupus skin flares on body    VENTOLIN HFA 90 mcg/actuation inhaler INHALE 2 PUFFS INTO THE LUNGS EVERY 4 HOURS AS NEEDED     Current Facility-Administered Medications   Medication    dexamethasone injection 8 mg       REVIEW OF SYSTEMS:    GENERAL:  No weight loss, malaise or fevers.  HEENT:  Negative for frequent or  "significant headaches.  NECK:  Negative for lumps, goiter, and significant neck swelling.  RESPIRATORY:  Negative for  wheezing or shortness of breath.  CARDIOVASCULAR:  Negative for chest pain, leg swelling or palpitations.  GI:  Negative for blood in stools or black stools or change in bowel habits.  MUSCULOSKELETAL:  See HPI.  SKIN:  Negative for lesions, rash, and itching.  PSYCH:  + sleep disturbance  HEMATOLOGY/LYMPHOLOGY:  Negative for prolonged bleeding, bruising easily or swollen nodes.  NEURO:   No history of paralysis, seizures or tremors.  All other reviewed and negative other than HPI.    OBJECTIVE:    /69   Pulse 70   Ht 5' 1" (1.549 m)   Wt 60.6 kg (133 lb 9.6 oz)   LMP 05/14/2014 (Approximate)   BMI 25.24 kg/m²     PHYSICAL EXAMINATION:    General appearance: Well appearing, in no acute distress, alert and oriented x3.  Psych:  Mood and affect appropriate.  Skin: Skin color, texture, turgor normal, no rashes or lesions, in both upper and lower body.  Head/face:  Normocephalic, atraumatic.  Neck: No pain to palpation over the cervical paraspinous muscles. No pain with neck flexion, extension, or lateral flexion.   Cor: RRR  Pulm: Breathing unlabored.  GI:  Soft and non-tender.  Back: Straight leg raising is negative to radicular pain. Tenderness to palpation over the lower lumbar paraspinous muscles and facets.  + pain with back extension and rotation.    Extremities: Peripheral joint ROM is full and pain free without obvious instability or laxity in all four extremities. No deformities, edema, or skin discoloration.   Musculoskeletal: No atrophy or tone abnormalities are noted.  Neuro: Bilateral  lower extremity coordination and muscle stretch reflexes are physiologic and symmetric. No loss of sensation is noted.  Strength testing:    Right hip flexion: 5/5  Left hip flexion: 5/5  Right knee extension: 5/5  Left knee extension: 5/5  Right knee flexion: 5/5  Left knee flexion: 5/5  Right " ankle dorsiflexion: 5/5  Left ankle dorsiflexion: 5/5    Gait: normal.    ASSESSMENT AND PLAN: 49 y.o.  female with history of fibromyalgia with chronic midline low back pain. Her low back pain is consistent with facet arthritis on examination.  We discussed treatment options including trial of physical therapy as well as diagnostic medial branch blocks to determine if her back pain is facet mediated.  The patient elects to proceed with conservative care at this time and move forward with trial of PT.  She will contact office if she elects to be scheduled for medial branch blocks.    Over 25 minutes spent with the patient today with greater than 50% of the time spent in face-to-face counseling.    The above plan and management options were discussed at length with patient. Patient is in agreement with the above and verbalized understanding. It will be communicated with the referring physician via electronic record, fax, or mail.    Eliu Peterson III  04/29/2019

## 2019-05-02 ENCOUNTER — CLINICAL SUPPORT (OUTPATIENT)
Dept: REHABILITATION | Facility: HOSPITAL | Age: 49
End: 2019-05-02
Attending: ANESTHESIOLOGY
Payer: MEDICARE

## 2019-05-02 DIAGNOSIS — R68.89 DECREASED FUNCTIONAL ACTIVITY TOLERANCE: ICD-10-CM

## 2019-05-02 DIAGNOSIS — M53.86 DECREASED ROM OF LUMBAR SPINE: ICD-10-CM

## 2019-05-02 DIAGNOSIS — R53.1 WEAKNESS: ICD-10-CM

## 2019-05-02 PROCEDURE — 97161 PT EVAL LOW COMPLEX 20 MIN: CPT | Mod: HCNC,PN

## 2019-05-02 NOTE — PLAN OF CARE
Physical Therapy Initial Evaluation     [id number]       PT Diagnosis:   Encounter Diagnoses   Name Primary?    Decreased functional activity tolerance     Decreased ROM of lumbar spine     Weakness        Physician: Eliu Peterson*    Medical Diagnosis:  M54.5,G89.29 (ICD-10-CM) - Chronic bilateral low back pain without sciatica  M46.96 (ICD-10-CM) - Facet arthritis of lumbar region    Treatment Orders: PT Eval and Treat    Past Medical History:   Diagnosis Date    Asthma in remission     COPD (chronic obstructive pulmonary disease)     Fibromyalgia     Hypothyroidism     Immune disorder     Lupus     Recurrent urticaria 1/6/2017    Subacute cutaneous lupus erythematosus      Current Outpatient Medications   Medication Sig    albuterol (PROVENTIL) 2.5 mg /3 mL (0.083 %) nebulizer solution Take 3 mLs (2.5 mg total) by nebulization every 6 (six) hours as needed for Wheezing.    aluminum hydrox-magnesium carb (GAVISCON EXTRA STRENGTH) 254-237.5 mg/5 mL Susp Take by mouth as needed.     cetirizine (ZYRTEC) 10 MG tablet Take 10 mg by mouth once daily.    cyclobenzaprine (FLEXERIL) 5 MG tablet TAKE 1 TABLET (5 MG TOTAL) BY MOUTH 3 (THREE) TIMES DAILY AS NEEDED.    EPINEPHrine (EPIPEN 2-GIGI) 0.3 mg/0.3 mL AtIn Inject 0.3 mLs (0.3 mg total) into the muscle once. for 1 dose    estradiol (ESTRACE) 0.01 % (0.1 mg/gram) vaginal cream Insert 2 g daily intravaginally for 2 weeks, then 1 g twice weekly    fluorometholone 0.1% (FML) 0.1 % DrpS 1 DROP IN BOTH EYES TWICE A DAY FOR 2 WEEKS THEN DAILY FOR 2 WEEKS    fluticasone (FLONASE) 50 mcg/actuation nasal spray USE 2 SPRAYS IN EACH NOSTRIL ONE TIME DAILY    fluticasone (FLOVENT DISKUS) 250 mcg/actuation DsDv Inhale 1 puff into the lungs 2 (two) times daily. Controller    fluticasone-salmeterol (ADVAIR HFA) 115-21 mcg/actuation HFAA Inhale 2 puffs  into the lungs 2 (two) times daily.    gabapentin (NEURONTIN) 600 MG tablet TAKE 1 TABLET  BY MOUTH EVERY DAY    hydroCHLOROthiazide (HYDRODIURIL) 25 MG tablet TAKE 1 TABLET BY MOUTH AS DIRECTED TAKE ONE TABLET BY MOUTH ONLY ON SATURDAY AND SUNDAY    HYDROcodone-acetaminophen (NORCO) 7.5-325 mg per tablet Take 1 tablet by mouth every 24 hours as needed for Pain.    hydrocortisone 2.5 % ointment Apply topically 2 (two) times daily. For eyelid rash PRN    hydroxychloroquine (PLAQUENIL) 200 mg tablet Take 2 tablets (400 mg total) by mouth once daily. TAKE 1 TABLET EVERY DAY    hydrOXYzine HCl (ATARAX) 25 MG tablet 1-8 tablets at bedtime for itching.    hydrOXYzine HCl (ATARAX) 25 MG tablet Take 1 tablet (25 mg total) by mouth every evening. Prn pruritus. Do not drive or operate machinery while taking this medicine.    ibuprofen (ADVIL,MOTRIN) 800 MG tablet TAKE 1 TABLET BY MOUTH EVERY SIX TO EIGHT HOURS AS NEEDED    JUBLIA 10 % Dawn APPLY ONE DROP TO ALL SMALLER TOES AND 2 DROPS TO GREATER TOES DAILY    levothyroxine (SYNTHROID) 100 MCG tablet Take 1 tablet by mouth Monday - Saturday and take 1/2 tablet on sunday    meloxicam (MOBIC) 7.5 MG tablet Take 1 tablet (7.5 mg total) by mouth once daily.    mometasone (NASONEX) 50 mcg/actuation nasal spray 2 sprays by Nasal route 2 (two) times daily.    montelukast (SINGULAIR) 10 mg tablet Take 1 tablet (10 mg total) by mouth every evening.    pantoprazole (PROTONIX) 40 MG tablet Take 1 tablet (40 mg total) by mouth once daily.    sertraline (ZOLOFT) 50 MG tablet TAKE 1 TABLET EVERY DAY    soy isofl/blk coh/gr tea/yerba (ESTROVEN ENERGY ORAL) Take by mouth.    soy isofla/blk cohosh/mag bark (ESTROVEN ORAL) Take by mouth.    triamcinolone (KENALOG) 0.5 % ointment APPLY TO AFFECTED AREA TWICE A DAY FOR 7 DAYS    triamcinolone (KENALOG) 0.5 % ointment Apply topically 2 (two) times daily. For lupus skin flares on body    VENTOLIN HFA 90 mcg/actuation inhaler  INHALE 2 PUFFS INTO THE LUNGS EVERY 4 HOURS AS NEEDED     Current Facility-Administered Medications   Medication    dexamethasone injection 8 mg     Review of patient's allergies indicates:   Allergen Reactions    Bactrim [sulfamethoxazole-trimethoprim] Rash       Precautions: Fall; asthma     Evaluation Date: 5/2/2019  Authorization Period Expiration: 12/31/19  Plan of Care Expiration: 8/2/19 (13 weeks)  Reassessment Due: 6/3/19  Visit # / Visits authorized: 1/ 20    Time In: 0810  Time Out: 0900  Total Billable Time: 50 minutes     HISTORY     History of Present Illness: Patient arrive to PT ambulating with a SPC due to fear of falling as her R LE gives out on her frequently. She reports her R knee jeanie as well. Patient reports LBP has been going for years and getting worse. The pain is located at the middle of lumbosacral region. She has pain shooting down her R legs, she denies numbness or tingling however.  She further reports left buttocks pain. Patient reports bending fwd made pain worst. Pain is worst with twisting, bending fwd, heavy lifting and carrying, prolonged sitting with long car rides. Pain is better with heating pad, cream rub.       MD's Notes:  Emily Jacobson Yovani is a 50 y/o female with hx of fibromyalgia who presents to the clinic for the evaluation of low back pain. The back pain started >10 years ago and symptoms have been progressively worsening. She denies recent trauma or inciting event. The pain is located in the lower back area at the midline. She denies radiation down the legs at this time.  She does report experiencing a pain radiating down the posterior aspect of the right leg last week which has now subsubsided.  The pain is described as sharp and stabbing and is rated as 5/10. The pain is rated with a score of  2/10 on the BEST day and a score of 10/10 on the WORST day.  Symptoms interfere with daily activity and sleeping. The pain is exacerbated by standing, bending,  "walking, getting out of bed/chair and activity.  The pain is mitigated by heat and medication. The patient reports 3 hours of uninterrupted sleep per night.     Patient denies night fever/night sweats, urinary incontinence, bowel incontinence and significant weight loss.     Physical Therapy/Home Exercise: no      Diagnostic Tests: From EPIC   X-Ray Lumbar Spine Ap Lateral w/Flex Ext (4/26/2019):   FINDINGS:  Bones are fairly well mineralized.  Vertebral body heights disc spaces and alignment is satisfactory.  No subluxation.      Pain Scale: Emily rates pain on a scale of 0-10 to be 10 at worst; 4 currently; 2 at best using VAS.   Pain location: Middle lumbosacral    Disturbed Sleep: no    Pattern of pain questions:  1.  Where is your pain the worst? lumbosacral  2.  Is your pain constant or intermittent? constantt  3.  Does bending forward make your typical pain worse? yes  4.  Since the start of your back pain, has there been a change in your bowel or bladder? no  5.  What can't you do now that you use to be able to do? "When In pain I cannot do anything".     Prior Treatment: None  Prior functional status: unlimited  Current status: Mild to severe depending on the day.   DME owned/used: [facility] [place]  Occupation:  Not working   Leisure: Dinner/movies                     Pts goals:  "Be pain free"    Red Flag Screening:   Cough  Sneeze  Strain: (--)  Bladder/ bowel: (--)  Falls: (--)  Night pain: (--)  Unexplained weight loss: (--)  General health: "Fair".     OBJECTIVE     Postural examination/scapula alignment: Rounded shoulder, Head forward and Slouched posture     Palpation: Knots at B buttock regions (lumbosacral)    Skin integrity: none    Edema: none    Correction of posture: better with lumbar roll, slim line roll    MOVEMENT LOSS    ROM Loss   Flexion minimal loss   Extension Major loss   Side bending Right major loss   Side bending Left major loss   Rotation Right minimal loss   Rotation Left minimal loss "     Lower Extremity Strength gross assessment MMT: 4/5 to 4+/5 Hips>ankles      Functional Legs Strength with 5x Sit<>Stand test: 15 seconds w/o UE (fearful with transitions)    Timed up and Go for Mobility, strength and balance: DNT today    GAIT:  Assistive Device used: straight cane  Level of Assistance: independent  Patient displays the following gait deviations:  unsteady gait.     Special Tests:   Test Name  Test Result   Prone Instability Test (+)   SI Joint Provocation Test (--)   Straight Leg Raise (--)   Neural Tension Test (--)   Crossed Straight Leg Raise (--)   Walking on toes (--)   Walking on heels  (--)     Piriformis and Cassy's Negative B.      NEUROLOGICAL SCREENING     Sensory deficit: All sensation to touch and temperature normal B LE's    Reflexes:    Left Right   Patella Tendon 2+ 2+   Achilles Tendon 1+ 1+   Babinski  (--) (--)   Clonus (--) (--)     REPEATED TEST MOVEMENTS:  Repeated Flexion in Standing no effect   Repeated Extension in Standing end range pain  worse   Repeated Flexion in lying end range pain  better Felt a good strength   Repeated Extension in lying  end range pain  no worse     TERRA: Worst in this position.    CMS Impairment/Limitation/Restriction for FOTO Lumbar Spine Survey  Status Limitation G-Code CMS Severity Modifier  Intake 48% 52% Current Status CK - At least 40 percent but less than 60 percent  Predicted 62% 38% Goal Status+ CJ - At least 20 percent but less than 40 percent  D/C Status CK **only report if this is a one time visit    Treatment       PT Evaluation Completed? Yes  Discussed Plan of Care with patient: Yes      Home Exercise Program as follows:   Handouts were given to the patient. Pt demo good understanding of the education provided. Emily demonstrated good return demonstration of activities.     HEP:  SKTC  DKTC  LTR  PPT  Piriformis    Emily received therapeutic exercises to develop/improve posture, lumbar/cervical ROM, strength and muscular  endurance for 00 minutes including the following exercises:     Emily received the following manual therapy techniques:  for 00 minutes.     Hot pack to lumbosacral region in z-lie for 10 minutes    Assessment     This is a 49 y.o. female referred to [facility] and presents with a medical diagnoses of  Chronic bilateral low back pain without sciatica, and Facet arthritis of lumbar region.  All pain provocation tests for lumbosacral completed w/o positive signs (negative piriformis, mikhail, SIJ, SLR ). Repeated movements however did provoke discomfort. Patient responded better with supine hip flexion (DKTC) reporting feeling a good stretch, as well as with piriformis stretch B. Patient's pain is located at lower lowest lumbar to sacral region B, with TTP on knots. Patient presented with varying degrees of lumbar spine AROM loss, and deficits with functional legs strength. Patient appeared unsteady with STS transitions and ambulation (requiring a SPC due to fear of fall). At this time, patient's pain appeared to be muscular in nature. Patient further displayed balance disturbances with mobility. PT will re-assess at next visit. Patient was provided with information on how to acquire lumbar roll, and tips regarding healthy habits, posture and body mechanics. Patient demonstrates limitations as described below in the problem list. Pt rehab potential is Good.      Based on the above history and physical examination an active physical therapy program is recommended.  Pt will continue to benefit from skilled outpatient physical therapy to address the deficits listed below in the chart, provide pt/family education and to maximize pt's level of independence in the home and community environment. .     No environmental, cultural, spiritual, developmental or education needs expressed or noted    Medical necessity is demonstrated by the following problem list.    Pt presents with the following impairments:      History  Co-morbidities and personal factors that may impact the plan of care Co-morbidities:   Fibromyalgia, Asthma, Fall; peripheral neuropathy    Personal Factors:   no deficits     low   Examination  Body Structures and Functions, activity limitations and participation restrictions that may impact the plan of care Body Regions:   lower back, lumbosacral    Body Systems:    ROM  strength  balance  gait  transfers  transitions    Participation Restrictions:   none    Activity limitations:   Learning and applying knowledge  no deficits    General Tasks and Commands  no deficits    Communication  no deficits    Mobility  lifting and carrying objects  walking  driving (bike, car, motorcycle)    Self care  no deficits    Domestic Life  shopping  cooking  doing house work (cleaning house, washing dishes, laundry)  assisting others    Interactions/Relationships  no deficits    Life Areas  employment    Community and Social Life  recreation and leisure         low   Clinical Presentation stable and uncomplicated low   Decision Making/ Complexity Score: low       GOALS: Pt is in agreement with the following goals.    STG 6 Weeks:  1. Patient will display at least 80% competency and adherence to all HEP and progressions, to display progress towards independence with ability to control and reduce their pain through posture positioning and mechanical movements throughout a typical day.   (with minimal cueing or supervision for therapist).  2. Patient will improve 5x STS test for functional legs strength to 13 seconds or less, w/o increasing pain, to demonstrate progress toward improving functional legs strength.  3. Patient will ambulate for 1/4 to 1/2 mile with least restrictive AD (or none), w/o increasing pain, to demonstrate increasing ambulation tolerance w/ decreasing difficulty.  4. Patient will improve all B LE MMT to a 4+/5 or better, to demonstrate progress towards full Lower Extremity strength.  5. Patient  will demonstrate improving overall function per FOTO Survey progressing towards CJ = at least 20% but < 40% impaired, limited or restricted score or less in order to show subjective improvement of condition.  6. Pt will report LBP of less than 5/10 (at worst) when compared to the initial value resulting in improved ability to perform bending, lifting, and carrying activities safely, confidently.    LTG 13 Weeks:  1. Patient will display 100% competency and adherence to all HEP and progressions, to display independence with ability to control and reduce their pain through posture positioning and mechanical movements throughout a typical day. (W/o cueing or supervision).  2. Patient will improve 5x STS test for functional legs strength to 15 seconds or less, w/o increasing pain, to demonstrate improved functional legs strength.  3. Patient will ambulate for 1/2-1 mile with least restrictive AD (or none), w/o increasing pain, to demonstrate increasing ambulation tolerance w/o limitation.  4. Patient will complete TUG activity in less than 9 seconds or better w/o cane, to demonstrate improved mobility, balance, walking ability decreased fall risk at functional gait speed.  5. Patient will improve all B LE MMT to 5/5, to demonstrate full Lower Extremity strength.  6. Patient will demonstrate improved overall function per FOTO Survey to CJ = at least 20% but < 40% impaired, limited or restricted score or less in order to show subjective improvement of condition.  7. Patient will display WNL (% of full range) Lumbar spine AROM with decreased pain during movement.    Plan   Outpatient physical therapy 2x week for 13 weeks or 20 visits to include the following:   - Patient education  - Therapeutic exercise  - Manual therapy  - Performance testing   - Neuromuscular Re-education  - Therapeutic activity   - Modalities    Pt may be seen by PTA as part of the rehabilitation team.     Therapist: Tomy Barkley  "PT  5/2/2019    "I certify the need for these services furnished under this plan of treatment and while under my care."    ____________________________________  Physician/Referring Practitioner    _______________  Date of Signature    "

## 2019-05-07 ENCOUNTER — CLINICAL SUPPORT (OUTPATIENT)
Dept: OPHTHALMOLOGY | Facility: CLINIC | Age: 49
End: 2019-05-07
Payer: MEDICARE

## 2019-05-07 ENCOUNTER — OFFICE VISIT (OUTPATIENT)
Dept: OPTOMETRY | Facility: CLINIC | Age: 49
End: 2019-05-07
Payer: MEDICARE

## 2019-05-07 DIAGNOSIS — Z79.899 LONG-TERM USE OF PLAQUENIL: Primary | ICD-10-CM

## 2019-05-07 DIAGNOSIS — H52.7 REFRACTIVE ERROR: ICD-10-CM

## 2019-05-07 DIAGNOSIS — L93.1 SUBACUTE CUTANEOUS LUPUS ERYTHEMATOSUS: ICD-10-CM

## 2019-05-07 DIAGNOSIS — Z79.899 LONG-TERM USE OF PLAQUENIL: ICD-10-CM

## 2019-05-07 PROCEDURE — 92134 CPTRZ OPH DX IMG PST SGM RTA: CPT | Mod: HCNC,S$GLB,, | Performed by: OPTOMETRIST

## 2019-05-07 PROCEDURE — 92004 PR EYE EXAM, NEW PATIENT,COMPREHESV: ICD-10-PCS | Mod: HCNC,S$GLB,, | Performed by: OPTOMETRIST

## 2019-05-07 PROCEDURE — 92004 COMPRE OPH EXAM NEW PT 1/>: CPT | Mod: HCNC,S$GLB,, | Performed by: OPTOMETRIST

## 2019-05-07 PROCEDURE — 99999 PR PBB SHADOW E&M-EST. PATIENT-LVL I: ICD-10-PCS | Mod: PBBFAC,HCNC,, | Performed by: OPTOMETRIST

## 2019-05-07 PROCEDURE — 92015 DETERMINE REFRACTIVE STATE: CPT | Mod: HCNC,S$GLB,, | Performed by: OPTOMETRIST

## 2019-05-07 PROCEDURE — 92015 PR REFRACTION: ICD-10-PCS | Mod: HCNC,S$GLB,, | Performed by: OPTOMETRIST

## 2019-05-07 PROCEDURE — 99999 PR PBB SHADOW E&M-EST. PATIENT-LVL I: CPT | Mod: PBBFAC,HCNC,, | Performed by: OPTOMETRIST

## 2019-05-07 PROCEDURE — 92134 POSTERIOR SEGMENT OCT RETINA (OCULAR COHERENCE TOMOGRAPHY)-BOTH EYES: ICD-10-PCS | Mod: HCNC,S$GLB,, | Performed by: OPTOMETRIST

## 2019-05-07 PROCEDURE — 92083 HUMPHREY VISUAL FIELD - OU - BOTH EYES: ICD-10-PCS | Mod: HCNC,S$GLB,, | Performed by: OPTOMETRIST

## 2019-05-07 PROCEDURE — 92083 EXTENDED VISUAL FIELD XM: CPT | Mod: HCNC,S$GLB,, | Performed by: OPTOMETRIST

## 2019-05-07 NOTE — PROGRESS NOTES
Subjective:       Patient ID: Emily Pina is a 49 y.o. female      Chief Complaint   Patient presents with    Concerns About Ocular Health    Plaquenil Eye Exam     History of Present Illness  Dls: 5/22/15 Dr. Smith     48 y/o female presents today for plaquenil ck.   Pt c/o floaters od for yrs but getting worse.  Pt c/o blurry vision at distance and near ou.   Pt wears pal's.     + tearing  + itching  No burning  No pain  No ha's  No flashes     Eye meds  zaditor ou qd       Assessment/Plan:     1. Long-term use of Plaquenil  2. Subacute cutaneous lupus erythematosus  No evidence of plaquenil maculopathy on exam, can continue with plaquenil therapy. OCT RNFL and HVF WNL OU today. Color vision normal. Return in 1 years for DFE, HVF 10-2, and OCT macula.       3. Refractive error  Educated patient on refractive error and discussed lens options. Dispensed updated spectacle Rx. Educated about adaptation period to new specs.    Eyeglass Final Rx     Eyeglass Final Rx       Sphere Cylinder Axis Add    Right -0.75 +1.00 165 +2.00    Left -0.75 +0.50 030 +2.00    Expiration Date:  5/7/2020                  Follow up in about 1 year (around 5/7/2020) for Plaquenil check, HVF 10-2, OCT macula.

## 2019-05-14 NOTE — PROGRESS NOTES
RHEUMATOLOGY CLINIC FOLLOW UP VISIT  Chief complaints:-  To follow up for lupus and fibromyalgia     HPI:-  Emily Blanco a 49 y.o. 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     Lupus was diagnosed at age 20 - via skin biopsy of the sface.  Developed facial rash.  Was on steroid and then was started on the plaquenil after development of arthralgia.  Last eye examination was 1-2 years ago.  About 3 weeks ago, rash developed in bilateral elbows and behind R knee.  Went to dermatologist given topical steroid cream.  +Raynaud's    Fibromyalgia (was diagnosed is managed with ibuprofen 1-2x/week.  She is also taking hydrocodone 7.5mg, gabapentin 600mg daily, flexeril.  Pain of the hands, feet, knees.     + Fatigue/maliase.  Sleep 8 hours every night.  Have multiple night time awakening.  LMP was >6 years ago.  +night sweats, mood swings, bloated    Depressed (on Zoloft - prescribed by PCP. Started a few months ago).  Does not want to increase the medication.  Previously tried it before (~20 years ago) and increasing caused her to be numb.  Never tried Trazadone, Cymbalta, or amitrypline    Smokes tobacco (1ppd x 30 years), denies EtOh or recreational drugs/medications/supplements.     Interval History  Patient had eye examination for monitoring plaquenil toxicity - negative.  Saw dermatology for rash that was concerning for cutaneous lupus - was told possible and eczema.  Agreed with increasing plaquenil from daily to BID dosing for treatment.  Patient had not been taking it BID - only 1 pill daily (forgot).  Does not use sunprotection.  Saw pain management, recommendation for PT for back pain.     Patient continues to have diffused arthralgia - mainly of her hands that is worsen with weather.  Takes Narco PRN (not everyday).      States  that increasing gabapentin previously really helped with pain.  But it cause drowsiness.     Meloxicam 7.5mg added to medication list at the last visit - uncertain if it's effective per patient. She states that she's been only taking it for a few days.  Not really using flexeril.                                   Review of Systems   Constitutional: Positive for malaise/fatigue. Negative for chills, diaphoresis, fever and weight loss.   HENT: Negative for congestion, ear discharge, ear pain, hearing loss, nosebleeds, sinus pain and tinnitus.    Eyes: Negative for photophobia, pain, discharge and redness.   Respiratory: Negative for cough, hemoptysis, sputum production, shortness of breath, wheezing and stridor.    Cardiovascular: Negative for chest pain, palpitations, orthopnea, claudication, leg swelling and PND.   Gastrointestinal: Negative for abdominal pain, constipation, diarrhea, heartburn, nausea and vomiting.   Genitourinary: Negative for dysuria, frequency, hematuria and urgency.   Musculoskeletal: Negative for back pain, joint pain, myalgias and neck pain.   Skin: Positive for rash.   Neurological: Negative for dizziness, tingling, tremors, weakness and headaches.   Endo/Heme/Allergies: Does not bruise/bleed easily.   Psychiatric/Behavioral: Negative for depression, hallucinations and suicidal ideas. The patient is not nervous/anxious and does not have insomnia.        Past Medical History:   Diagnosis Date    Asthma in remission     COPD (chronic obstructive pulmonary disease)     Fibromyalgia     Hypothyroidism     Immune disorder     Lupus     Recurrent urticaria 1/6/2017    Subacute cutaneous lupus erythematosus        Past Surgical History:   Procedure Laterality Date    CHOLECYSTECTOMY      CHOLECYSTECTOMY-LAPAROSCOPIC N/A 8/4/2017    Performed by Nik Shrestha MD at [facility] OR [address] [place] [address]        Social History     Tobacco Use    Smoking status: Current Every Day Smoker     Packs/day: 2.00  "    Types: Cigarettes    Smokeless tobacco: Former User     Quit date: 7/8/2013   Substance Use Topics    Alcohol use: No     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     Drug use: No       Family History   Problem Relation Age of Onset    Hypertension Mother     Thyroid disease Mother     Cataracts Mother     Arthritis Father     Hyperlipidemia Father     Cancer Father         Multiple Myeloma     Cirrhosis Father     Cataracts Father     Lupus Sister     Asthma Sister     No Known Problems Brother     No Known Problems Maternal Aunt     No Known Problems Maternal Uncle     No Known Problems Paternal Aunt     No Known Problems Paternal Uncle     No Known Problems Maternal Grandmother     No Known Problems Maternal Grandfather     No Known Problems Paternal Grandmother     No Known Problems Paternal Grandfather     Scoliosis Sister     Amblyopia Neg Hx     Blindness Neg Hx     Diabetes Neg Hx     Glaucoma Neg Hx     Macular degeneration Neg Hx     Retinal detachment Neg Hx     Strabismus Neg Hx     Stroke Neg Hx        Review of patient's allergies indicates:   Allergen Reactions    Bactrim [sulfamethoxazole-trimethoprim] Rash       Vitals:    05/15/19 1127   BP: 128/84   Pulse: 80   Weight: 60.9 kg (134 lb 4.2 oz)   Height: 5' 1" (1.549 m)   PainSc:   4       Physical Exam   Constitutional: She is oriented to person, place, and time and well-developed, well-nourished, and in no distress.   HENT:   Head: Normocephalic and atraumatic.   No mouth ulcers    Eyes: Pupils are equal, round, and reactive to light. EOM are normal.   Neck: Normal range of motion. Neck supple.   Cardiovascular: Normal rate, regular rhythm and normal heart sounds.   Pulmonary/Chest: Effort normal and breath sounds normal.   Abdominal: Soft. Bowel sounds are normal.   Musculoskeletal: Normal range of motion.   ROM intact  Strength intact in all major joints  No signs of synovitis     Neurological: She is alert and " oriented to person, place, and time. Gait normal. GCS score is 15.   Skin: Skin is warm and dry. No rash noted. No erythema.   Resolving rash on bilateral UE.  Very faint redness of the face and knuckles.     Psychiatric: Mood, memory, affect and judgment normal.       Medication List with Changes/Refills   New Medications    GABAPENTIN (NEURONTIN) 100 MG CAPSULE    Take 2 capsules (200 mg total) by mouth once daily. Please take 1 tablet for 2 weeks.  If ineffective, can take 2 tablets   Current Medications    ALBUTEROL (PROVENTIL) 2.5 MG /3 ML (0.083 %) NEBULIZER SOLUTION    Take 3 mLs (2.5 mg total) by nebulization every 6 (six) hours as needed for Wheezing.    ALUMINUM HYDROX-MAGNESIUM CARB (GAVISCON EXTRA STRENGTH) 254-237.5 MG/5 ML SUSP    Take by mouth as needed.     CETIRIZINE (ZYRTEC) 10 MG TABLET    Take 10 mg by mouth once daily.    EPINEPHRINE (EPIPEN 2-GIGI) 0.3 MG/0.3 ML ATIN    Inject 0.3 mLs (0.3 mg total) into the muscle once. for 1 dose    ESTRADIOL (ESTRACE) 0.01 % (0.1 MG/GRAM) VAGINAL CREAM    Insert 2 g daily intravaginally for 2 weeks, then 1 g twice weekly    FLUOROMETHOLONE 0.1% (FML) 0.1 % DRPS    1 DROP IN BOTH EYES TWICE A DAY FOR 2 WEEKS THEN DAILY FOR 2 WEEKS    FLUTICASONE (FLONASE) 50 MCG/ACTUATION NASAL SPRAY    USE 2 SPRAYS IN EACH NOSTRIL ONE TIME DAILY    FLUTICASONE (FLOVENT DISKUS) 250 MCG/ACTUATION DSDV    Inhale 1 puff into the lungs 2 (two) times daily. Controller    FLUTICASONE-SALMETEROL (ADVAIR HFA) 115-21 MCG/ACTUATION HFAA    Inhale 2 puffs into the lungs 2 (two) times daily.    GABAPENTIN (NEURONTIN) 600 MG TABLET    TAKE 1 TABLET  BY MOUTH EVERY DAY    HYDROCHLOROTHIAZIDE (HYDRODIURIL) 25 MG TABLET    TAKE 1 TABLET BY MOUTH AS DIRECTED TAKE ONE TABLET BY MOUTH ONLY ON SATURDAY AND SUNDAY    HYDROCORTISONE 2.5 % OINTMENT    Apply topically 2 (two) times daily. For eyelid rash PRN    HYDROXYCHLOROQUINE (PLAQUENIL) 200 MG TABLET    Take 2 tablets (400 mg total) by mouth  once daily. TAKE 1 TABLET EVERY DAY    HYDROXYZINE HCL (ATARAX) 25 MG TABLET    1-8 tablets at bedtime for itching.    JUBLIA 10 % MANDEEP    APPLY ONE DROP TO ALL SMALLER TOES AND 2 DROPS TO GREATER TOES DAILY    LEVOTHYROXINE (SYNTHROID) 100 MCG TABLET    Take 1 tablet by mouth Monday - Saturday and take 1/2 tablet on sunday    MELOXICAM (MOBIC) 7.5 MG TABLET    Take 1 tablet (7.5 mg total) by mouth once daily.    MOMETASONE (NASONEX) 50 MCG/ACTUATION NASAL SPRAY    2 sprays by Nasal route 2 (two) times daily.    MONTELUKAST (SINGULAIR) 10 MG TABLET    Take 1 tablet (10 mg total) by mouth every evening.    PANTOPRAZOLE (PROTONIX) 40 MG TABLET    Take 1 tablet (40 mg total) by mouth once daily.    SERTRALINE (ZOLOFT) 50 MG TABLET    TAKE 1 TABLET EVERY DAY    SOY ISOFL/BLK COH/GR TEA/YERBA (ESTROVEN ENERGY ORAL)    Take by mouth.    SOY ISOFLA/BLK COHOSH/MAG BARK (ESTROVEN ORAL)    Take by mouth.    TRIAMCINOLONE (KENALOG) 0.5 % OINTMENT    APPLY TO AFFECTED AREA TWICE A DAY FOR 7 DAYS    TRIAMCINOLONE (KENALOG) 0.5 % OINTMENT    Apply topically 2 (two) times daily. For lupus skin flares on body    VENTOLIN HFA 90 MCG/ACTUATION INHALER    INHALE 2 PUFFS INTO THE LUNGS EVERY 4 HOURS AS NEEDED   Changed and/or Refilled Medications    Modified Medication Previous Medication    HYDROCODONE-ACETAMINOPHEN (NORCO) 7.5-325 MG PER TABLET HYDROcodone-acetaminophen (NORCO) 7.5-325 mg per tablet       Take 1 tablet by mouth every 24 hours as needed for Pain.    Take 1 tablet by mouth every 24 hours as needed for Pain.   Discontinued Medications    CYCLOBENZAPRINE (FLEXERIL) 5 MG TABLET    TAKE 1 TABLET (5 MG TOTAL) BY MOUTH 3 (THREE) TIMES DAILY AS NEEDED.    IBUPROFEN (ADVIL,MOTRIN) 800 MG TABLET    TAKE 1 TABLET BY MOUTH EVERY SIX TO EIGHT HOURS AS NEEDED       Assessment/Plans:-  48 y.o. 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     Labs were unremarkable (done March 2019).    Dermatology recommends continuing with plaquenil 400mg daily and topical steroid over affected area.  Pain management on board for for diffused arthralgia (mainly lower back and knees at this time) - recommendation for PT.  Uptodate with eye exam for monitoring of plaquenil toxicity.     At this time, SLE appears stable (SLEDAI 2 - arthralgia).  Patient needs to be compliant with the increased dose of plaquenil.      Patient's complaint is mostly arthralgia.      Plan  - Plaquenil eye exam - due April 2020  - Continue plaquenil 400mg daily.  Told patient that she can do this daily instead of 200mg BID for better compliance  - Education provided on the importance of medication compliance  - discontinue flexeril  - Start Gabapentin 100mg daily (after breakfast) for pain management.  If tolerable, can increase to 200mg   - patient will see if meloxicam is helping with pain - if not, discontinue   - refill provided for norco  - CBC, CMP, ESR, CRP, C3, C4, UA, Up/c, dsDNA -to be done prior to next visit  - recommendation for daily usage of sunscreen   - patient will update me with the usage of meloxicam and gabapentin   - OT referral - bilateral hand arthralgia     # Follow up in about 3 months (around 8/15/2019).

## 2019-05-15 ENCOUNTER — OFFICE VISIT (OUTPATIENT)
Dept: RHEUMATOLOGY | Facility: CLINIC | Age: 49
End: 2019-05-15
Payer: MEDICARE

## 2019-05-15 VITALS
HEIGHT: 61 IN | BODY MASS INDEX: 25.34 KG/M2 | SYSTOLIC BLOOD PRESSURE: 128 MMHG | WEIGHT: 134.25 LBS | HEART RATE: 80 BPM | DIASTOLIC BLOOD PRESSURE: 84 MMHG

## 2019-05-15 DIAGNOSIS — M79.7 FIBROMYALGIA: ICD-10-CM

## 2019-05-15 DIAGNOSIS — Z92.25 HISTORY OF IMMUNOSUPPRESSION THERAPY: ICD-10-CM

## 2019-05-15 DIAGNOSIS — G89.4 CHRONIC PAIN SYNDROME: ICD-10-CM

## 2019-05-15 DIAGNOSIS — Z79.899 LONG-TERM USE OF PLAQUENIL: ICD-10-CM

## 2019-05-15 DIAGNOSIS — L93.1 SUBACUTE CUTANEOUS LUPUS ERYTHEMATOSUS: ICD-10-CM

## 2019-05-15 DIAGNOSIS — M25.50 ARTHRALGIA, UNSPECIFIED JOINT: Primary | ICD-10-CM

## 2019-05-15 DIAGNOSIS — J44.89 ASTHMA-CHRONIC OBSTRUCTIVE PULMONARY DISEASE OVERLAP SYNDROME: ICD-10-CM

## 2019-05-15 DIAGNOSIS — Z72.0 TOBACCO ABUSE: ICD-10-CM

## 2019-05-15 PROBLEM — K80.20 CALCULUS OF GALLBLADDER WITHOUT CHOLECYSTITIS WITHOUT OBSTRUCTION: Status: RESOLVED | Noted: 2017-07-17 | Resolved: 2019-05-15

## 2019-05-15 PROCEDURE — 3008F BODY MASS INDEX DOCD: CPT | Mod: CPTII,GC,S$GLB, | Performed by: STUDENT IN AN ORGANIZED HEALTH CARE EDUCATION/TRAINING PROGRAM

## 2019-05-15 PROCEDURE — 99999 PR PBB SHADOW E&M-EST. PATIENT-LVL V: CPT | Mod: PBBFAC,GC,, | Performed by: STUDENT IN AN ORGANIZED HEALTH CARE EDUCATION/TRAINING PROGRAM

## 2019-05-15 PROCEDURE — 3008F PR BODY MASS INDEX (BMI) DOCUMENTED: ICD-10-PCS | Mod: CPTII,GC,S$GLB, | Performed by: STUDENT IN AN ORGANIZED HEALTH CARE EDUCATION/TRAINING PROGRAM

## 2019-05-15 PROCEDURE — 99999 PR PBB SHADOW E&M-EST. PATIENT-LVL V: ICD-10-PCS | Mod: PBBFAC,GC,, | Performed by: STUDENT IN AN ORGANIZED HEALTH CARE EDUCATION/TRAINING PROGRAM

## 2019-05-15 PROCEDURE — 99214 PR OFFICE/OUTPT VISIT, EST, LEVL IV, 30-39 MIN: ICD-10-PCS | Mod: GC,S$GLB,, | Performed by: STUDENT IN AN ORGANIZED HEALTH CARE EDUCATION/TRAINING PROGRAM

## 2019-05-15 PROCEDURE — 99214 OFFICE O/P EST MOD 30 MIN: CPT | Mod: GC,S$GLB,, | Performed by: STUDENT IN AN ORGANIZED HEALTH CARE EDUCATION/TRAINING PROGRAM

## 2019-05-15 RX ORDER — HYDROCODONE BITARTRATE AND ACETAMINOPHEN 7.5; 325 MG/1; MG/1
1 TABLET ORAL
Qty: 30 TABLET | Refills: 0 | Status: SHIPPED | OUTPATIENT
Start: 2019-05-15 | End: 2019-08-19 | Stop reason: SDUPTHER

## 2019-05-15 RX ORDER — GABAPENTIN 100 MG/1
200 CAPSULE ORAL DAILY
Qty: 60 CAPSULE | Refills: 2 | Status: SHIPPED | OUTPATIENT
Start: 2019-05-15 | End: 2019-07-09 | Stop reason: SDUPTHER

## 2019-05-15 NOTE — PROGRESS NOTES
I have personally taken the history and examined the patient to verify the fellow's notation, and I agree with the impression and plan stated.    She has mostly sx of FMS at this time; SCLE is minimal and no systemic lupus apparent.  WD

## 2019-05-24 ENCOUNTER — DOCUMENTATION ONLY (OUTPATIENT)
Dept: REHABILITATION | Facility: HOSPITAL | Age: 49
End: 2019-05-24

## 2019-05-29 ENCOUNTER — DOCUMENTATION ONLY (OUTPATIENT)
Dept: REHABILITATION | Facility: HOSPITAL | Age: 49
End: 2019-05-29

## 2019-06-24 ENCOUNTER — PATIENT OUTREACH (OUTPATIENT)
Dept: ADMINISTRATIVE | Facility: HOSPITAL | Age: 49
End: 2019-06-24

## 2019-06-27 DIAGNOSIS — L93.1 SUBACUTE CUTANEOUS LUPUS ERYTHEMATOSUS: ICD-10-CM

## 2019-06-27 DIAGNOSIS — M79.7 FIBROMYALGIA: ICD-10-CM

## 2019-06-27 DIAGNOSIS — Z72.0 TOBACCO ABUSE: ICD-10-CM

## 2019-06-27 DIAGNOSIS — Z92.25 HISTORY OF IMMUNOSUPPRESSION THERAPY: ICD-10-CM

## 2019-06-27 DIAGNOSIS — G89.4 CHRONIC PAIN SYNDROME: ICD-10-CM

## 2019-06-27 RX ORDER — HYDROXYCHLOROQUINE SULFATE 200 MG/1
TABLET, FILM COATED ORAL
Qty: 90 TABLET | Refills: 3 | Status: SHIPPED | OUTPATIENT
Start: 2019-06-27 | End: 2020-01-03 | Stop reason: SDUPTHER

## 2019-06-27 RX ORDER — GABAPENTIN 600 MG/1
TABLET ORAL
Qty: 90 TABLET | Refills: 3 | Status: SHIPPED | OUTPATIENT
Start: 2019-06-27 | End: 2019-07-09 | Stop reason: SDUPTHER

## 2019-06-28 ENCOUNTER — PATIENT MESSAGE (OUTPATIENT)
Dept: FAMILY MEDICINE | Facility: CLINIC | Age: 49
End: 2019-06-28

## 2019-06-28 RX ORDER — SERTRALINE HYDROCHLORIDE 50 MG/1
TABLET, FILM COATED ORAL
Qty: 90 TABLET | Refills: 0 | Status: SHIPPED | OUTPATIENT
Start: 2019-06-28 | End: 2019-08-20 | Stop reason: SDUPTHER

## 2019-06-28 RX ORDER — FLUTICASONE PROPIONATE 50 MCG
SPRAY, SUSPENSION (ML) NASAL
Qty: 48 G | Refills: 0 | Status: SHIPPED | OUTPATIENT
Start: 2019-06-28 | End: 2019-08-20 | Stop reason: SDUPTHER

## 2019-06-28 NOTE — TELEPHONE ENCOUNTER
Gabapentin refill provided    Lab Results   Component Value Date    CHOL 174 10/11/2017    HDL 63 (H) 10/11/2017    LDLCALC 88 10/11/2017    TRIG 114 10/11/2017       INR: No results found for: INR, PROTIME    U/A:  Lab Results   Component Value Date    LABMICR YES 10/28/2013          Lab Results   Component Value Date    LABA1C 5.3 10/11/2017        Lab Results   Component Value Date    CREATININE 0.7 (L) 10/11/2017       -----------------------------------------------------------------       Assessment/Plan:     1) Internal/External Hemorrhoids   -Work restriction form completed for restriction on lifting >15 lbs for 6 weeks.   -Hydrocortisone (ANUSOL-HC) 25 MG rectal suppository

## 2019-07-08 ENCOUNTER — TELEPHONE (OUTPATIENT)
Dept: ALLERGY | Facility: CLINIC | Age: 49
End: 2019-07-08

## 2019-07-08 DIAGNOSIS — L30.9 ECZEMA, UNSPECIFIED TYPE: ICD-10-CM

## 2019-07-08 RX ORDER — HYDROXYZINE HYDROCHLORIDE 25 MG/1
TABLET, FILM COATED ORAL
Qty: 30 TABLET | Refills: 2 | Status: SHIPPED | OUTPATIENT
Start: 2019-07-08 | End: 2019-08-28 | Stop reason: SDUPTHER

## 2019-07-08 NOTE — TELEPHONE ENCOUNTER
Patient is requesting a refill of Montelukast, but has not been seen since 6/8/18.  Please schedule follow up.      ----- Message from Alona Tan MA sent at 7/5/2019  2:57 PM CDT -----  Contact: [facility] [phone]  Pharmacy is requesting a call back in regards to patient medication montalukast 10 MG.    Please advise.    Thanks   Aloan

## 2019-07-09 DIAGNOSIS — Z92.25 HISTORY OF IMMUNOSUPPRESSION THERAPY: ICD-10-CM

## 2019-07-09 DIAGNOSIS — G89.4 CHRONIC PAIN SYNDROME: ICD-10-CM

## 2019-07-09 DIAGNOSIS — L93.1 SUBACUTE CUTANEOUS LUPUS ERYTHEMATOSUS: ICD-10-CM

## 2019-07-09 DIAGNOSIS — M79.7 FIBROMYALGIA: ICD-10-CM

## 2019-07-09 DIAGNOSIS — Z72.0 TOBACCO ABUSE: ICD-10-CM

## 2019-07-09 RX ORDER — GABAPENTIN 100 MG/1
200 CAPSULE ORAL DAILY
Qty: 60 CAPSULE | Refills: 2 | Status: SHIPPED | OUTPATIENT
Start: 2019-07-09 | End: 2019-08-19

## 2019-07-09 RX ORDER — GABAPENTIN 600 MG/1
TABLET ORAL
Qty: 90 TABLET | Refills: 3 | Status: SHIPPED | OUTPATIENT
Start: 2019-07-09 | End: 2019-08-20 | Stop reason: SDUPTHER

## 2019-07-10 RX ORDER — ALBUTEROL SULFATE 0.83 MG/ML
2.5 SOLUTION RESPIRATORY (INHALATION) EVERY 6 HOURS PRN
Qty: 150 ML | Refills: 0 | Status: SHIPPED | OUTPATIENT
Start: 2019-07-10 | End: 2020-03-12 | Stop reason: SDUPTHER

## 2019-08-15 ENCOUNTER — LAB VISIT (OUTPATIENT)
Dept: LAB | Facility: HOSPITAL | Age: 49
End: 2019-08-15
Attending: STUDENT IN AN ORGANIZED HEALTH CARE EDUCATION/TRAINING PROGRAM
Payer: MEDICARE

## 2019-08-15 DIAGNOSIS — M79.7 FIBROMYALGIA: ICD-10-CM

## 2019-08-15 DIAGNOSIS — Z92.25 HISTORY OF IMMUNOSUPPRESSION THERAPY: ICD-10-CM

## 2019-08-15 DIAGNOSIS — G89.4 CHRONIC PAIN SYNDROME: ICD-10-CM

## 2019-08-15 DIAGNOSIS — L93.1 SUBACUTE CUTANEOUS LUPUS ERYTHEMATOSUS: ICD-10-CM

## 2019-08-15 DIAGNOSIS — Z72.0 TOBACCO ABUSE: ICD-10-CM

## 2019-08-15 LAB
ALBUMIN SERPL BCP-MCNC: 4.3 G/DL (ref 3.5–5.2)
ALP SERPL-CCNC: 65 U/L (ref 55–135)
ALT SERPL W/O P-5'-P-CCNC: 13 U/L (ref 10–44)
ANION GAP SERPL CALC-SCNC: 13 MMOL/L (ref 8–16)
AST SERPL-CCNC: 20 U/L (ref 10–40)
BASOPHILS # BLD AUTO: 0.02 K/UL (ref 0–0.2)
BASOPHILS NFR BLD: 0.5 % (ref 0–1.9)
BILIRUB SERPL-MCNC: 0.2 MG/DL (ref 0.1–1)
BUN SERPL-MCNC: 9 MG/DL (ref 6–20)
C3 SERPL-MCNC: 117 MG/DL (ref 50–180)
C4 SERPL-MCNC: 22 MG/DL (ref 11–44)
CALCIUM SERPL-MCNC: 9.4 MG/DL (ref 8.7–10.5)
CHLORIDE SERPL-SCNC: 101 MMOL/L (ref 95–110)
CO2 SERPL-SCNC: 25 MMOL/L (ref 23–29)
CREAT SERPL-MCNC: 0.8 MG/DL (ref 0.5–1.4)
CRP SERPL-MCNC: 1.8 MG/L (ref 0–8.2)
DIFFERENTIAL METHOD: ABNORMAL
EOSINOPHIL # BLD AUTO: 0.1 K/UL (ref 0–0.5)
EOSINOPHIL NFR BLD: 2.1 % (ref 0–8)
ERYTHROCYTE [DISTWIDTH] IN BLOOD BY AUTOMATED COUNT: 12.6 % (ref 11.5–14.5)
ERYTHROCYTE [SEDIMENTATION RATE] IN BLOOD BY WESTERGREN METHOD: 13 MM/HR (ref 0–36)
EST. GFR  (AFRICAN AMERICAN): >60 ML/MIN/1.73 M^2
EST. GFR  (NON AFRICAN AMERICAN): >60 ML/MIN/1.73 M^2
GLUCOSE SERPL-MCNC: 127 MG/DL (ref 70–110)
HCT VFR BLD AUTO: 41.2 % (ref 37–48.5)
HGB BLD-MCNC: 13.2 G/DL (ref 12–16)
IMM GRANULOCYTES # BLD AUTO: 0.01 K/UL (ref 0–0.04)
IMM GRANULOCYTES NFR BLD AUTO: 0.2 % (ref 0–0.5)
LYMPHOCYTES # BLD AUTO: 1.5 K/UL (ref 1–4.8)
LYMPHOCYTES NFR BLD: 35.1 % (ref 18–48)
MCH RBC QN AUTO: 29.5 PG (ref 27–31)
MCHC RBC AUTO-ENTMCNC: 32 G/DL (ref 32–36)
MCV RBC AUTO: 92 FL (ref 82–98)
MONOCYTES # BLD AUTO: 0.2 K/UL (ref 0.3–1)
MONOCYTES NFR BLD: 5.7 % (ref 4–15)
NEUTROPHILS # BLD AUTO: 2.4 K/UL (ref 1.8–7.7)
NEUTROPHILS NFR BLD: 56.4 % (ref 38–73)
NRBC BLD-RTO: 0 /100 WBC
PLATELET # BLD AUTO: 294 K/UL (ref 150–350)
PMV BLD AUTO: 11.3 FL (ref 9.2–12.9)
POTASSIUM SERPL-SCNC: 3.4 MMOL/L (ref 3.5–5.1)
PROT SERPL-MCNC: 8.2 G/DL (ref 6–8.4)
RBC # BLD AUTO: 4.48 M/UL (ref 4–5.4)
SODIUM SERPL-SCNC: 139 MMOL/L (ref 136–145)
WBC # BLD AUTO: 4.19 K/UL (ref 3.9–12.7)

## 2019-08-15 PROCEDURE — 80053 COMPREHEN METABOLIC PANEL: CPT | Mod: HCNC

## 2019-08-15 PROCEDURE — 85025 COMPLETE CBC W/AUTO DIFF WBC: CPT | Mod: HCNC

## 2019-08-15 PROCEDURE — 36415 COLL VENOUS BLD VENIPUNCTURE: CPT | Mod: HCNC,PO

## 2019-08-15 PROCEDURE — 86140 C-REACTIVE PROTEIN: CPT | Mod: HCNC

## 2019-08-15 PROCEDURE — 86225 DNA ANTIBODY NATIVE: CPT | Mod: HCNC

## 2019-08-15 PROCEDURE — 86160 COMPLEMENT ANTIGEN: CPT | Mod: HCNC

## 2019-08-15 PROCEDURE — 85652 RBC SED RATE AUTOMATED: CPT | Mod: HCNC

## 2019-08-15 PROCEDURE — 86160 COMPLEMENT ANTIGEN: CPT | Mod: 59,HCNC

## 2019-08-16 ENCOUNTER — PATIENT MESSAGE (OUTPATIENT)
Dept: GASTROENTEROLOGY | Facility: CLINIC | Age: 49
End: 2019-08-16

## 2019-08-16 ENCOUNTER — TELEPHONE (OUTPATIENT)
Dept: DERMATOLOGY | Facility: CLINIC | Age: 49
End: 2019-08-16

## 2019-08-16 NOTE — PROGRESS NOTES
RHEUMATOLOGY CLINIC FOLLOW UP VISIT  Chief complaints:-  To follow up for lupus and fibromyalgia     HPI:-  Emily Blanco a 49 y.o. 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     Lupus was diagnosed at age 20 - via skin biopsy of the face.  Developed facial rash.  Was on steroid and then was started on the plaquenil after development of arthralgia.  Last eye examination was 1-2 years ago.  About 3 weeks ago, rash developed in bilateral elbows and behind R knee.  Went to dermatologist given topical steroid cream.  +Raynaud's    Fibromyalgia (was diagnosed is managed with ibuprofen 1-2x/week.  She is also taking hydrocodone 7.5mg, gabapentin 600mg daily, flexeril.  Pain of the hands, feet, knees.     + Fatigue/maliase.  Sleep 8 hours every night.  Have multiple night time awakening.  LMP was >6 years ago.  +night sweats, mood swings, bloated    Depressed (on Zoloft - prescribed by PCP. Started a few months ago).  Does not want to increase the medication.  Previously tried it before (~20 years ago) and increasing caused her to be numb.  Never tried Trazadone, Cymbalta, or amitrypline    Smokes tobacco (1ppd x 30 years), denies EtOh or recreational drugs/medications/supplements.     Monitoring for HCQ toxicity - eye exam April 2019 (normal).     Dermatology (Dr Thomas - April 2019).  Subcutaneous lupus and eczema.  Agreed with increasing HCQ to 400mg daily.     Interval History  Patient is still having diffused arthralgia mainly of the bilateral hands and R knee.  Patient states that she is experiencing bilateral hand stiffness especially in the AM (for a long time).  Patient is also complaining of R knee pain that is persistent.  She feels that something heavy (like a metal) hitting the anterior caudal region of her knee cap).  Denies any  "recent trauma.  Tried to use knee brace and cane for ambulation.  Norco provides some alleviation of symptoms.  She feels her knee gives out on her at times whenever she puts pressure onto the affected area.     Patient is only take 200mg HCQ daily because concern about diarrhea.  Patient had been experiencing persistent diarrhea x few months.  Denies any blood.  Watery (non-formed) stool, up to 3x/day.  Improvement with opioids.  Denies any fever/chills, recent travels, sick contacts, abdominal pain.  Uncertain if it's related to food.  Had gallbladder removal a couple of years ago and noticed worsening since.  No follow up with GI or workup for the cause.      Rash had improved after following with dermatology (April 2019).  Noticed that it's reappearing again.     Currently on Zoloft for depression.  Does not wish to increase dosage - "feeling numb".  Never taken Cymbalta/Trazadone/amitrypline.  Did not follow up with PCP since the last visit.  Depression is mildly controlled.   .                                 Review of Systems   Constitutional: Positive for malaise/fatigue. Negative for chills, diaphoresis, fever and weight loss.   HENT: Negative for congestion, ear discharge, ear pain, hearing loss, nosebleeds, sinus pain and tinnitus.    Eyes: Negative for photophobia, pain, discharge and redness.   Respiratory: Negative for cough, hemoptysis, sputum production, shortness of breath, wheezing and stridor.    Cardiovascular: Negative for chest pain, palpitations, orthopnea, claudication, leg swelling and PND.   Gastrointestinal: Negative for abdominal pain, constipation, diarrhea, heartburn, nausea and vomiting.   Genitourinary: Negative for dysuria, frequency, hematuria and urgency.   Musculoskeletal: Positive for joint pain. Negative for back pain, myalgias and neck pain.   Skin: Positive for rash.   Neurological: Negative for dizziness, tingling, tremors, weakness and headaches.   Endo/Heme/Allergies: Does " not bruise/bleed easily.   Psychiatric/Behavioral: Negative for depression, hallucinations and suicidal ideas. The patient is not nervous/anxious and does not have insomnia.        Past Medical History:   Diagnosis Date    Asthma in remission     COPD (chronic obstructive pulmonary disease)     Fibromyalgia     Hypothyroidism     Immune disorder     Lupus     Recurrent urticaria 1/6/2017    Subacute cutaneous lupus erythematosus        Past Surgical History:   Procedure Laterality Date    CHOLECYSTECTOMY      CHOLECYSTECTOMY-LAPAROSCOPIC N/A 8/4/2017    Performed by Nik Shrestha MD at [facility] OR [address] [place] [address]        Social History     Tobacco Use    Smoking status: Current Every Day Smoker     Packs/day: 2.00     Types: Cigarettes    Smokeless tobacco: Former User     Quit date: 7/8/2013   Substance Use Topics    Alcohol use: No     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     Drug use: No       Family History   Problem Relation Age of Onset    Hypertension Mother     Thyroid disease Mother     Cataracts Mother     Arthritis Father     Hyperlipidemia Father     Cancer Father         Multiple Myeloma     Cirrhosis Father     Cataracts Father     Lupus Sister     Asthma Sister     No Known Problems Brother     No Known Problems Maternal Aunt     No Known Problems Maternal Uncle     No Known Problems Paternal Aunt     No Known Problems Paternal Uncle     No Known Problems Maternal Grandmother     No Known Problems Maternal Grandfather     No Known Problems Paternal Grandmother     No Known Problems Paternal Grandfather     Scoliosis Sister     Amblyopia Neg Hx     Blindness Neg Hx     Diabetes Neg Hx     Glaucoma Neg Hx     Macular degeneration Neg Hx     Retinal detachment Neg Hx     Strabismus Neg Hx     Stroke Neg Hx        Review of patient's allergies indicates:   Allergen Reactions    Bactrim [sulfamethoxazole-trimethoprim] Rash       Vitals:    08/19/19 0831   BP: 129/79  "  Pulse: 72   Weight: 57.3 kg (126 lb 5.2 oz)   Height: 5' 1" (1.549 m)   PainSc:   5       Physical Exam   Constitutional: She is oriented to person, place, and time and well-developed, well-nourished, and in no distress.   HENT:   Head: Normocephalic and atraumatic.   No mouth ulcers    Eyes: Pupils are equal, round, and reactive to light. EOM are normal.   Neck: Normal range of motion. Neck supple.   Cardiovascular: Normal rate, regular rhythm and normal heart sounds.   Pulmonary/Chest: Effort normal and breath sounds normal.   Abdominal: Soft. Bowel sounds are normal.   Musculoskeletal: Normal range of motion.   ROM intact  Strength intact in all major joints  No signs of synovitis  Mild R knee crepitus    Neurological: She is alert and oriented to person, place, and time. GCS score is 15.   Abnormal gait - R knee with brace and cane.  Difficulty with gait due to pain with walk    Skin: Skin is warm and dry. No rash noted. No erythema.   Resolving rash on bilateral UE.  Very faint redness of the face and knuckles.  Livedo reticularis in bilateral LE  Mild rash over frontal chest      Psychiatric: Mood, memory, affect and judgment normal.       Medication List with Changes/Refills   Current Medications    ALBUTEROL (PROVENTIL) 2.5 MG /3 ML (0.083 %) NEBULIZER SOLUTION    TAKE 3 MLS (2.5 MG TOTAL) BY NEBULIZATION EVERY 6 (SIX) HOURS AS NEEDED FOR WHEEZING.    ALUMINUM HYDROX-MAGNESIUM CARB (GAVISCON EXTRA STRENGTH) 254-237.5 MG/5 ML SUSP    Take by mouth as needed.     CETIRIZINE (ZYRTEC) 10 MG TABLET    Take 10 mg by mouth once daily.    EPINEPHRINE (EPIPEN 2-GIGI) 0.3 MG/0.3 ML ATIN    Inject 0.3 mLs (0.3 mg total) into the muscle once. for 1 dose    ESTRADIOL (ESTRACE) 0.01 % (0.1 MG/GRAM) VAGINAL CREAM    Insert 2 g daily intravaginally for 2 weeks, then 1 g twice weekly    FLUOROMETHOLONE 0.1% (FML) 0.1 % DRPS    1 DROP IN BOTH EYES TWICE A DAY FOR 2 WEEKS THEN DAILY FOR 2 WEEKS    FLUTICASONE PROPIONATE " (FLONASE) 50 MCG/ACTUATION NASAL SPRAY    USE 2 SPRAYS IN EACH NOSTRIL ONE TIME DAILY    FLUTICASONE-SALMETEROL (ADVAIR HFA) 115-21 MCG/ACTUATION HFAA    Inhale 2 puffs into the lungs 2 (two) times daily.    GABAPENTIN (NEURONTIN) 600 MG TABLET    TAKE 1 TABLET EVERY DAY    HYDROCHLOROTHIAZIDE (HYDRODIURIL) 25 MG TABLET    TAKE 1 TABLET BY MOUTH AS DIRECTED TAKE ONE TABLET BY MOUTH ONLY ON SATURDAY AND SUNDAY    HYDROCORTISONE 2.5 % OINTMENT    Apply topically 2 (two) times daily. For eyelid rash PRN    HYDROXYCHLOROQUINE (PLAQUENIL) 200 MG TABLET    TAKE 1 TABLET EVERY DAY    HYDROXYZINE HCL (ATARAX) 25 MG TABLET    1-8 tablets at bedtime for itching.    HYDROXYZINE HCL (ATARAX) 25 MG TABLET    TAKE 1 TAB BY MOUTH EVERY EVENING AS NEEDED FOR PRURITUS. DO NOT DRIVE OR OPERATE MACHINERY    JUBLIA 10 % MANDEEP    APPLY ONE DROP TO ALL SMALLER TOES AND 2 DROPS TO GREATER TOES DAILY    LEVOTHYROXINE (SYNTHROID) 100 MCG TABLET    Take 1 tablet by mouth Monday - Saturday and take 1/2 tablet on sunday    MOMETASONE (NASONEX) 50 MCG/ACTUATION NASAL SPRAY    2 sprays by Nasal route 2 (two) times daily.    MONTELUKAST (SINGULAIR) 10 MG TABLET    Take 1 tablet (10 mg total) by mouth every evening.    PANTOPRAZOLE (PROTONIX) 40 MG TABLET    Take 1 tablet (40 mg total) by mouth once daily.    SERTRALINE (ZOLOFT) 50 MG TABLET    TAKE 1 TABLET EVERY DAY    SOY ISOFL/BLK COH/GR TEA/YERBA (ESTROVEN ENERGY ORAL)    Take by mouth.    SOY ISOFLA/BLK COHOSH/MAG BARK (ESTROVEN ORAL)    Take by mouth.    TRIAMCINOLONE (KENALOG) 0.5 % OINTMENT    APPLY TO AFFECTED AREA TWICE A DAY FOR 7 DAYS    TRIAMCINOLONE (KENALOG) 0.5 % OINTMENT    Apply topically 2 (two) times daily. For lupus skin flares on body    VENTOLIN HFA 90 MCG/ACTUATION INHALER    INHALE 2 PUFFS INTO THE LUNGS EVERY 4 HOURS AS NEEDED   Changed and/or Refilled Medications    Modified Medication Previous Medication    HYDROCODONE-ACETAMINOPHEN (NORCO) 7.5-325 MG PER TABLET  HYDROcodone-acetaminophen (NORCO) 7.5-325 mg per tablet       Take 1 tablet by mouth every 24 hours as needed for Pain.    Take 1 tablet by mouth every 24 hours as needed for Pain.   Discontinued Medications    GABAPENTIN (NEURONTIN) 100 MG CAPSULE    Take 2 capsules (200 mg total) by mouth once daily. Please take 1 tablet for 2 weeks.  If ineffective, can take 2 tablets       Assessment/Plans:-  48 y.o. 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  Negative for synovitis, ROM intact, strength intact.     Labs were unremarkable (done Aug 2019).    Assessment  -Subacute cutaneous lupus - mild flare?  Rash reappeared.  Uncertain if it's eczema vs lupus.  Referred back to dermatology for management  - R knee pain - ddx includes trauma vs OA vs fibromyalgia?  - Persistent diarrhea - ddx includes IBS vs food insensitivity (lactose intolerance, celiac disease)  - Fibromyalgia - on Norco and gabapentin.  Patient currently on Zoloft - which is mildly controlling depression.  Consideration to change zoloft to other medications (Cymbalta, Trazadone) for treatment of depression and fibromyalgia  - Tobacco abuse - education provided for cessation.  Tobacco usage can decrease efficacy of HCQ      Plan  - Plaquenil eye exam - due April 2020  - Continue plaquenil 400mg daily. If it worsen diarrhea, continue HCQ 200mg daily  - Make appt to follow up with GI for evaluation of persistent diarrhea  - Transglutaminase Ab ordered - unable to add to 8/15 labs.  Will hold until patient need other lab completion   - Follow up with dermatology - evaluation and management of rash   - Follow up with primary - consideration to substitute Zoloft for other meds  for treatment of both fibromyalgia and depression   - Xray of bilateral knee for evaluation - if negative, consideration for MRI and referral to ortho   - Education provided on the importance of medication compliance  - Discontinue AM Gabapentin - patient did not tolerate (caused drowsiness)  - continue Gabapentin 600mg QHS   - discontinue Meloxicam    - refill provided for norco  - follow up with PMR as scheduled for evaluation of persistent arthralgia  - CBC, CMP, ESR, CRP, C3, C4, UA, Up/c, dsDNA -to be done prior to next visit  - recommendation for daily usage of sunscreen   - Information provided on fibromyalgia including non medication therapies   - Education provided on tobacco cessation - patient refused at this time including cessation programs     # Follow up in about 3 months (around 11/19/2019).

## 2019-08-16 NOTE — TELEPHONE ENCOUNTER
Returned pt call. Pt answered and asked can she call me back. Informed her ok    ----- Message from William Bartlett sent at 8/16/2019 10:56 AM CDT -----  Contact: patient   Please call pt at [phone]    Patient is requesting an immediate appt due to her having new lesions on her eyelids, lips, arms and knees    Thank you

## 2019-08-19 ENCOUNTER — HOSPITAL ENCOUNTER (OUTPATIENT)
Dept: RADIOLOGY | Facility: HOSPITAL | Age: 49
Discharge: HOME | End: 2019-08-19
Attending: STUDENT IN AN ORGANIZED HEALTH CARE EDUCATION/TRAINING PROGRAM
Payer: MEDICARE

## 2019-08-19 ENCOUNTER — OFFICE VISIT (OUTPATIENT)
Dept: RHEUMATOLOGY | Facility: CLINIC | Age: 49
End: 2019-08-19
Payer: MEDICARE

## 2019-08-19 VITALS
DIASTOLIC BLOOD PRESSURE: 79 MMHG | HEIGHT: 61 IN | SYSTOLIC BLOOD PRESSURE: 129 MMHG | HEART RATE: 72 BPM | WEIGHT: 126.31 LBS | BODY MASS INDEX: 23.85 KG/M2

## 2019-08-19 DIAGNOSIS — R19.7 DIARRHEA, UNSPECIFIED TYPE: ICD-10-CM

## 2019-08-19 DIAGNOSIS — Z72.0 TOBACCO ABUSE: ICD-10-CM

## 2019-08-19 DIAGNOSIS — M25.50 ARTHRALGIA, UNSPECIFIED JOINT: ICD-10-CM

## 2019-08-19 DIAGNOSIS — M25.50 ARTHRALGIA, UNSPECIFIED JOINT: Primary | ICD-10-CM

## 2019-08-19 DIAGNOSIS — G89.4 CHRONIC PAIN SYNDROME: ICD-10-CM

## 2019-08-19 DIAGNOSIS — Z79.899 LONG-TERM USE OF PLAQUENIL: ICD-10-CM

## 2019-08-19 DIAGNOSIS — M79.7 FIBROMYALGIA: ICD-10-CM

## 2019-08-19 DIAGNOSIS — L93.1 SUBACUTE CUTANEOUS LUPUS ERYTHEMATOSUS: ICD-10-CM

## 2019-08-19 DIAGNOSIS — Z92.25 HISTORY OF IMMUNOSUPPRESSION THERAPY: ICD-10-CM

## 2019-08-19 LAB — DSDNA AB SER-ACNC: NORMAL [IU]/ML

## 2019-08-19 PROCEDURE — 3008F BODY MASS INDEX DOCD: CPT | Mod: HCNC,CPTII,GC,S$GLB | Performed by: STUDENT IN AN ORGANIZED HEALTH CARE EDUCATION/TRAINING PROGRAM

## 2019-08-19 PROCEDURE — 73562 X-RAY EXAM OF KNEE 3: CPT | Mod: 26,50,HCNC, | Performed by: RADIOLOGY

## 2019-08-19 PROCEDURE — 3008F PR BODY MASS INDEX (BMI) DOCUMENTED: ICD-10-PCS | Mod: HCNC,CPTII,GC,S$GLB | Performed by: STUDENT IN AN ORGANIZED HEALTH CARE EDUCATION/TRAINING PROGRAM

## 2019-08-19 PROCEDURE — 99214 OFFICE O/P EST MOD 30 MIN: CPT | Mod: HCNC,GC,S$GLB, | Performed by: STUDENT IN AN ORGANIZED HEALTH CARE EDUCATION/TRAINING PROGRAM

## 2019-08-19 PROCEDURE — 99999 PR PBB SHADOW E&M-EST. PATIENT-LVL V: ICD-10-PCS | Mod: PBBFAC,HCNC,GC, | Performed by: STUDENT IN AN ORGANIZED HEALTH CARE EDUCATION/TRAINING PROGRAM

## 2019-08-19 PROCEDURE — 99999 PR PBB SHADOW E&M-EST. PATIENT-LVL V: CPT | Mod: PBBFAC,HCNC,GC, | Performed by: STUDENT IN AN ORGANIZED HEALTH CARE EDUCATION/TRAINING PROGRAM

## 2019-08-19 PROCEDURE — 73562 X-RAY EXAM OF KNEE 3: CPT | Mod: 50,TC,HCNC

## 2019-08-19 PROCEDURE — 73562 XR KNEE 3 VIEW BILATERAL: ICD-10-PCS | Mod: 26,50,HCNC, | Performed by: RADIOLOGY

## 2019-08-19 PROCEDURE — 99214 PR OFFICE/OUTPT VISIT, EST, LEVL IV, 30-39 MIN: ICD-10-PCS | Mod: HCNC,GC,S$GLB, | Performed by: STUDENT IN AN ORGANIZED HEALTH CARE EDUCATION/TRAINING PROGRAM

## 2019-08-19 RX ORDER — HYDROCODONE BITARTRATE AND ACETAMINOPHEN 7.5; 325 MG/1; MG/1
1 TABLET ORAL
Qty: 30 TABLET | Refills: 0 | Status: SHIPPED | OUTPATIENT
Start: 2019-08-19 | End: 2019-10-15 | Stop reason: SDUPTHER

## 2019-08-19 ASSESSMENT — ROUTINE ASSESSMENT OF PATIENT INDEX DATA (RAPID3)
AM STIFFNESS SCORE: 1, YES
MDHAQ FUNCTION SCORE: 1.2
PATIENT GLOBAL ASSESSMENT SCORE: 10
WHEN YOU AWAKENED IN THE MORNING OVER THE LAST WEEK, PLEASE INDICATE THE AMOUNT OF TIME IT TAKES UNTIL YOU ARE AS LIMBER AS YOU WILL BE FOR THE DAY: HRS
TOTAL RAPID3 SCORE: 8
PAIN SCORE: 10
FATIGUE SCORE: 10
PSYCHOLOGICAL DISTRESS SCORE: 3.3

## 2019-08-19 NOTE — PATIENT INSTRUCTIONS
Follow up with GI - workup for diarrhea  Follow up with Derm - rash  Follow up with primary about fibromyalgia - consideration to taper down Zoloft and start Cymbalta/Trazadone for concurrent treatment of fibromyalgia    Continue to take HCQ 200mg daily  Cut back on smoking     Labs to be done prior to next visit

## 2019-08-20 ENCOUNTER — PATIENT MESSAGE (OUTPATIENT)
Dept: FAMILY MEDICINE | Facility: CLINIC | Age: 49
End: 2019-08-20

## 2019-08-20 DIAGNOSIS — L93.1 SUBACUTE CUTANEOUS LUPUS ERYTHEMATOSUS: ICD-10-CM

## 2019-08-20 DIAGNOSIS — Z92.25 HISTORY OF IMMUNOSUPPRESSION THERAPY: ICD-10-CM

## 2019-08-20 DIAGNOSIS — G89.4 CHRONIC PAIN SYNDROME: ICD-10-CM

## 2019-08-20 DIAGNOSIS — M79.7 FIBROMYALGIA: ICD-10-CM

## 2019-08-20 DIAGNOSIS — Z72.0 TOBACCO ABUSE: ICD-10-CM

## 2019-08-20 RX ORDER — FLUTICASONE PROPIONATE 50 MCG
SPRAY, SUSPENSION (ML) NASAL
Qty: 48 G | Refills: 0 | Status: SHIPPED | OUTPATIENT
Start: 2019-08-20 | End: 2019-10-29 | Stop reason: SDUPTHER

## 2019-08-20 RX ORDER — SERTRALINE HYDROCHLORIDE 50 MG/1
TABLET, FILM COATED ORAL
Qty: 15 TABLET | Refills: 0 | Status: SHIPPED | OUTPATIENT
Start: 2019-08-20 | End: 2019-08-28

## 2019-08-20 NOTE — PROGRESS NOTES
I have personally taken the history and examined the patient to verify the fellow's notation, and I agree with the impression and plan stated.      She has FMS and chronic pain as well as cutaneous lupus  No evidence of systemic lupus  Also has depression and diarrhea    Non-medical FMS management discussed and handout given  She will f/u with PCP re: depression and diarrhea; should have celiac Ab to be sure diarrhea and arthralgia and rash not due to gluten sensitivity  Knee exam is benign but if knee pain persists then ortho should evaluate

## 2019-08-21 RX ORDER — GABAPENTIN 600 MG/1
TABLET ORAL
Qty: 90 TABLET | Refills: 3 | Status: SHIPPED | OUTPATIENT
Start: 2019-08-21 | End: 2020-01-03 | Stop reason: SDUPTHER

## 2019-08-28 ENCOUNTER — OFFICE VISIT (OUTPATIENT)
Dept: FAMILY MEDICINE | Facility: CLINIC | Age: 49
End: 2019-08-28
Payer: MEDICARE

## 2019-08-28 ENCOUNTER — PATIENT OUTREACH (OUTPATIENT)
Dept: ADMINISTRATIVE | Facility: HOSPITAL | Age: 49
End: 2019-08-28

## 2019-08-28 VITALS
DIASTOLIC BLOOD PRESSURE: 80 MMHG | WEIGHT: 123.44 LBS | OXYGEN SATURATION: 97 % | HEART RATE: 90 BPM | HEIGHT: 61 IN | TEMPERATURE: 98 F | BODY MASS INDEX: 23.3 KG/M2 | SYSTOLIC BLOOD PRESSURE: 120 MMHG

## 2019-08-28 DIAGNOSIS — M79.7 FIBROMYALGIA: Primary | ICD-10-CM

## 2019-08-28 DIAGNOSIS — M53.86 DECREASED ROM OF LUMBAR SPINE: ICD-10-CM

## 2019-08-28 DIAGNOSIS — M25.361 KNEE BUCKLING, RIGHT: ICD-10-CM

## 2019-08-28 PROCEDURE — 99999 PR PBB SHADOW E&M-EST. PATIENT-LVL IV: ICD-10-PCS | Mod: PBBFAC,HCNC,, | Performed by: FAMILY MEDICINE

## 2019-08-28 PROCEDURE — 3008F PR BODY MASS INDEX (BMI) DOCUMENTED: ICD-10-PCS | Mod: HCNC,CPTII,S$GLB, | Performed by: FAMILY MEDICINE

## 2019-08-28 PROCEDURE — 99214 PR OFFICE/OUTPT VISIT, EST, LEVL IV, 30-39 MIN: ICD-10-PCS | Mod: HCNC,S$GLB,, | Performed by: FAMILY MEDICINE

## 2019-08-28 PROCEDURE — 3008F BODY MASS INDEX DOCD: CPT | Mod: HCNC,CPTII,S$GLB, | Performed by: FAMILY MEDICINE

## 2019-08-28 PROCEDURE — 99999 PR PBB SHADOW E&M-EST. PATIENT-LVL IV: CPT | Mod: PBBFAC,HCNC,, | Performed by: FAMILY MEDICINE

## 2019-08-28 PROCEDURE — 99214 OFFICE O/P EST MOD 30 MIN: CPT | Mod: HCNC,S$GLB,, | Performed by: FAMILY MEDICINE

## 2019-08-28 RX ORDER — MONTELUKAST SODIUM 10 MG/1
10 TABLET ORAL NIGHTLY
Qty: 90 TABLET | Refills: 1 | Status: SHIPPED | OUTPATIENT
Start: 2019-08-28 | End: 2020-01-02

## 2019-08-28 RX ORDER — SERTRALINE HYDROCHLORIDE 25 MG/1
25 TABLET, FILM COATED ORAL DAILY
Qty: 30 TABLET | Refills: 0 | Status: SHIPPED | OUTPATIENT
Start: 2019-08-28 | End: 2019-11-19 | Stop reason: SDUPTHER

## 2019-08-28 NOTE — PATIENT INSTRUCTIONS
For Week one take 25 mg by mouth every day for 7 days.   For Week two take 12.5 mg (half a tablet)  by mouth every day for 7 days.   For Week three take 12.5 mg by mouth every other day.    Then stop the medication.     If during any week you notice increased symptoms of agitation continue to take the previous dosage for another week.  For example if you are transitioning to 12.5mg every day and do not feel well then return to taking 25 mg for another week.  Attempt to decrease after you have completed the 2nd week at this dosage.

## 2019-08-28 NOTE — PROGRESS NOTES
Assessment & Plan  Problem List Items Addressed This Visit        Orthopedic    Decreased ROM of lumbar spine    Relevant Orders    Ambulatory referral to Physical Medicine Rehab    Fibromyalgia - Primary      Other Visit Diagnoses     Knee buckling, right        Relevant Orders    Ambulatory referral to Physical Medicine Rehab            Health Maintenance reviewed    Follow-up: Follow up in about 3 months (around 11/28/2019).    ______________________________________________________________________    Chief Complaint  Chief Complaint   Patient presents with    discuss medication       HPI  Emily Tabaresblanc is a 49 y.o. female with multiple medical diagnoses as listed in the medical history and problem list that presents for discussion of medication.  Pt is known to me with last appointment 3/27/2018.      Patient reports that she continues to have symptoms in which her knee appears to be buckling.  Patient has a significant amount of weight loss.  She has noted decreased appetite.  Patient does require refills on her medications.  Patient is concerned about her worsening fibromyalgia as well as her increasing lower extremity pain.  She has had noted weakness.      PAST MEDICAL HISTORY:  Past Medical History:   Diagnosis Date    Asthma in remission     COPD (chronic obstructive pulmonary disease)     Fibromyalgia     Hypothyroidism     Immune disorder     Lupus     Recurrent urticaria 1/6/2017    Subacute cutaneous lupus erythematosus        PAST SURGICAL HISTORY:  Past Surgical History:   Procedure Laterality Date    CHOLECYSTECTOMY      CHOLECYSTECTOMY-LAPAROSCOPIC N/A 8/4/2017    Performed by Nik Shrestha MD at [facility] OR [address] [place] [address]       SOCIAL HISTORY:  Social History     Socioeconomic History    Marital status:      Spouse name: Not on file    Number of children: Not on file    Years of education: Not on file    Highest education level: Not on file   Occupational History     Not on file   Social Needs    Financial resource strain: Not on file    Food insecurity:     Worry: Not on file     Inability: Not on file    Transportation needs:     Medical: Not on file     Non-medical: Not on file   Tobacco Use    Smoking status: Current Every Day Smoker     Packs/day: 2.00     Types: Cigarettes    Smokeless tobacco: Former User     Quit date: 7/8/2013   Substance and Sexual Activity    Alcohol use: No     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     Drug use: No    Sexual activity: Yes     Partners: Male   Lifestyle    Physical activity:     Days per week: Not on file     Minutes per session: Not on file    Stress: Not on file   Relationships    Social connections:     Talks on phone: Not on file     Gets together: Not on file     Attends Jew service: Not on file     Active member of club or organization: Not on file     Attends meetings of clubs or organizations: Not on file     Relationship status: Not on file   Other Topics Concern    Not on file   Social History Narrative    Not on file       FAMILY HISTORY:  Family History   Problem Relation Age of Onset    Hypertension Mother     Thyroid disease Mother     Cataracts Mother     Arthritis Father     Hyperlipidemia Father     Cancer Father         Multiple Myeloma     Cirrhosis Father     Cataracts Father     Lupus Sister     Asthma Sister     No Known Problems Brother     No Known Problems Maternal Aunt     No Known Problems Maternal Uncle     No Known Problems Paternal Aunt     No Known Problems Paternal Uncle     No Known Problems Maternal Grandmother     No Known Problems Maternal Grandfather     No Known Problems Paternal Grandmother     No Known Problems Paternal Grandfather     Scoliosis Sister     Amblyopia Neg Hx     Blindness Neg Hx     Diabetes Neg Hx     Glaucoma Neg Hx     Macular degeneration Neg Hx     Retinal detachment Neg Hx     Strabismus Neg Hx     Stroke Neg Hx         ALLERGIES AND MEDICATIONS: updated and reviewed.  Review of patient's allergies indicates:   Allergen Reactions    Bactrim [sulfamethoxazole-trimethoprim] Rash     Current Outpatient Medications   Medication Sig Dispense Refill    cetirizine (ZYRTEC) 10 MG tablet Take 10 mg by mouth once daily.      estradiol (ESTRACE) 0.01 % (0.1 mg/gram) vaginal cream Insert 2 g daily intravaginally for 2 weeks, then 1 g twice weekly 42.5 g 5    fluorometholone 0.1% (FML) 0.1 % DrpS 1 DROP IN BOTH EYES TWICE A DAY FOR 2 WEEKS THEN DAILY FOR 2 WEEKS  0    fluticasone propionate (FLONASE) 50 mcg/actuation nasal spray USE 2 SPRAYS IN EACH NOSTRIL ONE TIME DAILY 48 g 0    fluticasone-salmeterol (ADVAIR HFA) 115-21 mcg/actuation HFAA Inhale 2 puffs into the lungs 2 (two) times daily. 12 g 6    gabapentin (NEURONTIN) 600 MG tablet TAKE 1 TABLET EVERY DAY 90 tablet 3    HYDROcodone-acetaminophen (NORCO) 7.5-325 mg per tablet Take 1 tablet by mouth every 24 hours as needed for Pain. 30 tablet 0    hydrocortisone 2.5 % ointment Apply topically 2 (two) times daily. For eyelid rash PRN 28 g 2    hydroxychloroquine (PLAQUENIL) 200 mg tablet TAKE 1 TABLET EVERY DAY 90 tablet 3    hydrOXYzine HCl (ATARAX) 25 MG tablet 1-8 tablets at bedtime for itching. 240 tablet 1    levothyroxine (SYNTHROID) 100 MCG tablet Take 1 tablet by mouth Monday - Saturday and take 1/2 tablet on sunday 90 tablet 0    montelukast (SINGULAIR) 10 mg tablet Take 1 tablet (10 mg total) by mouth every evening. 90 tablet 1    pantoprazole (PROTONIX) 40 MG tablet Take 1 tablet (40 mg total) by mouth once daily. 90 tablet 3    soy isofl/blk coh/gr tea/yerba (ESTROVEN ENERGY ORAL) Take by mouth.      triamcinolone (KENALOG) 0.5 % ointment Apply topically 2 (two) times daily. For lupus skin flares on body 15 g 5    VENTOLIN HFA 90 mcg/actuation inhaler INHALE 2 PUFFS INTO THE LUNGS EVERY 4 HOURS AS NEEDED 18 Inhaler 0    albuterol (PROVENTIL) 2.5 mg /3 mL  "(0.083 %) nebulizer solution TAKE 3 MLS (2.5 MG TOTAL) BY NEBULIZATION EVERY 6 (SIX) HOURS AS NEEDED FOR WHEEZING. 150 mL 0    EPINEPHrine (EPIPEN 2-GIGI) 0.3 mg/0.3 mL AtIn Inject 0.3 mLs (0.3 mg total) into the muscle once. for 1 dose 2 Device 0    sertraline (ZOLOFT) 25 MG tablet Take 1 tablet (25 mg total) by mouth once daily. 30 tablet 0     Current Facility-Administered Medications   Medication Dose Route Frequency Provider Last Rate Last Dose    dexamethasone injection 8 mg  8 mg Intramuscular 1 time in Clinic/HOD ANTHONY MarquezC             ROS  Review of Systems   Constitutional: Negative for activity change, appetite change, fatigue, fever and unexpected weight change.   HENT: Negative.  Negative for ear discharge, ear pain, rhinorrhea and sore throat.    Eyes: Negative.    Respiratory: Negative for apnea, cough, chest tightness, shortness of breath and wheezing.    Cardiovascular: Negative for chest pain, palpitations and leg swelling.   Gastrointestinal: Negative for abdominal distention, abdominal pain, constipation, diarrhea and vomiting.   Endocrine: Negative for cold intolerance, heat intolerance, polydipsia and polyuria.   Genitourinary: Negative for decreased urine volume and urgency.   Musculoskeletal: Positive for arthralgias and gait problem.   Skin: Negative for rash.   Neurological: Negative for dizziness and headaches.   Hematological: Does not bruise/bleed easily.   Psychiatric/Behavioral: Negative for agitation, sleep disturbance and suicidal ideas.           Physical Exam  Vitals:    08/28/19 1521   BP: 120/80   BP Location: Left arm   Patient Position: Sitting   BP Method: Small (Manual)   Pulse: 90   Temp: 98.3 °F (36.8 °C)   TempSrc: Oral   SpO2: 97%   Weight: 56 kg (123 lb 7.3 oz)   Height: 5' 1" (1.549 m)    Body mass index is 23.33 kg/m².  Weight: 56 kg (123 lb 7.3 oz)   Height: 5' 1" (154.9 cm)   Physical Exam   Constitutional: She is oriented to person, place, and time. " She appears well-developed and well-nourished.   HENT:   Head: Normocephalic and atraumatic.   Eyes: Pupils are equal, round, and reactive to light. Conjunctivae and EOM are normal.   Neck: Normal range of motion. Neck supple.   Cardiovascular: Normal rate, regular rhythm and normal heart sounds. Exam reveals no gallop and no friction rub.   No murmur heard.  Pulmonary/Chest: Breath sounds normal. No respiratory distress.   Abdominal: Soft. Bowel sounds are normal. She exhibits no distension. There is no tenderness.   Musculoskeletal: Normal range of motion.   Lymphadenopathy:     She has no cervical adenopathy.   Neurological: She is alert and oriented to person, place, and time.   Skin: Skin is warm.   Psychiatric: She has a normal mood and affect.       Health Maintenance       Date Due Completion Date    Pap Smear with HPV Cotest 01/20/2019 1/20/2016    Mammogram 03/06/2019 3/6/2017    Influenza Vaccine (1) 09/01/2019 11/7/2017    Colonoscopy 04/18/2021 4/18/2016    Lipid Panel 06/02/2021 6/2/2016    TETANUS VACCINE 09/02/2026 9/2/2016              Patient note was created using [employer]jur.  Any errors in syntax or even information may not have been identified and edited on initial review prior to signing this note.

## 2019-09-03 ENCOUNTER — PATIENT MESSAGE (OUTPATIENT)
Dept: DERMATOLOGY | Facility: CLINIC | Age: 49
End: 2019-09-03

## 2019-09-04 ENCOUNTER — PATIENT OUTREACH (OUTPATIENT)
Dept: ADMINISTRATIVE | Facility: OTHER | Age: 49
End: 2019-09-04

## 2019-09-05 NOTE — PROGRESS NOTES
Chronic Pain - New Consult    Referring Physician: No ref. provider found      Chief Complaint   Patient presents with    Low-back Pain        SUBJECTIVE:    9/6/19 Pt returns today complaining of low back pain.    49-year-old female presents to me for the 1st time, she is an established  patient with former colleague/ Dr. Peterson patient has a history of fibromyalgia she presents today with chronic low back pain, pain is been going on greater than 10 years patient denies recent trauma or inciting event.  Pain is located across her low back the lobe midline she denies radicular symptoms at this moment she does report occasional pain going down her right leg.  She describes the pain in her back as dull aching and tender to touch her worst pain is 9/10.  Presents today with a cane and right knee brace.  Previous clinic visit with Dr. Servin they discussed diagnostic lumbar medial branch blocks.      Emily Jacobson Yovani is a 48 y/o female with hx of fibromyalgia who presents to the clinic for the evaluation of low back pain. The back pain started >10 years ago and symptoms have been progressively worsening. She denies recent trauma or inciting event. The pain is located in the lower back area at the midline. She denies radiation down the legs at this time.  She does report experiencing a pain radiating down the posterior aspect of the right leg last week which has now subsubsided.  The pain is described as sharp and stabbing and is rated as 5/10. The pain is rated with a score of  2/10 on the BEST day and a score of 10/10 on the WORST day.  Symptoms interfere with daily activity and sleeping. The pain is exacerbated by standing, bending, walking, getting out of bed/chair and activity.  The pain is mitigated by heat and medication. The patient reports 3 hours of uninterrupted sleep per night.    Patient denies night fever/night sweats, urinary incontinence, bowel incontinence and significant weight  loss.    Physical Therapy/Home Exercise: no      Pain Disability Index Review:  Last 3 PDI Scores 9/6/2019 4/29/2019   Pain Disability Index (PDI) 70 69       Pain Medications:    - Opioids: Norco   - Adjuvant Medications: Advil,Motrin ( Ibuprofen), Mobic (Meloxicam) and Neurontin (Gabapentin)     report:  Reviewed    Pain Procedures: None on file     Imaging:     X-Ray Lumbar Spine Ap Lateral w/Flex Ext (4/26/2019):    FINDINGS:  Bones are fairly well mineralized.  Vertebral body heights disc spaces and alignment is satisfactory.  No subluxation.    Past Medical History:   Diagnosis Date    Asthma in remission     COPD (chronic obstructive pulmonary disease)     Fibromyalgia     Hypothyroidism     Immune disorder     Lupus     Recurrent urticaria 1/6/2017    Subacute cutaneous lupus erythematosus      Past Surgical History:   Procedure Laterality Date    CHOLECYSTECTOMY      CHOLECYSTECTOMY-LAPAROSCOPIC N/A 8/4/2017    Performed by Nik Shrestha MD at [facility] OR [address] [place] [address]     Social History     Socioeconomic History    Marital status:      Spouse name: Not on file    Number of children: Not on file    Years of education: Not on file    Highest education level: Not on file   Occupational History    Not on file   Social Needs    Financial resource strain: Not on file    Food insecurity:     Worry: Not on file     Inability: Not on file    Transportation needs:     Medical: Not on file     Non-medical: Not on file   Tobacco Use    Smoking status: Current Every Day Smoker     Packs/day: 2.00     Types: Cigarettes    Smokeless tobacco: Former User     Quit date: 7/8/2013   Substance and Sexual Activity    Alcohol use: No     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     Drug use: No    Sexual activity: Yes     Partners: Male   Lifestyle    Physical activity:     Days per week: Not on file     Minutes per session: Not on file    Stress: Not on file   Relationships    Social connections:      Talks on phone: Not on file     Gets together: Not on file     Attends Christianity service: Not on file     Active member of club or organization: Not on file     Attends meetings of clubs or organizations: Not on file     Relationship status: Not on file   Other Topics Concern    Not on file   Social History Narrative    Not on file     Family History   Problem Relation Age of Onset    Hypertension Mother     Thyroid disease Mother     Cataracts Mother     Arthritis Father     Hyperlipidemia Father     Cancer Father         Multiple Myeloma     Cirrhosis Father     Cataracts Father     Lupus Sister     Asthma Sister     No Known Problems Brother     No Known Problems Maternal Aunt     No Known Problems Maternal Uncle     No Known Problems Paternal Aunt     No Known Problems Paternal Uncle     No Known Problems Maternal Grandmother     No Known Problems Maternal Grandfather     No Known Problems Paternal Grandmother     No Known Problems Paternal Grandfather     Scoliosis Sister     Amblyopia Neg Hx     Blindness Neg Hx     Diabetes Neg Hx     Glaucoma Neg Hx     Macular degeneration Neg Hx     Retinal detachment Neg Hx     Strabismus Neg Hx     Stroke Neg Hx        Review of patient's allergies indicates:   Allergen Reactions    Bactrim [sulfamethoxazole-trimethoprim] Rash       Current Outpatient Medications   Medication Sig    albuterol (PROVENTIL) 2.5 mg /3 mL (0.083 %) nebulizer solution TAKE 3 MLS (2.5 MG TOTAL) BY NEBULIZATION EVERY 6 (SIX) HOURS AS NEEDED FOR WHEEZING.    cetirizine (ZYRTEC) 10 MG tablet Take 10 mg by mouth once daily.    estradiol (ESTRACE) 0.01 % (0.1 mg/gram) vaginal cream Insert 2 g daily intravaginally for 2 weeks, then 1 g twice weekly    fluorometholone 0.1% (FML) 0.1 % DrpS 1 DROP IN BOTH EYES TWICE A DAY FOR 2 WEEKS THEN DAILY FOR 2 WEEKS    fluticasone propionate (FLONASE) 50 mcg/actuation nasal spray USE 2 SPRAYS IN EACH NOSTRIL ONE TIME DAILY     gabapentin (NEURONTIN) 600 MG tablet TAKE 1 TABLET EVERY DAY    HYDROcodone-acetaminophen (NORCO) 7.5-325 mg per tablet Take 1 tablet by mouth every 24 hours as needed for Pain.    hydrocortisone 2.5 % ointment Apply topically 2 (two) times daily. For eyelid rash PRN    hydroxychloroquine (PLAQUENIL) 200 mg tablet TAKE 1 TABLET EVERY DAY    hydrOXYzine HCl (ATARAX) 25 MG tablet 1-8 tablets at bedtime for itching.    levothyroxine (SYNTHROID) 100 MCG tablet Take 1 tablet by mouth Monday - Saturday and take 1/2 tablet on sunday    montelukast (SINGULAIR) 10 mg tablet Take 1 tablet (10 mg total) by mouth every evening.    pantoprazole (PROTONIX) 40 MG tablet Take 1 tablet (40 mg total) by mouth once daily.    sertraline (ZOLOFT) 25 MG tablet Take 1 tablet (25 mg total) by mouth once daily.    soy isofl/blk coh/gr tea/yerba (ESTROVEN ENERGY ORAL) Take by mouth.    triamcinolone (KENALOG) 0.5 % ointment Apply topically 2 (two) times daily. For lupus skin flares on body    VENTOLIN HFA 90 mcg/actuation inhaler INHALE 2 PUFFS INTO THE LUNGS EVERY 4 HOURS AS NEEDED    EPINEPHrine (EPIPEN 2-GIGI) 0.3 mg/0.3 mL AtIn Inject 0.3 mLs (0.3 mg total) into the muscle once. for 1 dose    fluticasone-salmeterol (ADVAIR HFA) 115-21 mcg/actuation HFAA Inhale 2 puffs into the lungs 2 (two) times daily.     Current Facility-Administered Medications   Medication    dexamethasone injection 8 mg       REVIEW OF SYSTEMS:    GENERAL:  No weight loss, malaise or fevers.  HEENT:  Negative for frequent or significant headaches.  NECK:  Negative for lumps, goiter, and significant neck swelling.  RESPIRATORY:  Negative for  wheezing or shortness of breath.  CARDIOVASCULAR:  Negative for chest pain, leg swelling or palpitations.  GI:  Negative for blood in stools or black stools or change in bowel habits.  MUSCULOSKELETAL:  See HPI.  SKIN:  Negative for lesions, rash, and itching.  PSYCH:  + sleep  disturbance  HEMATOLOGY/LYMPHOLOGY:  Negative for prolonged bleeding, bruising easily or swollen nodes.  NEURO:   No history of paralysis, seizures or tremors.  All other reviewed and negative other than HPI.    OBJECTIVE:    /76   Pulse 73   Wt 56 kg (123 lb 7.3 oz)   LMP 05/14/2014 (Approximate)   BMI 23.33 kg/m²     PHYSICAL EXAMINATION:    General appearance: Well appearing, in no acute distress, alert and oriented x3.  Psych:  Mood and affect appropriate.  Skin: Skin color, texture, turgor normal, no rashes or lesions, in both upper and lower body.  Head/face:  Normocephalic, atraumatic.  Neck: No pain to palpation over the cervical paraspinous muscles. No pain with neck flexion, extension, or lateral flexion.   Cor: RRR  Pulm: Breathing unlabored.  GI:  Soft and non-tender.  Back: Straight leg raising is negative to radicular pain. Tenderness to palpation over the lower lumbar paraspinous muscles and facets.  + pain with back extension and rotation.    Extremities: Peripheral joint ROM is full and pain free without obvious instability or laxity in all four extremities. No deformities, edema, or skin discoloration.   Musculoskeletal: No atrophy or tone abnormalities are noted.  Neuro: Bilateral  lower extremity coordination and muscle stretch reflexes are physiologic and symmetric. No loss of sensation is noted.  Strength testing:    Right hip flexion: 5/5  Left hip flexion: 5/5  Right knee extension: 5/5  Left knee extension: 5/5  Right knee flexion: 5/5  Left knee flexion: 5/5  Right ankle dorsiflexion: 5/5  Left ankle dorsiflexion: 5/5    Gait: normal.    ASSESSMENT AND PLAN: 49 y.o.  female with history of fibromyalgia with chronic midline low back pain. Her low back pain is consistent with facet arthritis on examination.  We discussed treatment options including trial of physical therapy she states she attempted physical therapy but quit after 1 day because she felt like it was making her pain  worse.  Recommended that we will s/f lumbar MBB at L4/5 and L5/S1  to determine if her back pain is facet mediated, patient declined at this time and states that she will contact office if she elects to be scheduled for medial branch blocks.  Education sheet given to patient explaining both procedures as well as my card patient states she will contact me via my [facility] if she decides move forward with procedure.    Over 25 minutes spent with the patient today with greater than 50% of the time spent in face-to-face counseling.    The above plan and management options were discussed at length with patient. Patient is in agreement with the above and verbalized understanding. Dr. Reid was consulted on this patient  and agrees with this plan.      Usama Mondragon NP-C  Interventional Pain Management    09/06/2019

## 2019-09-06 ENCOUNTER — OFFICE VISIT (OUTPATIENT)
Dept: PAIN MEDICINE | Facility: CLINIC | Age: 49
End: 2019-09-06
Payer: MEDICARE

## 2019-09-06 VITALS
BODY MASS INDEX: 23.33 KG/M2 | DIASTOLIC BLOOD PRESSURE: 76 MMHG | WEIGHT: 123.44 LBS | HEART RATE: 73 BPM | SYSTOLIC BLOOD PRESSURE: 120 MMHG

## 2019-09-06 DIAGNOSIS — M47.816 FACET ARTHRITIS OF LUMBAR REGION: ICD-10-CM

## 2019-09-06 DIAGNOSIS — G89.29 CHRONIC BILATERAL LOW BACK PAIN WITHOUT SCIATICA: Primary | ICD-10-CM

## 2019-09-06 DIAGNOSIS — M47.816 SPONDYLOSIS OF LUMBAR REGION WITHOUT MYELOPATHY OR RADICULOPATHY: ICD-10-CM

## 2019-09-06 DIAGNOSIS — M54.50 CHRONIC BILATERAL LOW BACK PAIN WITHOUT SCIATICA: Primary | ICD-10-CM

## 2019-09-06 PROCEDURE — 3008F PR BODY MASS INDEX (BMI) DOCUMENTED: ICD-10-PCS | Mod: HCNC,CPTII,S$GLB, | Performed by: NURSE PRACTITIONER

## 2019-09-06 PROCEDURE — 99999 PR PBB SHADOW E&M-EST. PATIENT-LVL IV: CPT | Mod: PBBFAC,HCNC,, | Performed by: NURSE PRACTITIONER

## 2019-09-06 PROCEDURE — 99214 PR OFFICE/OUTPT VISIT, EST, LEVL IV, 30-39 MIN: ICD-10-PCS | Mod: HCNC,S$GLB,, | Performed by: NURSE PRACTITIONER

## 2019-09-06 PROCEDURE — 99999 PR PBB SHADOW E&M-EST. PATIENT-LVL IV: ICD-10-PCS | Mod: PBBFAC,HCNC,, | Performed by: NURSE PRACTITIONER

## 2019-09-06 PROCEDURE — 3008F BODY MASS INDEX DOCD: CPT | Mod: HCNC,CPTII,S$GLB, | Performed by: NURSE PRACTITIONER

## 2019-09-06 PROCEDURE — 99214 OFFICE O/P EST MOD 30 MIN: CPT | Mod: HCNC,S$GLB,, | Performed by: NURSE PRACTITIONER

## 2019-09-10 ENCOUNTER — PATIENT MESSAGE (OUTPATIENT)
Dept: PAIN MEDICINE | Facility: CLINIC | Age: 49
End: 2019-09-10

## 2019-09-11 ENCOUNTER — OFFICE VISIT (OUTPATIENT)
Dept: FAMILY MEDICINE | Facility: CLINIC | Age: 49
End: 2019-09-11
Payer: MEDICARE

## 2019-09-11 ENCOUNTER — HOSPITAL ENCOUNTER (OUTPATIENT)
Dept: RADIOLOGY | Facility: HOSPITAL | Age: 49
Discharge: HOME | End: 2019-09-11
Attending: FAMILY MEDICINE
Payer: MEDICARE

## 2019-09-11 VITALS
HEIGHT: 61 IN | BODY MASS INDEX: 23.06 KG/M2 | DIASTOLIC BLOOD PRESSURE: 64 MMHG | WEIGHT: 122.13 LBS | HEART RATE: 64 BPM | OXYGEN SATURATION: 97 % | TEMPERATURE: 98 F | SYSTOLIC BLOOD PRESSURE: 100 MMHG

## 2019-09-11 DIAGNOSIS — Z00.00 ANNUAL PHYSICAL EXAM: ICD-10-CM

## 2019-09-11 DIAGNOSIS — D89.9 DISORDER OF IMMUNE SYSTEM: ICD-10-CM

## 2019-09-11 DIAGNOSIS — E03.9 HYPOTHYROIDISM, UNSPECIFIED TYPE: ICD-10-CM

## 2019-09-11 DIAGNOSIS — Z23 NEED FOR IMMUNIZATION AGAINST INFLUENZA: Primary | ICD-10-CM

## 2019-09-11 DIAGNOSIS — L93.1 SUBACUTE CUTANEOUS LUPUS ERYTHEMATOSUS: ICD-10-CM

## 2019-09-11 DIAGNOSIS — Z12.4 CERVICAL CANCER SCREENING: ICD-10-CM

## 2019-09-11 DIAGNOSIS — Z12.39 BREAST CANCER SCREENING: ICD-10-CM

## 2019-09-11 DIAGNOSIS — Z79.899 LONG-TERM USE OF PLAQUENIL: ICD-10-CM

## 2019-09-11 PROCEDURE — 88175 CYTOPATH C/V AUTO FLUID REDO: CPT | Mod: HCNC

## 2019-09-11 PROCEDURE — 77063 BREAST TOMOSYNTHESIS BI: CPT | Mod: 26,HCNC,, | Performed by: RADIOLOGY

## 2019-09-11 PROCEDURE — 99499 UNLISTED E&M SERVICE: CPT | Mod: S$GLB,,, | Performed by: FAMILY MEDICINE

## 2019-09-11 PROCEDURE — 77063 MAMMO DIGITAL SCREENING BILAT WITH TOMOSYNTHESIS_CAD: ICD-10-PCS | Mod: 26,HCNC,, | Performed by: RADIOLOGY

## 2019-09-11 PROCEDURE — 99499 RISK ADDL DX/OHS AUDIT: ICD-10-PCS | Mod: S$GLB,,, | Performed by: FAMILY MEDICINE

## 2019-09-11 PROCEDURE — 90686 IIV4 VACC NO PRSV 0.5 ML IM: CPT | Mod: HCNC,S$GLB,, | Performed by: FAMILY MEDICINE

## 2019-09-11 PROCEDURE — 77067 SCR MAMMO BI INCL CAD: CPT | Mod: 26,HCNC,, | Performed by: RADIOLOGY

## 2019-09-11 PROCEDURE — G0008 ADMIN INFLUENZA VIRUS VAC: HCPCS | Mod: HCNC,S$GLB,, | Performed by: FAMILY MEDICINE

## 2019-09-11 PROCEDURE — 99396 PREV VISIT EST AGE 40-64: CPT | Mod: 25,HCNC,S$GLB, | Performed by: FAMILY MEDICINE

## 2019-09-11 PROCEDURE — 77067 MAMMO DIGITAL SCREENING BILAT WITH TOMOSYNTHESIS_CAD: ICD-10-PCS | Mod: 26,HCNC,, | Performed by: RADIOLOGY

## 2019-09-11 PROCEDURE — 99396 PR PREVENTIVE VISIT,EST,40-64: ICD-10-PCS | Mod: 25,HCNC,S$GLB, | Performed by: FAMILY MEDICINE

## 2019-09-11 PROCEDURE — 99999 PR PBB SHADOW E&M-EST. PATIENT-LVL III: ICD-10-PCS | Mod: PBBFAC,HCNC,, | Performed by: FAMILY MEDICINE

## 2019-09-11 PROCEDURE — G0008 FLU VACCINE (QUAD) GREATER THAN OR EQUAL TO 3YO PRESERVATIVE FREE IM: ICD-10-PCS | Mod: HCNC,S$GLB,, | Performed by: FAMILY MEDICINE

## 2019-09-11 PROCEDURE — 87624 HPV HI-RISK TYP POOLED RSLT: CPT | Mod: HCNC

## 2019-09-11 PROCEDURE — 90686 FLU VACCINE (QUAD) GREATER THAN OR EQUAL TO 3YO PRESERVATIVE FREE IM: ICD-10-PCS | Mod: HCNC,S$GLB,, | Performed by: FAMILY MEDICINE

## 2019-09-11 PROCEDURE — 99999 PR PBB SHADOW E&M-EST. PATIENT-LVL III: CPT | Mod: PBBFAC,HCNC,, | Performed by: FAMILY MEDICINE

## 2019-09-11 PROCEDURE — 77067 SCR MAMMO BI INCL CAD: CPT | Mod: TC,HCNC,PO

## 2019-09-11 NOTE — PROGRESS NOTES
Assessment & Plan  Problem List Items Addressed This Visit        Immunology/Multi System    Disorder of immune system    Overview     Followed by Dr. Spauldign ([facility] de[facility]) for subacute cutaneous lupus erythematosus (10-9-2017)          Current Assessment & Plan     Noted          Subacute cutaneous lupus erythematosus    Overview     3/30/16 per Rheumatology History of present illness: 46-year-old female with (subacute cutaneous lupus). She has had no evidence of systemic lupus. She remains on Plaquenil.          Current Assessment & Plan     Patient is stable.  Assess and addressed all modifiable risk factors.  Continue with appropriate management to prevent complications.           Relevant Orders    Lipid panel (Completed)    TSH (Completed)       Endocrine    Hypothyroidism    Relevant Orders    Lipid panel (Completed)    TSH (Completed)       Other    Long-term use of Plaquenil      Other Visit Diagnoses     Need for immunization against influenza    -  Primary    Relevant Orders    Influenza - Quadrivalent (6 months+) (PF) (Completed)    Annual physical exam        Relevant Orders    Liquid-based pap smear, screening (Completed)    HPV High Risk Genotypes, PCR (Completed)    Lipid panel (Completed)    TSH (Completed)    Cervical cancer screening        Relevant Orders    Liquid-based pap smear, screening (Completed)    HPV High Risk Genotypes, PCR (Completed)      I addressed all major concerns as it related to health maintenance.  All were ordered and scheduled based on the patients wishes.  Any additional health maintenance will be readdressed at the next physical if declined or deferred by the patient.        Health Maintenance reviewed.    Follow-up: Follow up in about 1 year (around 9/11/2020) for annual exam.    ______________________________________________________________________    Chief Complaint  Chief Complaint   Patient presents with    Annual Exam       HPI  Emily Jacobson  Yovani is a 49 y.o. female with multiple medical diagnoses as listed in the medical history and problem list that presents for annual exam.  Pt is known to me with last appointment 8/28/2019.    Patient denies any new symptoms including chest pain, SOB, blurry vision, N/V, diarrhea.        PAST MEDICAL HISTORY:  Past Medical History:   Diagnosis Date    Asthma in remission     COPD (chronic obstructive pulmonary disease)     Fibromyalgia     Hypothyroidism     Immune disorder     Lupus     Recurrent urticaria 1/6/2017    Subacute cutaneous lupus erythematosus        PAST SURGICAL HISTORY:  Past Surgical History:   Procedure Laterality Date    CHOLECYSTECTOMY         SOCIAL HISTORY:  Social History     Socioeconomic History    Marital status:      Spouse name: Not on file    Number of children: Not on file    Years of education: Not on file    Highest education level: Not on file   Occupational History    Not on file   Social Needs    Financial resource strain: Not on file    Food insecurity:     Worry: Not on file     Inability: Not on file    Transportation needs:     Medical: Not on file     Non-medical: Not on file   Tobacco Use    Smoking status: Current Every Day Smoker     Packs/day: 2.00     Types: Cigarettes    Smokeless tobacco: Former User     Quit date: 7/8/2013   Substance and Sexual Activity    Alcohol use: No     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     Drug use: No    Sexual activity: Yes     Partners: Male   Lifestyle    Physical activity:     Days per week: Not on file     Minutes per session: Not on file    Stress: Not on file   Relationships    Social connections:     Talks on phone: Not on file     Gets together: Not on file     Attends [employer] service: Not on file     Active member of club or organization: Not on file     Attends meetings of clubs or organizations: Not on file     Relationship status: Not on file   Other Topics Concern    Not on file    Social History Narrative    Not on file       FAMILY HISTORY:  Family History   Problem Relation Age of Onset    Hypertension Mother     Thyroid disease Mother     Cataracts Mother     Arthritis Father     Hyperlipidemia Father     Cancer Father         Multiple Myeloma     Cirrhosis Father     Cataracts Father     Lupus Sister     Asthma Sister     No Known Problems Brother     No Known Problems Maternal Aunt     No Known Problems Maternal Uncle     No Known Problems Paternal Aunt     No Known Problems Paternal Uncle     No Known Problems Maternal Grandmother     No Known Problems Maternal Grandfather     No Known Problems Paternal Grandmother     No Known Problems Paternal Grandfather     Scoliosis Sister     Amblyopia Neg Hx     Blindness Neg Hx     Diabetes Neg Hx     Glaucoma Neg Hx     Macular degeneration Neg Hx     Retinal detachment Neg Hx     Strabismus Neg Hx     Stroke Neg Hx        ALLERGIES AND MEDICATIONS: updated and reviewed.  Review of patient's allergies indicates:   Allergen Reactions    Bactrim [sulfamethoxazole-trimethoprim] Rash     Current Outpatient Medications   Medication Sig Dispense Refill    albuterol (PROVENTIL) 2.5 mg /3 mL (0.083 %) nebulizer solution TAKE 3 MLS (2.5 MG TOTAL) BY NEBULIZATION EVERY 6 (SIX) HOURS AS NEEDED FOR WHEEZING. 150 mL 0    cetirizine (ZYRTEC) 10 MG tablet Take 10 mg by mouth once daily.      estradiol (ESTRACE) 0.01 % (0.1 mg/gram) vaginal cream Insert 2 g daily intravaginally for 2 weeks, then 1 g twice weekly 42.5 g 5    fluorometholone 0.1% (FML) 0.1 % DrpS 1 DROP IN BOTH EYES TWICE A DAY FOR 2 WEEKS THEN DAILY FOR 2 WEEKS  0    fluticasone propionate (FLONASE) 50 mcg/actuation nasal spray USE 2 SPRAYS IN EACH NOSTRIL ONE TIME DAILY 48 g 0    gabapentin (NEURONTIN) 600 MG tablet TAKE 1 TABLET EVERY DAY 90 tablet 3    HYDROcodone-acetaminophen (NORCO) 7.5-325 mg per tablet Take 1 tablet by mouth every 24 hours as  needed for Pain. 30 tablet 0    hydrocortisone 2.5 % ointment Apply topically 2 (two) times daily. For eyelid rash PRN 28 g 2    hydroxychloroquine (PLAQUENIL) 200 mg tablet TAKE 1 TABLET EVERY DAY 90 tablet 3    hydrOXYzine HCl (ATARAX) 25 MG tablet 1-8 tablets at bedtime for itching. 240 tablet 1    levothyroxine (SYNTHROID) 100 MCG tablet Take 1 tablet by mouth Monday - Saturday and take 1/2 tablet on sunday 90 tablet 0    montelukast (SINGULAIR) 10 mg tablet Take 1 tablet (10 mg total) by mouth every evening. 90 tablet 1    pantoprazole (PROTONIX) 40 MG tablet Take 1 tablet (40 mg total) by mouth once daily. 90 tablet 3    sertraline (ZOLOFT) 25 MG tablet Take 1 tablet (25 mg total) by mouth once daily. 30 tablet 0    soy isofl/blk coh/gr tea/yerba (ESTROVEN ENERGY ORAL) Take by mouth.      triamcinolone (KENALOG) 0.5 % ointment Apply topically 2 (two) times daily. For lupus skin flares on body 15 g 5    VENTOLIN HFA 90 mcg/actuation inhaler INHALE 2 PUFFS INTO THE LUNGS EVERY 4 HOURS AS NEEDED 18 Inhaler 0    EPINEPHrine (EPIPEN 2-GIGI) 0.3 mg/0.3 mL AtIn Inject 0.3 mLs (0.3 mg total) into the muscle once. for 1 dose 2 Device 0    fluticasone-salmeterol (ADVAIR HFA) 115-21 mcg/actuation HFAA Inhale 2 puffs into the lungs 2 (two) times daily. 12 g 6     Current Facility-Administered Medications   Medication Dose Route Frequency Provider Last Rate Last Dose    dexamethasone injection 8 mg  8 mg Intramuscular 1 time in Clinic/HOD ANTHONY MarquezC             ROS  Review of Systems   Constitutional: Negative for activity change, appetite change, fatigue, fever and unexpected weight change.   HENT: Negative.  Negative for ear discharge, ear pain, rhinorrhea and sore throat.    Eyes: Negative.    Respiratory: Negative for apnea, cough, chest tightness, shortness of breath and wheezing.    Cardiovascular: Negative for chest pain, palpitations and leg swelling.   Gastrointestinal: Negative for  "abdominal distention, abdominal pain, constipation, diarrhea and vomiting.   Endocrine: Negative for cold intolerance, heat intolerance, polydipsia and polyuria.   Genitourinary: Negative for decreased urine volume and urgency.   Musculoskeletal: Negative.    Skin: Negative for rash.   Neurological: Negative for dizziness and headaches.   Hematological: Does not bruise/bleed easily.   Psychiatric/Behavioral: Negative for agitation, sleep disturbance and suicidal ideas.         Physical Exam  Vitals:    09/11/19 0906   BP: 100/64   BP Location: Right arm   Patient Position: Sitting   BP Method: Small (Manual)   Pulse: 64   Temp: 98.2 °F (36.8 °C)   TempSrc: Oral   SpO2: 97%   Weight: 55.4 kg (122 lb 2.2 oz)   Height: 5' 1" (1.549 m)    Body mass index is 23.08 kg/m².  Weight: 55.4 kg (122 lb 2.2 oz)   Height: 5' 1" (154.9 cm)   Physical Exam   Constitutional: She is oriented to person, place, and time. She appears well-developed and well-nourished.   HENT:   Head: Normocephalic and atraumatic.   Right Ear: External ear normal.   Left Ear: External ear normal.   Nose: Nose normal.   Mouth/Throat: Oropharynx is clear and moist.   Eyes: Pupils are equal, round, and reactive to light. Conjunctivae and EOM are normal.   Neck: Normal range of motion. No JVD present. No thyromegaly present.   Cardiovascular: Normal rate, regular rhythm and normal heart sounds.   Pulmonary/Chest: Effort normal and breath sounds normal. She has no wheezes. Right breast exhibits no inverted nipple, no mass, no nipple discharge, no skin change and no tenderness. Left breast exhibits no inverted nipple, no mass, no nipple discharge, no skin change and no tenderness.   Abdominal: Soft. Bowel sounds are normal. She exhibits no distension. There is no tenderness.   Genitourinary: Vagina normal and uterus normal. Pelvic exam was performed with patient supine. There is no rash, tenderness or lesion on the right labia. There is no rash, tenderness, " lesion or injury on the left labia. Cervix exhibits no discharge and no friability. Right adnexum displays no mass, no tenderness and no fullness. Left adnexum displays no mass, no tenderness and no fullness.   Musculoskeletal: Normal range of motion.   Lymphadenopathy:     She has no cervical adenopathy.   Neurological: She is alert and oriented to person, place, and time. She has normal reflexes.   Skin: Skin is warm and dry.   Psychiatric: She has a normal mood and affect. Her behavior is normal. Judgment and thought content normal.   Vitals reviewed.        Health Maintenance       Date Due Completion Date    Pap Smear with HPV Cotest 01/20/2019 1/20/2016    Influenza Vaccine (1) 09/01/2019 11/7/2017    Mammogram 09/12/2019 (Originally 3/6/2019) 3/6/2017    Colonoscopy 04/18/2021 4/18/2016    Lipid Panel 06/02/2021 6/2/2016    TETANUS VACCINE 09/02/2026 9/2/2016              Patient note was created using [employer].  Any errors in syntax or even information may not have been identified and edited on initial review prior to signing this note.

## 2019-09-14 ENCOUNTER — LAB VISIT (OUTPATIENT)
Dept: LAB | Facility: HOSPITAL | Age: 49
End: 2019-09-14
Attending: FAMILY MEDICINE
Payer: MEDICARE

## 2019-09-14 DIAGNOSIS — M79.7 FIBROMYALGIA: ICD-10-CM

## 2019-09-14 DIAGNOSIS — L93.1 SUBACUTE CUTANEOUS LUPUS ERYTHEMATOSUS: ICD-10-CM

## 2019-09-14 DIAGNOSIS — Z92.25 HISTORY OF IMMUNOSUPPRESSION THERAPY: ICD-10-CM

## 2019-09-14 DIAGNOSIS — E03.9 HYPOTHYROIDISM, UNSPECIFIED TYPE: ICD-10-CM

## 2019-09-14 DIAGNOSIS — Z72.0 TOBACCO ABUSE: ICD-10-CM

## 2019-09-14 DIAGNOSIS — G89.4 CHRONIC PAIN SYNDROME: ICD-10-CM

## 2019-09-14 DIAGNOSIS — R19.7 DIARRHEA, UNSPECIFIED TYPE: ICD-10-CM

## 2019-09-14 DIAGNOSIS — Z00.00 ANNUAL PHYSICAL EXAM: ICD-10-CM

## 2019-09-14 LAB
ALBUMIN SERPL BCP-MCNC: 3.9 G/DL (ref 3.5–5.2)
ALP SERPL-CCNC: 57 U/L (ref 55–135)
ALT SERPL W/O P-5'-P-CCNC: 10 U/L (ref 10–44)
ANION GAP SERPL CALC-SCNC: 9 MMOL/L (ref 8–16)
AST SERPL-CCNC: 19 U/L (ref 10–40)
BASOPHILS # BLD AUTO: 0.03 K/UL (ref 0–0.2)
BASOPHILS NFR BLD: 0.7 % (ref 0–1.9)
BILIRUB SERPL-MCNC: 0.2 MG/DL (ref 0.1–1)
BUN SERPL-MCNC: 11 MG/DL (ref 6–20)
C3 SERPL-MCNC: 106 MG/DL (ref 50–180)
C4 SERPL-MCNC: 20 MG/DL (ref 11–44)
CALCIUM SERPL-MCNC: 8.9 MG/DL (ref 8.7–10.5)
CHLORIDE SERPL-SCNC: 106 MMOL/L (ref 95–110)
CHOLEST SERPL-MCNC: 135 MG/DL (ref 120–199)
CHOLEST/HDLC SERPL: 3.2 {RATIO} (ref 2–5)
CO2 SERPL-SCNC: 25 MMOL/L (ref 23–29)
CREAT SERPL-MCNC: 0.8 MG/DL (ref 0.5–1.4)
CRP SERPL-MCNC: 2 MG/L (ref 0–8.2)
DIFFERENTIAL METHOD: NORMAL
EOSINOPHIL # BLD AUTO: 0.1 K/UL (ref 0–0.5)
EOSINOPHIL NFR BLD: 3.1 % (ref 0–8)
ERYTHROCYTE [DISTWIDTH] IN BLOOD BY AUTOMATED COUNT: 12.7 % (ref 11.5–14.5)
ERYTHROCYTE [SEDIMENTATION RATE] IN BLOOD BY WESTERGREN METHOD: 26 MM/HR (ref 0–36)
EST. GFR  (AFRICAN AMERICAN): >60 ML/MIN/1.73 M^2
EST. GFR  (NON AFRICAN AMERICAN): >60 ML/MIN/1.73 M^2
GLUCOSE SERPL-MCNC: 92 MG/DL (ref 70–110)
HCT VFR BLD AUTO: 37.6 % (ref 37–48.5)
HDLC SERPL-MCNC: 42 MG/DL (ref 40–75)
HDLC SERPL: 31.1 % (ref 20–50)
HGB BLD-MCNC: 12.1 G/DL (ref 12–16)
IMM GRANULOCYTES # BLD AUTO: 0.01 K/UL (ref 0–0.04)
IMM GRANULOCYTES NFR BLD AUTO: 0.2 % (ref 0–0.5)
LDLC SERPL CALC-MCNC: 74 MG/DL (ref 63–159)
LYMPHOCYTES # BLD AUTO: 1.7 K/UL (ref 1–4.8)
LYMPHOCYTES NFR BLD: 38 % (ref 18–48)
MCH RBC QN AUTO: 29.7 PG (ref 27–31)
MCHC RBC AUTO-ENTMCNC: 32.2 G/DL (ref 32–36)
MCV RBC AUTO: 92 FL (ref 82–98)
MONOCYTES # BLD AUTO: 0.3 K/UL (ref 0.3–1)
MONOCYTES NFR BLD: 7.6 % (ref 4–15)
NEUTROPHILS # BLD AUTO: 2.3 K/UL (ref 1.8–7.7)
NEUTROPHILS NFR BLD: 50.4 % (ref 38–73)
NONHDLC SERPL-MCNC: 93 MG/DL
NRBC BLD-RTO: 0 /100 WBC
PLATELET # BLD AUTO: 263 K/UL (ref 150–350)
PMV BLD AUTO: 11.1 FL (ref 9.2–12.9)
POTASSIUM SERPL-SCNC: 4.1 MMOL/L (ref 3.5–5.1)
PROT SERPL-MCNC: 7.5 G/DL (ref 6–8.4)
RBC # BLD AUTO: 4.07 M/UL (ref 4–5.4)
SODIUM SERPL-SCNC: 140 MMOL/L (ref 136–145)
TRIGL SERPL-MCNC: 95 MG/DL (ref 30–150)
TSH SERPL DL<=0.005 MIU/L-ACNC: 2.82 UIU/ML (ref 0.4–4)
WBC # BLD AUTO: 4.47 K/UL (ref 3.9–12.7)

## 2019-09-14 PROCEDURE — 36415 COLL VENOUS BLD VENIPUNCTURE: CPT | Mod: HCNC,PO

## 2019-09-14 PROCEDURE — 85652 RBC SED RATE AUTOMATED: CPT | Mod: HCNC

## 2019-09-14 PROCEDURE — 86160 COMPLEMENT ANTIGEN: CPT | Mod: 59,HCNC

## 2019-09-14 PROCEDURE — 83516 IMMUNOASSAY NONANTIBODY: CPT | Mod: 59,HCNC

## 2019-09-14 PROCEDURE — 86160 COMPLEMENT ANTIGEN: CPT | Mod: HCNC

## 2019-09-14 PROCEDURE — 84443 ASSAY THYROID STIM HORMONE: CPT | Mod: HCNC

## 2019-09-14 PROCEDURE — 85025 COMPLETE CBC W/AUTO DIFF WBC: CPT | Mod: HCNC

## 2019-09-14 PROCEDURE — 86225 DNA ANTIBODY NATIVE: CPT | Mod: HCNC

## 2019-09-14 PROCEDURE — 86140 C-REACTIVE PROTEIN: CPT | Mod: HCNC

## 2019-09-14 PROCEDURE — 80061 LIPID PANEL: CPT | Mod: HCNC

## 2019-09-14 PROCEDURE — 80053 COMPREHEN METABOLIC PANEL: CPT | Mod: HCNC

## 2019-09-16 LAB
DSDNA AB SER-ACNC: NORMAL [IU]/ML
HPV HR 12 DNA CVX QL NAA+PROBE: NEGATIVE
HPV16 AG SPEC QL: NEGATIVE
HPV18 DNA SPEC QL NAA+PROBE: NEGATIVE

## 2019-09-20 LAB
TTG IGA SER-ACNC: 6 UNITS
TTG IGG SER-ACNC: 3 UNITS

## 2019-10-01 ENCOUNTER — PATIENT MESSAGE (OUTPATIENT)
Dept: RHEUMATOLOGY | Facility: CLINIC | Age: 49
End: 2019-10-01

## 2019-10-03 ENCOUNTER — TELEPHONE (OUTPATIENT)
Dept: GASTROENTEROLOGY | Facility: CLINIC | Age: 49
End: 2019-10-03

## 2019-10-03 DIAGNOSIS — J31.0 RHINITIS, UNSPECIFIED TYPE: Primary | ICD-10-CM

## 2019-10-03 NOTE — TELEPHONE ENCOUNTER
MA spoke to pt offer pt apt on 10/22 with Dr. Lopez. Pt confirmed apt and thank MA     Message sent to Zoila to schedule apt.     ----- Message from Paula Funez sent at 10/3/2019 10:38 AM CDT -----  Contact: Pt   PT calling  To schedule a NP appointment for constant diarrhea and stomach pains.     Pt can be reached [phone]    Thanks

## 2019-10-08 ENCOUNTER — PATIENT MESSAGE (OUTPATIENT)
Dept: ALLERGY | Facility: CLINIC | Age: 49
End: 2019-10-08

## 2019-10-09 ENCOUNTER — OFFICE VISIT (OUTPATIENT)
Dept: FAMILY MEDICINE | Facility: CLINIC | Age: 49
End: 2019-10-09
Payer: MEDICARE

## 2019-10-09 ENCOUNTER — TELEPHONE (OUTPATIENT)
Dept: ALLERGY | Facility: CLINIC | Age: 49
End: 2019-10-09

## 2019-10-09 ENCOUNTER — PATIENT MESSAGE (OUTPATIENT)
Dept: FAMILY MEDICINE | Facility: CLINIC | Age: 49
End: 2019-10-09

## 2019-10-09 VITALS
WEIGHT: 119 LBS | OXYGEN SATURATION: 98 % | BODY MASS INDEX: 22.47 KG/M2 | DIASTOLIC BLOOD PRESSURE: 64 MMHG | SYSTOLIC BLOOD PRESSURE: 110 MMHG | HEIGHT: 61 IN | TEMPERATURE: 99 F | HEART RATE: 73 BPM

## 2019-10-09 DIAGNOSIS — M54.50 ACUTE LOW BACK PAIN WITHOUT SCIATICA, UNSPECIFIED BACK PAIN LATERALITY: Primary | ICD-10-CM

## 2019-10-09 DIAGNOSIS — Z79.899 LONG-TERM USE OF PLAQUENIL: ICD-10-CM

## 2019-10-09 DIAGNOSIS — L30.9 ECZEMA, UNSPECIFIED TYPE: ICD-10-CM

## 2019-10-09 DIAGNOSIS — M79.7 FIBROMYALGIA: ICD-10-CM

## 2019-10-09 DIAGNOSIS — M62.838 MUSCLE SPASM: ICD-10-CM

## 2019-10-09 DIAGNOSIS — L93.0 LUPUS ERYTHEMATOSUS, UNSPECIFIED FORM: ICD-10-CM

## 2019-10-09 PROCEDURE — 99999 PR PBB SHADOW E&M-EST. PATIENT-LVL III: ICD-10-PCS | Mod: PBBFAC,HCNC,, | Performed by: FAMILY MEDICINE

## 2019-10-09 PROCEDURE — 99999 PR PBB SHADOW E&M-EST. PATIENT-LVL III: CPT | Mod: PBBFAC,HCNC,, | Performed by: FAMILY MEDICINE

## 2019-10-09 PROCEDURE — 3008F BODY MASS INDEX DOCD: CPT | Mod: HCNC,CPTII,S$GLB, | Performed by: FAMILY MEDICINE

## 2019-10-09 PROCEDURE — 99214 OFFICE O/P EST MOD 30 MIN: CPT | Mod: HCNC,S$GLB,, | Performed by: FAMILY MEDICINE

## 2019-10-09 PROCEDURE — 3008F PR BODY MASS INDEX (BMI) DOCUMENTED: ICD-10-PCS | Mod: HCNC,CPTII,S$GLB, | Performed by: FAMILY MEDICINE

## 2019-10-09 PROCEDURE — 99214 PR OFFICE/OUTPT VISIT, EST, LEVL IV, 30-39 MIN: ICD-10-PCS | Mod: HCNC,S$GLB,, | Performed by: FAMILY MEDICINE

## 2019-10-09 RX ORDER — CYCLOBENZAPRINE HCL 5 MG
5 TABLET ORAL 3 TIMES DAILY PRN
Qty: 60 TABLET | Refills: 3 | Status: SHIPPED | OUTPATIENT
Start: 2019-10-09 | End: 2019-10-11

## 2019-10-09 RX ORDER — CYCLOBENZAPRINE HCL 5 MG
5 TABLET ORAL 3 TIMES DAILY PRN
Qty: 60 TABLET | Refills: 3 | Status: CANCELLED | OUTPATIENT
Start: 2019-10-09 | End: 2019-10-19

## 2019-10-09 RX ORDER — HYDROCORTISONE 25 MG/G
OINTMENT TOPICAL
Qty: 20 G | Refills: 2 | Status: SHIPPED | OUTPATIENT
Start: 2019-10-09 | End: 2020-06-01

## 2019-10-09 NOTE — PROGRESS NOTES
Office Visit    Patient Name: Emily Pina    : 1970  MRN: [id number]      Assessment/Plan:  Emily Pina is a 49 y.o. female who presents today for :    Acute low back pain without sciatica, unspecified back pain laterality  Muscle spasm  -     cyclobenzaprine (FLEXERIL) 5 MG tablet; Take 1 tablet (5 mg total) by mouth 3 (three) times daily as needed for Muscle spasms.  Dispense: 60 tablet; Refill: 3  Fibromyalgia  Lupus erythematosus, unspecified form  Long-term use of Plaquenil    -exam c/w mm spasm with no red flags.  -reassurance provided - advised pt that pain may last up to 4-6 weeks, but in the meantime, advised to add heat/massage and avoid provocative movements that could worsen pain, maintain good posture, as well as using proper lifting techniques.  -continue Mobic and gabapentin, may add Tylenol PRN - restart Flexeril prn.  -counseled patient extensively on stretching/strengthening exercises, maintaining good posture as demonstrated in clinic (handout also given) to help with decreasing risk for future injury.  -may use back brace if needed, especially with strenuous activities or any lifting  -RTC in 4-6 weeks, or sooner if Sx worsens or call clinic back if pt has any concerns.          Follow up for worsening Sx. Urgent care/ED precautions provided.     This note was created by combination of typed  and Dragon dictation.  Transcription errors may be present.  If there are any questions, please contact me.        ----------------------------------------------------------------------------------------------------------------------      HPI:  Patient Care Team:  Emma Subramanian MD as PCP - General (Family Medicine)  Champ Gordon II, MD as Consulting Physician (Gastroenterology)  Leonardo Jones OD as Consulting Physician (Optometry)  Irasema Lemus LPN as Licensed Practical Nurse    Emily is a 49 y.o. female with      Patient Active Problem  List   Diagnosis    Subacute cutaneous lupus erythematosus    Fibromyalgia    Chronic obstructive airway disease with asthma    Tobacco abuse    GERD (gastroesophageal reflux disease)    Unspecified hereditary and idiopathic peripheral neuropathy    Hypothyroidism    Chronic allergic conjunctivitis    Chronic allergic rhinitis due to animal hair and dander    Recurrent urticaria    Other pruritus    Biliary colic    S/P laparoscopic cholecystectomy    Disorder of immune system    Decreased functional activity tolerance    Decreased ROM of lumbar spine    Weakness    Refractive error    Long-term use of Plaquenil     This patient is new to me       Patient presents today for :  Back Pain    that started a month ago, no injuries to the area, no falls - this an acute flare up of her this is a chronic recurrent issue for patient, she had been seen by pain Mx and Rheum and is currently on Plaquenil for Lupus as well as Mobic/Gapapentin for Fibromyalgia - which helps somewhat with the pain. She was previously on Flexeril with good control but was taken off, and thinks that it may benefit her to be back on it. At worst, the pain is 6/10, worse with bending over, twisting trunk, resting makes the pain better.  She is still able to perform daily routine. No BMs changes or urinary changes/tingling/numbness/weakness. Lumbar XR from earlier this year unremarkable (see below)      Additional ROS    No CP/SOB/palpitations/swelling  No cough/wheezing/SOB  No nausea/vomiting/abd pain/no diarrhea, no constipation, blood in stool  No rash, no history of allergies to any specific substances      X-Ray Lumbar Spine Ap Lateral w/Flex Ext   Order: [id number]   Status:  Final result   Visible to patient:  Yes (Patient Portal)   Next appt:  10/16/2019 at 09:15 AM in Dermatology (Jessica Thomas MD)   Dx:  Chronic bilateral low back pain, with...   Details     Reading Physician Reading Date Result Priority   Giuseppe Fitzpatrick  MD Sofya 4/26/2019       Narrative     EXAMINATION:  XR LUMBAR SPINE AP AND LAT WITH FLEX/EXT    CLINICAL HISTORY:  Low back pain    TECHNIQUE:  AP and lateral views as well as lateral flexion and extension images are performed through the lumbar spine.    COMPARISON:  None    FINDINGS:  Bones are fairly well mineralized.  Vertebral body heights disc spaces and alignment is satisfactory.  No subluxation.      Impression       No significant abnormality identified.  There are 4 non rib bearing segments.               Patient Active Problem List   Diagnosis    Subacute cutaneous lupus erythematosus    Fibromyalgia    Chronic obstructive airway disease with asthma    Tobacco abuse    GERD (gastroesophageal reflux disease)    Unspecified hereditary and idiopathic peripheral neuropathy    Hypothyroidism    Chronic allergic conjunctivitis    Chronic allergic rhinitis due to animal hair and dander    Recurrent urticaria    Other pruritus    Biliary colic    S/P laparoscopic cholecystectomy    Disorder of immune system    Decreased functional activity tolerance    Decreased ROM of lumbar spine    Weakness    Refractive error    Long-term use of Plaquenil       Current Medications  Medications reviewed/updated.     Current Outpatient Medications on File Prior to Visit   Medication Sig Dispense Refill    albuterol (PROVENTIL) 2.5 mg /3 mL (0.083 %) nebulizer solution TAKE 3 MLS (2.5 MG TOTAL) BY NEBULIZATION EVERY 6 (SIX) HOURS AS NEEDED FOR WHEEZING. 150 mL 0    cetirizine (ZYRTEC) 10 MG tablet Take 10 mg by mouth once daily.      EPINEPHrine (EPIPEN 2-GIGI) 0.3 mg/0.3 mL AtIn Inject 0.3 mLs (0.3 mg total) into the muscle once. for 1 dose 2 Device 0    estradiol (ESTRACE) 0.01 % (0.1 mg/gram) vaginal cream Insert 2 g daily intravaginally for 2 weeks, then 1 g twice weekly 42.5 g 5    fluorometholone 0.1% (FML) 0.1 % DrpS 1 DROP IN BOTH EYES TWICE A DAY FOR 2 WEEKS THEN DAILY FOR 2 WEEKS  0     fluticasone propionate (FLONASE) 50 mcg/actuation nasal spray USE 2 SPRAYS IN EACH NOSTRIL ONE TIME DAILY 48 g 0    fluticasone-salmeterol (ADVAIR HFA) 115-21 mcg/actuation HFAA Inhale 2 puffs into the lungs 2 (two) times daily. 12 g 6    gabapentin (NEURONTIN) 600 MG tablet TAKE 1 TABLET EVERY DAY 90 tablet 3    HYDROcodone-acetaminophen (NORCO) 7.5-325 mg per tablet Take 1 tablet by mouth every 24 hours as needed for Pain. 30 tablet 0    hydrocortisone 2.5 % ointment Apply topically 2 (two) times daily. For eyelid rash PRN 28 g 2    hydroxychloroquine (PLAQUENIL) 200 mg tablet TAKE 1 TABLET EVERY DAY 90 tablet 3    hydrOXYzine HCl (ATARAX) 25 MG tablet 1-8 tablets at bedtime for itching. 240 tablet 1    levothyroxine (SYNTHROID) 100 MCG tablet Take 1 tablet by mouth Monday - Saturday and take 1/2 tablet on sunday 90 tablet 0    montelukast (SINGULAIR) 10 mg tablet Take 1 tablet (10 mg total) by mouth every evening. 90 tablet 1    pantoprazole (PROTONIX) 40 MG tablet Take 1 tablet (40 mg total) by mouth once daily. 90 tablet 3    sertraline (ZOLOFT) 25 MG tablet Take 1 tablet (25 mg total) by mouth once daily. 30 tablet 0    soy isofl/blk coh/gr tea/yerba (ESTROVEN ENERGY ORAL) Take by mouth.      triamcinolone (KENALOG) 0.5 % ointment Apply topically 2 (two) times daily. For lupus skin flares on body 15 g 5    VENTOLIN HFA 90 mcg/actuation inhaler INHALE 2 PUFFS INTO THE LUNGS EVERY 4 HOURS AS NEEDED 18 Inhaler 0     Current Facility-Administered Medications on File Prior to Visit   Medication Dose Route Frequency Provider Last Rate Last Dose    dexamethasone injection 8 mg  8 mg Intramuscular 1 time in Clinic/[facility] MARLO Marquez           Past Surgical History:   Procedure Laterality Date    CHOLECYSTECTOMY         Family History   Problem Relation Age of Onset    Hypertension Mother     Thyroid disease Mother     Cataracts Mother     Arthritis Father     Hyperlipidemia Father      Cancer Father         Multiple Myeloma     Cirrhosis Father     Cataracts Father     Lupus Sister     Asthma Sister     No Known Problems Brother     No Known Problems Maternal Aunt     No Known Problems Maternal Uncle     No Known Problems Paternal Aunt     No Known Problems Paternal Uncle     No Known Problems Maternal Grandmother     No Known Problems Maternal Grandfather     No Known Problems Paternal Grandmother     No Known Problems Paternal Grandfather     Scoliosis Sister     Amblyopia Neg Hx     Blindness Neg Hx     Diabetes Neg Hx     Glaucoma Neg Hx     Macular degeneration Neg Hx     Retinal detachment Neg Hx     Strabismus Neg Hx     Stroke Neg Hx        Social History     Socioeconomic History    Marital status:      Spouse name: Not on file    Number of children: Not on file    Years of education: Not on file    Highest education level: Not on file   Occupational History    Not on file   Social Needs    Financial resource strain: Not on file    Food insecurity:     Worry: Not on file     Inability: Not on file    Transportation needs:     Medical: Not on file     Non-medical: Not on file   Tobacco Use    Smoking status: Current Every Day Smoker     Packs/day: 2.00     Types: Cigarettes    Smokeless tobacco: Former User     Quit date: 7/8/2013   Substance and Sexual Activity    Alcohol use: No     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     Drug use: No    Sexual activity: Yes     Partners: Male   Lifestyle    Physical activity:     Days per week: Not on file     Minutes per session: Not on file    Stress: Not on file   Relationships    Social connections:     Talks on phone: Not on file     Gets together: Not on file     Attends Alevism service: Not on file     Active member of club or organization: Not on file     Attends meetings of clubs or organizations: Not on file     Relationship status: Not on file   Other Topics Concern    Not on file   Social  "History Narrative    Not on file           Allergies   Review of patient's allergies indicates:   Allergen Reactions    Bactrim [sulfamethoxazole-trimethoprim] Rash             Review of Systems  See HPI      Physical Exam  /64   Pulse 73   Temp 98.6 °F (37 °C) (Oral)   Ht 5' 1" (1.549 m)   Wt 54 kg (119 lb)   LMP 05/14/2014 (Approximate)   SpO2 98%   BMI 22.48 kg/m²     GEN: NAD, well developed, pleasant, well nourished  HEENT: NCAT, PERRLA, EOMI, sclera clear, anicteric\  NECK: normal, supple with midline trachea, no LAD, no thyromegaly  LUNGS: CTAB, no w/r/r, no increased work of breathing   HEART: RRR, normal S1 and S2, no m/r/g, no edema  ABD: s/nt/nd, NABS  SKIN: normal turgor, no rashes  PSYCH: AOx3, appropriate mood and affect  MSK: warm/well perfused, normal ROM in all extremities, no c/c/e.  BACK: normal alignment of spine. FROM of back. Normal gait.  +TTP over the L4-L5 area over spine and b/l paraspinal mm.   +pain with forward flexion and backward extension. No pain with lateral bending. NEG straight leg raise.                  "

## 2019-10-09 NOTE — TELEPHONE ENCOUNTER
Called and left a message for the patient to give us a call back in regards to rescheduling her missed appointment.

## 2019-10-11 ENCOUNTER — PATIENT MESSAGE (OUTPATIENT)
Dept: FAMILY MEDICINE | Facility: CLINIC | Age: 49
End: 2019-10-11

## 2019-10-11 DIAGNOSIS — M54.50 ACUTE LOW BACK PAIN WITHOUT SCIATICA, UNSPECIFIED BACK PAIN LATERALITY: Primary | ICD-10-CM

## 2019-10-11 DIAGNOSIS — M62.838 MUSCLE SPASM: ICD-10-CM

## 2019-10-11 RX ORDER — CYCLOBENZAPRINE HCL 5 MG
5 TABLET ORAL 3 TIMES DAILY PRN
Qty: 60 TABLET | Refills: 2 | Status: SHIPPED | OUTPATIENT
Start: 2019-10-11 | End: 2019-10-22

## 2019-10-11 RX ORDER — NAPROXEN 500 MG/1
500 TABLET ORAL 2 TIMES DAILY WITH MEALS
Qty: 30 TABLET | Refills: 0 | Status: SHIPPED | OUTPATIENT
Start: 2019-10-11 | End: 2019-10-24 | Stop reason: SDUPTHER

## 2019-10-11 NOTE — TELEPHONE ENCOUNTER
Acute low back pain without sciatica, unspecified back pain laterality  -     naproxen (NAPROSYN) 500 MG tablet; Take 1 tablet (500 mg total) by mouth 2 (two) times daily with meals.  Dispense: 30 tablet; Refill: 0    Muscle spasm  -     cyclobenzaprine (FLEXERIL) 5 MG tablet; Take 1 tablet (5 mg total) by mouth 3 (three) times daily as needed for Muscle spasms.  Dispense: 60 tablet; Refill: 2

## 2019-10-15 ENCOUNTER — PATIENT MESSAGE (OUTPATIENT)
Dept: RHEUMATOLOGY | Facility: CLINIC | Age: 49
End: 2019-10-15

## 2019-10-15 ENCOUNTER — TELEPHONE (OUTPATIENT)
Dept: RHEUMATOLOGY | Facility: CLINIC | Age: 49
End: 2019-10-15

## 2019-10-15 DIAGNOSIS — M79.7 FIBROMYALGIA: ICD-10-CM

## 2019-10-15 DIAGNOSIS — G89.4 CHRONIC PAIN SYNDROME: ICD-10-CM

## 2019-10-15 DIAGNOSIS — Z72.0 TOBACCO ABUSE: ICD-10-CM

## 2019-10-15 DIAGNOSIS — L93.1 SUBACUTE CUTANEOUS LUPUS ERYTHEMATOSUS: ICD-10-CM

## 2019-10-15 DIAGNOSIS — M25.50 ARTHRALGIA, UNSPECIFIED JOINT: ICD-10-CM

## 2019-10-15 DIAGNOSIS — Z92.25 HISTORY OF IMMUNOSUPPRESSION THERAPY: ICD-10-CM

## 2019-10-15 RX ORDER — HYDROCODONE BITARTRATE AND ACETAMINOPHEN 7.5; 325 MG/1; MG/1
1 TABLET ORAL
Qty: 30 TABLET | Refills: 0 | Status: SHIPPED | OUTPATIENT
Start: 2019-10-15 | End: 2020-01-03 | Stop reason: SDUPTHER

## 2019-10-22 ENCOUNTER — TELEPHONE (OUTPATIENT)
Dept: ENDOSCOPY | Facility: HOSPITAL | Age: 49
End: 2019-10-22

## 2019-10-22 ENCOUNTER — OFFICE VISIT (OUTPATIENT)
Dept: GASTROENTEROLOGY | Facility: CLINIC | Age: 49
End: 2019-10-22
Payer: MEDICARE

## 2019-10-22 ENCOUNTER — PATIENT MESSAGE (OUTPATIENT)
Dept: ENDOSCOPY | Facility: HOSPITAL | Age: 49
End: 2019-10-22

## 2019-10-22 VITALS
BODY MASS INDEX: 22.86 KG/M2 | HEIGHT: 61 IN | WEIGHT: 121.06 LBS | HEART RATE: 69 BPM | SYSTOLIC BLOOD PRESSURE: 128 MMHG | DIASTOLIC BLOOD PRESSURE: 81 MMHG

## 2019-10-22 DIAGNOSIS — Z12.11 SPECIAL SCREENING FOR MALIGNANT NEOPLASMS, COLON: Primary | ICD-10-CM

## 2019-10-22 DIAGNOSIS — K21.9 GASTROESOPHAGEAL REFLUX DISEASE, ESOPHAGITIS PRESENCE NOT SPECIFIED: Primary | ICD-10-CM

## 2019-10-22 DIAGNOSIS — R19.7 DIARRHEA, UNSPECIFIED TYPE: ICD-10-CM

## 2019-10-22 PROCEDURE — 3008F PR BODY MASS INDEX (BMI) DOCUMENTED: ICD-10-PCS | Mod: HCNC,CPTII,S$GLB, | Performed by: INTERNAL MEDICINE

## 2019-10-22 PROCEDURE — 99204 OFFICE O/P NEW MOD 45 MIN: CPT | Mod: HCNC,S$GLB,, | Performed by: INTERNAL MEDICINE

## 2019-10-22 PROCEDURE — 99999 PR PBB SHADOW E&M-EST. PATIENT-LVL III: ICD-10-PCS | Mod: PBBFAC,HCNC,, | Performed by: INTERNAL MEDICINE

## 2019-10-22 PROCEDURE — 99999 PR PBB SHADOW E&M-EST. PATIENT-LVL III: CPT | Mod: PBBFAC,HCNC,, | Performed by: INTERNAL MEDICINE

## 2019-10-22 PROCEDURE — 99204 PR OFFICE/OUTPT VISIT, NEW, LEVL IV, 45-59 MIN: ICD-10-PCS | Mod: HCNC,S$GLB,, | Performed by: INTERNAL MEDICINE

## 2019-10-22 PROCEDURE — 3008F BODY MASS INDEX DOCD: CPT | Mod: HCNC,CPTII,S$GLB, | Performed by: INTERNAL MEDICINE

## 2019-10-22 RX ORDER — SODIUM, POTASSIUM,MAG SULFATES 17.5-3.13G
1 SOLUTION, RECONSTITUTED, ORAL ORAL DAILY
Qty: 1 KIT | Refills: 0 | Status: SHIPPED | OUTPATIENT
Start: 2019-10-22 | End: 2019-10-24

## 2019-10-22 NOTE — PROGRESS NOTES
Reason for visit:  Loose bowel movements, regurgitation with nausea    HPI:   is a 49-year-old who for the past 2 months has been noticing more loose bowel movements than usual.  She usually has 1 or 2 soft bowel movements a day with some form to.  Over the last 2 months she has been noticing more loose to watery bowel movements typically is 5 to 6 times a day.  She denies any significant urgency, abdominal pain, blood or mucus in the stool.  She denies any fevers or chills.  She had been having some night sweats recently but is attributed to menopause.  She denies any dysphagia, odynophagia or any oleksandr regurgitation of gastric contents.  She does however complain of nausea in the morning with some regurgitation of clear nonacidic fluid.  Over the past year she has lost close to 20 lb, unintentionally.  She denies any severe abdominal pains associated with the above symptoms.  Recently over the past 2 months her dose of Plaquenil has been increased to twice daily.  She has not been able to pinpoint any particular food items that could be triggering her symptoms.  She does take NSAIDs intermittently for back pain. Previously she was taking Tylenol and Aleve subsequently was on meloxicam for several months and now a new prescription for naproxen has been given.  She denies any melena, maroon stools or bright red blood per rectum. She denies taking any herbal supplements or multivitamins.  She denies drinking alcohol on regular basis.  Denies any abdominal surgeries besides cholecystectomy 2 years ago.  She did well post cholecystectomy without any major postprandial diarrhea issues.  Denies being on antibiotics on a regular basis.  No history of Clostridium difficile.  Denies any nocturnal diarrhea or is fecal incontinence.  History of endoscopy and colonoscopy approximately 5 years ago with colon polyps.    Past medical, surgical, social family history reviewed in epic    Medication allergies  reviewed in epic    Review of systems:  Constitutional:  No fever, no chills, night sweats present, appetite is slightly decreased, unintentional weight loss  Eyes:  No visual changes or red eyes  ENT:  No odynophagia or hoarseness of voice, no oral aphthous ulcers  Cardiovascular:  No angina or palpitation  Respiratory:  No shortness of breath or wheezing  Genitourinary:  No dysuria or frequency, complaints of abnormal urinary stream ( patient will seek Urology consultation)  Skin:  No pruritus or eczema, no painful lumps or bumps  Neurologic:  No headaches or seizures  Psychiatric:  No anxiety or depression  Gastrointestinal:  See HPI  Musculoskeletal:  Complains of significant low back pain attributed to fibromyalgia    Physical exam:  Vitals see epic, awake, alert, oriented x3 in no acute distress  Neck:  Supple, no carotid bruit, no cervical adenopathy  Eyes:  Conjunctivae anicteric, not injected ENT:  Oral mucosa moist with no aphthous ulcers  Abdomen:  Slightly obese, soft, nontender, nondistended, no masses palpated, no hepatosplenomegaly appreciated, bowel sounds are normal, no abdominal bruits heard  Cardiovascular:  S1, S2 normal, no murmurs or gallops  Respiratory:  Bilateral air entry equal with no rhonchi crackles  Skin:  No palmar erythema or spider angiomata  Neurologic:  Asterixis or tremors  Psychiatric:  Affect appropriate  Lower extremities:  No pedal edema    Recently labs reviewed    Impression:  2 month history of loose bowel movements along with intermittent regurgitation and nausea. Possibility of Plaquenil being the culprit, since the dose has been increased over the past 2 months.    Recommendation:  Proceed with EGD and colonoscopy with biopsies with further recommendations thereafter.

## 2019-10-24 DIAGNOSIS — M54.50 ACUTE LOW BACK PAIN WITHOUT SCIATICA, UNSPECIFIED BACK PAIN LATERALITY: ICD-10-CM

## 2019-10-25 RX ORDER — PANTOPRAZOLE SODIUM 40 MG/1
TABLET, DELAYED RELEASE ORAL
Qty: 90 TABLET | Refills: 3 | Status: SHIPPED | OUTPATIENT
Start: 2019-10-25 | End: 2019-11-05 | Stop reason: ALTCHOICE

## 2019-10-25 RX ORDER — SERTRALINE HYDROCHLORIDE 50 MG/1
TABLET, FILM COATED ORAL
Qty: 90 TABLET | Refills: 0 | OUTPATIENT
Start: 2019-10-25

## 2019-10-25 RX ORDER — NAPROXEN 500 MG/1
TABLET ORAL
Qty: 30 TABLET | Refills: 0 | Status: SHIPPED | OUTPATIENT
Start: 2019-10-25 | End: 2020-04-06

## 2019-10-30 RX ORDER — FLUTICASONE PROPIONATE 50 MCG
SPRAY, SUSPENSION (ML) NASAL
Qty: 48 G | Refills: 0 | Status: SHIPPED | OUTPATIENT
Start: 2019-10-30 | End: 2020-01-01

## 2019-11-05 ENCOUNTER — OFFICE VISIT (OUTPATIENT)
Dept: PULMONOLOGY | Facility: CLINIC | Age: 49
End: 2019-11-05
Payer: MEDICARE

## 2019-11-05 ENCOUNTER — PATIENT MESSAGE (OUTPATIENT)
Dept: PULMONOLOGY | Facility: CLINIC | Age: 49
End: 2019-11-05

## 2019-11-05 VITALS
WEIGHT: 118.81 LBS | SYSTOLIC BLOOD PRESSURE: 132 MMHG | BODY MASS INDEX: 22.43 KG/M2 | TEMPERATURE: 98 F | OXYGEN SATURATION: 100 % | HEIGHT: 61 IN | HEART RATE: 72 BPM | DIASTOLIC BLOOD PRESSURE: 79 MMHG

## 2019-11-05 DIAGNOSIS — R00.2 PALPITATIONS: ICD-10-CM

## 2019-11-05 DIAGNOSIS — K21.9 GASTROESOPHAGEAL REFLUX DISEASE, ESOPHAGITIS PRESENCE NOT SPECIFIED: ICD-10-CM

## 2019-11-05 DIAGNOSIS — G47.33 OSA (OBSTRUCTIVE SLEEP APNEA): Primary | ICD-10-CM

## 2019-11-05 DIAGNOSIS — J30.81 CHRONIC ALLERGIC RHINITIS DUE TO ANIMAL HAIR AND DANDER: Chronic | ICD-10-CM

## 2019-11-05 DIAGNOSIS — J45.20 MILD INTERMITTENT ASTHMA, UNSPECIFIED WHETHER COMPLICATED: ICD-10-CM

## 2019-11-05 DIAGNOSIS — Z72.0 TOBACCO ABUSE: ICD-10-CM

## 2019-11-05 PROCEDURE — 99999 PR PBB SHADOW E&M-EST. PATIENT-LVL V: CPT | Mod: PBBFAC,HCNC,, | Performed by: NURSE PRACTITIONER

## 2019-11-05 PROCEDURE — 99499 UNLISTED E&M SERVICE: CPT | Mod: S$GLB,,, | Performed by: NURSE PRACTITIONER

## 2019-11-05 PROCEDURE — 99499 RISK ADDL DX/OHS AUDIT: ICD-10-PCS | Mod: S$GLB,,, | Performed by: NURSE PRACTITIONER

## 2019-11-05 PROCEDURE — 99203 OFFICE O/P NEW LOW 30 MIN: CPT | Mod: HCNC,S$GLB,, | Performed by: NURSE PRACTITIONER

## 2019-11-05 PROCEDURE — 3008F BODY MASS INDEX DOCD: CPT | Mod: HCNC,CPTII,S$GLB, | Performed by: NURSE PRACTITIONER

## 2019-11-05 PROCEDURE — 99999 PR PBB SHADOW E&M-EST. PATIENT-LVL V: ICD-10-PCS | Mod: PBBFAC,HCNC,, | Performed by: NURSE PRACTITIONER

## 2019-11-05 PROCEDURE — 99203 PR OFFICE/OUTPT VISIT, NEW, LEVL III, 30-44 MIN: ICD-10-PCS | Mod: HCNC,S$GLB,, | Performed by: NURSE PRACTITIONER

## 2019-11-05 PROCEDURE — 3008F PR BODY MASS INDEX (BMI) DOCUMENTED: ICD-10-PCS | Mod: HCNC,CPTII,S$GLB, | Performed by: NURSE PRACTITIONER

## 2019-11-05 RX ORDER — DEXLANSOPRAZOLE 30 MG/1
1 CAPSULE, DELAYED RELEASE ORAL DAILY
Qty: 30 CAPSULE | Refills: 2 | Status: SHIPPED | OUTPATIENT
Start: 2019-11-05 | End: 2019-11-05

## 2019-11-05 RX ORDER — PANTOPRAZOLE SODIUM 40 MG/1
40 TABLET, DELAYED RELEASE ORAL DAILY
Qty: 90 TABLET | Refills: 1 | Status: SHIPPED | OUTPATIENT
Start: 2019-11-05 | End: 2020-05-05 | Stop reason: SDUPTHER

## 2019-11-05 RX ORDER — AZELASTINE 1 MG/ML
1 SPRAY, METERED NASAL 2 TIMES DAILY
Qty: 30 ML | Refills: 5 | Status: SHIPPED | OUTPATIENT
Start: 2019-11-05 | End: 2020-02-03 | Stop reason: SDUPTHER

## 2019-11-05 RX ORDER — CYCLOBENZAPRINE HCL 5 MG
TABLET ORAL
Refills: 2 | COMMUNITY
Start: 2019-10-25 | End: 2021-09-10 | Stop reason: SDUPTHER

## 2019-11-05 NOTE — PROGRESS NOTES
CHIEF COMPLAINT:    Chief Complaint   Patient presents with    Apnea    Consult       HISTORY OF PRESENT ILLNESS: Emily CentenoKyungBurgess is a 49 y.o. female with problems listed on problem list is here for sleep evaluation. Patient with symptoms of  snoring, witnessed apnea, excessive daytime sleepiness, fatigue and difficulty staying asleep . Pt was diagnosed with CHEPE in 2015, tried cpap but had machine repossessed  due to noncompliance.Reports difficulty tolerating due to sinus problems at the time.      Patient with co morbidities: Asthma, Fibromyalgia, Hypothyroidism, Lupus, GERD, AR, Tobacco abuse      Patient does not have: sleep paralysis, cataplexy, sleep talking, sleep walking,, nightmares, RLS and hypnic hallucinations  Patient does have: bruxism, leg cramps night and day    [place] Sleepiness Scale score 13      SLEEP ROUTINE:  Bed partner:  Significant other, sleeps in another room  Time to bed:  10-12 pm  Sleep onset latency:  15-20  min  Disruptions or awakenings:   2-3 times, with difficulty falling back to sleep  Wakeup time:     7-9  Perceived sleep quality:  restless , feel tire  Daytime naps:     yes -1 nap(s) for about 2 hours at around 1400  Weekend sleep routine:    same  Caffeine use: 1 cup(s) of coffee  exercise habit:   activities of daily living only     PAST MEDICAL HISTORY:    Active Ambulatory Problems     Diagnosis Date Noted    Subacute cutaneous lupus erythematosus     Fibromyalgia     Tobacco abuse 10/29/2012    GERD (gastroesophageal reflux disease) 10/29/2012    Unspecified hereditary and idiopathic peripheral neuropathy 09/25/2014    Hypothyroidism 11/10/2014    Chronic allergic conjunctivitis 11/13/2016    Chronic allergic rhinitis due to animal hair and dander 11/13/2016    Recurrent urticaria 01/06/2017    Other pruritus 01/06/2017    Biliary colic 08/04/2017    S/P laparoscopic cholecystectomy 08/04/2017    Disorder of immune system 12/06/2017     Decreased functional activity tolerance 05/02/2019    Decreased ROM of lumbar spine 05/02/2019    Weakness 05/02/2019    Refractive error 05/07/2019    Long-term use of Plaquenil 05/07/2019     Resolved Ambulatory Problems     Diagnosis Date Noted    Amenorrhea 10/25/2013    Systemic lupus erythematosus 09/25/2014    Eczematous dermatitis of upper and lower eyelids of both eyes 11/13/2016    Calculus of gallbladder without cholecystitis without obstruction 07/17/2017     Past Medical History:   Diagnosis Date    Asthma in remission     COPD (chronic obstructive pulmonary disease)     Immune disorder     Lupus     Recurrent urticaria 1/6/2017                PAST SURGICAL HISTORY:    Past Surgical History:   Procedure Laterality Date    CHOLECYSTECTOMY           FAMILY HISTORY:                Family History   Problem Relation Age of Onset    Hypertension Mother     Thyroid disease Mother     Cataracts Mother     Cancer Mother         duodenal    Arthritis Father     Hyperlipidemia Father     Cancer Father         Multiple Myeloma     Cirrhosis Father     Cataracts Father     Lupus Sister     Asthma Sister     No Known Problems Brother     No Known Problems Maternal Aunt     No Known Problems Maternal Uncle     No Known Problems Paternal Aunt     Pancreatic cancer Paternal Uncle     No Known Problems Maternal Grandmother     Lung cancer Maternal Grandfather     No Known Problems Paternal Grandmother     Lung cancer Paternal Grandfather     Scoliosis Sister     Amblyopia Neg Hx     Blindness Neg Hx     Diabetes Neg Hx     Glaucoma Neg Hx     Macular degeneration Neg Hx     Retinal detachment Neg Hx     Strabismus Neg Hx     Stroke Neg Hx        SOCIAL HISTORY:          Tobacco:   Social History     Tobacco Use   Smoking Status Current Every Day Smoker    Packs/day: 2.00    Years: 32.00    Pack years: 64.00    Types: Cigarettes   Smokeless Tobacco Former User    Quit date:  7/8/2013   Tobacco Comment    started age 17       alcohol use:    Social History     Substance and Sexual Activity   Alcohol Use No    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                  Occupation:Disabled    ALLERGIES:    Review of patient's allergies indicates:   Allergen Reactions    Bactrim [sulfamethoxazole-trimethoprim] Rash       CURRENT MEDICATIONS:    Current Outpatient Medications   Medication Sig Dispense Refill    albuterol (PROVENTIL) 2.5 mg /3 mL (0.083 %) nebulizer solution TAKE 3 MLS (2.5 MG TOTAL) BY NEBULIZATION EVERY 6 (SIX) HOURS AS NEEDED FOR WHEEZING. 150 mL 0    cetirizine (ZYRTEC) 10 MG tablet Take 10 mg by mouth once daily.      estradiol (ESTRACE) 0.01 % (0.1 mg/gram) vaginal cream Insert 2 g daily intravaginally for 2 weeks, then 1 g twice weekly 42.5 g 5    fluorometholone 0.1% (FML) 0.1 % DrpS 1 DROP IN BOTH EYES TWICE A DAY FOR 2 WEEKS THEN DAILY FOR 2 WEEKS  0    fluticasone propionate (FLONASE) 50 mcg/actuation nasal spray USE 2 SPRAYS IN EACH NOSTRIL ONE TIME DAILY 48 g 0    gabapentin (NEURONTIN) 600 MG tablet TAKE 1 TABLET EVERY DAY 90 tablet 3    HYDROcodone-acetaminophen (NORCO) 7.5-325 mg per tablet Take 1 tablet by mouth every 24 hours as needed for Pain. 30 tablet 0    hydrocortisone 2.5 % ointment APPLY TOPICALLY 2 (TWO) TIMES DAILY. FOR EYELID RASH AS NEEDED 20 g 2    hydroxychloroquine (PLAQUENIL) 200 mg tablet TAKE 1 TABLET EVERY DAY 90 tablet 3    hydrOXYzine HCl (ATARAX) 25 MG tablet 1-8 tablets at bedtime for itching. 240 tablet 1    levothyroxine (SYNTHROID) 100 MCG tablet Take 1 tablet by mouth Monday - Saturday and take 1/2 tablet on sunday 90 tablet 0    montelukast (SINGULAIR) 10 mg tablet Take 1 tablet (10 mg total) by mouth every evening. 90 tablet 1    naproxen (NAPROSYN) 500 MG tablet TAKE 1 TABLET BY MOUTH TWICE A DAY WITH MEALS 30 tablet 0    sertraline (ZOLOFT) 25 MG tablet Take 1 tablet (25 mg total) by mouth once daily. 30 tablet 0     soy isofl/blk coh/gr tea/yerba (ESTROVEN ENERGY ORAL) Take by mouth.      triamcinolone (KENALOG) 0.5 % ointment Apply topically 2 (two) times daily. For lupus skin flares on body 15 g 5    VENTOLIN HFA 90 mcg/actuation inhaler INHALE 2 PUFFS INTO THE LUNGS EVERY 4 HOURS AS NEEDED 18 Inhaler 0    azelastine (ASTELIN) 137 mcg (0.1 %) nasal spray 1 spray (137 mcg total) by Nasal route 2 (two) times daily. 30 mL 5    cyclobenzaprine (FLEXERIL) 5 MG tablet TAKE 1 TABLET BY MOUTH THREE TIMES A DAY AS NEEDED FOR MUSCLE SPASMS  2    EPINEPHrine (EPIPEN 2-GIGI) 0.3 mg/0.3 mL AtIn Inject 0.3 mLs (0.3 mg total) into the muscle once. for 1 dose 2 Device 0    pantoprazole (PROTONIX) 40 MG tablet Take 1 tablet (40 mg total) by mouth once daily. 90 tablet 1     No current facility-administered medications for this visit.                   REVIEW OF SYSTEMS:   Review of Systems   Constitutional: Positive for weight loss (unintentional), fatigue and night sweats. Negative for fever, chills, weight gain, activity change and appetite change.   HENT: Positive for congestion. Negative for trouble swallowing and voice change.    Eyes: Negative.    Respiratory: Positive for apnea, snoring, cough (daily), shortness of breath (seasonal), asthma nighttime symptoms (1 week ago), dyspnea on extertion, use of rescue inhaler (once a week, 2 rescue inhalers ) and somnolence. Negative for sputum production, chest tightness, wheezing, orthopnea and previous hospitialization due to pulmonary problems.    Cardiovascular: Positive for palpitations (once a week at night x couple months, last 30 minutes , sharpness, anxious, happens prior to bedtime). Negative for chest pain.   Genitourinary: Negative.    Endocrine: endocrine negative   Musculoskeletal: Negative for gait problem.   Skin: Negative.         1-2 x  Week, 1 month   Gastrointestinal: Positive for nausea (daily), vomiting (once a week, ass with sweating, pale) and acid reflux (daily).  "Negative for abdominal pain.   Neurological: Positive for light-headedness (daytime, sweaty, cold water helps). Negative for headaches.   Psychiatric/Behavioral: Positive for sleep disturbance.        PHYSICAL EXAM:  Vitals:    11/05/19 0821   BP: 132/79   Pulse: 72   Temp: 98.1 °F (36.7 °C)   TempSrc: Oral   SpO2: 100%   Weight: 53.9 kg (118 lb 13.3 oz)   Height: 5' 1" (1.549 m)   PainSc:   6   PainLoc: Leg     Body mass index is 22.45 kg/m².   Physical Exam   Constitutional: She is oriented to person, place, and time. She appears well-developed.   HENT:   Head: Normocephalic.   Mouth/Throat: Oropharynx is clear and moist. Mallampati Score: III.   + nasal congestion   Neck: Neck supple.   Cardiovascular: Normal rate, regular rhythm and normal heart sounds.   Pulmonary/Chest: Normal expansion, effort normal and breath sounds normal.   Musculoskeletal: She exhibits no edema.   Neurological: She is alert and oriented to person, place, and time. Gait normal.   Skin: Skin is warm.   Psychiatric: She has a normal mood and affect. Her behavior is normal. Judgment and thought content normal.                                               DATA:  TSH:  Lab Results   Component Value Date    TSH 2.816 09/14/2019     CBC:  Lab Results   Component Value Date    WBC 4.47 09/14/2019    HGB 12.1 09/14/2019    HCT 37.6 09/14/2019    MCV 92 09/14/2019     09/14/2019     BMP:  Lab Results   Component Value Date     09/14/2019    K 4.1 09/14/2019     09/14/2019    CO2 25 09/14/2019    BUN 11 09/14/2019    CREATININE 0.8 09/14/2019    CALCIUM 8.9 09/14/2019    ANIONGAP 9 09/14/2019    ESTGFRAFRICA >60.0 09/14/2019    EGFRNONAA >60.0 09/14/2019     HgbA1C:  No results found for: LABA1C, HGBA1C     PFT: normal    CXR 2017: normal    Sleep study:  PSG 1/7/2015: The overall AHI was 13.6 with an oxygen carmita of 86.0%. The supine AHI was 41.5.   CPAP titration 3/20/2015: Effective control of sleep disordered breathing was " achieved with CPAP at 7 cm of water.     igE 256  eos 0.1  ASSESSMENT    ICD-10-CM ICD-9-CM    1. CHEPE (obstructive sleep apnea) G47.33 327.23 Home Sleep Studies   2. Mild intermittent asthma, unspecified whether complicated J45.20 493.90 Complete PFT with bronchodilator      X-Ray Chest PA And Lateral   3. Gastroesophageal reflux disease, esophagitis presence not specified K21.9 530.81 DISCONTINUED: dexlansoprazole (DEXILANT) 30 mg CpDM   4. Palpitations R00.2 785.1 SCHEDULED EKG 12-LEAD (to Muse)   5. Tobacco abuse Z72.0 305.1 Ambulatory referral to Smoking Cessation Program   6. Chronic allergic rhinitis due to animal hair and dander J30.81 477.2 azelastine (ASTELIN) 137 mcg (0.1 %) nasal spray       PLAN:      Problem List Items Addressed This Visit     Unprioritized              Tobacco abuse    Overview      smoking cessation           Relevant Orders    Ambulatory referral to Smoking Cessation Program    Palpitations    Relevant Orders    SCHEDULED EKG 12-LEAD (to Muse)    CHEPE (obstructive sleep apnea) - Primary    Overview     Patient with symptoms of snoring, witnessed apnea, excessive daytime sleepiness, fatigue and difficulty staying asleep. Dx with CHEPE in 2015 but difficulty tolerating cpap at time due to sinus problems. Patient needs requalifying study.            Relevant Orders    Home Sleep Studies    Mild intermittent asthma    Overview     Pt with reports daily cough, dyspnea, freqent rescue inhaler use.    Treat triggers-sinus, gerd, smoking cessation.    Pulmonary studies. Consider inhale controller           Relevant Orders    Complete PFT with bronchodilator (Completed)    X-Ray Chest PA And Lateral (Completed)    GERD (gastroesophageal reflux disease)    Overview     Follow by GI           Chronic allergic rhinitis due to animal hair and dander (Chronic)    Overview     Addition  topical antihistamine to regimen. Pt on singulair, flonase           Relevant Medications    azelastine  (ASTELIN) 137 mcg (0.1 %) nasal spray            .    Patient will Follow up follow up following sleep study . .

## 2019-11-12 ENCOUNTER — HOSPITAL ENCOUNTER (OUTPATIENT)
Dept: RADIOLOGY | Facility: HOSPITAL | Age: 49
Discharge: HOME | End: 2019-11-12
Attending: NURSE PRACTITIONER
Payer: MEDICARE

## 2019-11-12 ENCOUNTER — HOSPITAL ENCOUNTER (OUTPATIENT)
Dept: RESPIRATORY THERAPY | Facility: HOSPITAL | Age: 49
Discharge: HOME | End: 2019-11-12
Attending: INTERNAL MEDICINE
Payer: MEDICARE

## 2019-11-12 ENCOUNTER — HOSPITAL ENCOUNTER (OUTPATIENT)
Dept: CARDIOLOGY | Facility: HOSPITAL | Age: 49
Discharge: HOME | End: 2019-11-12
Attending: NURSE PRACTITIONER
Payer: MEDICARE

## 2019-11-12 VITALS — HEART RATE: 66 BPM | OXYGEN SATURATION: 98 % | RESPIRATION RATE: 18 BRPM

## 2019-11-12 DIAGNOSIS — J45.20 MILD INTERMITTENT ASTHMA, UNSPECIFIED WHETHER COMPLICATED: ICD-10-CM

## 2019-11-12 DIAGNOSIS — R00.2 PALPITATIONS: ICD-10-CM

## 2019-11-12 PROBLEM — G47.33 OSA (OBSTRUCTIVE SLEEP APNEA): Status: ACTIVE | Noted: 2019-11-12

## 2019-11-12 LAB
BRPFT: ABNORMAL
DLCO ADJ PRE: 22.83 ML/(MIN*MMHG) (ref 17.07–28.53)
DLCO SINGLE BREATH LLN: 17.07
DLCO SINGLE BREATH PRE REF: 100.1 %
DLCO SINGLE BREATH REF: 22.8
DLCOC SBVA LLN: 3.41
DLCOC SBVA PRE REF: 95.6 %
DLCOC SBVA REF: 5.14
DLCOC SINGLE BREATH LLN: 17.07
DLCOC SINGLE BREATH PRE REF: 100.1 %
DLCOC SINGLE BREATH REF: 22.8
DLCOVA LLN: 3.41
DLCOVA PRE REF: 95.6 %
DLCOVA PRE: 4.91 ML/(MIN*MMHG*L) (ref 3.41–6.86)
DLCOVA REF: 5.14
DLVAADJ PRE: 4.91 ML/(MIN*MMHG*L) (ref 3.41–6.86)
ERVN2 LLN: 0.96
ERVN2 PRE REF: 137.3 %
ERVN2 PRE: 1.32 L (ref 0.96–0.96)
ERVN2 REF: 0.96
FEF 25 75 CHG: 32.3 %
FEF 25 75 LLN: 1.52
FEF 25 75 POST REF: 117.1 %
FEF 25 75 PRE REF: 88.5 %
FEF 25 75 REF: 2.66
FET100 CHG: -13.5 %
FEV1 CHG: 3.9 %
FEV1 FVC CHG: 6.9 %
FEV1 FVC LLN: 70
FEV1 FVC POST REF: 103.5 %
FEV1 FVC PRE REF: 96.9 %
FEV1 FVC REF: 81
FEV1 LLN: 2.01
FEV1 POST REF: 100 %
FEV1 PRE REF: 96.2 %
FEV1 REF: 2.55
FRCN2 LLN: 1.7
FRCN2 PRE REF: 91.3 %
FRCN2 REF: 2.52
FVC CHG: -2.7 %
FVC LLN: 2.48
FVC POST REF: 96.2 %
FVC PRE REF: 98.9 %
FVC REF: 3.15
IVC PRE: 3.16 L (ref 2.48–3.83)
IVC SINGLE BREATH LLN: 2.48
IVC SINGLE BREATH PRE REF: 100.2 %
IVC SINGLE BREATH REF: 3.15
PEF CHG: -7 %
PEF LLN: 4.82
PEF POST REF: 92 %
PEF PRE REF: 98.8 %
PEF REF: 6.38
POST FEF 25 75: 3.11 L/S (ref 1.52–3.79)
POST FET 100: 7.9 SEC
POST FEV1 FVC: 84.16 % (ref 70.2–92.39)
POST FEV1: 2.56 L (ref 2.01–3.1)
POST FVC: 3.04 L (ref 2.48–3.83)
POST PEF: 5.87 L/S (ref 4.82–7.94)
PRE DLCO: 22.83 ML/(MIN*MMHG) (ref 17.07–28.53)
PRE FEF 25 75: 2.35 L/S (ref 1.52–3.79)
PRE FET 100: 9.13 SEC
PRE FEV1 FVC: 78.76 % (ref 70.2–92.39)
PRE FEV1: 2.46 L (ref 2.01–3.1)
PRE FRC N2: 2.3 L
PRE FVC: 3.12 L (ref 2.48–3.83)
PRE PEF: 6.31 L/S (ref 4.82–7.94)
RVN2 LLN: 0.98
RVN2 PRE REF: 62.9 %
RVN2 PRE: 0.98 L (ref 0.98–2.13)
RVN2 REF: 1.56
RVN2TLCN2 LLN: 25.35
RVN2TLCN2 PRE REF: 70.8 %
RVN2TLCN2 PRE: 24.75 % (ref 25.35–44.53)
RVN2TLCN2 REF: 34.94
TLCN2 LLN: 3.45
TLCN2 PRE REF: 89.2 %
TLCN2 PRE: 3.96 L (ref 3.45–5.43)
TLCN2 REF: 4.44
VA PRE: 4.66 L (ref 4.29–4.29)
VA SINGLE BREATH LLN: 4.29
VA SINGLE BREATH PRE REF: 108.5 %
VA SINGLE BREATH REF: 4.29
VCMAXN2 LLN: 2.48
VCMAXN2 PRE REF: 94.5 %
VCMAXN2 PRE: 2.98 L (ref 2.48–3.83)
VCMAXN2 REF: 3.15

## 2019-11-12 PROCEDURE — 94729 PR C02/MEMBANE DIFFUSE CAPACITY: ICD-10-PCS | Mod: 26,HCNC,, | Performed by: INTERNAL MEDICINE

## 2019-11-12 PROCEDURE — 93005 ELECTROCARDIOGRAM TRACING: CPT | Mod: HCNC

## 2019-11-12 PROCEDURE — 93010 ELECTROCARDIOGRAM REPORT: CPT | Mod: HCNC,,, | Performed by: INTERNAL MEDICINE

## 2019-11-12 PROCEDURE — 94060 EVALUATION OF WHEEZING: CPT | Mod: 26,HCNC,, | Performed by: INTERNAL MEDICINE

## 2019-11-12 PROCEDURE — 94060 PR EVAL OF BRONCHOSPASM: ICD-10-PCS | Mod: 26,HCNC,, | Performed by: INTERNAL MEDICINE

## 2019-11-12 PROCEDURE — 25000242 PHARM REV CODE 250 ALT 637 W/ HCPCS: Mod: HCNC | Performed by: INTERNAL MEDICINE

## 2019-11-12 PROCEDURE — 94727 GAS DIL/WSHOT DETER LNG VOL: CPT | Mod: 26,HCNC,, | Performed by: INTERNAL MEDICINE

## 2019-11-12 PROCEDURE — 94729 DIFFUSING CAPACITY: CPT | Mod: 26,HCNC,, | Performed by: INTERNAL MEDICINE

## 2019-11-12 PROCEDURE — 71046 X-RAY EXAM CHEST 2 VIEWS: CPT | Mod: TC,HCNC,FY

## 2019-11-12 PROCEDURE — 71046 X-RAY EXAM CHEST 2 VIEWS: CPT | Mod: 26,HCNC,, | Performed by: RADIOLOGY

## 2019-11-12 PROCEDURE — 93010 EKG 12-LEAD: ICD-10-PCS | Mod: HCNC,,, | Performed by: INTERNAL MEDICINE

## 2019-11-12 PROCEDURE — 94727 PR PULM FUNCTION TEST BY GAS: ICD-10-PCS | Mod: 26,HCNC,, | Performed by: INTERNAL MEDICINE

## 2019-11-12 PROCEDURE — 71046 XR CHEST PA AND LATERAL: ICD-10-PCS | Mod: 26,HCNC,, | Performed by: RADIOLOGY

## 2019-11-12 RX ORDER — ALBUTEROL SULFATE 2.5 MG/.5ML
2.5 SOLUTION RESPIRATORY (INHALATION) ONCE
Status: COMPLETED | OUTPATIENT
Start: 2019-11-12 | End: 2019-11-12

## 2019-11-12 RX ADMIN — ALBUTEROL SULFATE 2.5 MG: 2.5 SOLUTION RESPIRATORY (INHALATION) at 08:11

## 2019-11-19 ENCOUNTER — PATIENT MESSAGE (OUTPATIENT)
Dept: FAMILY MEDICINE | Facility: CLINIC | Age: 49
End: 2019-11-19

## 2019-11-20 ENCOUNTER — PATIENT MESSAGE (OUTPATIENT)
Dept: FAMILY MEDICINE | Facility: CLINIC | Age: 49
End: 2019-11-20

## 2019-11-20 RX ORDER — SERTRALINE HYDROCHLORIDE 25 MG/1
25 TABLET, FILM COATED ORAL DAILY
Qty: 30 TABLET | Refills: 0 | Status: SHIPPED | OUTPATIENT
Start: 2019-11-20 | End: 2020-01-27

## 2019-11-25 ENCOUNTER — OFFICE VISIT (OUTPATIENT)
Dept: RHEUMATOLOGY | Facility: CLINIC | Age: 49
End: 2019-11-25
Payer: MEDICARE

## 2019-11-25 ENCOUNTER — LAB VISIT (OUTPATIENT)
Dept: LAB | Facility: HOSPITAL | Age: 49
End: 2019-11-25
Payer: MEDICARE

## 2019-11-25 VITALS
BODY MASS INDEX: 22.35 KG/M2 | HEIGHT: 61 IN | DIASTOLIC BLOOD PRESSURE: 80 MMHG | SYSTOLIC BLOOD PRESSURE: 118 MMHG | WEIGHT: 118.38 LBS | HEART RATE: 87 BPM

## 2019-11-25 DIAGNOSIS — Z79.899 LONG-TERM USE OF PLAQUENIL: ICD-10-CM

## 2019-11-25 DIAGNOSIS — Z92.25 HISTORY OF IMMUNOSUPPRESSION THERAPY: ICD-10-CM

## 2019-11-25 DIAGNOSIS — G89.4 CHRONIC PAIN SYNDROME: ICD-10-CM

## 2019-11-25 DIAGNOSIS — G60.9 HEREDITARY AND IDIOPATHIC PERIPHERAL NEUROPATHY: ICD-10-CM

## 2019-11-25 DIAGNOSIS — D89.9 DISORDER OF IMMUNE SYSTEM: ICD-10-CM

## 2019-11-25 DIAGNOSIS — L93.1 SUBACUTE CUTANEOUS LUPUS ERYTHEMATOSUS: ICD-10-CM

## 2019-11-25 DIAGNOSIS — G89.4 CHRONIC PAIN SYNDROME: Primary | ICD-10-CM

## 2019-11-25 DIAGNOSIS — G47.33 OSA (OBSTRUCTIVE SLEEP APNEA): ICD-10-CM

## 2019-11-25 DIAGNOSIS — M79.7 FIBROMYALGIA: ICD-10-CM

## 2019-11-25 DIAGNOSIS — M25.561 RIGHT KNEE PAIN, UNSPECIFIED CHRONICITY: ICD-10-CM

## 2019-11-25 DIAGNOSIS — Z72.0 TOBACCO ABUSE: ICD-10-CM

## 2019-11-25 LAB
ALBUMIN SERPL BCP-MCNC: 4.5 G/DL (ref 3.5–5.2)
ALP SERPL-CCNC: 59 U/L (ref 55–135)
ALT SERPL W/O P-5'-P-CCNC: 12 U/L (ref 10–44)
ANION GAP SERPL CALC-SCNC: 9 MMOL/L (ref 8–16)
AST SERPL-CCNC: 19 U/L (ref 10–40)
BASOPHILS # BLD AUTO: 0.02 K/UL (ref 0–0.2)
BASOPHILS NFR BLD: 0.4 % (ref 0–1.9)
BILIRUB SERPL-MCNC: 0.5 MG/DL (ref 0.1–1)
BUN SERPL-MCNC: 7 MG/DL (ref 6–20)
C3 SERPL-MCNC: 120 MG/DL (ref 50–180)
C4 SERPL-MCNC: 21 MG/DL (ref 11–44)
CALCIUM SERPL-MCNC: 9.8 MG/DL (ref 8.7–10.5)
CHLORIDE SERPL-SCNC: 104 MMOL/L (ref 95–110)
CO2 SERPL-SCNC: 28 MMOL/L (ref 23–29)
CREAT SERPL-MCNC: 0.7 MG/DL (ref 0.5–1.4)
CRP SERPL-MCNC: 1.2 MG/L (ref 0–8.2)
DIFFERENTIAL METHOD: NORMAL
EOSINOPHIL # BLD AUTO: 0.1 K/UL (ref 0–0.5)
EOSINOPHIL NFR BLD: 1.1 % (ref 0–8)
ERYTHROCYTE [DISTWIDTH] IN BLOOD BY AUTOMATED COUNT: 12.3 % (ref 11.5–14.5)
ERYTHROCYTE [SEDIMENTATION RATE] IN BLOOD BY WESTERGREN METHOD: 22 MM/HR (ref 0–36)
EST. GFR  (AFRICAN AMERICAN): >60 ML/MIN/1.73 M^2
EST. GFR  (NON AFRICAN AMERICAN): >60 ML/MIN/1.73 M^2
GLUCOSE SERPL-MCNC: 99 MG/DL (ref 70–110)
HCT VFR BLD AUTO: 39.7 % (ref 37–48.5)
HGB BLD-MCNC: 13.1 G/DL (ref 12–16)
IMM GRANULOCYTES # BLD AUTO: 0.01 K/UL (ref 0–0.04)
IMM GRANULOCYTES NFR BLD AUTO: 0.2 % (ref 0–0.5)
LYMPHOCYTES # BLD AUTO: 1.4 K/UL (ref 1–4.8)
LYMPHOCYTES NFR BLD: 29.5 % (ref 18–48)
MCH RBC QN AUTO: 30 PG (ref 27–31)
MCHC RBC AUTO-ENTMCNC: 33 G/DL (ref 32–36)
MCV RBC AUTO: 91 FL (ref 82–98)
MONOCYTES # BLD AUTO: 0.4 K/UL (ref 0.3–1)
MONOCYTES NFR BLD: 7.6 % (ref 4–15)
NEUTROPHILS # BLD AUTO: 2.9 K/UL (ref 1.8–7.7)
NEUTROPHILS NFR BLD: 61.2 % (ref 38–73)
NRBC BLD-RTO: 0 /100 WBC
PLATELET # BLD AUTO: 277 K/UL (ref 150–350)
PMV BLD AUTO: 10.5 FL (ref 9.2–12.9)
POTASSIUM SERPL-SCNC: 3.6 MMOL/L (ref 3.5–5.1)
PROT SERPL-MCNC: 8.5 G/DL (ref 6–8.4)
RBC # BLD AUTO: 4.36 M/UL (ref 4–5.4)
SODIUM SERPL-SCNC: 141 MMOL/L (ref 136–145)
WBC # BLD AUTO: 4.74 K/UL (ref 3.9–12.7)

## 2019-11-25 PROCEDURE — 99999 PR PBB SHADOW E&M-EST. PATIENT-LVL IV: CPT | Mod: PBBFAC,HCNC,GC, | Performed by: STUDENT IN AN ORGANIZED HEALTH CARE EDUCATION/TRAINING PROGRAM

## 2019-11-25 PROCEDURE — 86140 C-REACTIVE PROTEIN: CPT | Mod: HCNC

## 2019-11-25 PROCEDURE — 85652 RBC SED RATE AUTOMATED: CPT | Mod: HCNC

## 2019-11-25 PROCEDURE — 86160 COMPLEMENT ANTIGEN: CPT | Mod: HCNC

## 2019-11-25 PROCEDURE — 99214 OFFICE O/P EST MOD 30 MIN: CPT | Mod: HCNC,GC,S$GLB, | Performed by: STUDENT IN AN ORGANIZED HEALTH CARE EDUCATION/TRAINING PROGRAM

## 2019-11-25 PROCEDURE — 86225 DNA ANTIBODY NATIVE: CPT | Mod: HCNC

## 2019-11-25 PROCEDURE — 86160 COMPLEMENT ANTIGEN: CPT | Mod: 59,HCNC

## 2019-11-25 PROCEDURE — 80053 COMPREHEN METABOLIC PANEL: CPT | Mod: HCNC

## 2019-11-25 PROCEDURE — 85025 COMPLETE CBC W/AUTO DIFF WBC: CPT | Mod: HCNC

## 2019-11-25 PROCEDURE — 99214 PR OFFICE/OUTPT VISIT, EST, LEVL IV, 30-39 MIN: ICD-10-PCS | Mod: HCNC,GC,S$GLB, | Performed by: STUDENT IN AN ORGANIZED HEALTH CARE EDUCATION/TRAINING PROGRAM

## 2019-11-25 PROCEDURE — 99999 PR PBB SHADOW E&M-EST. PATIENT-LVL IV: ICD-10-PCS | Mod: PBBFAC,HCNC,GC, | Performed by: STUDENT IN AN ORGANIZED HEALTH CARE EDUCATION/TRAINING PROGRAM

## 2019-11-25 PROCEDURE — 3008F BODY MASS INDEX DOCD: CPT | Mod: HCNC,CPTII,GC,S$GLB | Performed by: STUDENT IN AN ORGANIZED HEALTH CARE EDUCATION/TRAINING PROGRAM

## 2019-11-25 PROCEDURE — 3008F PR BODY MASS INDEX (BMI) DOCUMENTED: ICD-10-PCS | Mod: HCNC,CPTII,GC,S$GLB | Performed by: STUDENT IN AN ORGANIZED HEALTH CARE EDUCATION/TRAINING PROGRAM

## 2019-11-25 PROCEDURE — 36415 COLL VENOUS BLD VENIPUNCTURE: CPT | Mod: HCNC

## 2019-11-25 ASSESSMENT — ROUTINE ASSESSMENT OF PATIENT INDEX DATA (RAPID3)
MDHAQ FUNCTION SCORE: 1.4
PSYCHOLOGICAL DISTRESS SCORE: 5.5
PATIENT GLOBAL ASSESSMENT SCORE: 5
TOTAL RAPID3 SCORE: 5.55
AM STIFFNESS SCORE: 0, NO
FATIGUE SCORE: 7
PAIN SCORE: 7

## 2019-11-25 NOTE — PROGRESS NOTES
RHEUMATOLOGY CLINIC FOLLOW UP VISIT  Chief complaints:-  R knee pain     HPI:-  Emily Blanco a 49 y.o. 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     Lupus was diagnosed at age 20 - via skin biopsy of the face.  Developed facial rash.  Was on steroid and then was started on the plaquenil after development of arthralgia.  Last eye examination was 1-2 years ago.  About 3 weeks ago, rash developed in bilateral elbows and behind R knee.  Went to dermatologist given topical steroid cream.  +Raynaud's    Fibromyalgia (was diagnosed is managed with ibuprofen 1-2x/week.  She is also taking hydrocodone 7.5mg, gabapentin 600mg daily, flexeril.  Pain of the hands, feet, knees.     + Fatigue/maliase.  Sleep 8 hours every night.  Have multiple night time awakening.  LMP was >6 years ago.  +night sweats, mood swings, bloated    Depressed (on Zoloft - prescribed by PCP. Started a few months ago).  Does not want to increase the medication.  Previously tried it before (~20 years ago) and increasing caused her to be numb.  Never tried Trazadone, Cymbalta, or amitrypline    Smokes tobacco (1ppd x 30 years), denies EtOh or recreational drugs/medications/supplements.     Monitoring for HCQ toxicity - eye exam April 2019 (normal).     Dermatology (Dr Thomas - April 2019).  Subcutaneous lupus and eczema.  Agreed with increasing HCQ to 400mg daily.     At the last visit, patient was complaining of R knee pain and weakness - requiring usage of walker.  Xray of bilateral knee was unremarkable.  Patient continues to have severe R knee pain and weakness when ambulating.     Patient is only take 200mg HCQ inconsistently and continues to have diarrhea.  Patient had been experiencing persistent diarrhea x few months.  Denies any blood.  Watery (non-formed) stool,  "up to 3x/day especially after food.  Denies any fever/chills, recent travels, sick contacts, abdominal pain.  Uncertain if it's related to food.  Had gallbladder removal a couple of years ago and noticed worsening since.  GI scheduled for scoping - Dec 10    Rash had improved after following with dermatology (April 2019).  Continues to use topical steroid cream.  No new rash     Currently on Zoloft for depression.  Does not wish to increase dosage - "feeling numb".  Never taken Cymbalta/Trazadone/amitrypline.  Did not follow up with PCP since the last visit.  Depression is mildly controlled.  Was previously diagnosed with CHEPE but did not tolerate sleep machine - will get re-evaluate again soon.                                    Review of Systems   Constitutional: Positive for malaise/fatigue. Negative for chills, diaphoresis, fever and weight loss.   HENT: Negative for congestion, ear discharge, ear pain, hearing loss, nosebleeds, sinus pain and tinnitus.    Eyes: Negative for photophobia, pain, discharge and redness.   Respiratory: Negative for cough, hemoptysis, sputum production, shortness of breath, wheezing and stridor.    Cardiovascular: Negative for chest pain, palpitations, orthopnea, claudication, leg swelling and PND.   Gastrointestinal: Negative for abdominal pain, constipation, diarrhea, heartburn, nausea and vomiting.   Genitourinary: Negative for dysuria, frequency, hematuria and urgency.   Musculoskeletal: Positive for joint pain. Negative for back pain, myalgias and neck pain.   Skin: Negative for rash.   Neurological: Negative for dizziness, tingling, tremors, weakness and headaches.   Endo/Heme/Allergies: Does not bruise/bleed easily.   Psychiatric/Behavioral: Negative for depression, hallucinations and suicidal ideas. The patient is not nervous/anxious and does not have insomnia.        Past Medical History:   Diagnosis Date    Asthma in remission     COPD (chronic obstructive pulmonary " "disease)     Fibromyalgia     Hypothyroidism     Immune disorder     Lupus     CHEPE (obstructive sleep apnea) 11/12/2019    Recurrent urticaria 1/6/2017    Subacute cutaneous lupus erythematosus        Past Surgical History:   Procedure Laterality Date    CHOLECYSTECTOMY          Social History     Tobacco Use    Smoking status: Current Every Day Smoker     Packs/day: 2.00     Years: 32.00     Pack years: 64.00     Types: Cigarettes    Smokeless tobacco: Former User     Quit date: 7/8/2013    Tobacco comment: started age 17   Substance Use Topics    Alcohol use: No     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     Drug use: No       Family History   Problem Relation Age of Onset    Hypertension Mother     Thyroid disease Mother     Cataracts Mother     Cancer Mother         duodenal    Arthritis Father     Hyperlipidemia Father     Cancer Father         Multiple Myeloma     Cirrhosis Father     Cataracts Father     Lupus Sister     Asthma Sister     No Known Problems Brother     No Known Problems Maternal Aunt     No Known Problems Maternal Uncle     No Known Problems Paternal Aunt     Pancreatic cancer Paternal Uncle     No Known Problems Maternal Grandmother     Lung cancer Maternal Grandfather     No Known Problems Paternal Grandmother     Lung cancer Paternal Grandfather     Scoliosis Sister     Amblyopia Neg Hx     Blindness Neg Hx     Diabetes Neg Hx     Glaucoma Neg Hx     Macular degeneration Neg Hx     Retinal detachment Neg Hx     Strabismus Neg Hx     Stroke Neg Hx        Review of patient's allergies indicates:   Allergen Reactions    Bactrim [sulfamethoxazole-trimethoprim] Rash       Vitals:    11/25/19 0821   BP: 118/80   Pulse: 87   Weight: 53.7 kg (118 lb 6.2 oz)   Height: 5' 1" (1.549 m)   PainSc: 0-No pain       Physical Exam   Constitutional: She is oriented to person, place, and time and well-developed, well-nourished, and in no distress.   HENT:   Head: " Normocephalic and atraumatic.   No mouth ulcers    Eyes: Pupils are equal, round, and reactive to light. EOM are normal.   Neck: Normal range of motion. Neck supple.   Cardiovascular: Normal rate, regular rhythm and normal heart sounds.   Pulmonary/Chest: Effort normal and breath sounds normal.   Abdominal: Soft. Bowel sounds are normal.   Musculoskeletal: Normal range of motion.   ROM intact  Strength intact in all major joints  No signs of synovitis  Mild R knee crepitus    Neurological: She is alert and oriented to person, place, and time. GCS score is 15.   Abnormal gait - R knee with brace and cane.  Difficulty with gait due to pain with walk    Skin: Skin is warm and dry. No rash noted. No erythema.   Mild redness, eczema rash over bilateral knuckles   Livedo reticularis in bilateral LE       Psychiatric: Mood, memory, affect and judgment normal.       Medication List with Changes/Refills   Current Medications    ALBUTEROL (PROVENTIL) 2.5 MG /3 ML (0.083 %) NEBULIZER SOLUTION    TAKE 3 MLS (2.5 MG TOTAL) BY NEBULIZATION EVERY 6 (SIX) HOURS AS NEEDED FOR WHEEZING.    AZELASTINE (ASTELIN) 137 MCG (0.1 %) NASAL SPRAY    1 spray (137 mcg total) by Nasal route 2 (two) times daily.    CETIRIZINE (ZYRTEC) 10 MG TABLET    Take 10 mg by mouth once daily.    CYCLOBENZAPRINE (FLEXERIL) 5 MG TABLET    TAKE 1 TABLET BY MOUTH THREE TIMES A DAY AS NEEDED FOR MUSCLE SPASMS    EPINEPHRINE (EPIPEN 2-GIGI) 0.3 MG/0.3 ML ATIN    Inject 0.3 mLs (0.3 mg total) into the muscle once. for 1 dose    ESTRADIOL (ESTRACE) 0.01 % (0.1 MG/GRAM) VAGINAL CREAM    Insert 2 g daily intravaginally for 2 weeks, then 1 g twice weekly    FLUOROMETHOLONE 0.1% (FML) 0.1 % DRPS    1 DROP IN BOTH EYES TWICE A DAY FOR 2 WEEKS THEN DAILY FOR 2 WEEKS    FLUTICASONE PROPIONATE (FLONASE) 50 MCG/ACTUATION NASAL SPRAY    USE 2 SPRAYS IN EACH NOSTRIL ONE TIME DAILY    GABAPENTIN (NEURONTIN) 600 MG TABLET    TAKE 1 TABLET EVERY DAY    HYDROCODONE-ACETAMINOPHEN  (NORCO) 7.5-325 MG PER TABLET    Take 1 tablet by mouth every 24 hours as needed for Pain.    HYDROCORTISONE 2.5 % OINTMENT    APPLY TOPICALLY 2 (TWO) TIMES DAILY. FOR EYELID RASH AS NEEDED    HYDROXYCHLOROQUINE (PLAQUENIL) 200 MG TABLET    TAKE 1 TABLET EVERY DAY    HYDROXYZINE HCL (ATARAX) 25 MG TABLET    1-8 tablets at bedtime for itching.    LEVOTHYROXINE (SYNTHROID) 100 MCG TABLET    Take 1 tablet by mouth Monday - Saturday and take 1/2 tablet on sunday    MONTELUKAST (SINGULAIR) 10 MG TABLET    Take 1 tablet (10 mg total) by mouth every evening.    NAPROXEN (NAPROSYN) 500 MG TABLET    TAKE 1 TABLET BY MOUTH TWICE A DAY WITH MEALS    PANTOPRAZOLE (PROTONIX) 40 MG TABLET    Take 1 tablet (40 mg total) by mouth once daily.    SERTRALINE (ZOLOFT) 25 MG TABLET    Take 1 tablet (25 mg total) by mouth once daily.    SOY ISOFL/BLK COH/GR TEA/YERBA (ESTROVEN ENERGY ORAL)    Take by mouth.    TRIAMCINOLONE (KENALOG) 0.5 % OINTMENT    Apply topically 2 (two) times daily. For lupus skin flares on body    VENTOLIN HFA 90 MCG/ACTUATION INHALER    INHALE 2 PUFFS INTO THE LUNGS EVERY 4 HOURS AS NEEDED       Assessment/Plans:-  49 y.o. 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  Negative for synovitis, ROM intact, strength intact.     Labs were unremarkable (done Sept 2019).    Assessment  - R knee pain - ddx includes trauma vs OA vs AVN vs fibromyalgia?  - Persistent diarrhea - ddx includes IBS vs food insensitivity (lactose intolerance, celiac disease) - following up with GI.  Scheduled for EGD/Colonoscopy Dec 10  - Fibromyalgia - on Norco and gabapentin.  Patient currently on Zoloft - which is mildly controlling depression.  Consideration to change  zoloft to other medications (Cymbalta, Trazadone) for treatment of depression and fibromyalgia  - Tobacco abuse - education provided for cessation.  Tobacco usage can decrease efficacy of HCQ      Plan  - Plaquenil eye exam - due April 2020  - Continue plaquenil 200mg Daily - consideration to increase it back to 400mg daily if diarrhea resolves  - Follow up with GI as previously scheduled for scopes  - Follow up with dermatology -  management of rash   - Follow up with primary - consideration to substitute Zoloft for other meds for treatment of both fibromyalgia and depression   - MRI of R knee - if abnormal, consideration for ortho consult  - Education provided on the importance of medication compliance  - Discontinued AM Gabapentin - patient did not tolerate (caused drowsiness)  - continue Gabapentin 600mg QHS   - refill provided for norco  - follow up with PMR as scheduled for evaluation of persistent arthralgia  - CBC, CMP, ESR, CRP, C3, C4, UA, Up/c, dsDNA -today  - recommendation for daily usage of sunscreen   - Information provided on fibromyalgia including non medication therapies   - Education provided on tobacco cessation - patient refused at this time including cessation programs     # Follow up in about 3 months (around 2/25/2020).

## 2019-11-25 NOTE — PROGRESS NOTES
I have personally taken the history and examined the patient to verify the fellow's notation, and I agree with the impression and plan stated.      She has cutaneous but no systemic lupus  She has fibromyalgia and has some localized knee pain that we will investigate with MRI but may simply be related to fibro    WD

## 2019-11-26 LAB — DSDNA AB SER-ACNC: NORMAL [IU]/ML

## 2019-12-05 ENCOUNTER — PATIENT MESSAGE (OUTPATIENT)
Dept: PULMONOLOGY | Facility: CLINIC | Age: 49
End: 2019-12-05

## 2019-12-31 DIAGNOSIS — E03.9 HYPOTHYROIDISM, UNSPECIFIED TYPE: ICD-10-CM

## 2020-01-01 RX ORDER — FLUTICASONE PROPIONATE 50 MCG
SPRAY, SUSPENSION (ML) NASAL
Qty: 48 G | Refills: 0 | Status: SHIPPED | OUTPATIENT
Start: 2020-01-01 | End: 2021-07-29

## 2020-01-01 RX ORDER — SERTRALINE HYDROCHLORIDE 50 MG/1
TABLET, FILM COATED ORAL
Qty: 90 TABLET | Refills: 0 | OUTPATIENT
Start: 2020-01-01

## 2020-01-02 ENCOUNTER — PATIENT MESSAGE (OUTPATIENT)
Dept: RHEUMATOLOGY | Facility: CLINIC | Age: 50
End: 2020-01-02

## 2020-01-02 RX ORDER — LEVOTHYROXINE SODIUM 100 UG/1
TABLET ORAL
Qty: 85 TABLET | Refills: 2 | Status: SHIPPED | OUTPATIENT
Start: 2020-01-02 | End: 2020-03-25 | Stop reason: SDUPTHER

## 2020-01-02 RX ORDER — MONTELUKAST SODIUM 10 MG/1
10 TABLET ORAL NIGHTLY
Qty: 90 TABLET | Refills: 1 | Status: SHIPPED | OUTPATIENT
Start: 2020-01-02 | End: 2020-03-25 | Stop reason: SDUPTHER

## 2020-01-03 ENCOUNTER — TELEPHONE (OUTPATIENT)
Dept: RHEUMATOLOGY | Facility: HOSPITAL | Age: 50
End: 2020-01-03

## 2020-01-03 ENCOUNTER — PATIENT MESSAGE (OUTPATIENT)
Dept: PULMONOLOGY | Facility: CLINIC | Age: 50
End: 2020-01-03

## 2020-01-03 DIAGNOSIS — M25.50 ARTHRALGIA, UNSPECIFIED JOINT: ICD-10-CM

## 2020-01-03 DIAGNOSIS — M79.7 FIBROMYALGIA: ICD-10-CM

## 2020-01-03 DIAGNOSIS — Z92.25 HISTORY OF IMMUNOSUPPRESSION THERAPY: ICD-10-CM

## 2020-01-03 DIAGNOSIS — Z72.0 TOBACCO ABUSE: ICD-10-CM

## 2020-01-03 DIAGNOSIS — L93.1 SUBACUTE CUTANEOUS LUPUS ERYTHEMATOSUS: ICD-10-CM

## 2020-01-03 DIAGNOSIS — G89.4 CHRONIC PAIN SYNDROME: ICD-10-CM

## 2020-01-03 RX ORDER — HYDROCODONE BITARTRATE AND ACETAMINOPHEN 7.5; 325 MG/1; MG/1
1 TABLET ORAL
Qty: 30 TABLET | Refills: 0 | Status: SHIPPED | OUTPATIENT
Start: 2020-01-03 | End: 2020-03-18 | Stop reason: SDUPTHER

## 2020-01-03 RX ORDER — GABAPENTIN 600 MG/1
600 TABLET ORAL DAILY
Qty: 90 TABLET | Refills: 3 | Status: SHIPPED | OUTPATIENT
Start: 2020-01-03 | End: 2020-03-18 | Stop reason: SDUPTHER

## 2020-01-03 RX ORDER — HYDROXYCHLOROQUINE SULFATE 200 MG/1
200 TABLET, FILM COATED ORAL DAILY
Qty: 90 TABLET | Refills: 3 | Status: SHIPPED | OUTPATIENT
Start: 2020-01-03 | End: 2020-03-18 | Stop reason: SDUPTHER

## 2020-01-15 ENCOUNTER — TELEPHONE (OUTPATIENT)
Dept: PSYCHIATRY | Facility: CLINIC | Age: 50
End: 2020-01-15

## 2020-01-15 NOTE — TELEPHONE ENCOUNTER
Returned patient's call left on voicemail. LVM to ask that she please call us back when she is able. If we are with patients and not able to answer asked that she leave messge with a good time for us to call her back.

## 2020-01-16 ENCOUNTER — TELEPHONE (OUTPATIENT)
Dept: PSYCHIATRY | Facility: CLINIC | Age: 50
End: 2020-01-16

## 2020-01-16 ENCOUNTER — OFFICE VISIT (OUTPATIENT)
Dept: FAMILY MEDICINE | Facility: CLINIC | Age: 50
End: 2020-01-16
Payer: MEDICARE

## 2020-01-16 ENCOUNTER — PATIENT MESSAGE (OUTPATIENT)
Dept: PULMONOLOGY | Facility: CLINIC | Age: 50
End: 2020-01-16

## 2020-01-16 VITALS
DIASTOLIC BLOOD PRESSURE: 90 MMHG | SYSTOLIC BLOOD PRESSURE: 144 MMHG | WEIGHT: 111.75 LBS | HEART RATE: 94 BPM | BODY MASS INDEX: 21.1 KG/M2 | HEIGHT: 61 IN | OXYGEN SATURATION: 97 % | TEMPERATURE: 98 F

## 2020-01-16 DIAGNOSIS — E03.9 HYPOTHYROIDISM, UNSPECIFIED TYPE: ICD-10-CM

## 2020-01-16 DIAGNOSIS — F33.2 SEVERE EPISODE OF RECURRENT MAJOR DEPRESSIVE DISORDER, WITHOUT PSYCHOTIC FEATURES: ICD-10-CM

## 2020-01-16 DIAGNOSIS — R53.83 FATIGUE, UNSPECIFIED TYPE: Primary | ICD-10-CM

## 2020-01-16 DIAGNOSIS — M79.7 FIBROMYALGIA: ICD-10-CM

## 2020-01-16 PROCEDURE — 99214 OFFICE O/P EST MOD 30 MIN: CPT | Mod: S$GLB,,, | Performed by: NURSE PRACTITIONER

## 2020-01-16 PROCEDURE — 99214 PR OFFICE/OUTPT VISIT, EST, LEVL IV, 30-39 MIN: ICD-10-PCS | Mod: S$GLB,,, | Performed by: NURSE PRACTITIONER

## 2020-01-16 PROCEDURE — 3008F BODY MASS INDEX DOCD: CPT | Mod: CPTII,S$GLB,, | Performed by: NURSE PRACTITIONER

## 2020-01-16 PROCEDURE — 99999 PR PBB SHADOW E&M-EST. PATIENT-LVL V: ICD-10-PCS | Mod: PBBFAC,,, | Performed by: NURSE PRACTITIONER

## 2020-01-16 PROCEDURE — 99999 PR PBB SHADOW E&M-EST. PATIENT-LVL V: CPT | Mod: PBBFAC,,, | Performed by: NURSE PRACTITIONER

## 2020-01-16 PROCEDURE — 3008F PR BODY MASS INDEX (BMI) DOCUMENTED: ICD-10-PCS | Mod: CPTII,S$GLB,, | Performed by: NURSE PRACTITIONER

## 2020-01-16 NOTE — PROGRESS NOTES
Subjective:       Patient ID: Emily Pina is a 49 y.o. female.    Chief Complaint: Fatigue; Weight Loss; and Depression    49-year-old female presents to the clinic today with complaint of fatigue and depression worse since last Friday.  She has also had weight loss of 29 lb in the last 10 months.  She has a EGD scheduled 01/28/2020 for her weight loss.  She says Dr. Subramanian and her rheumatologist or in the process of weaning her off Zoloft and starting her on Cymbalta.  She is presently taking Zoloft 25 mg p.o. daily.  However, she has been off all her medications since Monday.  She feels like that her  is having affair on her because she heard him on the speaker phone.  However, she made him take a polygram and he passed it but she still feels like something is still going on.  She is presently staying at her mother's house.  She has good support from her mother and brother.  She denies any suicidal thoughts, homicidal ideations, or hallucinations.  She has been  for 13 years and has no children.  She is on disability due to fibromyalgia.    Past Medical History:   Diagnosis Date    Asthma in remission     COPD (chronic obstructive pulmonary disease)     Fibromyalgia     Hypothyroidism     Immune disorder     Lupus     CHEPE (obstructive sleep apnea) 11/12/2019    Recurrent urticaria 1/6/2017    Subacute cutaneous lupus erythematosus      Past Surgical History:   Procedure Laterality Date    CHOLECYSTECTOMY        reports that she has been smoking cigarettes. She has a 64.00 pack-year smoking history. She quit smokeless tobacco use about 6 years ago. She reports that she does not drink alcohol or use drugs.  Review of Systems   Constitutional: Positive for fatigue and unexpected weight change.   Gastrointestinal: Positive for abdominal pain. Negative for diarrhea, nausea and vomiting.        Has a EGD scheduled 1/28/2020 due to weight loss    Psychiatric/Behavioral:  Positive for hallucinations and sleep disturbance. Negative for agitation, behavioral problems and suicidal ideas. The patient is not nervous/anxious.         Depression       Objective:      Physical Exam   Constitutional: No distress.   Thin pleasant female crying but in nad   Eyes: Pupils are equal, round, and reactive to light. Conjunctivae and EOM are normal. Right eye exhibits no discharge. Left eye exhibits no discharge. No scleral icterus.   Cardiovascular: Normal rate, regular rhythm and normal heart sounds. Exam reveals no gallop and no friction rub.   No murmur heard.  Pulmonary/Chest: Effort normal and breath sounds normal. No respiratory distress. She has no wheezes.   Abdominal: Soft. Bowel sounds are normal. There is no tenderness.   Musculoskeletal: She exhibits no edema.   Neurological: She is alert.   Skin: She is not diaphoretic.   Psychiatric: Her behavior is normal.   Depression and crying a lot        Assessment:       1. Fatigue, unspecified type    2. Fibromyalgia    3. Hypothyroidism, unspecified type    4. Severe episode of recurrent major depressive disorder, without psychotic features        Plan:         Fatigue, unspecified type  -     CBC auto differential; Future; Expected date: 01/16/2020  -     Comprehensive metabolic panel; Future; Expected date: 01/16/2020  -     TSH; Future; Expected date: 01/16/2020    Fibromyalgia  - followed by Rheumatology    Hypothyroidism, unspecified type  - been off thyroid medications since Monday or restarted today and check labs in 1 week.    Severe episode of recurrent major depressive disorder, without psychotic features  - continue weaning off the Zoloft 25 mg p.o. Daily   - Dr. Marilynn dominguez rheumatologist plan is to wean her completely off Zoloft and start her on Cymbalta.    - I gave her the business card of [facility]  to call today to schedule a appointment asap

## 2020-01-16 NOTE — TELEPHONE ENCOUNTER
Returned patient's call left on voicemail to schedule new patient appointment. LVM to ask that she call us back to schedule. If we are with patients and unable to answer she can leave message with a good time for us to call her back.

## 2020-01-16 NOTE — PATIENT INSTRUCTIONS
Call [facility] at [phone] ASAP to schedule a appointment  Check labs one week after getting back on thyroid medication  Follow up with Dr. uSbramanian

## 2020-01-20 ENCOUNTER — TELEPHONE (OUTPATIENT)
Dept: PSYCHIATRY | Facility: CLINIC | Age: 50
End: 2020-01-20

## 2020-01-20 NOTE — TELEPHONE ENCOUNTER
Spoke with patient, made an appt with Angelica Garcia. Confirmed appt date and time. Informed pt I would be placing them on a waitlist.

## 2020-01-22 ENCOUNTER — HOSPITAL ENCOUNTER (OUTPATIENT)
Dept: SLEEP MEDICINE | Facility: HOSPITAL | Age: 50
Discharge: HOME | End: 2020-01-22
Attending: NURSE PRACTITIONER
Payer: MEDICARE

## 2020-01-22 DIAGNOSIS — G47.33 OSA (OBSTRUCTIVE SLEEP APNEA): ICD-10-CM

## 2020-01-22 PROCEDURE — 95800 SLP STDY UNATTENDED: CPT

## 2020-01-22 NOTE — PATIENT INSTRUCTIONS
Your sleep study will be scored and interpreted by one of our physicians who are board certified in sleep medicine.  Within two weeks the results will be sent to the physician who referred you. Your physician should then contact you to go over the results, along with any recommendations. If you do not hear from your physician within two weeks, please call them.       Please return the device back to us tomorrow before 10:00 AM. Drop it off to our .

## 2020-01-23 ENCOUNTER — LAB VISIT (OUTPATIENT)
Dept: LAB | Facility: HOSPITAL | Age: 50
End: 2020-01-23
Payer: MEDICARE

## 2020-01-23 ENCOUNTER — TELEPHONE (OUTPATIENT)
Dept: FAMILY MEDICINE | Facility: CLINIC | Age: 50
End: 2020-01-23

## 2020-01-23 DIAGNOSIS — E87.6 HYPOKALEMIA: Primary | ICD-10-CM

## 2020-01-23 DIAGNOSIS — R53.83 FATIGUE, UNSPECIFIED TYPE: ICD-10-CM

## 2020-01-23 LAB
ALBUMIN SERPL BCP-MCNC: 3.9 G/DL (ref 3.5–5.2)
ALP SERPL-CCNC: 61 U/L (ref 55–135)
ALT SERPL W/O P-5'-P-CCNC: 16 U/L (ref 10–44)
ANION GAP SERPL CALC-SCNC: 9 MMOL/L (ref 8–16)
AST SERPL-CCNC: 20 U/L (ref 10–40)
BASOPHILS # BLD AUTO: 0.02 K/UL (ref 0–0.2)
BASOPHILS NFR BLD: 0.3 % (ref 0–1.9)
BILIRUB SERPL-MCNC: 0.2 MG/DL (ref 0.1–1)
BUN SERPL-MCNC: 7 MG/DL (ref 6–20)
CALCIUM SERPL-MCNC: 8.8 MG/DL (ref 8.7–10.5)
CHLORIDE SERPL-SCNC: 104 MMOL/L (ref 95–110)
CO2 SERPL-SCNC: 27 MMOL/L (ref 23–29)
CREAT SERPL-MCNC: 0.7 MG/DL (ref 0.5–1.4)
DIFFERENTIAL METHOD: ABNORMAL
EOSINOPHIL # BLD AUTO: 0.1 K/UL (ref 0–0.5)
EOSINOPHIL NFR BLD: 1.9 % (ref 0–8)
ERYTHROCYTE [DISTWIDTH] IN BLOOD BY AUTOMATED COUNT: 12.3 % (ref 11.5–14.5)
EST. GFR  (AFRICAN AMERICAN): >60 ML/MIN/1.73 M^2
EST. GFR  (NON AFRICAN AMERICAN): >60 ML/MIN/1.73 M^2
GLUCOSE SERPL-MCNC: 105 MG/DL (ref 70–110)
HCT VFR BLD AUTO: 39.7 % (ref 37–48.5)
HGB BLD-MCNC: 12.5 G/DL (ref 12–16)
IMM GRANULOCYTES # BLD AUTO: 0.01 K/UL (ref 0–0.04)
IMM GRANULOCYTES NFR BLD AUTO: 0.1 % (ref 0–0.5)
LYMPHOCYTES # BLD AUTO: 2.3 K/UL (ref 1–4.8)
LYMPHOCYTES NFR BLD: 33.5 % (ref 18–48)
MCH RBC QN AUTO: 29.8 PG (ref 27–31)
MCHC RBC AUTO-ENTMCNC: 31.5 G/DL (ref 32–36)
MCV RBC AUTO: 95 FL (ref 82–98)
MONOCYTES # BLD AUTO: 0.4 K/UL (ref 0.3–1)
MONOCYTES NFR BLD: 5.4 % (ref 4–15)
NEUTROPHILS # BLD AUTO: 4 K/UL (ref 1.8–7.7)
NEUTROPHILS NFR BLD: 58.8 % (ref 38–73)
NRBC BLD-RTO: 0 /100 WBC
PLATELET # BLD AUTO: 268 K/UL (ref 150–350)
PMV BLD AUTO: 10.9 FL (ref 9.2–12.9)
POTASSIUM SERPL-SCNC: 3.3 MMOL/L (ref 3.5–5.1)
PROT SERPL-MCNC: 8 G/DL (ref 6–8.4)
RBC # BLD AUTO: 4.2 M/UL (ref 4–5.4)
SODIUM SERPL-SCNC: 140 MMOL/L (ref 136–145)
TSH SERPL DL<=0.005 MIU/L-ACNC: 2 UIU/ML (ref 0.4–4)
WBC # BLD AUTO: 6.84 K/UL (ref 3.9–12.7)

## 2020-01-23 PROCEDURE — 80053 COMPREHEN METABOLIC PANEL: CPT

## 2020-01-23 PROCEDURE — 36415 COLL VENOUS BLD VENIPUNCTURE: CPT | Mod: PO

## 2020-01-23 PROCEDURE — 85025 COMPLETE CBC W/AUTO DIFF WBC: CPT

## 2020-01-23 PROCEDURE — 84443 ASSAY THYROID STIM HORMONE: CPT

## 2020-01-23 NOTE — TELEPHONE ENCOUNTER
I spoke with the patient and explained that her potassium was slightly low otherwise the rest of her labs were normal. I recommended eating bananas and drinking orange juice and repeating your potassium in 2 weeks. She wants to com 2/6/2020 at 7:30 am. Patient verbalized understanding of above.

## 2020-01-24 PROCEDURE — 95800 PR SLEEP STUDY, UNATTENDED, RECORD HEART RATE/O2 SAT/RESP ANAL/SLEEP TIME: ICD-10-PCS | Mod: 26,,, | Performed by: INTERNAL MEDICINE

## 2020-01-24 PROCEDURE — 95800 SLP STDY UNATTENDED: CPT | Mod: 26,,, | Performed by: INTERNAL MEDICINE

## 2020-01-25 ENCOUNTER — PATIENT MESSAGE (OUTPATIENT)
Dept: FAMILY MEDICINE | Facility: CLINIC | Age: 50
End: 2020-01-25

## 2020-01-25 DIAGNOSIS — M79.7 FIBROMYALGIA: Primary | ICD-10-CM

## 2020-01-27 ENCOUNTER — PES CALL (OUTPATIENT)
Dept: ADMINISTRATIVE | Facility: CLINIC | Age: 50
End: 2020-01-27

## 2020-01-27 RX ORDER — DULOXETIN HYDROCHLORIDE 20 MG/1
20 CAPSULE, DELAYED RELEASE ORAL DAILY
Qty: 30 CAPSULE | Refills: 1 | Status: SHIPPED | OUTPATIENT
Start: 2020-01-27 | End: 2020-02-05 | Stop reason: SDUPTHER

## 2020-01-27 NOTE — PROGRESS NOTES
Please call the patient  and let them know their test results are back. Please call the patient to schedule a follow up appointment to discuss results.

## 2020-02-03 ENCOUNTER — OFFICE VISIT (OUTPATIENT)
Dept: PULMONOLOGY | Facility: CLINIC | Age: 50
End: 2020-02-03
Payer: MEDICARE

## 2020-02-03 VITALS
HEIGHT: 61 IN | HEART RATE: 79 BPM | SYSTOLIC BLOOD PRESSURE: 102 MMHG | OXYGEN SATURATION: 99 % | WEIGHT: 113.56 LBS | DIASTOLIC BLOOD PRESSURE: 68 MMHG | BODY MASS INDEX: 21.44 KG/M2

## 2020-02-03 DIAGNOSIS — J30.81 CHRONIC ALLERGIC RHINITIS DUE TO ANIMAL HAIR AND DANDER: Chronic | ICD-10-CM

## 2020-02-03 DIAGNOSIS — J45.30 MILD PERSISTENT ASTHMA WITHOUT COMPLICATION: ICD-10-CM

## 2020-02-03 DIAGNOSIS — G47.33 OSA (OBSTRUCTIVE SLEEP APNEA): Primary | ICD-10-CM

## 2020-02-03 PROCEDURE — 99999 PR PBB SHADOW E&M-EST. PATIENT-LVL III: CPT | Mod: PBBFAC,,, | Performed by: NURSE PRACTITIONER

## 2020-02-03 PROCEDURE — 99214 PR OFFICE/OUTPT VISIT, EST, LEVL IV, 30-39 MIN: ICD-10-PCS | Mod: S$GLB,,, | Performed by: NURSE PRACTITIONER

## 2020-02-03 PROCEDURE — 99999 PR PBB SHADOW E&M-EST. PATIENT-LVL III: ICD-10-PCS | Mod: PBBFAC,,, | Performed by: NURSE PRACTITIONER

## 2020-02-03 PROCEDURE — 3008F PR BODY MASS INDEX (BMI) DOCUMENTED: ICD-10-PCS | Mod: CPTII,S$GLB,, | Performed by: NURSE PRACTITIONER

## 2020-02-03 PROCEDURE — 99214 OFFICE O/P EST MOD 30 MIN: CPT | Mod: S$GLB,,, | Performed by: NURSE PRACTITIONER

## 2020-02-03 PROCEDURE — 99499 UNLISTED E&M SERVICE: CPT | Mod: S$GLB,,, | Performed by: NURSE PRACTITIONER

## 2020-02-03 PROCEDURE — 3008F BODY MASS INDEX DOCD: CPT | Mod: CPTII,S$GLB,, | Performed by: NURSE PRACTITIONER

## 2020-02-03 PROCEDURE — 99499 RISK ADDL DX/OHS AUDIT: ICD-10-PCS | Mod: S$GLB,,, | Performed by: NURSE PRACTITIONER

## 2020-02-03 RX ORDER — FLUTICASONE FUROATE AND VILANTEROL 100; 25 UG/1; UG/1
1 POWDER RESPIRATORY (INHALATION) DAILY
Qty: 60 EACH | Refills: 3 | Status: SHIPPED | OUTPATIENT
Start: 2020-02-03 | End: 2020-06-30 | Stop reason: DRUGHIGH

## 2020-02-03 RX ORDER — AZELASTINE 1 MG/ML
2 SPRAY, METERED NASAL 2 TIMES DAILY
Qty: 30 ML | Refills: 5 | Status: SHIPPED | OUTPATIENT
Start: 2020-02-03 | End: 2020-12-23 | Stop reason: SDUPTHER

## 2020-02-03 RX ORDER — SODIUM, POTASSIUM,MAG SULFATES 17.5-3.13G
SOLUTION, RECONSTITUTED, ORAL ORAL
Status: ON HOLD | COMMUNITY
Start: 2020-01-22 | End: 2020-03-09 | Stop reason: HOSPADM

## 2020-02-03 NOTE — PROGRESS NOTES
CHIEF COMPLAINT:    Chief Complaint   Patient presents with    Follow-up       HISTORY OF PRESENT ILLNESS: Emily CentenoKyungBurgess is a 49 y.o. female withAsthma, Fibromyalgia, Hypothyroidism, Lupus, GERD, AR, Tobacco abuse is here for sleep evaluation. Patient with symptoms of  snoring, witnessed apnea, excessive daytime sleepiness, fatigue and difficulty staying asleep . ESS 13 initial visit. Patient does have: bruxism Pt was diagnosed with CHEPE in 2015, tried cpap but had machine repossessed  due to noncompliance.Reports difficulty tolerating due to sinus problems at the time.          PAST MEDICAL HISTORY:    Active Ambulatory Problems     Diagnosis Date Noted    Subacute cutaneous lupus erythematosus     Fibromyalgia     Tobacco abuse 10/29/2012    GERD (gastroesophageal reflux disease) 10/29/2012    Unspecified hereditary and idiopathic peripheral neuropathy 09/25/2014    Hypothyroidism 11/10/2014    Chronic allergic conjunctivitis 11/13/2016    Chronic allergic rhinitis due to animal hair and dander 11/13/2016    Recurrent urticaria 01/06/2017    Other pruritus 01/06/2017    Biliary colic 08/04/2017    S/P laparoscopic cholecystectomy 08/04/2017    Disorder of immune system 12/06/2017    Decreased functional activity tolerance 05/02/2019    Decreased ROM of lumbar spine 05/02/2019    Weakness 05/02/2019    Refractive error 05/07/2019    Long-term use of Plaquenil 05/07/2019    Palpitations 11/12/2019    Mild persistent asthma without complication 11/12/2019    CHEPE (obstructive sleep apnea) 11/12/2019     Resolved Ambulatory Problems     Diagnosis Date Noted    Amenorrhea 10/25/2013    Systemic lupus erythematosus 09/25/2014    Eczematous dermatitis of upper and lower eyelids of both eyes 11/13/2016    Calculus of gallbladder without cholecystitis without obstruction 07/17/2017     Past Medical History:   Diagnosis Date    Asthma in remission     COPD (chronic obstructive  pulmonary disease)     Immune disorder     Lupus     Recurrent urticaria 1/6/2017                PAST SURGICAL HISTORY:    Past Surgical History:   Procedure Laterality Date    CHOLECYSTECTOMY           FAMILY HISTORY:                Family History   Problem Relation Age of Onset    Hypertension Mother     Thyroid disease Mother     Cataracts Mother     Cancer Mother         duodenal    Arthritis Father     Hyperlipidemia Father     Cancer Father         Multiple Myeloma     Cirrhosis Father     Cataracts Father     Lupus Sister     Asthma Sister     No Known Problems Brother     No Known Problems Maternal Aunt     No Known Problems Maternal Uncle     No Known Problems Paternal Aunt     Pancreatic cancer Paternal Uncle     No Known Problems Maternal Grandmother     Lung cancer Maternal Grandfather     No Known Problems Paternal Grandmother     Lung cancer Paternal Grandfather     Scoliosis Sister     Amblyopia Neg Hx     Blindness Neg Hx     Diabetes Neg Hx     Glaucoma Neg Hx     Macular degeneration Neg Hx     Retinal detachment Neg Hx     Strabismus Neg Hx     Stroke Neg Hx        SOCIAL HISTORY:          Tobacco:   Social History     Tobacco Use   Smoking Status Current Every Day Smoker    Packs/day: 2.00    Years: 32.00    Pack years: 64.00    Types: Cigarettes   Smokeless Tobacco Former User    Quit date: 7/8/2013   Tobacco Comment    started age 17       alcohol use:    Social History     Substance and Sexual Activity   Alcohol Use No    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                  Occupation:Disabled    ALLERGIES:    Review of patient's allergies indicates:   Allergen Reactions    Bactrim [sulfamethoxazole-trimethoprim] Rash       CURRENT MEDICATIONS:    Current Outpatient Medications   Medication Sig Dispense Refill    albuterol (PROVENTIL) 2.5 mg /3 mL (0.083 %) nebulizer solution TAKE 3 MLS (2.5 MG TOTAL) BY NEBULIZATION EVERY 6 (SIX) HOURS AS NEEDED FOR  WHEEZING. 150 mL 0    azelastine (ASTELIN) 137 mcg (0.1 %) nasal spray 2 sprays (274 mcg total) by Nasal route 2 (two) times daily. 30 mL 5    cetirizine (ZYRTEC) 10 MG tablet Take 10 mg by mouth once daily.      cyclobenzaprine (FLEXERIL) 5 MG tablet TAKE 1 TABLET BY MOUTH THREE TIMES A DAY AS NEEDED FOR MUSCLE SPASMS  2    DULoxetine (CYMBALTA) 20 MG capsule Take 1 capsule (20 mg total) by mouth once daily. 90 capsule 1    EPINEPHrine (EPIPEN 2-GIGI) 0.3 mg/0.3 mL AtIn Inject 0.3 mLs (0.3 mg total) into the muscle once. for 1 dose 2 Device 0    estradiol (ESTRACE) 0.01 % (0.1 mg/gram) vaginal cream Insert 2 g daily intravaginally for 2 weeks, then 1 g twice weekly 42.5 g 5    fluorometholone 0.1% (FML) 0.1 % DrpS 1 DROP IN BOTH EYES TWICE A DAY FOR 2 WEEKS THEN DAILY FOR 2 WEEKS  0    fluticasone furoate-vilanterol (BREO) 100-25 mcg/dose diskus inhaler Inhale 1 puff into the lungs once daily. Controller 60 each 3    fluticasone propionate (FLONASE) 50 mcg/actuation nasal spray USE 2 SPRAYS IN EACH NOSTRIL ONE TIME DAILY  48 g 0    gabapentin (NEURONTIN) 600 MG tablet Take 1 tablet (600 mg total) by mouth once daily. 90 tablet 3    HYDROcodone-acetaminophen (NORCO) 7.5-325 mg per tablet Take 1 tablet by mouth every 24 hours as needed for Pain. 30 tablet 0    hydrocortisone 2.5 % ointment APPLY TOPICALLY 2 (TWO) TIMES DAILY. FOR EYELID RASH AS NEEDED 20 g 2    hydroxychloroquine (PLAQUENIL) 200 mg tablet Take 1 tablet (200 mg total) by mouth once daily. 90 tablet 3    hydrOXYzine HCl (ATARAX) 25 MG tablet 1-8 tablets at bedtime for itching. 240 tablet 1    levothyroxine (SYNTHROID) 100 MCG tablet TAKE 1 TABLET  MONDAY  THROUGH  SATURDAY AND TAKE 1/2 TABLET ON SUNDAY 85 tablet 2    montelukast (SINGULAIR) 10 mg tablet TAKE 1 TABLET (10 MG TOTAL) BY MOUTH EVERY EVENING. 90 tablet 1    naproxen (NAPROSYN) 500 MG tablet TAKE 1 TABLET BY MOUTH TWICE A DAY WITH MEALS (Patient not taking: Reported on  2/10/2020) 30 tablet 0    pantoprazole (PROTONIX) 40 MG tablet Take 1 tablet (40 mg total) by mouth once daily. 90 tablet 1    soy isofl/blk coh/gr tea/yerba (ESTROVEN ENERGY ORAL) Take by mouth.      SUPREP BOWEL PREP KIT 17.5-3.13-1.6 gram SolR TAKE 177 MLS BY MOUTH ONCE DAILY. FOR 2 DAYS      triamcinolone (KENALOG) 0.5 % ointment Apply topically 2 (two) times daily. For lupus skin flares on body 15 g 5    VENTOLIN HFA 90 mcg/actuation inhaler INHALE 2 PUFFS INTO THE LUNGS EVERY 4 HOURS AS NEEDED 18 Inhaler 0     No current facility-administered medications for this visit.                   REVIEW OF SYSTEMS:   Review of Systems   Constitutional: Positive for weight loss (unintentional), fatigue and night sweats. Negative for fever, chills, weight gain, activity change and appetite change.   HENT: Positive for congestion. Negative for trouble swallowing and voice change.    Eyes: Negative.    Respiratory: Positive for apnea, snoring, cough (daily), shortness of breath (seasonal), asthma nighttime symptoms (1 week ago), dyspnea on extertion, use of rescue inhaler (once a week, 2 rescue inhalers ) and somnolence. Negative for sputum production, chest tightness, wheezing, orthopnea and previous hospitialization due to pulmonary problems.    Cardiovascular: Positive for palpitations (once a week at night x couple months, last 30 minutes , sharpness, anxious, happens prior to bedtime). Negative for chest pain.   Genitourinary: Negative.    Endocrine: endocrine negative   Musculoskeletal: Negative for gait problem.   Skin: Negative.         1-2 x  Week, 1 month   Gastrointestinal: Positive for nausea (daily), vomiting (once a week, ass with sweating, pale) and acid reflux (daily). Negative for abdominal pain.   Neurological: Positive for light-headedness (daytime, sweaty, cold water helps). Negative for headaches.   Psychiatric/Behavioral: Positive for sleep disturbance.        PHYSICAL EXAM:  Vitals:    02/03/20  "[id number]   BP: 102/68   Pulse: 79   SpO2: 99%   Weight: 51.5 kg (113 lb 8.6 oz)   Height: 5' 1" (1.549 m)   PainSc: 0-No pain     Body mass index is 21.45 kg/m².   Physical Exam   Constitutional: She is oriented to person, place, and time. She appears well-developed.   HENT:   Head: Normocephalic.   Mouth/Throat: Oropharynx is clear and moist. Mallampati Score: III.   + nasal congestion   Neck: Neck supple.   Cardiovascular: Normal rate, regular rhythm and normal heart sounds.   Pulmonary/Chest: Normal expansion, effort normal and breath sounds normal.   Musculoskeletal: She exhibits no edema.   Neurological: She is alert and oriented to person, place, and time. Gait normal.   Skin: Skin is warm.   Psychiatric: She has a normal mood and affect. Her behavior is normal. Judgment and thought content normal.                                               DATA:  TSH:  Lab Results   Component Value Date    TSH 2.001 01/23/2020     CBC:  Lab Results   Component Value Date    WBC 5.93 02/10/2020    HGB 12.4 02/10/2020    HCT 39.7 02/10/2020    MCV 93 02/10/2020     02/10/2020     BMP:  Lab Results   Component Value Date     02/10/2020    K 3.9 02/10/2020     02/10/2020    CO2 28 02/10/2020    BUN 6 02/10/2020    CREATININE 0.8 02/10/2020    CALCIUM 9.3 02/10/2020    ANIONGAP 10 02/10/2020    ESTGFRAFRICA >60.0 02/10/2020    EGFRNONAA >60.0 02/10/2020     HgbA1C:  No results found for: LABA1C, HGBA1C     PFT 11/12/2019: normal    CXR 2017: normal    Sleep study:  PSG 1/7/2015: The overall AHI was 13.6 with an oxygen carmita of 86.0%. The supine AHI was 41.5.   CPAP titration 3/20/2015: Effective control of sleep disordered breathing was achieved with CPAP at 7 cm of water.     HST 1/22/2020: AHI 7 RDI 15    igE 256  eos 0.1  ASSESSMENT    ICD-10-CM ICD-9-CM    1. CHEPE (obstructive sleep apnea) G47.33 327.23 CPAP FOR HOME USE   2. Mild persistent asthma without complication J45.30 493.90 fluticasone " furoate-vilanterol (BREO) 100-25 mcg/dose diskus inhaler   3. Chronic allergic rhinitis due to animal hair and dander J30.81 477.2 azelastine (ASTELIN) 137 mcg (0.1 %) nasal spray       PLAN:      Problem List Items Addressed This Visit        Unprioritized    CHEPE (obstructive sleep apnea) - Primary    Overview     Patient with symptoms of snoring, witnessed apnea, excessive daytime sleepiness, fatigue and difficulty staying asleep. Dx with CHEPE in 2015 but difficulty tolerating cpap at time due to sinus problems.   HST 1/22/2020: AHI 7 RDI 15    After discussing all options, pt agree to trial APAP         Current Assessment & Plan     Education: During our discussion today, we talked about the etiology of obstructive sleep apnea as well as the potential ramifications of untreated sleep apnea, which could include daytime sleepiness, dementia, cognitive impairment, hypertension, heart disease and/or stroke. We discussed potential treatment options, which could include weight loss, body positioning, oral appliances (OA), continuous positive airway pressure (CPAP), or referral for surgical consideration.     Behavior modification which includes losing weight, exercising, changing the sleep position, abstaining from alcohol, and avoiding certain medications    Precautions: The patient was advised to abstain from driving should they feel sleepy or drowsy           Relevant Orders    CPAP FOR HOME USE    Mild persistent asthma without complication    Overview     Pt with reports daily cough, dyspnea, freqent rescue inhaler use.    Treat triggers-sinus, gerd, smoking cessation.    Normal pulmonary studies. Trial breo for persistent symptoms         Relevant Medications    fluticasone furoate-vilanterol (BREO) 100-25 mcg/dose diskus inhaler    Chronic allergic rhinitis due to animal hair and dander (Chronic)    Overview     Addition  topical antihistamine to regimen. Pt on singulair, flonase         Relevant Medications     azelastine (ASTELIN) 137 mcg (0.1 %) nasal spray      .    Patient will Follow up for 5-6 weeks following cpap usage, please bring cpap. .

## 2020-02-03 NOTE — PATIENT INSTRUCTIONS
Plan:   1. Start auto PAP therapy (the order will go to medical equipment and they will contact you after it gets processed through insurance which takes approximately 2-3 weeks)   2. Compliance requirement on machine is minimum 4 hours or more 70% nights (22/30 days) x 13 months or until machine paid off otherwise you risk not having the machine cover by your insurance company.   3. Pick a comfortable mask but should you have problems with the mask. [facility] has a 30 mask exchange mask policy so exchange any mask within 30 days if mask is uncomfortable  4. We do not actively monitor you. You are responsible for scheduling a follow up in 5-6 weeks after cpap usage to assess effectiveness and make adjustment as needed, bring cpap machine. Please schedule this appointment when you  your machine from medical equipment because our schedule is usually full for 5-6 weeks out.     [facility] :    [facility] [employer] [phone]  for questions regarding your cpap    Education: During our discussion today, we talked about the etiology of obstructive sleep apnea as well as the potential ramifications of untreated sleep apnea, which could include daytime sleepiness, dementia, cognitive impairment, hypertension, heart disease and/or stroke. We discussed potential treatment options, which could include weight loss, body positioning, oral appliances (OA), continuous positive airway pressure (CPAP), or referral for surgical consideration.     Behavior modification which includes losing weight, exercising, changing the sleep position, abstaining from alcohol, and avoiding certain medications    Precautions: The patient was advised to abstain from driving should they feel sleepy or drowsy    CPAP DESENSITIZATION if difficulty adhering to machine:    Step 1:  Wear the CPAP  at home while awake for 1-2 hour each day.  Practice breathing through the mask while  watching television, reading, or performing another sedentary activity that keeps your mind off your anxiety.     Step 2: Use the CPAP   during scheduled one hour naps at home.     Step 3: If can tolerate CPAP for 2 hours daily while awake, start sleeping with it. Use CPAP   during the initial 4.5 hours of nocturnal sleep.     Step 4: Use CPAP  through the entire night of sleep.

## 2020-02-05 ENCOUNTER — OFFICE VISIT (OUTPATIENT)
Dept: FAMILY MEDICINE | Facility: CLINIC | Age: 50
End: 2020-02-05
Payer: MEDICARE

## 2020-02-05 VITALS
DIASTOLIC BLOOD PRESSURE: 72 MMHG | TEMPERATURE: 98 F | BODY MASS INDEX: 21.14 KG/M2 | HEIGHT: 61 IN | SYSTOLIC BLOOD PRESSURE: 114 MMHG | WEIGHT: 112 LBS | HEART RATE: 92 BPM | OXYGEN SATURATION: 99 %

## 2020-02-05 DIAGNOSIS — M25.569 KNEE PAIN, UNSPECIFIED CHRONICITY, UNSPECIFIED LATERALITY: ICD-10-CM

## 2020-02-05 DIAGNOSIS — M79.7 FIBROMYALGIA: ICD-10-CM

## 2020-02-05 DIAGNOSIS — Z20.2 STD EXPOSURE: Primary | ICD-10-CM

## 2020-02-05 DIAGNOSIS — Z11.4 ENCOUNTER FOR SCREENING FOR HUMAN IMMUNODEFICIENCY VIRUS (HIV): ICD-10-CM

## 2020-02-05 DIAGNOSIS — R63.4 ABNORMAL WEIGHT LOSS: ICD-10-CM

## 2020-02-05 PROCEDURE — 99214 OFFICE O/P EST MOD 30 MIN: CPT | Mod: S$GLB,,, | Performed by: FAMILY MEDICINE

## 2020-02-05 PROCEDURE — 3008F PR BODY MASS INDEX (BMI) DOCUMENTED: ICD-10-PCS | Mod: CPTII,S$GLB,, | Performed by: FAMILY MEDICINE

## 2020-02-05 PROCEDURE — 99214 PR OFFICE/OUTPT VISIT, EST, LEVL IV, 30-39 MIN: ICD-10-PCS | Mod: S$GLB,,, | Performed by: FAMILY MEDICINE

## 2020-02-05 PROCEDURE — 3008F BODY MASS INDEX DOCD: CPT | Mod: CPTII,S$GLB,, | Performed by: FAMILY MEDICINE

## 2020-02-05 PROCEDURE — 99999 PR PBB SHADOW E&M-EST. PATIENT-LVL III: ICD-10-PCS | Mod: PBBFAC,,, | Performed by: FAMILY MEDICINE

## 2020-02-05 PROCEDURE — 99999 PR PBB SHADOW E&M-EST. PATIENT-LVL III: CPT | Mod: PBBFAC,,, | Performed by: FAMILY MEDICINE

## 2020-02-05 RX ORDER — DULOXETIN HYDROCHLORIDE 20 MG/1
20 CAPSULE, DELAYED RELEASE ORAL DAILY
Qty: 90 CAPSULE | Refills: 1 | Status: SHIPPED | OUTPATIENT
Start: 2020-02-05 | End: 2020-05-29 | Stop reason: SDUPTHER

## 2020-02-05 NOTE — PROGRESS NOTES
Assessment & Plan  Problem List Items Addressed This Visit        Orthopedic    Fibromyalgia    Relevant Medications    DULoxetine (CYMBALTA) 20 MG capsule      Other Visit Diagnoses     STD exposure    -  Primary    Relevant Orders    Hepatitis panel, acute    HIV 1/2 Ag/Ab (4th Gen)    RPR    C. trachomatis/N. gonorrhoeae by AMP DNA    Abnormal weight loss         Relevant Orders    Hepatitis panel, acute    HIV 1/2 Ag/Ab (4th Gen)    C. trachomatis/N. gonorrhoeae by AMP DNA    Encounter for screening for human immunodeficiency virus (HIV)         Relevant Orders    HIV 1/2 Ag/Ab (4th Gen)    Knee pain, unspecified chronicity, unspecified laterality         Relevant Orders    C. trachomatis/N. gonorrhoeae by AMP DNA            Health Maintenance reviewed.    Follow-up: No follow-ups on file.    ______________________________________________________________________    Chief Complaint  Chief Complaint   Patient presents with    Follow-up       HPI  Emily Indira Yovani is a 49 y.o. female with multiple medical diagnoses as listed in the medical history and problem list that presents for follow up fibromyalgia.  Pt is known to me with last appointment 9/11/2019.    Patient reports that she is greatly concerned that her  is cheating on her.  She reports that her  does have a history of heroin use.  She reports that he has recently taken [employer] detected test.  She reports that her mood swings have intensified.  She reports increased sadness as well as increased anger.  Patient has been weaning off of Zoloft appropriately.      PAST MEDICAL HISTORY:  Past Medical History:   Diagnosis Date    Asthma in remission     COPD (chronic obstructive pulmonary disease)     Fibromyalgia     Hypothyroidism     Immune disorder     Lupus     CHEPE (obstructive sleep apnea) 11/12/2019    Recurrent urticaria 1/6/2017    Subacute cutaneous lupus erythematosus        PAST SURGICAL HISTORY:  Past Surgical  History:   Procedure Laterality Date    CHOLECYSTECTOMY         SOCIAL HISTORY:  Social History     Socioeconomic History    Marital status:      Spouse name: Not on file    Number of children: Not on file    Years of education: Not on file    Highest education level: Not on file   Occupational History    Not on file   Social Needs    Financial resource strain: Not on file    Food insecurity:     Worry: Not on file     Inability: Not on file    Transportation needs:     Medical: Not on file     Non-medical: Not on file   Tobacco Use    Smoking status: Current Every Day Smoker     Packs/day: 2.00     Years: 32.00     Pack years: 64.00     Types: Cigarettes    Smokeless tobacco: Former User     Quit date: 7/8/2013    Tobacco comment: started age 17   Substance and Sexual Activity    Alcohol use: No     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     Drug use: No    Sexual activity: Yes     Partners: Male   Lifestyle    Physical activity:     Days per week: Not on file     Minutes per session: Not on file    Stress: Only a little   Relationships    Social connections:     Talks on phone: Not on file     Gets together: Not on file     Attends Jew service: Not on file     Active member of club or organization: Not on file     Attends meetings of clubs or organizations: Not on file     Relationship status: Not on file   Other Topics Concern    Not on file   Social History Narrative    Not on file       FAMILY HISTORY:  Family History   Problem Relation Age of Onset    Hypertension Mother     Thyroid disease Mother     Cataracts Mother     Cancer Mother         duodenal    Arthritis Father     Hyperlipidemia Father     Cancer Father         Multiple Myeloma     Cirrhosis Father     Cataracts Father     Lupus Sister     Asthma Sister     No Known Problems Brother     No Known Problems Maternal Aunt     No Known Problems Maternal Uncle     No Known Problems Paternal Aunt      Pancreatic cancer Paternal Uncle     No Known Problems Maternal Grandmother     Lung cancer Maternal Grandfather     No Known Problems Paternal Grandmother     Lung cancer Paternal Grandfather     Scoliosis Sister     Amblyopia Neg Hx     Blindness Neg Hx     Diabetes Neg Hx     Glaucoma Neg Hx     Macular degeneration Neg Hx     Retinal detachment Neg Hx     Strabismus Neg Hx     Stroke Neg Hx        ALLERGIES AND MEDICATIONS: updated and reviewed.  Review of patient's allergies indicates:   Allergen Reactions    Bactrim [sulfamethoxazole-trimethoprim] Rash     Current Outpatient Medications   Medication Sig Dispense Refill    albuterol (PROVENTIL) 2.5 mg /3 mL (0.083 %) nebulizer solution TAKE 3 MLS (2.5 MG TOTAL) BY NEBULIZATION EVERY 6 (SIX) HOURS AS NEEDED FOR WHEEZING. 150 mL 0    azelastine (ASTELIN) 137 mcg (0.1 %) nasal spray 2 sprays (274 mcg total) by Nasal route 2 (two) times daily. 30 mL 5    cetirizine (ZYRTEC) 10 MG tablet Take 10 mg by mouth once daily.      cyclobenzaprine (FLEXERIL) 5 MG tablet TAKE 1 TABLET BY MOUTH THREE TIMES A DAY AS NEEDED FOR MUSCLE SPASMS  2    DULoxetine (CYMBALTA) 20 MG capsule Take 1 capsule (20 mg total) by mouth once daily. 90 capsule 1    EPINEPHrine (EPIPEN 2-GIGI) 0.3 mg/0.3 mL AtIn Inject 0.3 mLs (0.3 mg total) into the muscle once. for 1 dose 2 Device 0    estradiol (ESTRACE) 0.01 % (0.1 mg/gram) vaginal cream Insert 2 g daily intravaginally for 2 weeks, then 1 g twice weekly 42.5 g 5    fluorometholone 0.1% (FML) 0.1 % DrpS 1 DROP IN BOTH EYES TWICE A DAY FOR 2 WEEKS THEN DAILY FOR 2 WEEKS  0    fluticasone furoate-vilanterol (BREO) 100-25 mcg/dose diskus inhaler Inhale 1 puff into the lungs once daily. Controller 60 each 3    gabapentin (NEURONTIN) 600 MG tablet Take 1 tablet (600 mg total) by mouth once daily. 90 tablet 3    HYDROcodone-acetaminophen (NORCO) 7.5-325 mg per tablet Take 1 tablet by mouth every 24 hours as needed for Pain.  30 tablet 0    hydrocortisone 2.5 % ointment APPLY TOPICALLY 2 (TWO) TIMES DAILY. FOR EYELID RASH AS NEEDED 20 g 2    hydroxychloroquine (PLAQUENIL) 200 mg tablet Take 1 tablet (200 mg total) by mouth once daily. 90 tablet 3    hydrOXYzine HCl (ATARAX) 25 MG tablet 1-8 tablets at bedtime for itching. 240 tablet 1    levothyroxine (SYNTHROID) 100 MCG tablet TAKE 1 TABLET  MONDAY  THROUGH  SATURDAY AND TAKE 1/2 TABLET ON SUNDAY 85 tablet 2    montelukast (SINGULAIR) 10 mg tablet TAKE 1 TABLET (10 MG TOTAL) BY MOUTH EVERY EVENING. 90 tablet 1    naproxen (NAPROSYN) 500 MG tablet TAKE 1 TABLET BY MOUTH TWICE A DAY WITH MEALS 30 tablet 0    pantoprazole (PROTONIX) 40 MG tablet Take 1 tablet (40 mg total) by mouth once daily. 90 tablet 1    soy isofl/blk coh/gr tea/yerba (ESTROVEN ENERGY ORAL) Take by mouth.      SUPREP BOWEL PREP KIT 17.5-3.13-1.6 gram SolR TAKE 177 MLS BY MOUTH ONCE DAILY. FOR 2 DAYS      triamcinolone (KENALOG) 0.5 % ointment Apply topically 2 (two) times daily. For lupus skin flares on body 15 g 5    fluticasone propionate (FLONASE) 50 mcg/actuation nasal spray USE 2 SPRAYS IN EACH NOSTRIL ONE TIME DAILY  48 g 0    VENTOLIN HFA 90 mcg/actuation inhaler INHALE 2 PUFFS INTO THE LUNGS EVERY 4 HOURS AS NEEDED (Patient not taking: Reported on 2/5/2020) 18 Inhaler 0     No current facility-administered medications for this visit.          ROS  Review of Systems   Constitutional: Negative for activity change, appetite change, fatigue, fever and unexpected weight change.   HENT: Negative.  Negative for ear discharge, ear pain, rhinorrhea and sore throat.    Eyes: Negative.    Respiratory: Negative for apnea, cough, chest tightness, shortness of breath and wheezing.    Cardiovascular: Negative for chest pain, palpitations and leg swelling.   Gastrointestinal: Negative for abdominal distention, abdominal pain, constipation, diarrhea and vomiting.   Endocrine: Negative for cold intolerance, heat  "intolerance, polydipsia and polyuria.   Genitourinary: Negative for decreased urine volume and urgency.   Musculoskeletal: Negative.    Skin: Negative for rash.   Neurological: Positive for weakness. Negative for dizziness and headaches.   Hematological: Does not bruise/bleed easily.   Psychiatric/Behavioral: Positive for behavioral problems, decreased concentration and dysphoric mood. Negative for agitation, sleep disturbance and suicidal ideas.           Physical Exam  Vitals:    02/05/20 0920   BP: 114/72   BP Location: Right arm   Patient Position: Sitting   BP Method: Medium (Manual)   Pulse: 92   Temp: 98.1 °F (36.7 °C)   TempSrc: Oral   SpO2: 99%   Weight: 50.8 kg (111 lb 15.9 oz)   Height: 5' 1" (1.549 m)    Body mass index is 21.16 kg/m².  Weight: 50.8 kg (111 lb 15.9 oz)   Height: 5' 1" (154.9 cm)   Physical Exam   Constitutional: She is oriented to person, place, and time. She appears cachectic.   HENT:   Head: Normocephalic and atraumatic.   Right Ear: External ear normal.   Left Ear: External ear normal.   Nose: Nose normal.   Mouth/Throat: Oropharynx is clear and moist.   Eyes: Pupils are equal, round, and reactive to light. Conjunctivae and EOM are normal.   Cardiovascular: Normal rate, regular rhythm and normal heart sounds.   Pulmonary/Chest: Effort normal and breath sounds normal.   Neurological: She is alert and oriented to person, place, and time.   Skin: Skin is warm and dry.   Vitals reviewed.        Health Maintenance       Date Due Completion Date    HIV Screening 03/26/1987 ---    Colonoscopy 04/18/2021 4/18/2016    Mammogram 09/11/2021 9/11/2019    Pap Smear with HPV Cotest 09/11/2022 9/11/2019    Pap Smear 09/11/2022 9/11/2019    Lipid Panel 09/14/2024 9/14/2019    TETANUS VACCINE 09/02/2026 9/2/2016              Patient note was created using [employer].  Any errors in syntax or even information may not have been identified and edited on initial review prior to signing this note.  "

## 2020-02-06 ENCOUNTER — LAB VISIT (OUTPATIENT)
Dept: LAB | Facility: HOSPITAL | Age: 50
End: 2020-02-06
Attending: NURSE PRACTITIONER
Payer: MEDICARE

## 2020-02-06 DIAGNOSIS — Z11.4 ENCOUNTER FOR SCREENING FOR HUMAN IMMUNODEFICIENCY VIRUS (HIV): ICD-10-CM

## 2020-02-06 DIAGNOSIS — Z20.2 STD EXPOSURE: ICD-10-CM

## 2020-02-06 DIAGNOSIS — E87.6 HYPOKALEMIA: ICD-10-CM

## 2020-02-06 DIAGNOSIS — R63.4 ABNORMAL WEIGHT LOSS: ICD-10-CM

## 2020-02-06 LAB
HAV IGM SERPL QL IA: NEGATIVE
HBV CORE IGM SERPL QL IA: NEGATIVE
HBV SURFACE AG SERPL QL IA: NEGATIVE
HCV AB SERPL QL IA: NEGATIVE
HIV 1+2 AB+HIV1 P24 AG SERPL QL IA: NEGATIVE
POTASSIUM SERPL-SCNC: 3.5 MMOL/L (ref 3.5–5.1)

## 2020-02-06 PROCEDURE — 80074 ACUTE HEPATITIS PANEL: CPT

## 2020-02-06 PROCEDURE — 86592 SYPHILIS TEST NON-TREP QUAL: CPT

## 2020-02-06 PROCEDURE — 36415 COLL VENOUS BLD VENIPUNCTURE: CPT | Mod: PO

## 2020-02-06 PROCEDURE — 86703 HIV-1/HIV-2 1 RESULT ANTBDY: CPT

## 2020-02-06 PROCEDURE — 84132 ASSAY OF SERUM POTASSIUM: CPT

## 2020-02-07 LAB — RPR SER QL: NORMAL

## 2020-02-10 ENCOUNTER — OFFICE VISIT (OUTPATIENT)
Dept: RHEUMATOLOGY | Facility: CLINIC | Age: 50
End: 2020-02-10
Payer: MEDICARE

## 2020-02-10 ENCOUNTER — PATIENT MESSAGE (OUTPATIENT)
Dept: FAMILY MEDICINE | Facility: CLINIC | Age: 50
End: 2020-02-10

## 2020-02-10 ENCOUNTER — LAB VISIT (OUTPATIENT)
Dept: LAB | Facility: HOSPITAL | Age: 50
End: 2020-02-10
Payer: MEDICARE

## 2020-02-10 VITALS
SYSTOLIC BLOOD PRESSURE: 115 MMHG | DIASTOLIC BLOOD PRESSURE: 69 MMHG | BODY MASS INDEX: 21.77 KG/M2 | WEIGHT: 115.31 LBS | HEIGHT: 61 IN | HEART RATE: 76 BPM

## 2020-02-10 DIAGNOSIS — M79.7 FIBROMYALGIA: Primary | ICD-10-CM

## 2020-02-10 DIAGNOSIS — Z72.0 TOBACCO ABUSE: ICD-10-CM

## 2020-02-10 DIAGNOSIS — Z92.25 HISTORY OF IMMUNOSUPPRESSION THERAPY: ICD-10-CM

## 2020-02-10 DIAGNOSIS — Z79.899 LONG-TERM USE OF PLAQUENIL: ICD-10-CM

## 2020-02-10 DIAGNOSIS — G89.4 CHRONIC PAIN SYNDROME: ICD-10-CM

## 2020-02-10 DIAGNOSIS — L93.1 SUBACUTE CUTANEOUS LUPUS ERYTHEMATOSUS: ICD-10-CM

## 2020-02-10 DIAGNOSIS — M79.7 FIBROMYALGIA: ICD-10-CM

## 2020-02-10 DIAGNOSIS — M25.561 RIGHT KNEE PAIN, UNSPECIFIED CHRONICITY: ICD-10-CM

## 2020-02-10 LAB
ALBUMIN SERPL BCP-MCNC: 4.2 G/DL (ref 3.5–5.2)
ALP SERPL-CCNC: 58 U/L (ref 55–135)
ALT SERPL W/O P-5'-P-CCNC: 10 U/L (ref 10–44)
ANION GAP SERPL CALC-SCNC: 10 MMOL/L (ref 8–16)
AST SERPL-CCNC: 16 U/L (ref 10–40)
BASOPHILS # BLD AUTO: 0.02 K/UL (ref 0–0.2)
BASOPHILS NFR BLD: 0.3 % (ref 0–1.9)
BILIRUB SERPL-MCNC: 0.3 MG/DL (ref 0.1–1)
BUN SERPL-MCNC: 6 MG/DL (ref 6–20)
C3 SERPL-MCNC: 108 MG/DL (ref 50–180)
C4 SERPL-MCNC: 20 MG/DL (ref 11–44)
CALCIUM SERPL-MCNC: 9.3 MG/DL (ref 8.7–10.5)
CHLORIDE SERPL-SCNC: 104 MMOL/L (ref 95–110)
CO2 SERPL-SCNC: 28 MMOL/L (ref 23–29)
CREAT SERPL-MCNC: 0.8 MG/DL (ref 0.5–1.4)
CRP SERPL-MCNC: 0.6 MG/L (ref 0–8.2)
DIFFERENTIAL METHOD: ABNORMAL
EOSINOPHIL # BLD AUTO: 0 K/UL (ref 0–0.5)
EOSINOPHIL NFR BLD: 0.3 % (ref 0–8)
ERYTHROCYTE [DISTWIDTH] IN BLOOD BY AUTOMATED COUNT: 12 % (ref 11.5–14.5)
ERYTHROCYTE [SEDIMENTATION RATE] IN BLOOD BY WESTERGREN METHOD: 11 MM/HR (ref 0–36)
EST. GFR  (AFRICAN AMERICAN): >60 ML/MIN/1.73 M^2
EST. GFR  (NON AFRICAN AMERICAN): >60 ML/MIN/1.73 M^2
GLUCOSE SERPL-MCNC: 91 MG/DL (ref 70–110)
HCT VFR BLD AUTO: 39.7 % (ref 37–48.5)
HGB BLD-MCNC: 12.4 G/DL (ref 12–16)
IMM GRANULOCYTES # BLD AUTO: 0.01 K/UL (ref 0–0.04)
IMM GRANULOCYTES NFR BLD AUTO: 0.2 % (ref 0–0.5)
LYMPHOCYTES # BLD AUTO: 1.5 K/UL (ref 1–4.8)
LYMPHOCYTES NFR BLD: 25.8 % (ref 18–48)
MCH RBC QN AUTO: 29.1 PG (ref 27–31)
MCHC RBC AUTO-ENTMCNC: 31.2 G/DL (ref 32–36)
MCV RBC AUTO: 93 FL (ref 82–98)
MONOCYTES # BLD AUTO: 0.3 K/UL (ref 0.3–1)
MONOCYTES NFR BLD: 5.4 % (ref 4–15)
NEUTROPHILS # BLD AUTO: 4 K/UL (ref 1.8–7.7)
NEUTROPHILS NFR BLD: 68 % (ref 38–73)
NRBC BLD-RTO: 0 /100 WBC
PLATELET # BLD AUTO: 258 K/UL (ref 150–350)
PMV BLD AUTO: 10.6 FL (ref 9.2–12.9)
POTASSIUM SERPL-SCNC: 3.9 MMOL/L (ref 3.5–5.1)
PROT SERPL-MCNC: 8 G/DL (ref 6–8.4)
RBC # BLD AUTO: 4.26 M/UL (ref 4–5.4)
SODIUM SERPL-SCNC: 142 MMOL/L (ref 136–145)
WBC # BLD AUTO: 5.93 K/UL (ref 3.9–12.7)

## 2020-02-10 PROCEDURE — 3008F BODY MASS INDEX DOCD: CPT | Mod: CPTII,GC,S$GLB, | Performed by: STUDENT IN AN ORGANIZED HEALTH CARE EDUCATION/TRAINING PROGRAM

## 2020-02-10 PROCEDURE — 3008F PR BODY MASS INDEX (BMI) DOCUMENTED: ICD-10-PCS | Mod: CPTII,GC,S$GLB, | Performed by: STUDENT IN AN ORGANIZED HEALTH CARE EDUCATION/TRAINING PROGRAM

## 2020-02-10 PROCEDURE — 86140 C-REACTIVE PROTEIN: CPT

## 2020-02-10 PROCEDURE — 86160 COMPLEMENT ANTIGEN: CPT | Mod: 59

## 2020-02-10 PROCEDURE — 36415 COLL VENOUS BLD VENIPUNCTURE: CPT

## 2020-02-10 PROCEDURE — 99214 PR OFFICE/OUTPT VISIT, EST, LEVL IV, 30-39 MIN: ICD-10-PCS | Mod: GC,S$GLB,, | Performed by: STUDENT IN AN ORGANIZED HEALTH CARE EDUCATION/TRAINING PROGRAM

## 2020-02-10 PROCEDURE — 99214 OFFICE O/P EST MOD 30 MIN: CPT | Mod: GC,S$GLB,, | Performed by: STUDENT IN AN ORGANIZED HEALTH CARE EDUCATION/TRAINING PROGRAM

## 2020-02-10 PROCEDURE — 86225 DNA ANTIBODY NATIVE: CPT

## 2020-02-10 PROCEDURE — 85025 COMPLETE CBC W/AUTO DIFF WBC: CPT

## 2020-02-10 PROCEDURE — 99999 PR PBB SHADOW E&M-EST. PATIENT-LVL IV: CPT | Mod: PBBFAC,GC,, | Performed by: STUDENT IN AN ORGANIZED HEALTH CARE EDUCATION/TRAINING PROGRAM

## 2020-02-10 PROCEDURE — 99999 PR PBB SHADOW E&M-EST. PATIENT-LVL IV: ICD-10-PCS | Mod: PBBFAC,GC,, | Performed by: STUDENT IN AN ORGANIZED HEALTH CARE EDUCATION/TRAINING PROGRAM

## 2020-02-10 PROCEDURE — 80053 COMPREHEN METABOLIC PANEL: CPT

## 2020-02-10 PROCEDURE — 85652 RBC SED RATE AUTOMATED: CPT

## 2020-02-10 PROCEDURE — 86160 COMPLEMENT ANTIGEN: CPT

## 2020-02-10 NOTE — PROGRESS NOTES
RHEUMATOLOGY CLINIC FOLLOW UP VISIT  Chief complaints:-  R knee pain     HPI:-  Emily Blanco a 49 y.o. 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     Lupus was diagnosed at age 20 - via skin biopsy of the face.  Developed facial rash.  Was on steroid and then was started on the plaquenil after development of arthralgia.  Last eye examination was 1-2 years ago.  About 3 weeks ago, rash developed in bilateral elbows and behind R knee.  Went to dermatologist given topical steroid cream.  +Raynaud's    Fibromyalgia (was diagnosed is managed with ibuprofen 1-2x/week.  She is also taking hydrocodone 7.5mg, gabapentin 600mg daily, flexeril.  Pain of the hands, feet, knees.     + Fatigue/maliase.  Sleep 8 hours every night.  Have multiple night time awakening.  LMP was >6 years ago.  +night sweats, mood swings, bloated    Depressed (on Zoloft - prescribed by PCP. Started a few months ago).  Does not want to increase the medication.  Previously tried it before (~20 years ago) and increasing caused her to be numb.  Never tried Trazadone, Cymbalta, or amitrypline    Smokes tobacco (1ppd x 30 years), denies EtOh or recreational drugs/medications/supplements.     Monitoring for HCQ toxicity - eye exam April 2019 (normal).     Dermatology (Dr Thomas - April 2019).  Subcutaneous lupus and eczema.  Agreed with increasing HCQ to 400mg daily.     At the last visit, patient was complaining of R knee pain and weakness - requiring usage of walker.  Xray of bilateral knee was unremarkable.  Patient continues to have severe R knee pain and weakness when ambulating. MRI ordered - had not been completed yet due to recent change in insurance.     Patient is only take 200mg HCQ inconsistently (had discontinued it for the last few weeks - forgetting to take it)  "and continues to have diarrhea.  Patient had been experiencing persistent diarrhea x few months.  Denies any blood.  Watery (non-formed) stool, up to 3x/day especially after food.  Denies any fever/chills, recent travels, sick contacts, abdominal pain.  Uncertain if it's related to food.  Had gallbladder removal a couple of years ago and noticed worsening since.  GI scheduled for scoping - Dec 10 but had to be rescheduled to March     Rash had improved after following with dermatology (April 2019).  Continues to use topical steroid cream.  No new rash     Currently on Zoloft for depression.  Did not wish to increase dosage - "feeling numb".  Never taken Cymbalta/Trazadone/amitrypline.  Followed up with PCP - recommendation to start Cymbalta (had not started it yet).     Sleep study recent done - patient diagnosed with CHEPE.  Awaiting for CPAP machine.     Currently feeling very depressed.  Just found out that  is "cheating" on her via speaker phone.  Had moved out to mother's house.  Had STD panel - negative per patient.  Patient due to see psychiatry next week - just "needs someone to talk to"  Patient trying to take care of herself.  Decreased appetite, + weight loss, and increased diarrhea.                                   Review of Systems   Constitutional: Positive for malaise/fatigue. Negative for chills, diaphoresis, fever and weight loss.   HENT: Negative for congestion, ear discharge, ear pain, hearing loss, nosebleeds, sinus pain and tinnitus.    Eyes: Negative for photophobia, pain, discharge and redness.   Respiratory: Negative for cough, hemoptysis, sputum production, shortness of breath, wheezing and stridor.    Cardiovascular: Negative for chest pain, palpitations, orthopnea, claudication, leg swelling and PND.   Gastrointestinal: Positive for diarrhea. Negative for abdominal pain, constipation, heartburn, nausea and vomiting.   Genitourinary: Negative for dysuria, frequency, hematuria and " urgency.   Musculoskeletal: Positive for joint pain. Negative for back pain, myalgias and neck pain.   Skin: Negative for rash.   Neurological: Negative for dizziness, tingling, tremors, weakness and headaches.   Endo/Heme/Allergies: Does not bruise/bleed easily.   Psychiatric/Behavioral: Negative for depression, hallucinations and suicidal ideas. The patient is not nervous/anxious and does not have insomnia.        Past Medical History:   Diagnosis Date    Asthma in remission     COPD (chronic obstructive pulmonary disease)     Fibromyalgia     Hypothyroidism     Immune disorder     Lupus     CHEPE (obstructive sleep apnea) 11/12/2019    Recurrent urticaria 1/6/2017    Subacute cutaneous lupus erythematosus        Past Surgical History:   Procedure Laterality Date    CHOLECYSTECTOMY          Social History     Tobacco Use    Smoking status: Current Every Day Smoker     Packs/day: 2.00     Years: 32.00     Pack years: 64.00     Types: Cigarettes    Smokeless tobacco: Former User     Quit date: 7/8/2013    Tobacco comment: started age 17   Substance Use Topics    Alcohol use: No     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     Drug use: No       Family History   Problem Relation Age of Onset    Hypertension Mother     Thyroid disease Mother     Cataracts Mother     Cancer Mother         duodenal    Arthritis Father     Hyperlipidemia Father     Cancer Father         Multiple Myeloma     Cirrhosis Father     Cataracts Father     Lupus Sister     Asthma Sister     No Known Problems Brother     No Known Problems Maternal Aunt     No Known Problems Maternal Uncle     No Known Problems Paternal Aunt     Pancreatic cancer Paternal Uncle     No Known Problems Maternal Grandmother     Lung cancer Maternal Grandfather     No Known Problems Paternal Grandmother     Lung cancer Paternal Grandfather     Scoliosis Sister     Amblyopia Neg Hx     Blindness Neg Hx     Diabetes Neg Hx     Glaucoma  "Neg Hx     Macular degeneration Neg Hx     Retinal detachment Neg Hx     Strabismus Neg Hx     Stroke Neg Hx        Review of patient's allergies indicates:   Allergen Reactions    Bactrim [sulfamethoxazole-trimethoprim] Rash       Vitals:    02/10/20 1103   BP: 115/69   Pulse: 76   Weight: 52.3 kg (115 lb 4.8 oz)   Height: 5' 1" (1.549 m)   PainSc: 0-No pain       Physical Exam   Constitutional: She is oriented to person, place, and time and well-developed, well-nourished, and in no distress.   HENT:   Head: Normocephalic and atraumatic.   No mouth ulcers    Eyes: Pupils are equal, round, and reactive to light. EOM are normal.   Neck: Normal range of motion. Neck supple.   Cardiovascular: Normal rate, regular rhythm and normal heart sounds.   Pulmonary/Chest: Effort normal and breath sounds normal.   Abdominal: Soft. Bowel sounds are normal.   Musculoskeletal: Normal range of motion.   ROM intact  Strength intact in all major joints  No signs of synovitis  Mild R knee crepitus    Neurological: She is alert and oriented to person, place, and time. GCS score is 15.   Abnormal gait - R knee with brace and cane.  Difficulty with gait due to pain with walk    Skin: Skin is warm and dry. No rash noted. No erythema.   Mild redness, eczema rash over bilateral knuckles   Livedo reticularis in bilateral LE       Psychiatric: Mood, memory, affect and judgment normal.   Tearful at times        Medication List with Changes/Refills   Current Medications    ALBUTEROL (PROVENTIL) 2.5 MG /3 ML (0.083 %) NEBULIZER SOLUTION    TAKE 3 MLS (2.5 MG TOTAL) BY NEBULIZATION EVERY 6 (SIX) HOURS AS NEEDED FOR WHEEZING.    AZELASTINE (ASTELIN) 137 MCG (0.1 %) NASAL SPRAY    2 sprays (274 mcg total) by Nasal route 2 (two) times daily.    CETIRIZINE (ZYRTEC) 10 MG TABLET    Take 10 mg by mouth once daily.    CYCLOBENZAPRINE (FLEXERIL) 5 MG TABLET    TAKE 1 TABLET BY MOUTH THREE TIMES A DAY AS NEEDED FOR MUSCLE SPASMS    DULOXETINE " (CYMBALTA) 20 MG CAPSULE    Take 1 capsule (20 mg total) by mouth once daily.    EPINEPHRINE (EPIPEN 2-GIGI) 0.3 MG/0.3 ML ATIN    Inject 0.3 mLs (0.3 mg total) into the muscle once. for 1 dose    ESTRADIOL (ESTRACE) 0.01 % (0.1 MG/GRAM) VAGINAL CREAM    Insert 2 g daily intravaginally for 2 weeks, then 1 g twice weekly    FLUOROMETHOLONE 0.1% (FML) 0.1 % DRPS    1 DROP IN BOTH EYES TWICE A DAY FOR 2 WEEKS THEN DAILY FOR 2 WEEKS    FLUTICASONE FUROATE-VILANTEROL (BREO) 100-25 MCG/DOSE DISKUS INHALER    Inhale 1 puff into the lungs once daily. Controller    FLUTICASONE PROPIONATE (FLONASE) 50 MCG/ACTUATION NASAL SPRAY    USE 2 SPRAYS IN EACH NOSTRIL ONE TIME DAILY     GABAPENTIN (NEURONTIN) 600 MG TABLET    Take 1 tablet (600 mg total) by mouth once daily.    HYDROCODONE-ACETAMINOPHEN (NORCO) 7.5-325 MG PER TABLET    Take 1 tablet by mouth every 24 hours as needed for Pain.    HYDROCORTISONE 2.5 % OINTMENT    APPLY TOPICALLY 2 (TWO) TIMES DAILY. FOR EYELID RASH AS NEEDED    HYDROXYCHLOROQUINE (PLAQUENIL) 200 MG TABLET    Take 1 tablet (200 mg total) by mouth once daily.    HYDROXYZINE HCL (ATARAX) 25 MG TABLET    1-8 tablets at bedtime for itching.    LEVOTHYROXINE (SYNTHROID) 100 MCG TABLET    TAKE 1 TABLET  MONDAY  THROUGH  SATURDAY AND TAKE 1/2 TABLET ON SUNDAY    MONTELUKAST (SINGULAIR) 10 MG TABLET    TAKE 1 TABLET (10 MG TOTAL) BY MOUTH EVERY EVENING.    NAPROXEN (NAPROSYN) 500 MG TABLET    TAKE 1 TABLET BY MOUTH TWICE A DAY WITH MEALS    PANTOPRAZOLE (PROTONIX) 40 MG TABLET    Take 1 tablet (40 mg total) by mouth once daily.    SOY ISOFL/BLK COH/GR TEA/YERBA (ESTROVEN ENERGY ORAL)    Take by mouth.    SUPREP BOWEL PREP KIT 17.5-3.13-1.6 GRAM SOLR    TAKE 177 MLS BY MOUTH ONCE DAILY. FOR 2 DAYS    TRIAMCINOLONE (KENALOG) 0.5 % OINTMENT    Apply topically 2 (two) times daily. For lupus skin flares on body    VENTOLIN HFA 90 MCG/ACTUATION INHALER    INHALE 2 PUFFS INTO THE LUNGS EVERY 4 HOURS AS NEEDED        Assessment/Plans:-  49 y.o. 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  Negative for synovitis, ROM intact, strength intact.     Labs were unremarkable (done Nov 2019).    Assessment  - R knee pain - ddx includes trauma vs OA vs AVN vs fibromyalgia? Normal xray.  Pending MRI  - Persistent diarrhea - ddx includes IBS vs food insensitivity (lactose intolerance, celiac disease) - following up with GI.  Scheduled for EGD/Colonoscopy March   - Fibromyalgia - on Norco and gabapentin.  Patient currently on Zoloft - which is mildly controlling depression. Weaning off of Zoloft at this time.  Had not start Cymbalta yet.  - Tobacco abuse - education provided for cessation.  Tobacco usage can decrease efficacy of HCQ      Plan  - Plaquenil eye exam - due April 2020  - Continue plaquenil 200mg Daily - consideration to increase it back to 400mg daily if diarrhea resolves  - Follow up with GI as previously scheduled for scopes  - Follow up with dermatology -  management of rash   - Follow up with primary - switching over of zoloft to Cymbalta  - MRI of R knee - if abnormal, consideration for ortho consult  - Education provided on the importance of medication compliance  - continue Gabapentin 600mg QHS   - follow up with PMR as scheduled for evaluation of persistent arthralgia  - CBC, CMP, ESR, CRP, C3, C4, UA, Up/c, dsDNA -today  - recommendation for daily usage of sunscreen   - Education provided on tobacco cessation - patient refused at this time including cessation programs  - Follow up with psychiatry as previously scheduled      # Follow up in about 2 months (around 4/10/2020).

## 2020-02-10 NOTE — PROGRESS NOTES
I have personally taken the history and examined the patient to verify the fellow's notation, and I agree with the impression and plan stated.      Overall stable with FMS sx and some rash  GI sx probably stress related    WD

## 2020-02-12 LAB — DSDNA AB SER-ACNC: NORMAL [IU]/ML

## 2020-02-18 ENCOUNTER — INITIAL CONSULT (OUTPATIENT)
Dept: PSYCHIATRY | Facility: CLINIC | Age: 50
End: 2020-02-18
Payer: COMMERCIAL

## 2020-02-18 DIAGNOSIS — F43.23 ADJUSTMENT DISORDER WITH MIXED ANXIETY AND DEPRESSED MOOD: Primary | ICD-10-CM

## 2020-02-18 DIAGNOSIS — Z63.0 MARITAL PROBLEM: ICD-10-CM

## 2020-02-18 PROCEDURE — 90791 PR PSYCHIATRIC DIAGNOSTIC EVALUATION: ICD-10-PCS | Mod: S$GLB,,, | Performed by: SOCIAL WORKER

## 2020-02-18 PROCEDURE — 90791 PSYCH DIAGNOSTIC EVALUATION: CPT | Mod: S$GLB,,, | Performed by: SOCIAL WORKER

## 2020-02-18 SDOH — SOCIAL DETERMINANTS OF HEALTH (SDOH): PROBLEMS IN RELATIONSHIP WITH SPOUSE OR PARTNER: Z63.0
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
## 2020-02-21 ENCOUNTER — HOSPITAL ENCOUNTER (OUTPATIENT)
Dept: RADIOLOGY | Facility: HOSPITAL | Age: 50
Discharge: HOME | End: 2020-02-21
Attending: STUDENT IN AN ORGANIZED HEALTH CARE EDUCATION/TRAINING PROGRAM
Payer: MEDICARE

## 2020-02-21 DIAGNOSIS — G89.4 CHRONIC PAIN SYNDROME: ICD-10-CM

## 2020-02-21 DIAGNOSIS — Z72.0 TOBACCO ABUSE: ICD-10-CM

## 2020-02-21 DIAGNOSIS — M25.561 RIGHT KNEE PAIN, UNSPECIFIED CHRONICITY: ICD-10-CM

## 2020-02-21 DIAGNOSIS — M79.7 FIBROMYALGIA: ICD-10-CM

## 2020-02-21 DIAGNOSIS — L93.1 SUBACUTE CUTANEOUS LUPUS ERYTHEMATOSUS: ICD-10-CM

## 2020-02-21 PROCEDURE — 73721 MRI JNT OF LWR EXTRE W/O DYE: CPT | Mod: 26,RT,, | Performed by: RADIOLOGY

## 2020-02-21 PROCEDURE — 73721 MRI JNT OF LWR EXTRE W/O DYE: CPT | Mod: TC,RT

## 2020-02-21 PROCEDURE — 73721 MRI KNEE WITHOUT CONTRAST RIGHT: ICD-10-PCS | Mod: 26,RT,, | Performed by: RADIOLOGY

## 2020-03-09 ENCOUNTER — ANESTHESIA (OUTPATIENT)
Dept: ENDOSCOPY | Facility: HOSPITAL | Age: 50
End: 2020-03-09
Payer: MEDICARE

## 2020-03-09 ENCOUNTER — HOSPITAL ENCOUNTER (OUTPATIENT)
Facility: HOSPITAL | Age: 50
Discharge: HOME | End: 2020-03-09
Attending: INTERNAL MEDICINE | Admitting: INTERNAL MEDICINE
Payer: MEDICARE

## 2020-03-09 ENCOUNTER — ANESTHESIA EVENT (OUTPATIENT)
Dept: ENDOSCOPY | Facility: HOSPITAL | Age: 50
End: 2020-03-09
Payer: MEDICARE

## 2020-03-09 VITALS
DIASTOLIC BLOOD PRESSURE: 80 MMHG | WEIGHT: 110 LBS | OXYGEN SATURATION: 99 % | HEIGHT: 61 IN | RESPIRATION RATE: 14 BRPM | SYSTOLIC BLOOD PRESSURE: 132 MMHG | BODY MASS INDEX: 20.77 KG/M2 | HEART RATE: 74 BPM | TEMPERATURE: 98 F

## 2020-03-09 DIAGNOSIS — R19.8 CHANGE IN BOWEL MOVEMENT: Primary | ICD-10-CM

## 2020-03-09 PROCEDURE — 43239 EGD BIOPSY SINGLE/MULTIPLE: CPT | Performed by: INTERNAL MEDICINE

## 2020-03-09 PROCEDURE — 45380 COLONOSCOPY AND BIOPSY: CPT | Performed by: INTERNAL MEDICINE

## 2020-03-09 PROCEDURE — 63600175 PHARM REV CODE 636 W HCPCS: Performed by: INTERNAL MEDICINE

## 2020-03-09 PROCEDURE — 88305 TISSUE EXAM BY PATHOLOGIST: ICD-10-PCS | Mod: 26,,, | Performed by: PATHOLOGY

## 2020-03-09 PROCEDURE — 63600175 PHARM REV CODE 636 W HCPCS: Performed by: NURSE ANESTHETIST, CERTIFIED REGISTERED

## 2020-03-09 PROCEDURE — 37000008 HC ANESTHESIA 1ST 15 MINUTES: Performed by: INTERNAL MEDICINE

## 2020-03-09 PROCEDURE — 43239 EGD BIOPSY SINGLE/MULTIPLE: CPT | Mod: 51,,, | Performed by: INTERNAL MEDICINE

## 2020-03-09 PROCEDURE — E9220 PRA ENDO ANESTHESIA: ICD-10-PCS | Mod: ,,, | Performed by: NURSE ANESTHETIST, CERTIFIED REGISTERED

## 2020-03-09 PROCEDURE — 37000009 HC ANESTHESIA EA ADD 15 MINS: Performed by: INTERNAL MEDICINE

## 2020-03-09 PROCEDURE — 45380 PR COLONOSCOPY,BIOPSY: ICD-10-PCS | Mod: ,,, | Performed by: INTERNAL MEDICINE

## 2020-03-09 PROCEDURE — 45380 COLONOSCOPY AND BIOPSY: CPT | Mod: ,,, | Performed by: INTERNAL MEDICINE

## 2020-03-09 PROCEDURE — E9220 PRA ENDO ANESTHESIA: HCPCS | Mod: ,,, | Performed by: NURSE ANESTHETIST, CERTIFIED REGISTERED

## 2020-03-09 PROCEDURE — 88305 TISSUE EXAM BY PATHOLOGIST: CPT | Mod: 26,,, | Performed by: PATHOLOGY

## 2020-03-09 PROCEDURE — 27201012 HC FORCEPS, HOT/COLD, DISP: Performed by: INTERNAL MEDICINE

## 2020-03-09 PROCEDURE — 43239 PR EGD, FLEX, W/BIOPSY, SGL/MULTI: ICD-10-PCS | Mod: 51,,, | Performed by: INTERNAL MEDICINE

## 2020-03-09 PROCEDURE — 88305 TISSUE EXAM BY PATHOLOGIST: CPT | Mod: 59 | Performed by: PATHOLOGY

## 2020-03-09 RX ORDER — SODIUM CHLORIDE 9 MG/ML
INJECTION, SOLUTION INTRAVENOUS CONTINUOUS
Status: DISCONTINUED | OUTPATIENT
Start: 2020-03-09 | End: 2020-03-09 | Stop reason: HOSPADM

## 2020-03-09 RX ORDER — PROPOFOL 10 MG/ML
VIAL (ML) INTRAVENOUS CONTINUOUS PRN
Status: DISCONTINUED | OUTPATIENT
Start: 2020-03-09 | End: 2020-03-09

## 2020-03-09 RX ORDER — LIDOCAINE HCL/PF 100 MG/5ML
SYRINGE (ML) INTRAVENOUS
Status: DISCONTINUED | OUTPATIENT
Start: 2020-03-09 | End: 2020-03-09

## 2020-03-09 RX ORDER — PROPOFOL 10 MG/ML
VIAL (ML) INTRAVENOUS
Status: DISCONTINUED | OUTPATIENT
Start: 2020-03-09 | End: 2020-03-09

## 2020-03-09 RX ADMIN — SODIUM CHLORIDE: 0.9 INJECTION, SOLUTION INTRAVENOUS at 10:03

## 2020-03-09 RX ADMIN — PROPOFOL 50 MG: 10 INJECTION, EMULSION INTRAVENOUS at 10:03

## 2020-03-09 RX ADMIN — PROPOFOL 100 MG: 10 INJECTION, EMULSION INTRAVENOUS at 10:03

## 2020-03-09 RX ADMIN — PROPOFOL 300 MCG/KG/MIN: 10 INJECTION, EMULSION INTRAVENOUS at 10:03

## 2020-03-09 RX ADMIN — Medication 100 MG: at 10:03

## 2020-03-09 NOTE — PROVATION PATIENT INSTRUCTIONS
Discharge Summary/Instructions after an Endoscopic Procedure  Patient Name: Emily Buitrago  Patient MRN: [id number]  Patient YOB: 1970  Monday, March 09, 2020  Judah Lopez MD  RESTRICTIONS:  During your procedure today, you received medications for sedation.  These   medications may affect your judgment, balance and coordination.  Therefore,   for 24 hours, you have the following restrictions:   - DO NOT drive a car, operate machinery, make legal/financial decisions,   sign important papers or drink alcohol.    ACTIVITY:  Today: no heavy lifting, straining or running due to procedural   sedation/anesthesia.  The following day: return to full activity including work.  DIET:  Eat and drink normally unless instructed otherwise.     TREATMENT FOR COMMON SIDE EFFECTS:  - Mild abdominal pain, nausea, belching, bloating or excessive gas:  rest,   eat lightly and use a heating pad.  - Sore Throat: treat with throat lozenges and/or gargle with warm salt   water.  - Because air was used during the procedure, expelling large amounts of air   from your rectum or belching is normal.  - If a bowel prep was taken, you may not have a bowel movement for 1-3 days.    This is normal.  SYMPTOMS TO WATCH FOR AND REPORT TO YOUR PHYSICIAN:  1. Abdominal pain or bloating, other than gas cramps.  2. Chest pain.  3. Back pain.  4. Signs of infection such as: chills or fever occurring within 24 hours   after the procedure.  5. Rectal bleeding, which would show as bright red, maroon, or black stools.   (A tablespoon of blood from the rectum is not serious, especially if   hemorrhoids are present.)  6. Vomiting.  7. Weakness or dizziness.  GO DIRECTLY TO THE NEAREST EMERGENCY ROOM IF YOU HAVE ANY OF THE FOLLOWING:      Difficulty breathing              Chills and/or fever over 101 F   Persistent vomiting and/or vomiting blood   Severe abdominal pain   Severe chest pain   Black, tarry stools   Bleeding- more than one  tablespoon   Any other symptom or condition that you feel may need urgent attention  Your doctor recommends these additional instructions:  If any biopsies were taken, your doctors clinic will contact you in 1 to 2   weeks with any results.  - Patient has a contact number available for emergencies.  The signs and   symptoms of potential delayed complications were discussed with the   patient.  Return to normal activities tomorrow.  Written discharge   instructions were provided to the patient.   - Discharge patient to home.   - Resume previous diet.   - Continue present medications.   - Await pathology results.   For questions, problems or results please call your physician - Judah Lopez MD at Work:  [phone].  [facility], EMERGENCY ROOM PHONE NUMBER: [phone]  IF A COMPLICATION OR EMERGENCY SITUATION ARISES AND YOU ARE UNABLE TO REACH   YOUR PHYSICIAN - GO DIRECTLY TO THE EMERGENCY ROOM.  Judah Lopez MD  3/9/2020 10:49:11 AM  This report has been verified and signed electronically.  [employer]

## 2020-03-09 NOTE — H&P
Short Stay Endoscopy History and Physical    PCP - Emma Subramanian MD    Procedure - EGD/Colonoscopy  Sedation: GA  ASA - per anesthesia  Mallampati - per anesthesia  History of Anesthesia problems - no  Family history Anesthesia problems -  no     HPI:  This is a 49 y.o. female here for evaluation of : Change in bowels    Reflux - no  Dysphagia - no  Abdominal pain - no  Diarrhea - occ    ROS:  Constitutional: No fevers, chills, No weight loss  ENT: No allergies  CV: No chest pain  Pulm: No cough, No shortness of breath  Ophtho: No vision changes  GI: see HPI  Medical History:  has a past medical history of Asthma in remission, COPD (chronic obstructive pulmonary disease), Fibromyalgia, Hypothyroidism, Immune disorder, Lupus, CHEPE (obstructive sleep apnea) (11/12/2019), Recurrent urticaria (1/6/2017), and Subacute cutaneous lupus erythematosus.    Surgical History:  has a past surgical history that includes Cholecystectomy.    Family History: family history includes Arthritis in her father; Asthma in her sister; Cancer in her father and mother; Cataracts in her father and mother; Cirrhosis in her father; Hyperlipidemia in her father; Hypertension in her mother; Lung cancer in her maternal grandfather and paternal grandfather; Lupus in her sister; No Known Problems in her brother, maternal aunt, maternal grandmother, maternal uncle, paternal aunt, and paternal grandmother; Pancreatic cancer in her paternal uncle; Scoliosis in her sister; Thyroid disease in her mother.. Otherwise no colon cancer, inflammatory bowel disease, or GI malignancies.    Social History:  reports that she has been smoking cigarettes. She has a 64.00 pack-year smoking history. She quit smokeless tobacco use about 6 years ago. She reports that she does not drink alcohol or use drugs.    Review of patient's allergies indicates:   Allergen Reactions    Bactrim [sulfamethoxazole-trimethoprim] Rash       Medications:   Medications Prior to  Admission   Medication Sig Dispense Refill Last Dose    albuterol (PROVENTIL) 2.5 mg /3 mL (0.083 %) nebulizer solution TAKE 3 MLS (2.5 MG TOTAL) BY NEBULIZATION EVERY 6 (SIX) HOURS AS NEEDED FOR WHEEZING. 150 mL 0 Taking    azelastine (ASTELIN) 137 mcg (0.1 %) nasal spray 2 sprays (274 mcg total) by Nasal route 2 (two) times daily. 30 mL 5 Taking    cetirizine (ZYRTEC) 10 MG tablet Take 10 mg by mouth once daily.   Taking    cyclobenzaprine (FLEXERIL) 5 MG tablet TAKE 1 TABLET BY MOUTH THREE TIMES A DAY AS NEEDED FOR MUSCLE SPASMS  2 Taking    DULoxetine (CYMBALTA) 20 MG capsule Take 1 capsule (20 mg total) by mouth once daily. 90 capsule 1 Taking    EPINEPHrine (EPIPEN 2-GIGI) 0.3 mg/0.3 mL AtIn Inject 0.3 mLs (0.3 mg total) into the muscle once. for 1 dose 2 Device 0 Taking    estradiol (ESTRACE) 0.01 % (0.1 mg/gram) vaginal cream Insert 2 g daily intravaginally for 2 weeks, then 1 g twice weekly 42.5 g 5 Taking    fluorometholone 0.1% (FML) 0.1 % DrpS 1 DROP IN BOTH EYES TWICE A DAY FOR 2 WEEKS THEN DAILY FOR 2 WEEKS  0 Taking    fluticasone furoate-vilanterol (BREO) 100-25 mcg/dose diskus inhaler Inhale 1 puff into the lungs once daily. Controller 60 each 3 Taking    fluticasone propionate (FLONASE) 50 mcg/actuation nasal spray USE 2 SPRAYS IN EACH NOSTRIL ONE TIME DAILY  48 g 0 Taking    gabapentin (NEURONTIN) 600 MG tablet Take 1 tablet (600 mg total) by mouth once daily. 90 tablet 3 Taking    HYDROcodone-acetaminophen (NORCO) 7.5-325 mg per tablet Take 1 tablet by mouth every 24 hours as needed for Pain. 30 tablet 0 Taking    hydrocortisone 2.5 % ointment APPLY TOPICALLY 2 (TWO) TIMES DAILY. FOR EYELID RASH AS NEEDED 20 g 2 Taking    hydroxychloroquine (PLAQUENIL) 200 mg tablet Take 1 tablet (200 mg total) by mouth once daily. 90 tablet 3 Taking    hydrOXYzine HCl (ATARAX) 25 MG tablet 1-8 tablets at bedtime for itching. 240 tablet 1 Taking    levothyroxine (SYNTHROID) 100 MCG tablet TAKE 1  TABLET  MONDAY  THROUGH  SATURDAY AND TAKE 1/2 TABLET ON SUNDAY 85 tablet 2 Taking    montelukast (SINGULAIR) 10 mg tablet TAKE 1 TABLET (10 MG TOTAL) BY MOUTH EVERY EVENING. 90 tablet 1 Taking    naproxen (NAPROSYN) 500 MG tablet TAKE 1 TABLET BY MOUTH TWICE A DAY WITH MEALS (Patient not taking: Reported on 2/10/2020) 30 tablet 0 Not Taking    pantoprazole (PROTONIX) 40 MG tablet Take 1 tablet (40 mg total) by mouth once daily. 90 tablet 1 Taking    soy isofl/blk coh/gr tea/yerba (ESTROVEN ENERGY ORAL) Take by mouth.   Taking    SUPREP BOWEL PREP KIT 17.5-3.13-1.6 gram SolR TAKE 177 MLS BY MOUTH ONCE DAILY. FOR 2 DAYS   Taking    triamcinolone (KENALOG) 0.5 % ointment Apply topically 2 (two) times daily. For lupus skin flares on body 15 g 5 Taking    VENTOLIN HFA 90 mcg/actuation inhaler INHALE 2 PUFFS INTO THE LUNGS EVERY 4 HOURS AS NEEDED 18 Inhaler 0 Taking       Objective Findings:    Vital Signs: Per nursing notes.    Physical Exam:  General Appearance: Well appearing in no acute distress  Head:   Normocephalic, without obvious abnormality  Eyes:    No scleral icterus  Airway: Open  Neck: No restriction in mobility  Lungs: CTA bilaterally in anterior and posterior fields, no wheezes, no crackles.  Heart:  Regular rate and rhythm, S1, S2 normal, no murmurs heard  Abdomen: Soft, non tender, non distended      Labs:  Lab Results   Component Value Date    WBC 5.93 02/10/2020    HGB 12.4 02/10/2020    HCT 39.7 02/10/2020     02/10/2020    CHOL 135 09/14/2019    TRIG 95 09/14/2019    HDL 42 09/14/2019    ALT 10 02/10/2020    AST 16 02/10/2020     02/10/2020    K 3.9 02/10/2020     02/10/2020    CREATININE 0.8 02/10/2020    BUN 6 02/10/2020    CO2 28 02/10/2020    TSH 2.001 01/23/2020    INR 0.9 05/08/2008         I have explained the risks and benefits of endoscopy procedures to the patient including but not limited to bleeding, perforation, infection, and death.    Thank you so much for  allowing me to participate in the care of Emily Lopez MD

## 2020-03-09 NOTE — PROVATION PATIENT INSTRUCTIONS
Discharge Summary/Instructions after an Endoscopic Procedure  Patient Name: Emily Buitrago  Patient MRN: [id number]  Patient YOB: 1970  Monday, March 09, 2020  Judah Lopez MD  RESTRICTIONS:  During your procedure today, you received medications for sedation.  These   medications may affect your judgment, balance and coordination.  Therefore,   for 24 hours, you have the following restrictions:   - DO NOT drive a car, operate machinery, make legal/financial decisions,   sign important papers or drink alcohol.    ACTIVITY:  Today: no heavy lifting, straining or running due to procedural   sedation/anesthesia.  The following day: return to full activity including work.  DIET:  Eat and drink normally unless instructed otherwise.     TREATMENT FOR COMMON SIDE EFFECTS:  - Mild abdominal pain, nausea, belching, bloating or excessive gas:  rest,   eat lightly and use a heating pad.  - Sore Throat: treat with throat lozenges and/or gargle with warm salt   water.  - Because air was used during the procedure, expelling large amounts of air   from your rectum or belching is normal.  - If a bowel prep was taken, you may not have a bowel movement for 1-3 days.    This is normal.  SYMPTOMS TO WATCH FOR AND REPORT TO YOUR PHYSICIAN:  1. Abdominal pain or bloating, other than gas cramps.  2. Chest pain.  3. Back pain.  4. Signs of infection such as: chills or fever occurring within 24 hours   after the procedure.  5. Rectal bleeding, which would show as bright red, maroon, or black stools.   (A tablespoon of blood from the rectum is not serious, especially if   hemorrhoids are present.)  6. Vomiting.  7. Weakness or dizziness.  GO DIRECTLY TO THE NEAREST EMERGENCY ROOM IF YOU HAVE ANY OF THE FOLLOWING:      Difficulty breathing              Chills and/or fever over 101 F   Persistent vomiting and/or vomiting blood   Severe abdominal pain   Severe chest pain   Black, tarry stools   Bleeding- more than one  tablespoon   Any other symptom or condition that you feel may need urgent attention  Your doctor recommends these additional instructions:  If any biopsies were taken, your doctors clinic will contact you in 1 to 2   weeks with any results.  - Patient has a contact number available for emergencies.  The signs and   symptoms of potential delayed complications were discussed with the   patient.  Return to normal activities tomorrow.  Written discharge   instructions were provided to the patient.   - Discharge patient to home.   - Resume previous diet.   - Continue present medications.   - Await pathology results.   - Repeat colonoscopy in 10 years for screening purposes.  For questions, problems or results please call your physician - Judah Lopez MD at Work:  [phone].  [facility], EMERGENCY ROOM PHONE NUMBER: [phone]  IF A COMPLICATION OR EMERGENCY SITUATION ARISES AND YOU ARE UNABLE TO REACH   YOUR PHYSICIAN - GO DIRECTLY TO THE EMERGENCY ROOM.  Judah Lopez MD  3/9/2020 11:11:36 AM  This report has been verified and signed electronically.  [employer]

## 2020-03-09 NOTE — ANESTHESIA POSTPROCEDURE EVALUATION
Anesthesia Post Evaluation    Patient: Emily Pina    Procedure(s) Performed: Procedure(s) (LRB):  EGD (ESOPHAGOGASTRODUODENOSCOPY) (N/A)  COLONOSCOPY (N/A)    Final Anesthesia Type: general    Patient location during evaluation: GI PACU  Patient participation: Yes- Able to Participate  Level of consciousness: awake and alert  Post-procedure vital signs: reviewed and stable  Pain management: adequate  Airway patency: patent    PONV status at discharge: No PONV  Anesthetic complications: no      Cardiovascular status: hemodynamically stable  Respiratory status: unassisted and spontaneous ventilation  Hydration status: euvolemic  Follow-up not needed.          Vitals Value Taken Time   /80 3/9/2020 11:44 AM   Temp 36.7 °C (98 °F) 3/9/2020 11:44 AM   Pulse 74 3/9/2020 11:44 AM   Resp 14 3/9/2020 11:44 AM   SpO2 99 % 3/9/2020 11:44 AM         Event Time     Out of Recovery 11:48:43          Pain/Qiana Score: Qiana Score: 10 (3/9/2020 11:45 AM)

## 2020-03-09 NOTE — TRANSFER OF CARE
"Anesthesia Transfer of Care Note    Patient: Emily Pina    Procedure(s) Performed: Procedure(s) (LRB):  EGD (ESOPHAGOGASTRODUODENOSCOPY) (N/A)  COLONOSCOPY (N/A)    Patient location: PACU    Anesthesia Type: general    Transport from OR: Transported from OR on room air with adequate spontaneous ventilation    Post pain: adequate analgesia    Post assessment: no apparent anesthetic complications    Post vital signs: stable    Level of consciousness: awake    Nausea/Vomiting: no nausea/vomiting    Complications: none    Transfer of care protocol was followed      Last vitals:   Visit Vitals  BP (!) 146/82 (BP Location: Left arm, Patient Position: Lying)   Pulse 90   Temp 36.6 °C (97.9 °F) (Oral)   Resp 16   Ht 5' 1" (1.549 m)   Wt 49.9 kg (110 lb)   LMP 05/14/2014 (Approximate)   SpO2 100%   Breastfeeding? No   BMI 20.78 kg/m²     "

## 2020-03-09 NOTE — ANESTHESIA PREPROCEDURE EVALUATION
03/09/2020  Emily CentenoKyungAditya is a 49 y.o., female.    Past Medical History:   Diagnosis Date    Asthma in remission     COPD (chronic obstructive pulmonary disease)     Fibromyalgia     Hypothyroidism     Immune disorder     Lupus     CHEPE (obstructive sleep apnea) 11/12/2019    Recurrent urticaria 1/6/2017    Subacute cutaneous lupus erythematosus      Patient Active Problem List   Diagnosis    Subacute cutaneous lupus erythematosus    Fibromyalgia    Tobacco abuse    GERD (gastroesophageal reflux disease)    Unspecified hereditary and idiopathic peripheral neuropathy    Hypothyroidism    Chronic allergic conjunctivitis    Chronic allergic rhinitis due to animal hair and dander    Recurrent urticaria    Other pruritus    Biliary colic    S/P laparoscopic cholecystectomy    Disorder of immune system    Decreased functional activity tolerance    Decreased ROM of lumbar spine    Weakness    Refractive error    Long-term use of Plaquenil    Palpitations    Mild persistent asthma without complication    CHEPE (obstructive sleep apnea)    Change in bowel movement     Past Surgical History:   Procedure Laterality Date    CHOLECYSTECTOMY           Anesthesia Evaluation    I have reviewed the Patient Summary Reports.     I have reviewed the Medications.     Review of Systems  Anesthesia Hx:  Denies Family Hx of Anesthesia complications.   Denies Personal Hx of Anesthesia complications.   Hematology/Oncology:  Hematology Normal   Oncology Normal     EENT/Dental:EENT/Dental Normal   Cardiovascular:  Cardiovascular Normal     Pulmonary:   COPD Asthma Sleep Apnea Smoker   Renal/:  Renal/ Normal     Hepatic/GI:   GERD    Musculoskeletal:  Musculoskeletal Normal    Neurological:   Neuromuscular Disease,    Endocrine:   Hypothyroidism    Dermatological:  Skin Normal     Psych:  Psychiatric Normal           Physical Exam  General:  Well nourished    Airway/Jaw/Neck:  Airway Findings: Mouth Opening: Normal Tongue: Normal  General Airway Assessment: Adult  Mallampati: II  TM Distance: Normal, at least 6 cm  Jaw/Neck Findings:  Neck ROM: Normal ROM  Neck Findings: Normal     Dental:  Dental Findings: In tact   Chest/Lungs:  Chest/Lungs Clear    Heart/Vascular:  Heart Findings: Normal    Abdomen:  Abdomen Findings: Normal      Mental Status:  Mental Status Findings: Normal        Anesthesia Plan  Type of Anesthesia, risks & benefits discussed:  Anesthesia Type:  general  Patient's Preference:   Intra-op Monitoring Plan: standard ASA monitors  Intra-op Monitoring Plan Comments:   Post Op Pain Control Plan:   Post Op Pain Control Plan Comments:   Induction:   IV  Beta Blocker:  Patient is not currently on a Beta-Blocker (No further documentation required).       Informed Consent: Patient understands risks and agrees with Anesthesia plan.  Questions answered. Anesthesia consent signed with patient.  ASA Score: 2     Day of Surgery Review of History & Physical:    H&P update referred to the provider.         Ready For Surgery From Anesthesia Perspective.

## 2020-03-11 ENCOUNTER — PATIENT MESSAGE (OUTPATIENT)
Dept: PULMONOLOGY | Facility: CLINIC | Age: 50
End: 2020-03-11

## 2020-03-12 RX ORDER — ALBUTEROL SULFATE 0.83 MG/ML
2.5 SOLUTION RESPIRATORY (INHALATION) EVERY 6 HOURS PRN
Qty: 150 ML | Refills: 0 | Status: SHIPPED | OUTPATIENT
Start: 2020-03-12 | End: 2020-06-01

## 2020-03-12 NOTE — TELEPHONE ENCOUNTER
Can you refill my albuterol solution for breathing machine? Not with [facility] anymore send       [facility] [address]        And albuterol inhaler also? the breo doesn't seem to help as much taking 1 puff daily

## 2020-03-16 ENCOUNTER — PATIENT MESSAGE (OUTPATIENT)
Dept: PSYCHIATRY | Facility: CLINIC | Age: 50
End: 2020-03-16

## 2020-03-16 ENCOUNTER — TELEPHONE (OUTPATIENT)
Dept: ENDOSCOPY | Facility: HOSPITAL | Age: 50
End: 2020-03-16

## 2020-03-16 ENCOUNTER — PATIENT MESSAGE (OUTPATIENT)
Dept: FAMILY MEDICINE | Facility: CLINIC | Age: 50
End: 2020-03-16

## 2020-03-17 ENCOUNTER — PATIENT MESSAGE (OUTPATIENT)
Dept: PULMONOLOGY | Facility: CLINIC | Age: 50
End: 2020-03-17

## 2020-03-18 ENCOUNTER — PATIENT MESSAGE (OUTPATIENT)
Dept: RHEUMATOLOGY | Facility: CLINIC | Age: 50
End: 2020-03-18

## 2020-03-18 ENCOUNTER — TELEPHONE (OUTPATIENT)
Dept: RHEUMATOLOGY | Facility: HOSPITAL | Age: 50
End: 2020-03-18

## 2020-03-18 DIAGNOSIS — M79.7 FIBROMYALGIA: ICD-10-CM

## 2020-03-18 DIAGNOSIS — L93.1 SUBACUTE CUTANEOUS LUPUS ERYTHEMATOSUS: ICD-10-CM

## 2020-03-18 DIAGNOSIS — M25.50 ARTHRALGIA, UNSPECIFIED JOINT: ICD-10-CM

## 2020-03-18 DIAGNOSIS — Z92.25 HISTORY OF IMMUNOSUPPRESSION THERAPY: ICD-10-CM

## 2020-03-18 DIAGNOSIS — Z72.0 TOBACCO ABUSE: ICD-10-CM

## 2020-03-18 DIAGNOSIS — G89.4 CHRONIC PAIN SYNDROME: ICD-10-CM

## 2020-03-18 RX ORDER — HYDROCODONE BITARTRATE AND ACETAMINOPHEN 7.5; 325 MG/1; MG/1
1 TABLET ORAL
Qty: 30 TABLET | Refills: 0 | Status: SHIPPED | OUTPATIENT
Start: 2020-03-18 | End: 2021-02-04 | Stop reason: SDUPTHER

## 2020-03-18 RX ORDER — GABAPENTIN 600 MG/1
600 TABLET ORAL DAILY
Qty: 90 TABLET | Refills: 3 | Status: SHIPPED | OUTPATIENT
Start: 2020-03-18 | End: 2021-01-06 | Stop reason: SDUPTHER

## 2020-03-18 RX ORDER — HYDROCODONE BITARTRATE AND ACETAMINOPHEN 7.5; 325 MG/1; MG/1
1 TABLET ORAL
Qty: 30 TABLET | Refills: 0 | Status: SHIPPED | OUTPATIENT
Start: 2020-03-18 | End: 2020-03-18 | Stop reason: SDUPTHER

## 2020-03-18 RX ORDER — HYDROXYCHLOROQUINE SULFATE 200 MG/1
200 TABLET, FILM COATED ORAL DAILY
Qty: 90 TABLET | Refills: 3 | Status: SHIPPED | OUTPATIENT
Start: 2020-03-18 | End: 2020-04-06 | Stop reason: SDUPTHER

## 2020-03-20 ENCOUNTER — OFFICE VISIT (OUTPATIENT)
Dept: PSYCHIATRY | Facility: CLINIC | Age: 50
End: 2020-03-20
Payer: MEDICARE

## 2020-03-20 ENCOUNTER — PATIENT MESSAGE (OUTPATIENT)
Dept: PSYCHIATRY | Facility: CLINIC | Age: 50
End: 2020-03-20

## 2020-03-20 DIAGNOSIS — F43.23 ADJUSTMENT DISORDER WITH MIXED ANXIETY AND DEPRESSED MOOD: Primary | ICD-10-CM

## 2020-03-20 DIAGNOSIS — Z63.0 MARITAL PROBLEM: ICD-10-CM

## 2020-03-20 PROCEDURE — 90834 PSYTX W PT 45 MINUTES: CPT | Mod: 95,,, | Performed by: SOCIAL WORKER

## 2020-03-20 PROCEDURE — 90834 PR PSYCHOTHERAPY W/PATIENT, 45 MIN: ICD-10-PCS | Mod: 95,,, | Performed by: SOCIAL WORKER

## 2020-03-20 SDOH — SOCIAL DETERMINANTS OF HEALTH (SDOH): PROBLEMS IN RELATIONSHIP WITH SPOUSE OR PARTNER: Z63.0

## 2020-03-20 NOTE — PROGRESS NOTES
"The patient location is: [place], [place]  The chief complaint leading to consultation is: depression, anxiety, marital conflict  Visit type: Virtual visit with synchronous audio and video  Total time spent with patient: 45 minutes  Each patient to whom he or she provides medical services by telemedicine is:  (1) informed of the relationship between the physician and patient and the respective role of any other health care provider with respect to management of the patient; and (2) notified that he or she may decline to receive medical services by telemedicine and may withdraw from such care at any time.    Notes: Patient returned for psychotherapy. Reports that she is experiencing increased anxiety. States that it has been shifted from her marriage to problems with COVID-19. States that she is worried about her health but also the health of her parents. Reports that they are not in the best health and worries about them catching something. Discussed that she has spoken to  but not much. States that she text him asking about his mother and if she had any symptoms. Recognized that he doesn't care about her the same way because he didn't ask about her or her parents. States that this was very difficult and hurtful. Discussed that she had made the decision to go through with the divorce. States that it wasn't an easy decision but decided it was best for her. Plans are currently on hold because of virus. States that she has been crying more. Patient is extremely tearful during session. States that she goes through phases of being sad and then phase of being "angry". Discussed the phase of divorce and how they can be similar to those of grief. Homework set for the week to write down what she envisioned her life with  like in the future. Also asked to write down 5 things that she would rather be spending her energy thinking about or doing.     Encounter Diagnoses   Name Primary?    Adjustment disorder with mixed " anxiety and depressed mood Yes    Marital problem

## 2020-03-23 LAB — FINAL PATHOLOGIC DIAGNOSIS: NORMAL

## 2020-03-24 ENCOUNTER — TELEPHONE (OUTPATIENT)
Dept: GASTROENTEROLOGY | Facility: CLINIC | Age: 50
End: 2020-03-24

## 2020-03-24 NOTE — TELEPHONE ENCOUNTER
Spoke with patient , results given as written by Dr. Lopez  Pt verbalizes understadning and appreciates the call.  Thanks MA

## 2020-03-24 NOTE — TELEPHONE ENCOUNTER
----- Message from Judah Lopez MD sent at 3/24/2020  9:28 AM CDT -----  Biopsies all checked out fine.

## 2020-03-25 ENCOUNTER — PATIENT MESSAGE (OUTPATIENT)
Dept: FAMILY MEDICINE | Facility: CLINIC | Age: 50
End: 2020-03-25

## 2020-03-25 DIAGNOSIS — E03.9 HYPOTHYROIDISM, UNSPECIFIED TYPE: ICD-10-CM

## 2020-03-25 DIAGNOSIS — J30.2 SEASONAL ALLERGIC RHINITIS, UNSPECIFIED TRIGGER: Primary | ICD-10-CM

## 2020-03-26 RX ORDER — LEVOTHYROXINE SODIUM 100 UG/1
TABLET ORAL
Qty: 85 TABLET | Refills: 2 | Status: SHIPPED | OUTPATIENT
Start: 2020-03-26 | End: 2020-12-16

## 2020-03-26 RX ORDER — MONTELUKAST SODIUM 10 MG/1
10 TABLET ORAL NIGHTLY
Qty: 90 TABLET | Refills: 1 | Status: SHIPPED | OUTPATIENT
Start: 2020-03-26 | End: 2020-09-24

## 2020-03-30 ENCOUNTER — PATIENT MESSAGE (OUTPATIENT)
Dept: RHEUMATOLOGY | Facility: CLINIC | Age: 50
End: 2020-03-30

## 2020-03-31 ENCOUNTER — OFFICE VISIT (OUTPATIENT)
Dept: PSYCHIATRY | Facility: CLINIC | Age: 50
End: 2020-03-31
Payer: COMMERCIAL

## 2020-03-31 DIAGNOSIS — Z63.0 MARITAL PROBLEM: ICD-10-CM

## 2020-03-31 DIAGNOSIS — F43.23 ADJUSTMENT DISORDER WITH MIXED ANXIETY AND DEPRESSED MOOD: Primary | ICD-10-CM

## 2020-03-31 PROCEDURE — 90834 PR PSYCHOTHERAPY W/PATIENT, 45 MIN: ICD-10-PCS | Mod: GT,,, | Performed by: SOCIAL WORKER

## 2020-03-31 PROCEDURE — 90834 PSYTX W PT 45 MINUTES: CPT | Mod: GT,,, | Performed by: SOCIAL WORKER

## 2020-03-31 RX ORDER — HYDROXYZINE HYDROCHLORIDE 25 MG/1
TABLET, FILM COATED ORAL
Qty: 30 TABLET | Refills: 2 | Status: SHIPPED | OUTPATIENT
Start: 2020-03-31 | End: 2021-04-05

## 2020-03-31 RX ORDER — HYDROXYZINE HYDROCHLORIDE 25 MG/1
TABLET, FILM COATED ORAL
Qty: 30 TABLET | Refills: 2 | Status: SHIPPED | OUTPATIENT
Start: 2020-03-31 | End: 2021-04-05 | Stop reason: SDUPTHER

## 2020-03-31 SDOH — SOCIAL DETERMINANTS OF HEALTH (SDOH): PROBLEMS IN RELATIONSHIP WITH SPOUSE OR PARTNER: Z63.0

## 2020-03-31 NOTE — PROGRESS NOTES
"The patient location is: [place], [place]  The chief complaint leading to consultation is: depression, anxiety, marital conflict  Visit type: Virtual visit with synchronous audio and video  Total time spent with patient: 45 minutes  Each patient to whom he or she provides medical services by telemedicine is:  (1) informed of the relationship between the physician and patient and the respective role of any other health care provider with respect to management of the patient; and (2) notified that he or she may decline to receive medical services by telemedicine and may withdraw from such care at any time.    Notes: Patient returned for psychotherapy. Reports that she is doing "okay". States that she is trying to stay busy. Her sister left a bird house at her house and she worked on it over the last couple of days. Family has been trying to do weekly game nights to stay together. Reports that  text her on her birthday. Since then has been talking to him most days. Has told her that he loves her and misses her. States that he still hasn't admitted that he did cheat on her. States that it also isn't just the cheating that makes her want to get a divorce. Discussed the importance of taking time to think about what she wants from her relationship. Discussed that there is no time line on making a decision. States that she thinks  would be upset if she asked him not to talk for awhile. Discussed writing a letter to her  about what she would like to say to him about relationship.     Encounter Diagnoses   Name Primary?    Adjustment disorder with mixed anxiety and depressed mood Yes    Marital problem        "

## 2020-04-06 ENCOUNTER — OFFICE VISIT (OUTPATIENT)
Dept: RHEUMATOLOGY | Facility: CLINIC | Age: 50
End: 2020-04-06
Payer: MEDICARE

## 2020-04-06 DIAGNOSIS — Z72.0 TOBACCO ABUSE: ICD-10-CM

## 2020-04-06 DIAGNOSIS — M79.7 FIBROMYALGIA: ICD-10-CM

## 2020-04-06 DIAGNOSIS — Z92.25 HISTORY OF IMMUNOSUPPRESSION THERAPY: ICD-10-CM

## 2020-04-06 DIAGNOSIS — G89.4 CHRONIC PAIN SYNDROME: ICD-10-CM

## 2020-04-06 DIAGNOSIS — L93.1 SUBACUTE CUTANEOUS LUPUS ERYTHEMATOSUS: ICD-10-CM

## 2020-04-06 PROCEDURE — 99214 PR OFFICE/OUTPT VISIT, EST, LEVL IV, 30-39 MIN: ICD-10-PCS | Mod: 95,GC,, | Performed by: STUDENT IN AN ORGANIZED HEALTH CARE EDUCATION/TRAINING PROGRAM

## 2020-04-06 PROCEDURE — 99214 OFFICE O/P EST MOD 30 MIN: CPT | Mod: 95,GC,, | Performed by: STUDENT IN AN ORGANIZED HEALTH CARE EDUCATION/TRAINING PROGRAM

## 2020-04-06 RX ORDER — HYDROXYCHLOROQUINE SULFATE 200 MG/1
200 TABLET, FILM COATED ORAL DAILY
Qty: 90 TABLET | Refills: 3 | Status: SHIPPED | OUTPATIENT
Start: 2020-04-06 | End: 2021-03-26 | Stop reason: SDUPTHER

## 2020-04-06 NOTE — PROGRESS NOTES
The patient location is: Home  The chief complaint leading to consultation is: subacute cutaneous lupus follow up  Visit type: Virtual visit with synchronous audio and video  Total time spent with patient: 37 minutes  Each patient to whom he or she provides medical services by telemedicine is:  (1) informed of the relationship between the physician and patient and the respective role of any other health care provider with respect to management of the patient; and (2) notified that he or she may decline to receive medical services by telemedicine and may withdraw from such care at any time.    Notes: Patient states that she is doing well.  Diarrhea had been improving.  EGD/Colonoscopy done last month and was normal.  Still having knee pain - saw back surgeon last month in [facility].  MRI performed and was told to get spine injection - did not do it yet.     At this time no new rash.  Doing well.  Living with parents and staying home.  Compliant with all home medications.  Rash had been improving.     Denies any mouth ulcers, alopecia, abdominal pain, N/V, fever/chills, SOB, urinary/tiffanie symptoms.                           RHEUMATOLOGY CLINIC FOLLOW UP VISIT  Chief complaints:-  R knee pain     HPI:-  Emily Blanco a 50 y.o. 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     Lupus was diagnosed at age 20 - via skin biopsy of the face.  Developed facial rash.  Was on steroid and then was started on the plaquenil after development of arthralgia.  Last eye examination was 1-2 years ago.  About 3 weeks ago, rash developed in bilateral elbows and behind R knee.  Went to dermatologist given topical steroid cream.  +Raynaud's    Fibromyalgia (was diagnosed is managed with ibuprofen 1-2x/week.  She is also taking hydrocodone 7.5mg, gabapentin 600mg daily, flexeril.  Pain of the hands, feet,  "knees.     + Fatigue/maliase.  Sleep 8 hours every night.  Have multiple night time awakening.  LMP was >6 years ago.  +night sweats, mood swings, bloated    Depressed (on Zoloft - prescribed by PCP. Started a few months ago).  Does not want to increase the medication.  Previously tried it before (~20 years ago) and increasing caused her to be numb.  Never tried Trazadone, Cymbalta, or amitrypline    Smokes tobacco (1ppd x 30 years), denies EtOh or recreational drugs/medications/supplements.     Monitoring for HCQ toxicity - eye exam April 2019 (normal).     Dermatology (Dr Thomas - April 2019).  Subcutaneous lupus and eczema.  Agreed with increasing HCQ to 400mg daily.     At the last visit, patient was complaining of R knee pain and weakness - requiring usage of walker.  Xray of bilateral knee was unremarkable.  Patient continues to have severe R knee pain and weakness when ambulating. MRI ordered - had not been completed yet due to recent change in insurance.     Patient is only take 200mg HCQ inconsistently (had discontinued it for the last few weeks - forgetting to take it) and continues to have diarrhea.  Patient had been experiencing persistent diarrhea x few months.  Denies any blood.  Watery (non-formed) stool, up to 3x/day especially after food.  Denies any fever/chills, recent travels, sick contacts, abdominal pain.  Uncertain if it's related to food.  Had gallbladder removal a couple of years ago and noticed worsening since.  GI scheduled for scoping - Dec 10 but had to be rescheduled to March     Rash had improved after following with dermatology (April 2019).  Continues to use topical steroid cream.  No new rash     Currently on Zoloft for depression.  Did not wish to increase dosage - "feeling numb".  Never taken Cymbalta/Trazadone/amitrypline.  Followed up with PCP - recommendation to start Cymbalta (had not started it yet).     Sleep study recent done - patient diagnosed with CHEPE.  Awaiting for CPAP " "machine.     Currently feeling very depressed.  Just found out that  is "cheating" on her via speaker phone.  Had moved out to mother's house.  Had STD panel - negative per patient.  Patient due to see psychiatry next week - just "needs someone to talk to"  Patient trying to take care of herself.  Decreased appetite, + weight loss, and increased diarrhea.                                   Review of Systems   Constitutional: Negative for chills, diaphoresis, fever, malaise/fatigue and weight loss.   HENT: Negative for congestion, ear discharge, ear pain, hearing loss, nosebleeds, sinus pain and tinnitus.    Eyes: Negative for photophobia, pain, discharge and redness.   Respiratory: Negative for cough, hemoptysis, sputum production, shortness of breath, wheezing and stridor.    Cardiovascular: Negative for chest pain, palpitations, orthopnea, claudication, leg swelling and PND.   Gastrointestinal: Negative for abdominal pain, constipation, diarrhea, heartburn, nausea and vomiting.   Genitourinary: Negative for dysuria, frequency, hematuria and urgency.   Musculoskeletal: Positive for joint pain. Negative for back pain, myalgias and neck pain.   Skin: Negative for rash.   Neurological: Negative for dizziness, tingling, tremors, weakness and headaches.   Endo/Heme/Allergies: Does not bruise/bleed easily.   Psychiatric/Behavioral: Negative for depression, hallucinations and suicidal ideas. The patient is not nervous/anxious and does not have insomnia.        Past Medical History:   Diagnosis Date    Asthma in remission     COPD (chronic obstructive pulmonary disease)     Fibromyalgia     Hypothyroidism     Immune disorder     Lupus     CHEPE (obstructive sleep apnea) 11/12/2019    Recurrent urticaria 1/6/2017    Subacute cutaneous lupus erythematosus        Past Surgical History:   Procedure Laterality Date    CHOLECYSTECTOMY      COLONOSCOPY N/A 3/9/2020    Procedure: COLONOSCOPY;  Surgeon: Judah CARLOS" MD John;  Location: [facility] ([address] [place] [address]);  Service: Endoscopy;  Laterality: N/A;    ESOPHAGOGASTRODUODENOSCOPY N/A 3/9/2020    Procedure: EGD (ESOPHAGOGASTRODUODENOSCOPY);  Surgeon: Judah Lopez MD;  Location: [facility] ([address] [place] [address]);  Service: Endoscopy;  Laterality: N/A;        Social History     Tobacco Use    Smoking status: Current Every Day Smoker     Packs/day: 1.50     Years: 32.00     Pack years: 48.00     Types: Cigarettes    Smokeless tobacco: Former User     Quit date: 7/8/2013    Tobacco comment: started age 17   Substance Use Topics    Alcohol use: No     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     Drug use: No       Family History   Problem Relation Age of Onset    Hypertension Mother     Thyroid disease Mother     Cataracts Mother     Cancer Mother         duodenal    Arthritis Father     Hyperlipidemia Father     Cancer Father         Multiple Myeloma     Cirrhosis Father     Cataracts Father     Lupus Sister     Asthma Sister     No Known Problems Brother     No Known Problems Maternal Aunt     No Known Problems Maternal Uncle     No Known Problems Paternal Aunt     Pancreatic cancer Paternal Uncle     No Known Problems Maternal Grandmother     Lung cancer Maternal Grandfather     No Known Problems Paternal Grandmother     Lung cancer Paternal Grandfather     Scoliosis Sister     Amblyopia Neg Hx     Blindness Neg Hx     Diabetes Neg Hx     Glaucoma Neg Hx     Macular degeneration Neg Hx     Retinal detachment Neg Hx     Strabismus Neg Hx     Stroke Neg Hx        Review of patient's allergies indicates:   Allergen Reactions    Bactrim [sulfamethoxazole-trimethoprim] Rash       There were no vitals filed for this visit.    Physical Exam   Constitutional: She is oriented to person, place, and time.   HENT:   No acute distress   Neurological: She is alert and oriented to person, place, and time.       Medication List with Changes/Refills   Current Medications     ALBUTEROL (PROVENTIL) 2.5 MG /3 ML (0.083 %) NEBULIZER SOLUTION    Take 3 mLs (2.5 mg total) by nebulization every 6 (six) hours as needed for Wheezing.    AZELASTINE (ASTELIN) 137 MCG (0.1 %) NASAL SPRAY    2 sprays (274 mcg total) by Nasal route 2 (two) times daily.    CETIRIZINE (ZYRTEC) 10 MG TABLET    Take 10 mg by mouth once daily.    CYCLOBENZAPRINE (FLEXERIL) 5 MG TABLET    TAKE 1 TABLET BY MOUTH THREE TIMES A DAY AS NEEDED FOR MUSCLE SPASMS    DULOXETINE (CYMBALTA) 20 MG CAPSULE    Take 1 capsule (20 mg total) by mouth once daily.    EPINEPHRINE (EPIPEN 2-GIGI) 0.3 MG/0.3 ML ATIN    Inject 0.3 mLs (0.3 mg total) into the muscle once. for 1 dose    ESTRADIOL (ESTRACE) 0.01 % (0.1 MG/GRAM) VAGINAL CREAM    Insert 2 g daily intravaginally for 2 weeks, then 1 g twice weekly    FLUOROMETHOLONE 0.1% (FML) 0.1 % DRPS    1 DROP IN BOTH EYES TWICE A DAY FOR 2 WEEKS THEN DAILY FOR 2 WEEKS    FLUTICASONE FUROATE-VILANTEROL (BREO) 100-25 MCG/DOSE DISKUS INHALER    Inhale 1 puff into the lungs once daily. Controller    FLUTICASONE PROPIONATE (FLONASE) 50 MCG/ACTUATION NASAL SPRAY    USE 2 SPRAYS IN EACH NOSTRIL ONE TIME DAILY     GABAPENTIN (NEURONTIN) 600 MG TABLET    Take 1 tablet (600 mg total) by mouth once daily.    HYDROCODONE-ACETAMINOPHEN (NORCO) 7.5-325 MG PER TABLET    Take 1 tablet by mouth every 24 hours as needed for Pain.    HYDROCORTISONE 2.5 % OINTMENT    APPLY TOPICALLY 2 (TWO) TIMES DAILY. FOR EYELID RASH AS NEEDED    HYDROXYZINE HCL (ATARAX) 25 MG TABLET    TAKE 1 TABLET BY MOUTH EVERY EVENING AS NEEDED FOR PRURITUS. DO NOT DRIVE OR OPERATE MACHINERY    HYDROXYZINE HCL (ATARAX) 25 MG TABLET    TAKE 1 TAB BY MOUTH EVERY EVENING AS NEEDED FOR PRURITUS. DO NOT DRIVE OR OPERATE MACHINERY    LEVOTHYROXINE (SYNTHROID) 100 MCG TABLET    TAKE 1 TABLET  MONDAY  THROUGH  SATURDAY AND TAKE 1/2 TABLET ON SUNDAY    MONTELUKAST (SINGULAIR) 10 MG TABLET    Take 1 tablet (10 mg total) by mouth every evening.     PANTOPRAZOLE (PROTONIX) 40 MG TABLET    Take 1 tablet (40 mg total) by mouth once daily.    SOY ISOFL/BLK COH/GR TEA/YERBA (ESTROVEN ENERGY ORAL)    Take by mouth.    TRIAMCINOLONE (KENALOG) 0.5 % OINTMENT    Apply topically 2 (two) times daily. For lupus skin flares on body    VENTOLIN HFA 90 MCG/ACTUATION INHALER    INHALE 2 PUFFS INTO THE LUNGS EVERY 4 HOURS AS NEEDED   Changed and/or Refilled Medications    Modified Medication Previous Medication    HYDROXYCHLOROQUINE (PLAQUENIL) 200 MG TABLET hydroxychloroquine (PLAQUENIL) 200 mg tablet       Take 1 tablet (200 mg total) by mouth once daily.    Take 1 tablet (200 mg total) by mouth once daily.   Discontinued Medications    NAPROXEN (NAPROSYN) 500 MG TABLET    TAKE 1 TABLET BY MOUTH TWICE A DAY WITH MEALS       Assessment/Plans:-  49 y.o. 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  Negative for synovitis, ROM intact, strength intact.     Labs were unremarkable (done Jan 2020).     Assessment  - R knee pain - most likely from impinged nerve.  Had recent MRI of lumbar spine done at [facility] by back surgeon.  Told to have back injection - did not do it yet.   - Persistent diarrhea -  Resolved.  Recent scopes (EGD/colonoscopy) unremarkable.    - Fibromyalgia - on Norco and gabapentin.  Zoloft discontinued (did not work).  Currently on Cymbalta 20mg daily  - Tobacco abuse - education provided for cessation.  Tobacco usage can decrease efficacy of HCQ      Plan  - Plaquenil eye exam - due April 2020.  Message sent to [facility] to set up appt for after COVID  - Continue plaquenil 200mg Daily - consideration to increase it back to 400mg daily if + new or worsening rash  - Follow up with dermatology -   management of rash   - Follow up with primary - management of Cymbalta  - MRI of R knee - if abnormal, consideration for ortho consult  - Education provided on the importance of medication compliance  - continue Gabapentin 600mg QHS   - follow up with PMR - management of nerve impingement  - CBC, CMP, ESR, CRP, C3, C4, UA, Up/c, dsDNA - scheduled for Thursday 4/9  - recommendation for daily usage of sunscreen   - Education provided on tobacco cessation - patient refused at this time including cessation programs  - Follow up with psychiatry as previously scheduled      # Follow up in about 3 months (around 7/6/2020).          Answers for HPI/ROS submitted by the patient on 4/5/2020   trouble swallowing: No  unexpected weight change: Yes  genital sore: No

## 2020-04-06 NOTE — PROGRESS NOTES
I have personally taken the history  to verify the fellow's notation, and I agree with the impression and plan stated.     She has improved  She has a hx of cutaneous LE that has not worsened on lower dose HCQ so will check labs and cont current Rx.  WD

## 2020-04-08 ENCOUNTER — OFFICE VISIT (OUTPATIENT)
Dept: PSYCHIATRY | Facility: CLINIC | Age: 50
End: 2020-04-08
Payer: MEDICARE

## 2020-04-08 DIAGNOSIS — Z63.0 MARITAL PROBLEM: ICD-10-CM

## 2020-04-08 DIAGNOSIS — F43.23 ADJUSTMENT DISORDER WITH MIXED ANXIETY AND DEPRESSED MOOD: Primary | ICD-10-CM

## 2020-04-08 PROCEDURE — 90834 PSYTX W PT 45 MINUTES: CPT | Mod: 95,,, | Performed by: SOCIAL WORKER

## 2020-04-08 PROCEDURE — 90834 PR PSYCHOTHERAPY W/PATIENT, 45 MIN: ICD-10-PCS | Mod: 95,,, | Performed by: SOCIAL WORKER

## 2020-04-08 SDOH — SOCIAL DETERMINANTS OF HEALTH (SDOH): PROBLEMS IN RELATIONSHIP WITH SPOUSE OR PARTNER: Z63.0

## 2020-04-08 NOTE — PROGRESS NOTES
"The patient location is: [place], [place]  The chief complaint leading to consultation is: depression, anxiety, marital conflict  Visit type: Virtual visit with synchronous audio and video  Total time spent with patient: 45 minutes  Each patient to whom he or she provides medical services by telemedicine is:  (1) informed of the relationship between the physician and patient and the respective role of any other health care provider with respect to management of the patient; and (2) notified that he or she may decline to receive medical services by telemedicine and may withdraw from such care at any time.    Notes: Patient returned for psychotherapy. Reports that she is doing "okay". States that she had a virtual appointment with rheumatologist and they want her to get labs. States that she is nervous about labs because she doesn't want to go out. States that she worries about being exposed but also coming home and possibly exposing her family. Discussed options that she has for mitigating exposure while out. Is attempting to limit contact with . States that she feels sad about not talking to . Has been told by numerous people to not theodore divorce. Thinks about how she wishes that she had evidence so that she could be certain about her decision. Worries about going home because she thinks that he will lie to her, or worse do something to her. Discussed her feelings of failure in regards to her marriage. Discussed writing a letter to herself about what she is thinking and what she wants.      Encounter Diagnoses   Name Primary?    Adjustment disorder with mixed anxiety and depressed mood Yes    Marital problem        "

## 2020-04-16 ENCOUNTER — PATIENT MESSAGE (OUTPATIENT)
Dept: PSYCHIATRY | Facility: CLINIC | Age: 50
End: 2020-04-16

## 2020-05-05 ENCOUNTER — PATIENT MESSAGE (OUTPATIENT)
Dept: FAMILY MEDICINE | Facility: CLINIC | Age: 50
End: 2020-05-05

## 2020-05-05 RX ORDER — PANTOPRAZOLE SODIUM 40 MG/1
40 TABLET, DELAYED RELEASE ORAL DAILY
Qty: 90 TABLET | Refills: 1 | Status: SHIPPED | OUTPATIENT
Start: 2020-05-05 | End: 2020-10-26

## 2020-05-18 ENCOUNTER — OFFICE VISIT (OUTPATIENT)
Dept: PSYCHIATRY | Facility: CLINIC | Age: 50
End: 2020-05-18
Payer: COMMERCIAL

## 2020-05-18 DIAGNOSIS — Z63.0 MARITAL PROBLEM: ICD-10-CM

## 2020-05-18 DIAGNOSIS — F43.23 ADJUSTMENT DISORDER WITH MIXED ANXIETY AND DEPRESSED MOOD: Primary | ICD-10-CM

## 2020-05-18 PROCEDURE — 90834 PR PSYCHOTHERAPY W/PATIENT, 45 MIN: ICD-10-PCS | Mod: 95,,, | Performed by: SOCIAL WORKER

## 2020-05-18 PROCEDURE — 90834 PSYTX W PT 45 MINUTES: CPT | Mod: 95,,, | Performed by: SOCIAL WORKER

## 2020-05-18 SDOH — SOCIAL DETERMINANTS OF HEALTH (SDOH): PROBLEMS IN RELATIONSHIP WITH SPOUSE OR PARTNER: Z63.0

## 2020-05-19 NOTE — PROGRESS NOTES
"The patient location is: home  The chief complaint leading to consultation is: depression, anxiety, marital conflict  Visit type: Virtual visit with synchronous audio and video  Total time spent with patient: 45 minutes  Each patient to whom he or she provides medical services by telemedicine is:  (1) informed of the relationship between the physician and patient and the respective role of any other health care provider with respect to management of the patient; and (2) notified that he or she may decline to receive medical services by telemedicine and may withdraw from such care at any time.    Notes: Patient returned for psychotherapy. Reports that she is doing "okay". Still communicating with  on a regular basis. States that she tries to keep conversations short. States that he will bring her the groceries that they can't find. Worries that if she doesn't talk to him then he will stop being helpful. Reports that when she feels angry or sad she will push herself to keep her mind occupied on something more important. Discussed the importance of making a decision on what to do as far as her marriage. Discussed that she has thoughts about it but doesn't know what is the best route. Discussed taking the time to research options. Discussed that she had a crown loose but pulled it herself, no longer having pain in her mouth.     Encounter Diagnoses   Name Primary?    Adjustment disorder with mixed anxiety and depressed mood Yes    Marital problem        "

## 2020-05-22 ENCOUNTER — PATIENT MESSAGE (OUTPATIENT)
Dept: RHEUMATOLOGY | Facility: CLINIC | Age: 50
End: 2020-05-22

## 2020-05-29 ENCOUNTER — PATIENT MESSAGE (OUTPATIENT)
Dept: FAMILY MEDICINE | Facility: CLINIC | Age: 50
End: 2020-05-29

## 2020-05-29 ENCOUNTER — TELEPHONE (OUTPATIENT)
Dept: FAMILY MEDICINE | Facility: CLINIC | Age: 50
End: 2020-05-29

## 2020-05-29 ENCOUNTER — PATIENT MESSAGE (OUTPATIENT)
Dept: PSYCHIATRY | Facility: CLINIC | Age: 50
End: 2020-05-29

## 2020-05-29 DIAGNOSIS — M79.7 FIBROMYALGIA: ICD-10-CM

## 2020-05-29 RX ORDER — DULOXETINE 40 MG/1
40 CAPSULE, DELAYED RELEASE ORAL DAILY
Qty: 90 CAPSULE | Refills: 1 | Status: SHIPPED | OUTPATIENT
Start: 2020-05-29 | End: 2020-06-11

## 2020-05-30 DIAGNOSIS — L30.9 ECZEMA, UNSPECIFIED TYPE: ICD-10-CM

## 2020-05-31 RX ORDER — CYCLOBENZAPRINE HCL 5 MG
TABLET ORAL
Qty: 60 TABLET | Refills: 0 | OUTPATIENT
Start: 2020-05-31

## 2020-06-01 RX ORDER — ALBUTEROL SULFATE 0.83 MG/ML
SOLUTION RESPIRATORY (INHALATION)
Qty: 150 ML | Refills: 0 | Status: SHIPPED | OUTPATIENT
Start: 2020-06-01 | End: 2021-09-11 | Stop reason: SDUPTHER

## 2020-06-01 RX ORDER — HYDROCORTISONE 25 MG/G
OINTMENT TOPICAL
Qty: 20 G | Refills: 2 | Status: SHIPPED | OUTPATIENT
Start: 2020-06-01 | End: 2023-08-10

## 2020-06-01 NOTE — TELEPHONE ENCOUNTER
Left VM for pt to return call to discuss asthma treatment due to frequent refill request and noted old email from pt stating she has not received her new cpap. Instructed pt to contact us back.

## 2020-06-04 ENCOUNTER — PATIENT MESSAGE (OUTPATIENT)
Dept: FAMILY MEDICINE | Facility: CLINIC | Age: 50
End: 2020-06-04

## 2020-06-04 RX ORDER — NAPROXEN 500 MG/1
500 TABLET ORAL 2 TIMES DAILY
Qty: 60 TABLET | Refills: 2 | Status: SHIPPED | OUTPATIENT
Start: 2020-06-04 | End: 2020-09-24

## 2020-06-11 ENCOUNTER — TELEPHONE (OUTPATIENT)
Dept: FAMILY MEDICINE | Facility: CLINIC | Age: 50
End: 2020-06-11

## 2020-06-11 DIAGNOSIS — M79.7 FIBROMYALGIA: Primary | ICD-10-CM

## 2020-06-11 RX ORDER — DULOXETIN HYDROCHLORIDE 30 MG/1
30 CAPSULE, DELAYED RELEASE ORAL DAILY
Qty: 90 CAPSULE | Refills: 1 | Status: SHIPPED | OUTPATIENT
Start: 2020-06-11 | End: 2020-12-16

## 2020-06-11 NOTE — TELEPHONE ENCOUNTER
Called patient and no answer a message was left to inform her that a new medication was sent to the pharmacy.

## 2020-06-11 NOTE — TELEPHONE ENCOUNTER
Duloxetine HCL Dr 40 mg cap is not covered by her insurance. Will cover duloxetine  20, 30 or 60. or mirtazapinr

## 2020-06-30 ENCOUNTER — PATIENT MESSAGE (OUTPATIENT)
Dept: PULMONOLOGY | Facility: CLINIC | Age: 50
End: 2020-06-30

## 2020-06-30 RX ORDER — FLUTICASONE FUROATE AND VILANTEROL TRIFENATATE 200; 25 UG/1; UG/1
1 POWDER RESPIRATORY (INHALATION) DAILY
Qty: 1 EACH | Refills: 5 | Status: SHIPPED | OUTPATIENT
Start: 2020-06-30 | End: 2021-02-12

## 2020-07-02 RX ORDER — ALBUTEROL SULFATE 90 UG/1
AEROSOL, METERED RESPIRATORY (INHALATION)
Qty: 18 G | Refills: 0 | Status: SHIPPED | OUTPATIENT
Start: 2020-07-02 | End: 2021-07-29 | Stop reason: SDUPTHER

## 2020-07-17 ENCOUNTER — PATIENT MESSAGE (OUTPATIENT)
Dept: FAMILY MEDICINE | Facility: CLINIC | Age: 50
End: 2020-07-17

## 2020-10-09 ENCOUNTER — PES CALL (OUTPATIENT)
Dept: ADMINISTRATIVE | Facility: CLINIC | Age: 50
End: 2020-10-09

## 2020-10-22 ENCOUNTER — PES CALL (OUTPATIENT)
Dept: ADMINISTRATIVE | Facility: CLINIC | Age: 50
End: 2020-10-22

## 2020-10-28 ENCOUNTER — CLINICAL SUPPORT (OUTPATIENT)
Dept: FAMILY MEDICINE | Facility: CLINIC | Age: 50
End: 2020-10-28
Payer: MEDICARE

## 2020-10-28 VITALS — TEMPERATURE: 98 F

## 2020-10-28 DIAGNOSIS — Z23 NEEDS FLU SHOT: Primary | ICD-10-CM

## 2020-10-28 PROCEDURE — 90686 FLU VACCINE (QUAD) GREATER THAN OR EQUAL TO 3YO PRESERVATIVE FREE IM: ICD-10-PCS | Mod: S$GLB,,, | Performed by: FAMILY MEDICINE

## 2020-10-28 PROCEDURE — G0008 ADMIN INFLUENZA VIRUS VAC: HCPCS | Mod: S$GLB,,, | Performed by: FAMILY MEDICINE

## 2020-10-28 PROCEDURE — 99999 PR PBB SHADOW E&M-EST. PATIENT-LVL I: ICD-10-PCS | Mod: PBBFAC,,,

## 2020-10-28 PROCEDURE — 90686 IIV4 VACC NO PRSV 0.5 ML IM: CPT | Mod: S$GLB,,, | Performed by: FAMILY MEDICINE

## 2020-10-28 PROCEDURE — G0008 FLU VACCINE (QUAD) GREATER THAN OR EQUAL TO 3YO PRESERVATIVE FREE IM: ICD-10-PCS | Mod: S$GLB,,, | Performed by: FAMILY MEDICINE

## 2020-10-28 PROCEDURE — 99999 PR PBB SHADOW E&M-EST. PATIENT-LVL I: CPT | Mod: PBBFAC,,,

## 2020-11-03 ENCOUNTER — OFFICE VISIT (OUTPATIENT)
Dept: FAMILY MEDICINE | Facility: CLINIC | Age: 50
End: 2020-11-03
Payer: MEDICARE

## 2020-11-03 VITALS
WEIGHT: 119.19 LBS | OXYGEN SATURATION: 98 % | HEART RATE: 82 BPM | HEIGHT: 61 IN | TEMPERATURE: 99 F | SYSTOLIC BLOOD PRESSURE: 128 MMHG | BODY MASS INDEX: 22.5 KG/M2 | DIASTOLIC BLOOD PRESSURE: 72 MMHG

## 2020-11-03 DIAGNOSIS — B35.4 TINEA CORPORIS: Primary | ICD-10-CM

## 2020-11-03 PROCEDURE — 99213 OFFICE O/P EST LOW 20 MIN: CPT | Mod: S$GLB,,, | Performed by: NURSE PRACTITIONER

## 2020-11-03 PROCEDURE — 99999 PR PBB SHADOW E&M-EST. PATIENT-LVL IV: ICD-10-PCS | Mod: PBBFAC,,, | Performed by: NURSE PRACTITIONER

## 2020-11-03 PROCEDURE — 3008F BODY MASS INDEX DOCD: CPT | Mod: CPTII,S$GLB,, | Performed by: NURSE PRACTITIONER

## 2020-11-03 PROCEDURE — 99213 PR OFFICE/OUTPT VISIT, EST, LEVL III, 20-29 MIN: ICD-10-PCS | Mod: S$GLB,,, | Performed by: NURSE PRACTITIONER

## 2020-11-03 PROCEDURE — 3008F PR BODY MASS INDEX (BMI) DOCUMENTED: ICD-10-PCS | Mod: CPTII,S$GLB,, | Performed by: NURSE PRACTITIONER

## 2020-11-03 PROCEDURE — 99999 PR PBB SHADOW E&M-EST. PATIENT-LVL IV: CPT | Mod: PBBFAC,,, | Performed by: NURSE PRACTITIONER

## 2020-11-03 RX ORDER — TERBINAFINE HYDROCHLORIDE 250 MG/1
250 TABLET ORAL DAILY
Qty: 14 TABLET | Refills: 0 | Status: SHIPPED | OUTPATIENT
Start: 2020-11-03 | End: 2020-11-17

## 2020-11-03 RX ORDER — HYDROXYZINE PAMOATE 25 MG/1
25 CAPSULE ORAL
Qty: 10 CAPSULE | Refills: 0 | Status: SHIPPED | OUTPATIENT
Start: 2020-11-03 | End: 2021-04-05

## 2020-11-03 RX ORDER — HYDROCORTISONE 25 MG/G
OINTMENT TOPICAL 2 TIMES DAILY
Qty: 20 G | Refills: 2 | Status: SHIPPED | OUTPATIENT
Start: 2020-11-03 | End: 2021-06-07 | Stop reason: SDUPTHER

## 2020-11-03 NOTE — PROGRESS NOTES
______________________________________________________________________    Chief Complaint  Chief Complaint   Patient presents with    Rash     whole body       HPI  Emily Pina is a 50 y.o. female with medical diagnoses as listed in the medical history and problem list that presents for tinea corporis x 5 days.  This patient is new to me. Last seen in primary care 10/28/2020.      Tinea Corporis   Patient reports she got a new kitten 2 weeks ago and a week later the dermaphytes appeared. Some lesions are raised some are not. Lesions are circular, reddened, itchy and 1 of them are inflammed and painful most likely due to irritation from scratching      PAST MEDICAL HISTORY:  Past Medical History:   Diagnosis Date    Asthma in remission     Chronic allergic conjunctivitis     Chronic allergic rhinitis due to animal hair and dander     COPD (chronic obstructive pulmonary disease)     Fibromyalgia     Hypothyroidism     Immune disorder     Lupus     CHEPE (obstructive sleep apnea) 11/12/2019    Recurrent urticaria 1/6/2017    Subacute cutaneous lupus erythematosus        PAST SURGICAL HISTORY:  Past Surgical History:   Procedure Laterality Date    CHOLECYSTECTOMY      COLONOSCOPY N/A 3/9/2020    Procedure: COLONOSCOPY;  Surgeon: Judah Lopez MD;  Location: [address];  Service: Endoscopy;  Laterality: N/A;    ESOPHAGOGASTRODUODENOSCOPY N/A 3/9/2020    Procedure: EGD (ESOPHAGOGASTRODUODENOSCOPY);  Surgeon: Judah Lopez MD;  Location: [address]);  Service: Endoscopy;  Laterality: N/A;       SOCIAL HISTORY:  Social History     Socioeconomic History    Marital status:      Spouse name: Not on file    Number of children: Not on file    Years of education: Not on file    Highest education level: Not on file   Occupational History    Not on file   Social Needs    Financial resource strain: Not on file    Food insecurity     Worry: Not on file      Inability: Not on file    Transportation needs     Medical: Not on file     Non-medical: Not on file   Tobacco Use    Smoking status: Current Every Day Smoker     Packs/day: 1.50     Years: 32.00     Pack years: 48.00     Types: Cigarettes    Smokeless tobacco: Former User     Quit date: 7/8/2013    Tobacco comment: started age 17   Substance and Sexual Activity    Alcohol use: No     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     Drug use: No    Sexual activity: Yes     Partners: Male   Lifestyle    Physical activity     Days per week: Not on file     Minutes per session: Not on file    Stress: Only a little   Relationships    Social connections     Talks on phone: Not on file     Gets together: Not on file     Attends Nondenominational service: Not on file     Active member of club or organization: Not on file     Attends meetings of clubs or organizations: Not on file     Relationship status: Not on file   Other Topics Concern    Not on file   Social History Narrative    Not on file       FAMILY HISTORY:  Family History   Problem Relation Age of Onset    Hypertension Mother     Thyroid disease Mother     Cataracts Mother     Cancer Mother         duodenal    Arthritis Father     Hyperlipidemia Father     Cancer Father         Multiple Myeloma     Cirrhosis Father     Cataracts Father     Lupus Sister     Asthma Sister     No Known Problems Brother     No Known Problems Maternal Aunt     No Known Problems Maternal Uncle     No Known Problems Paternal Aunt     Pancreatic cancer Paternal Uncle     No Known Problems Maternal Grandmother     Lung cancer Maternal Grandfather     No Known Problems Paternal Grandmother     Lung cancer Paternal Grandfather     Scoliosis Sister     Amblyopia Neg Hx     Blindness Neg Hx     Diabetes Neg Hx     Glaucoma Neg Hx     Macular degeneration Neg Hx     Retinal detachment Neg Hx     Strabismus Neg Hx     Stroke Neg Hx        ALLERGIES AND MEDICATIONS:  updated and reviewed.  Review of patient's allergies indicates:   Allergen Reactions    Bactrim [sulfamethoxazole-trimethoprim] Rash     Current Outpatient Medications   Medication Sig Dispense Refill    albuterol (PROVENTIL) 2.5 mg /3 mL (0.083 %) nebulizer solution TAKE 3 MLS BY NEBULIZER EVERY 6 HOURS AS NEEDED FOR WHEEZING 150 mL 0    albuterol (VENTOLIN HFA) 90 mcg/actuation inhaler INHALE 2 PUFFS INTO THE LUNGS EVERY 4 HOURS AS NEEDED 18 g 0    azelastine (ASTELIN) 137 mcg (0.1 %) nasal spray 2 sprays (274 mcg total) by Nasal route 2 (two) times daily. 30 mL 5    cetirizine (ZYRTEC) 10 MG tablet Take 10 mg by mouth once daily.      cyclobenzaprine (FLEXERIL) 5 MG tablet TAKE 1 TABLET BY MOUTH THREE TIMES A DAY AS NEEDED FOR MUSCLE SPASMS  2    DULoxetine (CYMBALTA) 30 MG capsule Take 1 capsule (30 mg total) by mouth once daily. 90 capsule 1    estradioL (ESTRACE) 0.01 % (0.1 mg/gram) vaginal cream INSERT 2 GRAMS DAILY INTO THE VAGINA FOR 2 WEEKS, THEN INSERT 1 GRAM TWICE WEEKLY 42.5 g 0    fluorometholone 0.1% (FML) 0.1 % DrpS 1 DROP IN BOTH EYES TWICE A DAY FOR 2 WEEKS THEN DAILY FOR 2 WEEKS  0    fluticasone furoate-vilanteroL (BREO ELLIPTA) 200-25 mcg/dose DsDv diskus inhaler Inhale 1 puff into the lungs once daily. Controller 1 each 5    fluticasone propionate (FLONASE) 50 mcg/actuation nasal spray USE 2 SPRAYS IN EACH NOSTRIL ONE TIME DAILY  48 g 0    gabapentin (NEURONTIN) 600 MG tablet Take 1 tablet (600 mg total) by mouth once daily. 90 tablet 3    hydrocortisone 2.5 % ointment APPLY TOPICALLY 2 (TWO) TIMES DAILY. FOR EYELID RASH AS NEEDED 20 g 2    hydroxychloroquine (PLAQUENIL) 200 mg tablet Take 1 tablet (200 mg total) by mouth once daily. 90 tablet 3    hydroxyzine HCL (ATARAX) 25 MG tablet TAKE 1 TABLET BY MOUTH EVERY EVENING AS NEEDED FOR PRURITUS. DO NOT DRIVE OR OPERATE MACHINERY 30 tablet 2    hydroxyzine HCL (ATARAX) 25 MG tablet TAKE 1 TAB BY MOUTH EVERY EVENING AS NEEDED FOR  PRURITUS. DO NOT DRIVE OR OPERATE MACHINERY 30 tablet 2    levothyroxine (SYNTHROID) 100 MCG tablet TAKE 1 TABLET  MONDAY  THROUGH  SATURDAY AND TAKE 1/2 TABLET ON SUNDAY 85 tablet 2    montelukast (SINGULAIR) 10 mg tablet TAKE 1 TABLET BY MOUTH EVERY DAY IN THE EVENING 90 tablet 1    naproxen (NAPROSYN) 500 MG tablet TAKE 1 TABLET BY MOUTH TWICE A DAY 60 tablet 2    pantoprazole (PROTONIX) 40 MG tablet TAKE 1 TABLET BY MOUTH EVERY DAY 90 tablet 1    soy isofl/blk coh/gr tea/yerba (ESTROVEN ENERGY ORAL) Take by mouth.      triamcinolone (KENALOG) 0.5 % ointment Apply topically 2 (two) times daily. For lupus skin flares on body 15 g 5    DULoxetine (CYMBALTA) 20 MG capsule TAKE 1 CAPSULE BY MOUTH EVERY DAY 90 capsule 0    EPINEPHrine (EPIPEN 2-GIGI) 0.3 mg/0.3 mL AtIn Inject 0.3 mLs (0.3 mg total) into the muscle once. for 1 dose 2 Device 0    HYDROcodone-acetaminophen (NORCO) 7.5-325 mg per tablet Take 1 tablet by mouth every 24 hours as needed for Pain. 30 tablet 0    hydrocortisone 2.5 % ointment Apply topically 2 (two) times daily. 20 g 2    hydrOXYzine pamoate (VISTARIL) 25 MG Cap Take 1 capsule (25 mg total) by mouth as needed. 10 capsule 0    terbinafine HCL (LAMISIL) 250 mg tablet Take 1 tablet (250 mg total) by mouth once daily. for 14 days 14 tablet 0     No current facility-administered medications for this visit.          ROS  Review of Systems   Constitutional: Negative for appetite change, chills, fatigue and fever.   HENT: Negative for congestion, ear pain, postnasal drip, rhinorrhea, sinus pressure, sneezing and sore throat.    Respiratory: Negative for shortness of breath.    Cardiovascular: Negative for chest pain and palpitations.   Gastrointestinal: Negative for abdominal pain, constipation, diarrhea, nausea and vomiting.   Genitourinary: Negative for dysuria, flank pain, frequency and urgency.   Musculoskeletal: Negative for arthralgias.   Skin: Positive for rash.   Neurological:  "Negative for headaches.   Psychiatric/Behavioral: Negative for agitation.           Physical Exam  Vitals:    11/03/20 1321   BP: 128/72   Pulse: 82   Temp: 98.8 °F (37.1 °C)   TempSrc: Oral   SpO2: 98%   Weight: 54.1 kg (119 lb 2.5 oz)   Height: 5' 1" (1.549 m)    Body mass index is 22.51 kg/m².  Weight: 54.1 kg (119 lb 2.5 oz)   Height: 5' 1" (154.9 cm)   Physical Exam  Constitutional:       General: She is not in acute distress.     Appearance: Normal appearance.   HENT:      Head: Normocephalic and atraumatic.      Right Ear: External ear normal.      Left Ear: External ear normal.      Nose: Nose normal.      Mouth/Throat:      Mouth: Mucous membranes are moist.   Eyes:      Pupils: Pupils are equal, round, and reactive to light.   Neck:      Musculoskeletal: Neck supple.   Cardiovascular:      Rate and Rhythm: Normal rate and regular rhythm.      Pulses: Normal pulses.   Pulmonary:      Effort: Pulmonary effort is normal. No respiratory distress.      Breath sounds: Normal breath sounds. No wheezing.   Abdominal:      General: Bowel sounds are normal. There is no distension.      Palpations: Abdomen is soft. There is no mass.      Tenderness: There is no abdominal tenderness.      Hernia: No hernia is present.   Musculoskeletal:         General: No swelling, tenderness, deformity or signs of injury.   Lymphadenopathy:      Cervical: No cervical adenopathy.   Skin:     General: Skin is warm and dry.      Capillary Refill: Capillary refill takes less than 2 seconds.      Findings: Erythema, lesion and rash present.   Neurological:      General: No focal deficit present.      Mental Status: She is alert and oriented to person, place, and time. Mental status is at baseline.   Psychiatric:         Mood and Affect: Mood normal.                             Health Maintenance       Date Due Completion Date    Mammogram 09/11/2021 9/11/2019    Cervical Cancer Screening 09/11/2022 9/11/2019    Lipid Panel 09/14/2024 " 9/14/2019    TETANUS VACCINE 09/02/2026 9/2/2016            Assessment & Plan  Problem List Items Addressed This Visit     None      Visit Diagnoses     Tinea corporis    -  Primary  -Patient advised to take Terbinafine 250 mg daily for 14 days   -Patient advised to use hydrocortisone cream om inflammed lesions   -Patient advised to use Vistaril 25 mg nightly for itching   -Patient may benefit from consult to Dermatology if lesions are remain after current treatment    Relevant Medications    terbinafine HCL (LAMISIL) 250 mg tablet    hydrOXYzine pamoate (VISTARIL) 25 MG Cap    hydrocortisone 2.5 % ointment            Health Maintenance reviewed    Follow-up: Follow up if symptoms worsen or fail to improve.

## 2020-11-03 NOTE — PATIENT INSTRUCTIONS
Hydroxyzine capsules or tablets  What is this medicine?  HYDROXYZINE (genaro DROX i zeen) is an antihistamine. This medicine is used to treat allergy symptoms. It is also used to treat anxiety and tension. This medicine can be used with other medicines to induce sleep before surgery.  How should I use this medicine?  Take this medicine by mouth with a full glass of water. Follow the directions on the prescription label. You may take this medicine with food or on an empty stomach. Take your medicine at regular intervals. Do not take your medicine more often than directed.  Talk to your pediatrician regarding the use of this medicine in children. Special care may be needed. While this drug may be prescribed for children as young as 6 years of age for selected conditions, precautions do apply.  Patients over 65 years old may have a stronger reaction and need a smaller dose.  What side effects may I notice from receiving this medicine?  Side effects that you should report to your doctor or health care professional as soon as possible:  · fast or irregular heartbeat  · difficulty passing urine  · seizures  · slurred speech or confusion  · tremor  Side effects that usually do not require medical attention (report to your doctor or health care professional if they continue or are bothersome):  · constipation  · drowsiness  · fatigue  · headache  · stomach upset  What may interact with this medicine?  · alcohol  · barbiturate medicines for sleep or seizures  · medicines for colds, allergies  · medicines for depression, anxiety, or emotional disturbances  · medicines for pain  · medicines for sleep  · muscle relaxants  What if I miss a dose?  If you miss a dose, take it as soon as you can. If it is almost time for your next dose, take only that dose. Do not take double or extra doses.  Where should I keep my medicine?  Keep out of the reach of children.  Store at room temperature between 15 and 30 degrees C (59 and 86 degrees  F). Keep container tightly closed. Throw away any unused medicine after the expiration date.  What should I tell my health care provider before I take this medicine?  They need to know if you have any of these conditions:  · any chronic illness  · difficulty passing urine  · glaucoma  · heart disease  · kidney disease  · liver disease  · lung disease  · an unusual or allergic reaction to hydroxyzine, cetirizine, other medicines, foods, dyes, or preservatives  · pregnant or trying to get pregnant  · breast-feeding  What should I watch for while using this medicine?  Tell your doctor or health care professional if your symptoms do not improve.  You may get drowsy or dizzy. Do not drive, use machinery, or do anything that needs mental alertness until you know how this medicine affects you. Do not stand or sit up quickly, especially if you are an older patient. This reduces the risk of dizzy or fainting spells. Alcohol may interfere with the effect of this medicine. Avoid alcoholic drinks.  Your mouth may get dry. Chewing sugarless gum or sucking hard candy, and drinking plenty of water may help. Contact your doctor if the problem does not go away or is severe.  This medicine may cause dry eyes and blurred vision. If you wear contact lenses you may feel some discomfort. Lubricating drops may help. See your eye doctor if the problem does not go away or is severe.  If you are receiving skin tests for allergies, tell your doctor you are using this medicine.  NOTE:This sheet is a summary. It may not cover all possible information. If you have questions about this medicine, talk to your doctor, pharmacist, or health care provider. Copyright© 2017 [employer]        Hydrocortisone tablets  What is this medicine?  HYDROCORTISONE (genaro droe KOR ti sone) is a corticosteroid. It is commonly used to treat inflammation of the skin, joints, lungs, and other organs. Common conditions treated include asthma, allergies, and arthritis.  It is also used for other conditions, like blood disorders and diseases of the adrenal glands.  How should I use this medicine?  Take this medicine by mouth with a drink of water. Follow the directions on the prescription label. Take it with food or milk to avoid stomach upset. If you are taking this medicine once a day, take it in the morning. Do not take more medicine than you are told to take. Do not suddenly stop taking your medicine because you may develop a severe reaction. Your doctor will tell you how much medicine to take. If your doctor wants you to stop the medicine, the dose may be slowly lowered over time to avoid any side effects.  Talk to your pediatrician regarding the use of this medicine in children. Special care may be needed.  What side effects may I notice from receiving this medicine?  Side effects that you should report to your doctor or health care professional as soon as possible:  · changes in vision  · fever, sore throat, sneezing, cough, or other signs of infection, wounds that will not heal  · frequent passing of urine  · increased thirst  · mental depression, mood swings, mistaken feelings of self importance or of being mistreated  · pain in hips, back, ribs, arms, shoulders, or legs  · severe stomach pain  · swelling of feet or lower legs  · unusually weak or tired  · vomiting  Side effects that usually do not require medical attention (report to your doctor or health care professional if they continue or are bothersome):  · headache  · increased appetite  · nausea  · skin problems, acne, thin and shiny skin  What may interact with this medicine?  Do not take this medicine with any of the following medications:  · mifepristone, RU-486  · vaccines  This medicine may also interact with the following medications:  · antibiotics like clarithromycin, erythromycin, and troleandomycin  · aspirin and aspirin-like drugs  · barbiturates like  phenobarbital  · ketoconazole  · phenytoin  · rifampin  · warfarin  What if I miss a dose?  If you miss a dose, take it as soon as you can. If it is almost time for your next dose, take only that dose. Do not take double or extra doses.  Where should I keep my medicine?  Keep out of the reach of children.  Store at room temperature between 20 and 25 degrees C (68 and 77 degrees F). Throw away any unused medicine after the expiration date.  What should I tell my health care provider before I take this medicine?  They need to know if you have any of these conditions:  · Cushing's syndrome  · diabetes  · glaucoma  · heart problems or disease  · high blood pressure  · infection like herpes, measles, tuberculosis, or chickenpox  · kidney disease  · liver disease  · mental problems  · myasthenia gravis  · osteoporosis  · previous heart attack  · seizures  · stomach, ulcer or intestine disease including colitis and diverticulitis  · thyroid problem  · an unusual or allergic reaction to hydrocortisone, corticosteroids, other medicines, lactose, foods, dyes, or preservatives  · pregnant or trying to get pregnant  · breast-feeding  What should I watch for while using this medicine?  Visit your doctor or health care professional for regular checks on your progress. If you are taking this medicine over a prolonged period, carry an identification card with your name and address, the type and dose of your medicine, and your doctor's name and address.  This medicine may increase your risk of getting an infection. Stay away from people who are sick. Tell your doctor or health care professional if you are around anyone with measles or chickenpox.  If you are going to have surgery, tell your doctor or health care professional that you have taken this medicine within the last twelve months.  Ask your doctor or health care professional about your diet. You may need to lower the amount of salt you eat.  The medicine can increase your  blood sugar. If you are a diabetic check with your doctor if you need help adjusting the dose of your diabetic medicine.  NOTE:This sheet is a summary. It may not cover all possible information. If you have questions about this medicine, talk to your doctor, pharmacist, or health care provider. Copyright© 2017 [employer]        Terbinafine tablets  What is this medicine?  TERBINAFINE (TER bin a feen) is an antifungal medicine. It is used to treat certain kinds of fungal or yeast infections.  How should I use this medicine?  Take this medicine by mouth with a full glass of water. Follow the directions on the prescription label. You can take this medicine with food or on an empty stomach. Take your medicine at regular intervals. Do not take your medicine more often than directed. Do not skip doses or stop your medicine early even if you feel better. Do not stop taking except on your doctor's advice. Talk to your pediatrician regarding the use of this medicine in children. Special care may be needed.  What side effects may I notice from receiving this medicine?  Side effects that you should report to your doctor or health care professional as soon as possible:  · allergic reactions like skin rash or hives, swelling of the face, lips, or tongue  · changes in vision  · dark urine  · fever or infection  · general ill feeling or flu-like symptoms  · light-colored stools  · loss of appetite, nausea  · redness, blistering, peeling or loosening of the skin, including inside the mouth  · right upper belly pain  · unusually weak or tired  · yellowing of the eyes or skin  Side effects that usually do not require medical attention (report to your doctor or health care professional if they continue or are bothersome):  · changes in taste  · diarrhea  · hair loss  · muscle or joint pain  · stomach gas  · stomach upset  What may interact with this medicine?  Do not take this medicine with any of the following  medications:  · thioridazine  This medicine may also interact with the following medications:  · beta-blockers  · caffeine  · cimetidine  · cyclosporine  · medicines for depression, anxiety, or psychotic disturbances  · medicines for fungal infections like fluconazole and ketoconazole  · medicines for irregular heartbeat like amiodarone, flecainide and propafenone  · rifampin  · warfarin  What if I miss a dose?  If you miss a dose, take it as soon as you can. If it is almost time for your next dose, take only that dose. Do not take double or extra doses.  Where should I keep my medicine?  Keep out of the reach of children.  Store at room temperature below 25 degrees C (77 degrees F). Protect from light. Throw away any unused medicine after the expiration date.  What should I tell my health care provider before I take this medicine?  They need to know if you have any of these conditions:  · drink alcoholic beverages  · kidney disease  · liver disease  · an unusual or allergic reaction to terbinafine, other medicines, foods, dyes, or preservatives  · pregnant or trying to get pregnant  · breast-feeding  What should I watch for while using this medicine?  Visit your doctor or health care provider regularly. Tell your doctor right away if you have nausea or vomiting, loss of appetite, stomach pain on your right upper side, yellow skin, dark urine, light stools, or are over tired. Some fungal infections need many weeks or months of treatment to cure. If you are taking this medicine for a long time, you will need to have important blood work done.  NOTE:This sheet is a summary. It may not cover all possible information. If you have questions about this medicine, talk to your doctor, pharmacist, or health care provider. Copyright© 2017 [employer]

## 2020-12-23 DIAGNOSIS — J30.81 CHRONIC ALLERGIC RHINITIS DUE TO ANIMAL HAIR AND DANDER: Chronic | ICD-10-CM

## 2020-12-24 RX ORDER — AZELASTINE 1 MG/ML
2 SPRAY, METERED NASAL 2 TIMES DAILY
Qty: 30 ML | Refills: 5 | Status: SHIPPED | OUTPATIENT
Start: 2020-12-24 | End: 2021-07-29 | Stop reason: SDUPTHER

## 2021-01-06 ENCOUNTER — PATIENT MESSAGE (OUTPATIENT)
Dept: FAMILY MEDICINE | Facility: CLINIC | Age: 51
End: 2021-01-06

## 2021-01-06 ENCOUNTER — PATIENT MESSAGE (OUTPATIENT)
Dept: RHEUMATOLOGY | Facility: CLINIC | Age: 51
End: 2021-01-06

## 2021-01-06 ENCOUNTER — TELEPHONE (OUTPATIENT)
Dept: RHEUMATOLOGY | Facility: CLINIC | Age: 51
End: 2021-01-06

## 2021-01-06 DIAGNOSIS — G89.4 CHRONIC PAIN SYNDROME: ICD-10-CM

## 2021-01-06 DIAGNOSIS — Z92.25 HISTORY OF IMMUNOSUPPRESSION THERAPY: ICD-10-CM

## 2021-01-06 DIAGNOSIS — M79.7 FIBROMYALGIA: ICD-10-CM

## 2021-01-06 DIAGNOSIS — Z72.0 TOBACCO ABUSE: ICD-10-CM

## 2021-01-06 DIAGNOSIS — L93.1 SUBACUTE CUTANEOUS LUPUS ERYTHEMATOSUS: ICD-10-CM

## 2021-01-06 RX ORDER — GABAPENTIN 600 MG/1
600 TABLET ORAL DAILY
Qty: 90 TABLET | Refills: 3 | Status: SHIPPED | OUTPATIENT
Start: 2021-01-06 | End: 2022-01-18 | Stop reason: SDUPTHER

## 2021-02-04 DIAGNOSIS — Z92.25 HISTORY OF IMMUNOSUPPRESSION THERAPY: ICD-10-CM

## 2021-02-04 DIAGNOSIS — M25.50 ARTHRALGIA, UNSPECIFIED JOINT: ICD-10-CM

## 2021-02-04 DIAGNOSIS — Z72.0 TOBACCO ABUSE: ICD-10-CM

## 2021-02-04 DIAGNOSIS — M79.7 FIBROMYALGIA: ICD-10-CM

## 2021-02-04 DIAGNOSIS — L93.1 SUBACUTE CUTANEOUS LUPUS ERYTHEMATOSUS: ICD-10-CM

## 2021-02-04 DIAGNOSIS — G89.4 CHRONIC PAIN SYNDROME: ICD-10-CM

## 2021-02-08 RX ORDER — HYDROCODONE BITARTRATE AND ACETAMINOPHEN 7.5; 325 MG/1; MG/1
1 TABLET ORAL
Qty: 30 TABLET | Refills: 0 | Status: SHIPPED | OUTPATIENT
Start: 2021-02-08 | End: 2021-03-26 | Stop reason: SDUPTHER

## 2021-02-23 ENCOUNTER — PATIENT MESSAGE (OUTPATIENT)
Dept: RHEUMATOLOGY | Facility: CLINIC | Age: 51
End: 2021-02-23

## 2021-03-26 ENCOUNTER — OFFICE VISIT (OUTPATIENT)
Dept: RHEUMATOLOGY | Facility: CLINIC | Age: 51
End: 2021-03-26
Payer: MEDICARE

## 2021-03-26 ENCOUNTER — LAB VISIT (OUTPATIENT)
Dept: LAB | Facility: HOSPITAL | Age: 51
End: 2021-03-26
Attending: STUDENT IN AN ORGANIZED HEALTH CARE EDUCATION/TRAINING PROGRAM
Payer: MEDICARE

## 2021-03-26 VITALS
SYSTOLIC BLOOD PRESSURE: 135 MMHG | WEIGHT: 119.25 LBS | BODY MASS INDEX: 22.54 KG/M2 | HEART RATE: 72 BPM | DIASTOLIC BLOOD PRESSURE: 78 MMHG

## 2021-03-26 DIAGNOSIS — Z72.0 TOBACCO ABUSE: ICD-10-CM

## 2021-03-26 DIAGNOSIS — M25.50 ARTHRALGIA, UNSPECIFIED JOINT: ICD-10-CM

## 2021-03-26 DIAGNOSIS — Z79.899 LONG-TERM USE OF PLAQUENIL: Primary | ICD-10-CM

## 2021-03-26 DIAGNOSIS — Z79.899 LONG-TERM USE OF PLAQUENIL: ICD-10-CM

## 2021-03-26 DIAGNOSIS — M79.7 FIBROMYALGIA: ICD-10-CM

## 2021-03-26 DIAGNOSIS — L93.1 SUBACUTE CUTANEOUS LUPUS ERYTHEMATOSUS: ICD-10-CM

## 2021-03-26 DIAGNOSIS — G89.4 CHRONIC PAIN SYNDROME: ICD-10-CM

## 2021-03-26 DIAGNOSIS — Z92.25 HISTORY OF IMMUNOSUPPRESSION THERAPY: ICD-10-CM

## 2021-03-26 LAB
ALBUMIN SERPL BCP-MCNC: 4 G/DL (ref 3.5–5.2)
ALP SERPL-CCNC: 59 U/L (ref 55–135)
ALT SERPL W/O P-5'-P-CCNC: 24 U/L (ref 10–44)
ANION GAP SERPL CALC-SCNC: 7 MMOL/L (ref 8–16)
AST SERPL-CCNC: 24 U/L (ref 10–40)
BASOPHILS # BLD AUTO: 0.04 K/UL (ref 0–0.2)
BASOPHILS NFR BLD: 0.9 % (ref 0–1.9)
BILIRUB SERPL-MCNC: 0.2 MG/DL (ref 0.1–1)
BUN SERPL-MCNC: 7 MG/DL (ref 6–20)
C3 SERPL-MCNC: 110 MG/DL (ref 50–180)
C4 SERPL-MCNC: 18 MG/DL (ref 11–44)
CALCIUM SERPL-MCNC: 9 MG/DL (ref 8.7–10.5)
CHLORIDE SERPL-SCNC: 104 MMOL/L (ref 95–110)
CO2 SERPL-SCNC: 31 MMOL/L (ref 23–29)
CREAT SERPL-MCNC: 0.8 MG/DL (ref 0.5–1.4)
CRP SERPL-MCNC: 1.4 MG/L (ref 0–8.2)
DIFFERENTIAL METHOD: ABNORMAL
EOSINOPHIL # BLD AUTO: 0.2 K/UL (ref 0–0.5)
EOSINOPHIL NFR BLD: 5 % (ref 0–8)
ERYTHROCYTE [DISTWIDTH] IN BLOOD BY AUTOMATED COUNT: 12.8 % (ref 11.5–14.5)
ERYTHROCYTE [SEDIMENTATION RATE] IN BLOOD BY WESTERGREN METHOD: 21 MM/HR (ref 0–36)
EST. GFR  (AFRICAN AMERICAN): >60 ML/MIN/1.73 M^2
EST. GFR  (NON AFRICAN AMERICAN): >60 ML/MIN/1.73 M^2
GLUCOSE SERPL-MCNC: 92 MG/DL (ref 70–110)
HCT VFR BLD AUTO: 38.7 % (ref 37–48.5)
HGB BLD-MCNC: 12.3 G/DL (ref 12–16)
IMM GRANULOCYTES # BLD AUTO: 0.02 K/UL (ref 0–0.04)
IMM GRANULOCYTES NFR BLD AUTO: 0.5 % (ref 0–0.5)
LYMPHOCYTES # BLD AUTO: 1.3 K/UL (ref 1–4.8)
LYMPHOCYTES NFR BLD: 28.8 % (ref 18–48)
MCH RBC QN AUTO: 29.6 PG (ref 27–31)
MCHC RBC AUTO-ENTMCNC: 31.8 G/DL (ref 32–36)
MCV RBC AUTO: 93 FL (ref 82–98)
MONOCYTES # BLD AUTO: 0.2 K/UL (ref 0.3–1)
MONOCYTES NFR BLD: 4.8 % (ref 4–15)
NEUTROPHILS # BLD AUTO: 2.6 K/UL (ref 1.8–7.7)
NEUTROPHILS NFR BLD: 60 % (ref 38–73)
NRBC BLD-RTO: 0 /100 WBC
PLATELET # BLD AUTO: 285 K/UL (ref 150–350)
PMV BLD AUTO: 10.1 FL (ref 9.2–12.9)
POTASSIUM SERPL-SCNC: 3.9 MMOL/L (ref 3.5–5.1)
PROT SERPL-MCNC: 8.2 G/DL (ref 6–8.4)
RBC # BLD AUTO: 4.16 M/UL (ref 4–5.4)
SODIUM SERPL-SCNC: 142 MMOL/L (ref 136–145)
WBC # BLD AUTO: 4.38 K/UL (ref 3.9–12.7)

## 2021-03-26 PROCEDURE — 99499 UNLISTED E&M SERVICE: CPT | Mod: S$GLB,,, | Performed by: STUDENT IN AN ORGANIZED HEALTH CARE EDUCATION/TRAINING PROGRAM

## 2021-03-26 PROCEDURE — 80053 COMPREHEN METABOLIC PANEL: CPT | Performed by: STUDENT IN AN ORGANIZED HEALTH CARE EDUCATION/TRAINING PROGRAM

## 2021-03-26 PROCEDURE — 86160 COMPLEMENT ANTIGEN: CPT | Performed by: STUDENT IN AN ORGANIZED HEALTH CARE EDUCATION/TRAINING PROGRAM

## 2021-03-26 PROCEDURE — 86160 COMPLEMENT ANTIGEN: CPT | Mod: 59 | Performed by: STUDENT IN AN ORGANIZED HEALTH CARE EDUCATION/TRAINING PROGRAM

## 2021-03-26 PROCEDURE — 86225 DNA ANTIBODY NATIVE: CPT | Mod: 59 | Performed by: STUDENT IN AN ORGANIZED HEALTH CARE EDUCATION/TRAINING PROGRAM

## 2021-03-26 PROCEDURE — 86225 DNA ANTIBODY NATIVE: CPT | Performed by: STUDENT IN AN ORGANIZED HEALTH CARE EDUCATION/TRAINING PROGRAM

## 2021-03-26 PROCEDURE — 85652 RBC SED RATE AUTOMATED: CPT | Performed by: STUDENT IN AN ORGANIZED HEALTH CARE EDUCATION/TRAINING PROGRAM

## 2021-03-26 PROCEDURE — 3008F PR BODY MASS INDEX (BMI) DOCUMENTED: ICD-10-PCS | Mod: CPTII,S$GLB,, | Performed by: STUDENT IN AN ORGANIZED HEALTH CARE EDUCATION/TRAINING PROGRAM

## 2021-03-26 PROCEDURE — 99214 PR OFFICE/OUTPT VISIT, EST, LEVL IV, 30-39 MIN: ICD-10-PCS | Mod: S$GLB,,, | Performed by: STUDENT IN AN ORGANIZED HEALTH CARE EDUCATION/TRAINING PROGRAM

## 2021-03-26 PROCEDURE — 99214 OFFICE O/P EST MOD 30 MIN: CPT | Mod: S$GLB,,, | Performed by: STUDENT IN AN ORGANIZED HEALTH CARE EDUCATION/TRAINING PROGRAM

## 2021-03-26 PROCEDURE — 3008F BODY MASS INDEX DOCD: CPT | Mod: CPTII,S$GLB,, | Performed by: STUDENT IN AN ORGANIZED HEALTH CARE EDUCATION/TRAINING PROGRAM

## 2021-03-26 PROCEDURE — 99999 PR PBB SHADOW E&M-EST. PATIENT-LVL V: ICD-10-PCS | Mod: PBBFAC,,, | Performed by: STUDENT IN AN ORGANIZED HEALTH CARE EDUCATION/TRAINING PROGRAM

## 2021-03-26 PROCEDURE — 86140 C-REACTIVE PROTEIN: CPT | Performed by: STUDENT IN AN ORGANIZED HEALTH CARE EDUCATION/TRAINING PROGRAM

## 2021-03-26 PROCEDURE — 85025 COMPLETE CBC W/AUTO DIFF WBC: CPT | Performed by: STUDENT IN AN ORGANIZED HEALTH CARE EDUCATION/TRAINING PROGRAM

## 2021-03-26 PROCEDURE — 36415 COLL VENOUS BLD VENIPUNCTURE: CPT | Performed by: STUDENT IN AN ORGANIZED HEALTH CARE EDUCATION/TRAINING PROGRAM

## 2021-03-26 PROCEDURE — 99999 PR PBB SHADOW E&M-EST. PATIENT-LVL V: CPT | Mod: PBBFAC,,, | Performed by: STUDENT IN AN ORGANIZED HEALTH CARE EDUCATION/TRAINING PROGRAM

## 2021-03-26 PROCEDURE — 99499 RISK ADDL DX/OHS AUDIT: ICD-10-PCS | Mod: S$GLB,,, | Performed by: STUDENT IN AN ORGANIZED HEALTH CARE EDUCATION/TRAINING PROGRAM

## 2021-03-26 RX ORDER — HYDROCODONE BITARTRATE AND ACETAMINOPHEN 7.5; 325 MG/1; MG/1
1 TABLET ORAL
Qty: 30 TABLET | Refills: 0 | Status: SHIPPED | OUTPATIENT
Start: 2021-03-26 | End: 2021-07-14 | Stop reason: SDUPTHER

## 2021-03-26 RX ORDER — HYDROXYCHLOROQUINE SULFATE 200 MG/1
400 TABLET, FILM COATED ORAL DAILY
Qty: 60 TABLET | Refills: 2 | Status: SHIPPED | OUTPATIENT
Start: 2021-03-26 | End: 2021-08-20

## 2021-03-26 ASSESSMENT — ROUTINE ASSESSMENT OF PATIENT INDEX DATA (RAPID3)
FATIGUE SCORE: 8
PAIN SCORE: 1
TOTAL RAPID3 SCORE: 2.22
PSYCHOLOGICAL DISTRESS SCORE: 4.4
AM STIFFNESS SCORE: 1, YES
MDHAQ FUNCTION SCORE: 0.8
PATIENT GLOBAL ASSESSMENT SCORE: 3

## 2021-03-30 LAB
DNA TITER: NORMAL
DSDNA AB SER-ACNC: POSITIVE [IU]/ML

## 2021-04-01 ENCOUNTER — PATIENT MESSAGE (OUTPATIENT)
Dept: RHEUMATOLOGY | Facility: CLINIC | Age: 51
End: 2021-04-01

## 2021-04-05 ENCOUNTER — TELEPHONE (OUTPATIENT)
Dept: RHEUMATOLOGY | Facility: CLINIC | Age: 51
End: 2021-04-05

## 2021-04-05 DIAGNOSIS — R21 RASH: Primary | ICD-10-CM

## 2021-04-05 RX ORDER — HYDROXYZINE HYDROCHLORIDE 25 MG/1
TABLET, FILM COATED ORAL
Qty: 30 TABLET | Refills: 2 | Status: SHIPPED | OUTPATIENT
Start: 2021-04-05 | End: 2023-01-23 | Stop reason: SDUPTHER

## 2021-04-07 ENCOUNTER — PATIENT MESSAGE (OUTPATIENT)
Dept: RHEUMATOLOGY | Facility: CLINIC | Age: 51
End: 2021-04-07

## 2021-04-13 DIAGNOSIS — L93.1 SUBACUTE CUTANEOUS LUPUS ERYTHEMATOSUS: Primary | ICD-10-CM

## 2021-04-13 DIAGNOSIS — Z79.899 LONG-TERM USE OF PLAQUENIL: ICD-10-CM

## 2021-04-26 ENCOUNTER — OFFICE VISIT (OUTPATIENT)
Dept: DERMATOLOGY | Facility: CLINIC | Age: 51
End: 2021-04-26
Payer: MEDICARE

## 2021-04-26 DIAGNOSIS — L93.1 SUBACUTE CUTANEOUS LUPUS ERYTHEMATOSUS: ICD-10-CM

## 2021-04-26 DIAGNOSIS — D48.5 NEOPLASM OF UNCERTAIN BEHAVIOR OF SKIN: ICD-10-CM

## 2021-04-26 DIAGNOSIS — L30.9 ECZEMA, UNSPECIFIED TYPE: Primary | ICD-10-CM

## 2021-04-26 PROCEDURE — 99214 PR OFFICE/OUTPT VISIT, EST, LEVL IV, 30-39 MIN: ICD-10-PCS | Mod: GC,S$GLB,, | Performed by: DERMATOLOGY

## 2021-04-26 PROCEDURE — 99999 PR PBB SHADOW E&M-EST. PATIENT-LVL III: ICD-10-PCS | Mod: PBBFAC,,,

## 2021-04-26 PROCEDURE — 99999 PR PBB SHADOW E&M-EST. PATIENT-LVL III: CPT | Mod: PBBFAC,,,

## 2021-04-26 PROCEDURE — 99214 OFFICE O/P EST MOD 30 MIN: CPT | Mod: GC,S$GLB,, | Performed by: DERMATOLOGY

## 2021-04-26 RX ORDER — TACROLIMUS 1 MG/G
OINTMENT TOPICAL 2 TIMES DAILY
Qty: 60 G | Refills: 3 | Status: SHIPPED | OUTPATIENT
Start: 2021-04-26 | End: 2021-06-07 | Stop reason: SDUPTHER

## 2021-05-19 ENCOUNTER — PATIENT MESSAGE (OUTPATIENT)
Dept: DERMATOLOGY | Facility: CLINIC | Age: 51
End: 2021-05-19

## 2021-06-01 DIAGNOSIS — L30.9 ECZEMA, UNSPECIFIED TYPE: Primary | ICD-10-CM

## 2021-06-01 RX ORDER — PIMECROLIMUS 10 MG/G
CREAM TOPICAL 2 TIMES DAILY
Qty: 30 G | Refills: 2 | Status: SHIPPED | OUTPATIENT
Start: 2021-06-01 | End: 2023-08-10

## 2021-06-07 ENCOUNTER — OFFICE VISIT (OUTPATIENT)
Dept: DERMATOLOGY | Facility: CLINIC | Age: 51
End: 2021-06-07
Payer: MEDICARE

## 2021-06-07 DIAGNOSIS — L30.9 ECZEMA, UNSPECIFIED TYPE: ICD-10-CM

## 2021-06-07 DIAGNOSIS — L82.1 SEBORRHEIC KERATOSES: Primary | ICD-10-CM

## 2021-06-07 DIAGNOSIS — D48.5 NEOPLASM OF UNCERTAIN BEHAVIOR OF SKIN: ICD-10-CM

## 2021-06-07 DIAGNOSIS — L93.2 CUTANEOUS LUPUS ERYTHEMATOSUS: ICD-10-CM

## 2021-06-07 PROCEDURE — 99214 PR OFFICE/OUTPT VISIT, EST, LEVL IV, 30-39 MIN: ICD-10-PCS | Mod: GC,S$GLB,, | Performed by: DERMATOLOGY

## 2021-06-07 PROCEDURE — 99214 OFFICE O/P EST MOD 30 MIN: CPT | Mod: GC,S$GLB,, | Performed by: DERMATOLOGY

## 2021-06-07 PROCEDURE — 99999 PR PBB SHADOW E&M-EST. PATIENT-LVL III: ICD-10-PCS | Mod: PBBFAC,,,

## 2021-06-07 PROCEDURE — 99999 PR PBB SHADOW E&M-EST. PATIENT-LVL III: CPT | Mod: PBBFAC,,,

## 2021-06-07 RX ORDER — HYDROCORTISONE 25 MG/G
OINTMENT TOPICAL 2 TIMES DAILY
Qty: 20 G | Refills: 2 | Status: SHIPPED | OUTPATIENT
Start: 2021-06-07 | End: 2021-06-07

## 2021-06-07 RX ORDER — TACROLIMUS 1 MG/G
OINTMENT TOPICAL 2 TIMES DAILY
Qty: 60 G | Refills: 3 | Status: SHIPPED | OUTPATIENT
Start: 2021-06-07

## 2021-06-07 RX ORDER — HYDROCORTISONE 25 MG/G
OINTMENT TOPICAL 2 TIMES DAILY
Qty: 20 G | Refills: 2 | Status: SHIPPED | OUTPATIENT
Start: 2021-06-07

## 2021-06-07 RX ORDER — TRIAMCINOLONE ACETONIDE 5 MG/G
OINTMENT TOPICAL 2 TIMES DAILY
Qty: 15 G | Refills: 5 | Status: SHIPPED | OUTPATIENT
Start: 2021-06-07 | End: 2022-10-12

## 2021-06-10 ENCOUNTER — TELEPHONE (OUTPATIENT)
Dept: PHARMACY | Facility: CLINIC | Age: 51
End: 2021-06-10

## 2021-06-14 ENCOUNTER — LAB VISIT (OUTPATIENT)
Dept: LAB | Facility: HOSPITAL | Age: 51
End: 2021-06-14
Attending: STUDENT IN AN ORGANIZED HEALTH CARE EDUCATION/TRAINING PROGRAM
Payer: MEDICARE

## 2021-06-14 DIAGNOSIS — M25.50 ARTHRALGIA, UNSPECIFIED JOINT: ICD-10-CM

## 2021-06-14 DIAGNOSIS — Z79.899 LONG-TERM USE OF PLAQUENIL: ICD-10-CM

## 2021-06-14 DIAGNOSIS — Z92.25 HISTORY OF IMMUNOSUPPRESSION THERAPY: ICD-10-CM

## 2021-06-14 DIAGNOSIS — G89.4 CHRONIC PAIN SYNDROME: ICD-10-CM

## 2021-06-14 DIAGNOSIS — M79.7 FIBROMYALGIA: ICD-10-CM

## 2021-06-14 DIAGNOSIS — Z72.0 TOBACCO ABUSE: ICD-10-CM

## 2021-06-14 DIAGNOSIS — L93.1 SUBACUTE CUTANEOUS LUPUS ERYTHEMATOSUS: ICD-10-CM

## 2021-06-14 LAB
ALBUMIN SERPL BCP-MCNC: 4 G/DL (ref 3.5–5.2)
ALP SERPL-CCNC: 54 U/L (ref 55–135)
ALT SERPL W/O P-5'-P-CCNC: 16 U/L (ref 10–44)
ANION GAP SERPL CALC-SCNC: 9 MMOL/L (ref 8–16)
AST SERPL-CCNC: 16 U/L (ref 10–40)
BASOPHILS # BLD AUTO: 0.04 K/UL (ref 0–0.2)
BASOPHILS NFR BLD: 0.9 % (ref 0–1.9)
BILIRUB SERPL-MCNC: 0.3 MG/DL (ref 0.1–1)
BUN SERPL-MCNC: 8 MG/DL (ref 6–20)
C3 SERPL-MCNC: 106 MG/DL (ref 50–180)
C4 SERPL-MCNC: 17 MG/DL (ref 11–44)
CALCIUM SERPL-MCNC: 9.2 MG/DL (ref 8.7–10.5)
CHLORIDE SERPL-SCNC: 102 MMOL/L (ref 95–110)
CO2 SERPL-SCNC: 28 MMOL/L (ref 23–29)
CREAT SERPL-MCNC: 0.8 MG/DL (ref 0.5–1.4)
CRP SERPL-MCNC: 1.2 MG/L (ref 0–8.2)
DIFFERENTIAL METHOD: ABNORMAL
EOSINOPHIL # BLD AUTO: 0.2 K/UL (ref 0–0.5)
EOSINOPHIL NFR BLD: 5.5 % (ref 0–8)
ERYTHROCYTE [DISTWIDTH] IN BLOOD BY AUTOMATED COUNT: 12.5 % (ref 11.5–14.5)
ERYTHROCYTE [SEDIMENTATION RATE] IN BLOOD BY WESTERGREN METHOD: 28 MM/HR (ref 0–36)
EST. GFR  (AFRICAN AMERICAN): >60 ML/MIN/1.73 M^2
EST. GFR  (NON AFRICAN AMERICAN): >60 ML/MIN/1.73 M^2
GLUCOSE SERPL-MCNC: 111 MG/DL (ref 70–110)
HCT VFR BLD AUTO: 37.5 % (ref 37–48.5)
HGB BLD-MCNC: 11.9 G/DL (ref 12–16)
IMM GRANULOCYTES # BLD AUTO: 0.01 K/UL (ref 0–0.04)
IMM GRANULOCYTES NFR BLD AUTO: 0.2 % (ref 0–0.5)
LYMPHOCYTES # BLD AUTO: 1.4 K/UL (ref 1–4.8)
LYMPHOCYTES NFR BLD: 32.3 % (ref 18–48)
MCH RBC QN AUTO: 29.2 PG (ref 27–31)
MCHC RBC AUTO-ENTMCNC: 31.7 G/DL (ref 32–36)
MCV RBC AUTO: 92 FL (ref 82–98)
MONOCYTES # BLD AUTO: 0.3 K/UL (ref 0.3–1)
MONOCYTES NFR BLD: 5.8 % (ref 4–15)
NEUTROPHILS # BLD AUTO: 2.4 K/UL (ref 1.8–7.7)
NEUTROPHILS NFR BLD: 55.3 % (ref 38–73)
NRBC BLD-RTO: 0 /100 WBC
PLATELET # BLD AUTO: 284 K/UL (ref 150–450)
PMV BLD AUTO: 11 FL (ref 9.2–12.9)
POTASSIUM SERPL-SCNC: 3.5 MMOL/L (ref 3.5–5.1)
PROT SERPL-MCNC: 7.9 G/DL (ref 6–8.4)
RBC # BLD AUTO: 4.08 M/UL (ref 4–5.4)
SODIUM SERPL-SCNC: 139 MMOL/L (ref 136–145)
WBC # BLD AUTO: 4.33 K/UL (ref 3.9–12.7)

## 2021-06-14 PROCEDURE — 86160 COMPLEMENT ANTIGEN: CPT | Mod: 59 | Performed by: STUDENT IN AN ORGANIZED HEALTH CARE EDUCATION/TRAINING PROGRAM

## 2021-06-14 PROCEDURE — 80053 COMPREHEN METABOLIC PANEL: CPT | Performed by: STUDENT IN AN ORGANIZED HEALTH CARE EDUCATION/TRAINING PROGRAM

## 2021-06-14 PROCEDURE — 86225 DNA ANTIBODY NATIVE: CPT | Mod: 59 | Performed by: STUDENT IN AN ORGANIZED HEALTH CARE EDUCATION/TRAINING PROGRAM

## 2021-06-14 PROCEDURE — 85652 RBC SED RATE AUTOMATED: CPT | Performed by: STUDENT IN AN ORGANIZED HEALTH CARE EDUCATION/TRAINING PROGRAM

## 2021-06-14 PROCEDURE — 36415 COLL VENOUS BLD VENIPUNCTURE: CPT | Mod: PO | Performed by: STUDENT IN AN ORGANIZED HEALTH CARE EDUCATION/TRAINING PROGRAM

## 2021-06-14 PROCEDURE — 86225 DNA ANTIBODY NATIVE: CPT | Performed by: STUDENT IN AN ORGANIZED HEALTH CARE EDUCATION/TRAINING PROGRAM

## 2021-06-14 PROCEDURE — 86160 COMPLEMENT ANTIGEN: CPT | Performed by: STUDENT IN AN ORGANIZED HEALTH CARE EDUCATION/TRAINING PROGRAM

## 2021-06-14 PROCEDURE — 86140 C-REACTIVE PROTEIN: CPT | Performed by: STUDENT IN AN ORGANIZED HEALTH CARE EDUCATION/TRAINING PROGRAM

## 2021-06-14 PROCEDURE — 85025 COMPLETE CBC W/AUTO DIFF WBC: CPT | Performed by: STUDENT IN AN ORGANIZED HEALTH CARE EDUCATION/TRAINING PROGRAM

## 2021-06-16 LAB
DNA TITER: NORMAL
DSDNA AB SER-ACNC: POSITIVE [IU]/ML

## 2021-06-25 ENCOUNTER — OFFICE VISIT (OUTPATIENT)
Dept: FAMILY MEDICINE | Facility: CLINIC | Age: 51
End: 2021-06-25
Payer: MEDICARE

## 2021-06-25 VITALS
OXYGEN SATURATION: 96 % | TEMPERATURE: 98 F | SYSTOLIC BLOOD PRESSURE: 120 MMHG | WEIGHT: 131.63 LBS | DIASTOLIC BLOOD PRESSURE: 62 MMHG | BODY MASS INDEX: 24.85 KG/M2 | HEART RATE: 88 BPM | HEIGHT: 61 IN

## 2021-06-25 DIAGNOSIS — E03.9 HYPOTHYROIDISM, UNSPECIFIED TYPE: ICD-10-CM

## 2021-06-25 DIAGNOSIS — Z00.00 ANNUAL PHYSICAL EXAM: Primary | ICD-10-CM

## 2021-06-25 DIAGNOSIS — K21.9 GASTROESOPHAGEAL REFLUX DISEASE, UNSPECIFIED WHETHER ESOPHAGITIS PRESENT: ICD-10-CM

## 2021-06-25 DIAGNOSIS — M79.7 FIBROMYALGIA: ICD-10-CM

## 2021-06-25 PROCEDURE — 3008F BODY MASS INDEX DOCD: CPT | Mod: CPTII,S$GLB,, | Performed by: FAMILY MEDICINE

## 2021-06-25 PROCEDURE — 1126F PR PAIN SEVERITY QUANTIFIED, NO PAIN PRESENT: ICD-10-PCS | Mod: S$GLB,,, | Performed by: FAMILY MEDICINE

## 2021-06-25 PROCEDURE — 1126F AMNT PAIN NOTED NONE PRSNT: CPT | Mod: S$GLB,,, | Performed by: FAMILY MEDICINE

## 2021-06-25 PROCEDURE — 99999 PR PBB SHADOW E&M-EST. PATIENT-LVL V: CPT | Mod: PBBFAC,,, | Performed by: FAMILY MEDICINE

## 2021-06-25 PROCEDURE — 99213 OFFICE O/P EST LOW 20 MIN: CPT | Mod: S$GLB,,, | Performed by: FAMILY MEDICINE

## 2021-06-25 PROCEDURE — 99213 PR OFFICE/OUTPT VISIT, EST, LEVL III, 20-29 MIN: ICD-10-PCS | Mod: S$GLB,,, | Performed by: FAMILY MEDICINE

## 2021-06-25 PROCEDURE — 3008F PR BODY MASS INDEX (BMI) DOCUMENTED: ICD-10-PCS | Mod: CPTII,S$GLB,, | Performed by: FAMILY MEDICINE

## 2021-06-25 PROCEDURE — 99999 PR PBB SHADOW E&M-EST. PATIENT-LVL V: ICD-10-PCS | Mod: PBBFAC,,, | Performed by: FAMILY MEDICINE

## 2021-06-25 RX ORDER — DULOXETIN HYDROCHLORIDE 30 MG/1
30 CAPSULE, DELAYED RELEASE ORAL DAILY
Qty: 90 CAPSULE | Refills: 3 | Status: SHIPPED | OUTPATIENT
Start: 2021-06-25 | End: 2021-08-12

## 2021-06-25 RX ORDER — ESTRADIOL 0.1 MG/G
1 CREAM VAGINAL DAILY
Qty: 42.5 G | Refills: 6 | Status: SHIPPED | OUTPATIENT
Start: 2021-06-25 | End: 2022-10-03

## 2021-06-25 RX ORDER — PANTOPRAZOLE SODIUM 40 MG/1
40 TABLET, DELAYED RELEASE ORAL DAILY
Qty: 90 TABLET | Refills: 1 | Status: SHIPPED | OUTPATIENT
Start: 2021-06-25 | End: 2021-12-28

## 2021-07-06 ENCOUNTER — OFFICE VISIT (OUTPATIENT)
Dept: OPTOMETRY | Facility: CLINIC | Age: 51
End: 2021-07-06
Attending: OPTOMETRIST
Payer: MEDICARE

## 2021-07-06 ENCOUNTER — CLINICAL SUPPORT (OUTPATIENT)
Dept: OPHTHALMOLOGY | Facility: CLINIC | Age: 51
End: 2021-07-06
Attending: OPTOMETRIST
Payer: MEDICARE

## 2021-07-06 DIAGNOSIS — Z79.899 LONG-TERM USE OF PLAQUENIL: ICD-10-CM

## 2021-07-06 DIAGNOSIS — L93.1 SUBACUTE CUTANEOUS LUPUS ERYTHEMATOSUS: ICD-10-CM

## 2021-07-06 DIAGNOSIS — L93.1 SUBACUTE CUTANEOUS LUPUS ERYTHEMATOSUS: Primary | ICD-10-CM

## 2021-07-06 DIAGNOSIS — H52.7 REFRACTIVE ERROR: ICD-10-CM

## 2021-07-06 PROCEDURE — 1126F PR PAIN SEVERITY QUANTIFIED, NO PAIN PRESENT: ICD-10-PCS | Mod: S$GLB,,, | Performed by: OPTOMETRIST

## 2021-07-06 PROCEDURE — 92014 COMPRE OPH EXAM EST PT 1/>: CPT | Mod: S$GLB,,, | Performed by: OPTOMETRIST

## 2021-07-06 PROCEDURE — 92015 DETERMINE REFRACTIVE STATE: CPT | Mod: S$GLB,,, | Performed by: OPTOMETRIST

## 2021-07-06 PROCEDURE — 1126F AMNT PAIN NOTED NONE PRSNT: CPT | Mod: S$GLB,,, | Performed by: OPTOMETRIST

## 2021-07-06 PROCEDURE — 99999 PR PBB SHADOW E&M-EST. PATIENT-LVL III: ICD-10-PCS | Mod: PBBFAC,,, | Performed by: OPTOMETRIST

## 2021-07-06 PROCEDURE — 92014 PR EYE EXAM, EST PATIENT,COMPREHESV: ICD-10-PCS | Mod: S$GLB,,, | Performed by: OPTOMETRIST

## 2021-07-06 PROCEDURE — 99999 PR PBB SHADOW E&M-EST. PATIENT-LVL III: CPT | Mod: PBBFAC,,, | Performed by: OPTOMETRIST

## 2021-07-06 PROCEDURE — 92015 PR REFRACTION: ICD-10-PCS | Mod: S$GLB,,, | Performed by: OPTOMETRIST

## 2021-07-08 ENCOUNTER — OFFICE VISIT (OUTPATIENT)
Dept: RHEUMATOLOGY | Facility: CLINIC | Age: 51
End: 2021-07-08
Payer: MEDICARE

## 2021-07-08 ENCOUNTER — TELEPHONE (OUTPATIENT)
Dept: RHEUMATOLOGY | Facility: CLINIC | Age: 51
End: 2021-07-08

## 2021-07-08 DIAGNOSIS — M25.50 ARTHRALGIA, UNSPECIFIED JOINT: ICD-10-CM

## 2021-07-08 DIAGNOSIS — Z72.0 TOBACCO ABUSE: ICD-10-CM

## 2021-07-08 DIAGNOSIS — L93.1 SUBACUTE CUTANEOUS LUPUS ERYTHEMATOSUS: ICD-10-CM

## 2021-07-08 DIAGNOSIS — M79.7 FIBROMYALGIA: ICD-10-CM

## 2021-07-08 DIAGNOSIS — Z79.899 LONG-TERM USE OF PLAQUENIL: ICD-10-CM

## 2021-07-08 DIAGNOSIS — R21 RASH: ICD-10-CM

## 2021-07-08 DIAGNOSIS — Z92.25 HISTORY OF IMMUNOSUPPRESSION THERAPY: ICD-10-CM

## 2021-07-08 DIAGNOSIS — M79.89 PAIN AND SWELLING OF TOE OF RIGHT FOOT: Primary | ICD-10-CM

## 2021-07-08 DIAGNOSIS — M79.674 PAIN AND SWELLING OF TOE OF RIGHT FOOT: Primary | ICD-10-CM

## 2021-07-08 PROCEDURE — 99214 OFFICE O/P EST MOD 30 MIN: CPT | Mod: 95,,, | Performed by: STUDENT IN AN ORGANIZED HEALTH CARE EDUCATION/TRAINING PROGRAM

## 2021-07-08 PROCEDURE — 99214 PR OFFICE/OUTPT VISIT, EST, LEVL IV, 30-39 MIN: ICD-10-PCS | Mod: 95,,, | Performed by: STUDENT IN AN ORGANIZED HEALTH CARE EDUCATION/TRAINING PROGRAM

## 2021-07-14 ENCOUNTER — HOSPITAL ENCOUNTER (OUTPATIENT)
Dept: RADIOLOGY | Facility: HOSPITAL | Age: 51
Discharge: HOME | End: 2021-07-14
Attending: STUDENT IN AN ORGANIZED HEALTH CARE EDUCATION/TRAINING PROGRAM
Payer: MEDICARE

## 2021-07-14 DIAGNOSIS — M79.89 PAIN AND SWELLING OF TOE OF RIGHT FOOT: ICD-10-CM

## 2021-07-14 DIAGNOSIS — Z92.25 HISTORY OF IMMUNOSUPPRESSION THERAPY: ICD-10-CM

## 2021-07-14 DIAGNOSIS — M25.50 ARTHRALGIA, UNSPECIFIED JOINT: ICD-10-CM

## 2021-07-14 DIAGNOSIS — Z72.0 TOBACCO ABUSE: ICD-10-CM

## 2021-07-14 DIAGNOSIS — M79.7 FIBROMYALGIA: ICD-10-CM

## 2021-07-14 DIAGNOSIS — G89.4 CHRONIC PAIN SYNDROME: ICD-10-CM

## 2021-07-14 DIAGNOSIS — M79.674 PAIN AND SWELLING OF TOE OF RIGHT FOOT: ICD-10-CM

## 2021-07-14 DIAGNOSIS — L93.1 SUBACUTE CUTANEOUS LUPUS ERYTHEMATOSUS: ICD-10-CM

## 2021-07-14 PROCEDURE — 73630 X-RAY EXAM OF FOOT: CPT | Mod: TC,FY,PO,RT

## 2021-07-14 PROCEDURE — 73630 XR FOOT COMPLETE 3 VIEW RIGHT: ICD-10-PCS | Mod: 26,RT,, | Performed by: RADIOLOGY

## 2021-07-14 PROCEDURE — 73630 X-RAY EXAM OF FOOT: CPT | Mod: 26,RT,, | Performed by: RADIOLOGY

## 2021-07-14 RX ORDER — HYDROCODONE BITARTRATE AND ACETAMINOPHEN 7.5; 325 MG/1; MG/1
1 TABLET ORAL
Qty: 30 TABLET | Refills: 0 | Status: SHIPPED | OUTPATIENT
Start: 2021-07-14 | End: 2021-09-10 | Stop reason: SDUPTHER

## 2021-07-29 ENCOUNTER — OFFICE VISIT (OUTPATIENT)
Dept: PULMONOLOGY | Facility: CLINIC | Age: 51
End: 2021-07-29
Payer: MEDICARE

## 2021-07-29 VITALS
HEIGHT: 61 IN | OXYGEN SATURATION: 97 % | SYSTOLIC BLOOD PRESSURE: 137 MMHG | BODY MASS INDEX: 25.39 KG/M2 | DIASTOLIC BLOOD PRESSURE: 73 MMHG | HEART RATE: 98 BPM | TEMPERATURE: 99 F | WEIGHT: 134.5 LBS

## 2021-07-29 DIAGNOSIS — R00.2 PALPITATIONS: ICD-10-CM

## 2021-07-29 DIAGNOSIS — J45.30 MILD PERSISTENT ASTHMA WITHOUT COMPLICATION: Primary | ICD-10-CM

## 2021-07-29 DIAGNOSIS — G47.33 OSA ON CPAP: ICD-10-CM

## 2021-07-29 DIAGNOSIS — J30.81 CHRONIC ALLERGIC RHINITIS DUE TO ANIMAL HAIR AND DANDER: Chronic | ICD-10-CM

## 2021-07-29 PROCEDURE — 99214 PR OFFICE/OUTPT VISIT, EST, LEVL IV, 30-39 MIN: ICD-10-PCS | Mod: S$GLB,,, | Performed by: NURSE PRACTITIONER

## 2021-07-29 PROCEDURE — 1159F MED LIST DOCD IN RCRD: CPT | Mod: CPTII,S$GLB,, | Performed by: NURSE PRACTITIONER

## 2021-07-29 PROCEDURE — 99499 RISK ADDL DX/OHS AUDIT: ICD-10-PCS | Mod: S$GLB,,, | Performed by: NURSE PRACTITIONER

## 2021-07-29 PROCEDURE — 99999 PR PBB SHADOW E&M-EST. PATIENT-LVL IV: ICD-10-PCS | Mod: PBBFAC,,, | Performed by: NURSE PRACTITIONER

## 2021-07-29 PROCEDURE — 1126F PR PAIN SEVERITY QUANTIFIED, NO PAIN PRESENT: ICD-10-PCS | Mod: CPTII,S$GLB,, | Performed by: NURSE PRACTITIONER

## 2021-07-29 PROCEDURE — 1159F PR MEDICATION LIST DOCUMENTED IN MEDICAL RECORD: ICD-10-PCS | Mod: CPTII,S$GLB,, | Performed by: NURSE PRACTITIONER

## 2021-07-29 PROCEDURE — 3008F PR BODY MASS INDEX (BMI) DOCUMENTED: ICD-10-PCS | Mod: CPTII,S$GLB,, | Performed by: NURSE PRACTITIONER

## 2021-07-29 PROCEDURE — 1126F AMNT PAIN NOTED NONE PRSNT: CPT | Mod: CPTII,S$GLB,, | Performed by: NURSE PRACTITIONER

## 2021-07-29 PROCEDURE — 3078F DIAST BP <80 MM HG: CPT | Mod: CPTII,S$GLB,, | Performed by: NURSE PRACTITIONER

## 2021-07-29 PROCEDURE — 3078F PR MOST RECENT DIASTOLIC BLOOD PRESSURE < 80 MM HG: ICD-10-PCS | Mod: CPTII,S$GLB,, | Performed by: NURSE PRACTITIONER

## 2021-07-29 PROCEDURE — 3008F BODY MASS INDEX DOCD: CPT | Mod: CPTII,S$GLB,, | Performed by: NURSE PRACTITIONER

## 2021-07-29 PROCEDURE — 99214 OFFICE O/P EST MOD 30 MIN: CPT | Mod: S$GLB,,, | Performed by: NURSE PRACTITIONER

## 2021-07-29 PROCEDURE — 99499 UNLISTED E&M SERVICE: CPT | Mod: S$GLB,,, | Performed by: NURSE PRACTITIONER

## 2021-07-29 PROCEDURE — 1160F PR REVIEW ALL MEDS BY PRESCRIBER/CLIN PHARMACIST DOCUMENTED: ICD-10-PCS | Mod: CPTII,S$GLB,, | Performed by: NURSE PRACTITIONER

## 2021-07-29 PROCEDURE — 3075F PR MOST RECENT SYSTOLIC BLOOD PRESS GE 130-139MM HG: ICD-10-PCS | Mod: CPTII,S$GLB,, | Performed by: NURSE PRACTITIONER

## 2021-07-29 PROCEDURE — 3075F SYST BP GE 130 - 139MM HG: CPT | Mod: CPTII,S$GLB,, | Performed by: NURSE PRACTITIONER

## 2021-07-29 PROCEDURE — 99999 PR PBB SHADOW E&M-EST. PATIENT-LVL IV: CPT | Mod: PBBFAC,,, | Performed by: NURSE PRACTITIONER

## 2021-07-29 PROCEDURE — 1160F RVW MEDS BY RX/DR IN RCRD: CPT | Mod: CPTII,S$GLB,, | Performed by: NURSE PRACTITIONER

## 2021-07-29 RX ORDER — ALBUTEROL SULFATE 90 UG/1
AEROSOL, METERED RESPIRATORY (INHALATION)
Qty: 18 G | Refills: 0 | Status: SHIPPED | OUTPATIENT
Start: 2021-07-29 | End: 2021-08-05

## 2021-07-29 RX ORDER — BUDESONIDE AND FORMOTEROL FUMARATE DIHYDRATE 160; 4.5 UG/1; UG/1
2 AEROSOL RESPIRATORY (INHALATION) EVERY 12 HOURS
Qty: 1 INHALER | Refills: 11 | Status: SHIPPED | OUTPATIENT
Start: 2021-07-29 | End: 2024-02-02

## 2021-07-29 RX ORDER — AZELASTINE 1 MG/ML
2 SPRAY, METERED NASAL 2 TIMES DAILY
Qty: 30 ML | Refills: 11 | Status: SHIPPED | OUTPATIENT
Start: 2021-07-29 | End: 2022-08-19

## 2021-08-02 ENCOUNTER — HOSPITAL ENCOUNTER (OUTPATIENT)
Dept: CARDIOLOGY | Facility: HOSPITAL | Age: 51
Discharge: HOME | End: 2021-08-02
Attending: NURSE PRACTITIONER
Payer: MEDICARE

## 2021-08-02 DIAGNOSIS — R00.2 PALPITATIONS: ICD-10-CM

## 2021-08-02 PROCEDURE — 93227 XTRNL ECG REC<48 HR R&I: CPT | Mod: ,,, | Performed by: INTERNAL MEDICINE

## 2021-08-02 PROCEDURE — 93227 HOLTER MONITOR - 48 HOUR (CUPID ONLY): ICD-10-PCS | Mod: ,,, | Performed by: INTERNAL MEDICINE

## 2021-08-02 PROCEDURE — 93225 XTRNL ECG REC<48 HRS REC: CPT

## 2021-08-05 ENCOUNTER — TELEPHONE (OUTPATIENT)
Dept: PULMONOLOGY | Facility: CLINIC | Age: 51
End: 2021-08-05

## 2021-08-05 LAB
OHS CV EVENT MONITOR DAY: 0
OHS CV HOLTER LENGTH DECIMAL HOURS: 47.98
OHS CV HOLTER LENGTH HOURS: 47
OHS CV HOLTER LENGTH MINUTES: 59
OHS CV HOLTER SINUS AVERAGE HR: 75
OHS CV HOLTER SINUS MAX HR HOUR MINUTES: NORMAL
OHS CV HOLTER SINUS MAX HR: 128
OHS CV HOLTER SINUS MIN HR HOUR MINUTE: NORMAL
OHS CV HOLTER SINUS MIN HR: 49

## 2021-08-05 RX ORDER — ALBUTEROL SULFATE 90 UG/1
2 AEROSOL, METERED RESPIRATORY (INHALATION) EVERY 6 HOURS PRN
Qty: 18 G | Refills: 1 | Status: SHIPPED | OUTPATIENT
Start: 2021-08-05 | End: 2021-09-27

## 2021-08-11 ENCOUNTER — PATIENT MESSAGE (OUTPATIENT)
Dept: FAMILY MEDICINE | Facility: CLINIC | Age: 51
End: 2021-08-11

## 2021-08-11 DIAGNOSIS — M79.7 FIBROMYALGIA: Primary | ICD-10-CM

## 2021-08-12 RX ORDER — DULOXETIN HYDROCHLORIDE 60 MG/1
60 CAPSULE, DELAYED RELEASE ORAL DAILY
Qty: 90 CAPSULE | Refills: 1 | Status: SHIPPED | OUTPATIENT
Start: 2021-08-12 | End: 2022-02-04

## 2021-08-13 ENCOUNTER — PATIENT MESSAGE (OUTPATIENT)
Dept: RHEUMATOLOGY | Facility: CLINIC | Age: 51
End: 2021-08-13

## 2021-08-17 ENCOUNTER — IMMUNIZATION (OUTPATIENT)
Dept: OBSTETRICS AND GYNECOLOGY | Facility: CLINIC | Age: 51
End: 2021-08-17
Payer: MEDICARE

## 2021-08-17 DIAGNOSIS — Z23 NEED FOR VACCINATION: Primary | ICD-10-CM

## 2021-08-17 PROCEDURE — 0003A COVID-19, MRNA, LNP-S, PF, 30 MCG/0.3 ML DOSE VACCINE: ICD-10-PCS | Mod: CV19,,, | Performed by: FAMILY MEDICINE

## 2021-08-17 PROCEDURE — 91300 COVID-19, MRNA, LNP-S, PF, 30 MCG/0.3 ML DOSE VACCINE: ICD-10-PCS | Mod: ,,, | Performed by: FAMILY MEDICINE

## 2021-08-17 PROCEDURE — 0003A COVID-19, MRNA, LNP-S, PF, 30 MCG/0.3 ML DOSE VACCINE: CPT | Mod: CV19,,, | Performed by: FAMILY MEDICINE

## 2021-08-17 PROCEDURE — 91300 COVID-19, MRNA, LNP-S, PF, 30 MCG/0.3 ML DOSE VACCINE: CPT | Mod: ,,, | Performed by: FAMILY MEDICINE

## 2021-09-10 ENCOUNTER — PATIENT MESSAGE (OUTPATIENT)
Dept: FAMILY MEDICINE | Facility: CLINIC | Age: 51
End: 2021-09-10

## 2021-09-10 DIAGNOSIS — M25.50 ARTHRALGIA, UNSPECIFIED JOINT: ICD-10-CM

## 2021-09-10 DIAGNOSIS — M79.7 FIBROMYALGIA: ICD-10-CM

## 2021-09-10 DIAGNOSIS — Z72.0 TOBACCO ABUSE: ICD-10-CM

## 2021-09-10 DIAGNOSIS — Z92.25 HISTORY OF IMMUNOSUPPRESSION THERAPY: ICD-10-CM

## 2021-09-10 DIAGNOSIS — L93.1 SUBACUTE CUTANEOUS LUPUS ERYTHEMATOSUS: ICD-10-CM

## 2021-09-10 DIAGNOSIS — G89.4 CHRONIC PAIN SYNDROME: ICD-10-CM

## 2021-09-10 RX ORDER — NAPROXEN 500 MG/1
500 TABLET ORAL 2 TIMES DAILY
Qty: 60 TABLET | Refills: 0 | Status: SHIPPED | OUTPATIENT
Start: 2021-09-10 | End: 2024-02-02

## 2021-09-10 RX ORDER — CYCLOBENZAPRINE HCL 5 MG
TABLET ORAL
Qty: 90 TABLET | Refills: 0 | Status: SHIPPED | OUTPATIENT
Start: 2021-09-10 | End: 2021-10-27

## 2021-09-11 ENCOUNTER — PATIENT MESSAGE (OUTPATIENT)
Dept: RHEUMATOLOGY | Facility: CLINIC | Age: 51
End: 2021-09-11

## 2021-09-13 RX ORDER — HYDROCODONE BITARTRATE AND ACETAMINOPHEN 7.5; 325 MG/1; MG/1
1 TABLET ORAL
Qty: 30 TABLET | Refills: 0 | Status: SHIPPED | OUTPATIENT
Start: 2021-09-13 | End: 2021-11-05 | Stop reason: SDUPTHER

## 2021-09-13 RX ORDER — ALBUTEROL SULFATE 0.83 MG/ML
2.5 SOLUTION RESPIRATORY (INHALATION) EVERY 6 HOURS PRN
Qty: 150 ML | Refills: 1 | Status: SHIPPED | OUTPATIENT
Start: 2021-09-13 | End: 2022-02-25

## 2021-09-13 RX ORDER — ALBUTEROL SULFATE 0.83 MG/ML
SOLUTION RESPIRATORY (INHALATION)
Qty: 150 ML | Refills: 0 | OUTPATIENT
Start: 2021-09-13

## 2021-11-05 DIAGNOSIS — G89.4 CHRONIC PAIN SYNDROME: ICD-10-CM

## 2021-11-05 DIAGNOSIS — Z72.0 TOBACCO ABUSE: ICD-10-CM

## 2021-11-05 DIAGNOSIS — L93.1 SUBACUTE CUTANEOUS LUPUS ERYTHEMATOSUS: ICD-10-CM

## 2021-11-05 DIAGNOSIS — M79.7 FIBROMYALGIA: ICD-10-CM

## 2021-11-05 DIAGNOSIS — Z92.25 HISTORY OF IMMUNOSUPPRESSION THERAPY: ICD-10-CM

## 2021-11-05 DIAGNOSIS — M25.50 ARTHRALGIA, UNSPECIFIED JOINT: ICD-10-CM

## 2021-11-08 RX ORDER — HYDROCODONE BITARTRATE AND ACETAMINOPHEN 7.5; 325 MG/1; MG/1
1 TABLET ORAL
Qty: 30 TABLET | Refills: 0 | Status: SHIPPED | OUTPATIENT
Start: 2021-11-08 | End: 2022-01-18 | Stop reason: SDUPTHER

## 2021-12-28 ENCOUNTER — TELEPHONE (OUTPATIENT)
Dept: FAMILY MEDICINE | Facility: CLINIC | Age: 51
End: 2021-12-28
Payer: MEDICARE

## 2021-12-29 ENCOUNTER — PATIENT OUTREACH (OUTPATIENT)
Dept: ADMINISTRATIVE | Facility: OTHER | Age: 51
End: 2021-12-29
Payer: MEDICARE

## 2021-12-29 ENCOUNTER — PATIENT MESSAGE (OUTPATIENT)
Dept: ADMINISTRATIVE | Facility: OTHER | Age: 51
End: 2021-12-29
Payer: MEDICARE

## 2021-12-29 DIAGNOSIS — Z12.31 ENCOUNTER FOR SCREENING MAMMOGRAM FOR MALIGNANT NEOPLASM OF BREAST: Primary | ICD-10-CM

## 2022-01-04 ENCOUNTER — OFFICE VISIT (OUTPATIENT)
Dept: RHEUMATOLOGY | Facility: CLINIC | Age: 52
End: 2022-01-04
Payer: MEDICARE

## 2022-01-04 ENCOUNTER — PATIENT MESSAGE (OUTPATIENT)
Dept: RHEUMATOLOGY | Facility: CLINIC | Age: 52
End: 2022-01-04

## 2022-01-04 DIAGNOSIS — L93.1 SUBACUTE CUTANEOUS LUPUS ERYTHEMATOSUS: ICD-10-CM

## 2022-01-04 DIAGNOSIS — Z79.891 ENCOUNTER FOR LONG-TERM OPIATE ANALGESIC USE: ICD-10-CM

## 2022-01-04 DIAGNOSIS — M79.7 FIBROMYALGIA: Primary | ICD-10-CM

## 2022-01-04 PROCEDURE — 1159F MED LIST DOCD IN RCRD: CPT | Mod: CPTII,95,, | Performed by: INTERNAL MEDICINE

## 2022-01-04 PROCEDURE — 99213 OFFICE O/P EST LOW 20 MIN: CPT | Mod: 95,,, | Performed by: INTERNAL MEDICINE

## 2022-01-04 PROCEDURE — 99213 PR OFFICE/OUTPT VISIT, EST, LEVL III, 20-29 MIN: ICD-10-PCS | Mod: 95,,, | Performed by: INTERNAL MEDICINE

## 2022-01-04 PROCEDURE — 1160F RVW MEDS BY RX/DR IN RCRD: CPT | Mod: CPTII,95,, | Performed by: INTERNAL MEDICINE

## 2022-01-04 PROCEDURE — 1159F PR MEDICATION LIST DOCUMENTED IN MEDICAL RECORD: ICD-10-PCS | Mod: CPTII,95,, | Performed by: INTERNAL MEDICINE

## 2022-01-04 PROCEDURE — 1160F PR REVIEW ALL MEDS BY PRESCRIBER/CLIN PHARMACIST DOCUMENTED: ICD-10-PCS | Mod: CPTII,95,, | Performed by: INTERNAL MEDICINE

## 2022-01-05 NOTE — PROGRESS NOTES
History of present illness:  51-year-old female I saw initially in 2005. She has lupus erythematosus with primarily skin involvement.  She has had joint aching but we felt it was more likely fibromyalgia.  She has not had joint swelling.  She does have positive anti phospholipid antibody but has no definite history of ischemic disease.  I saw her last in 2018. Since then she has been followed by Dr. Stewart.  She was last seen in July.  She was having problems with rash and had been evaluated by Dermatology.  She was on topical medication.    She states her rash comes and goes.  She remains on hydroxychloroquine but is taking 200 mg most days, she has posed to be on 400 mg. Her pain has been stable except for her back.  She has been taking Flexeril every night.  She remains on gabapentin and Cymbalta.  She was placed on Naprosyn for her back, which has been helping.  She does admit that she drags her foot on occasion but has had no joint swelling.    No fever, headache, rash, conjunctivitis, oral ulcers, dry eyes or mouth, Raynaud's phenomenon, pleurisy, chronic or bloody diarrhea, vaginal or urethral discharge or ulcer, numbness or tingling, blood clots or phlebitis.    Physical examination was not performed, the entire time was counseling.  Laboratory:  She has a positive anti DNA antibody on the past 2 lab draws, it did previously been negative.  She had normal sed rate and CRP.  She had normal complements.  She had normal CBC and CMP.  Urinalysis was unremarkable.    Assessment:  1. Chronic cutaneous lupus erythematosus.  Patients with skin involvement can have a positive anti DNA antibody  2. Chronic pain secondary to fibromyalgia    Plans:  1. I am making no change in her medication at this time.  2. I did tell her she could take both of the Plaquenil at the same time  3. Return to see me in 6 months, which will need to be a face-to-face visit.      Answers for HPI/ROS submitted by the patient on  1/3/2022  fever: No  eye redness: No  mouth sores: No  headaches: No  shortness of breath: Yes  chest pain: Yes  trouble swallowing: No  diarrhea: No  constipation: No  unexpected weight change: No  genital sore: No  dysuria: No  During the last 3 days, have you had a skin rash?: Yes  Bruises or bleeds easily: Yes  cough: Yes    ........The patient location is: home  The chief complaint leading to consultation is: lupus    Visit type: audiovisual    Face to Face time with patient: 10 min  30 minutes of total time spent on the encounter, which includes face to face time and non-face to face time preparing to see the patient (eg, review of tests), Obtaining and/or reviewing separately obtained history, Documenting clinical information in the electronic or other health record, Independently interpreting results (not separately reported) and communicating results to the patient/family/caregiver, or Care coordination (not separately reported).         Each patient to whom he or she provides medical services by telemedicine is:  (1) informed of the relationship between the physician and patient and the respective role of any other health care provider with respect to management of the patient; and (2) notified that he or she may decline to receive medical services by telemedicine and may withdraw from such care at any time.

## 2022-01-17 ENCOUNTER — PATIENT MESSAGE (OUTPATIENT)
Dept: RHEUMATOLOGY | Facility: CLINIC | Age: 52
End: 2022-01-17
Payer: MEDICARE

## 2022-01-17 DIAGNOSIS — M25.50 ARTHRALGIA, UNSPECIFIED JOINT: ICD-10-CM

## 2022-01-17 DIAGNOSIS — L93.1 SUBACUTE CUTANEOUS LUPUS ERYTHEMATOSUS: ICD-10-CM

## 2022-01-17 DIAGNOSIS — Z92.25 HISTORY OF IMMUNOSUPPRESSION THERAPY: ICD-10-CM

## 2022-01-17 DIAGNOSIS — G89.4 CHRONIC PAIN SYNDROME: ICD-10-CM

## 2022-01-17 DIAGNOSIS — M79.7 FIBROMYALGIA: ICD-10-CM

## 2022-01-17 DIAGNOSIS — Z72.0 TOBACCO ABUSE: ICD-10-CM

## 2022-01-18 DIAGNOSIS — M79.7 FIBROMYALGIA: ICD-10-CM

## 2022-01-18 DIAGNOSIS — Z92.25 HISTORY OF IMMUNOSUPPRESSION THERAPY: ICD-10-CM

## 2022-01-18 DIAGNOSIS — L93.1 SUBACUTE CUTANEOUS LUPUS ERYTHEMATOSUS: ICD-10-CM

## 2022-01-18 DIAGNOSIS — G89.4 CHRONIC PAIN SYNDROME: ICD-10-CM

## 2022-01-18 DIAGNOSIS — Z72.0 TOBACCO ABUSE: ICD-10-CM

## 2022-01-18 RX ORDER — GABAPENTIN 600 MG/1
600 TABLET ORAL DAILY
Qty: 90 TABLET | Refills: 6 | Status: SHIPPED | OUTPATIENT
Start: 2022-01-18 | End: 2023-01-18

## 2022-01-18 RX ORDER — HYDROCODONE BITARTRATE AND ACETAMINOPHEN 7.5; 325 MG/1; MG/1
1 TABLET ORAL
Qty: 30 TABLET | Refills: 0 | Status: SHIPPED | OUTPATIENT
Start: 2022-01-18 | End: 2022-03-15 | Stop reason: SDUPTHER

## 2022-01-18 RX ORDER — HYDROXYCHLOROQUINE SULFATE 200 MG/1
400 TABLET, FILM COATED ORAL DAILY
Qty: 60 TABLET | Refills: 6 | Status: SHIPPED | OUTPATIENT
Start: 2022-01-18 | End: 2022-10-31 | Stop reason: SDUPTHER

## 2022-01-19 ENCOUNTER — PATIENT MESSAGE (OUTPATIENT)
Dept: RHEUMATOLOGY | Facility: CLINIC | Age: 52
End: 2022-01-19
Payer: MEDICARE

## 2022-01-19 DIAGNOSIS — M25.50 ARTHRALGIA, UNSPECIFIED JOINT: ICD-10-CM

## 2022-01-19 DIAGNOSIS — Z72.0 TOBACCO ABUSE: ICD-10-CM

## 2022-01-19 DIAGNOSIS — Z92.25 HISTORY OF IMMUNOSUPPRESSION THERAPY: ICD-10-CM

## 2022-01-19 DIAGNOSIS — G89.4 CHRONIC PAIN SYNDROME: ICD-10-CM

## 2022-01-19 DIAGNOSIS — M79.7 FIBROMYALGIA: ICD-10-CM

## 2022-01-19 DIAGNOSIS — L93.1 SUBACUTE CUTANEOUS LUPUS ERYTHEMATOSUS: ICD-10-CM

## 2022-01-19 RX ORDER — HYDROCODONE BITARTRATE AND ACETAMINOPHEN 7.5; 325 MG/1; MG/1
1 TABLET ORAL
Qty: 30 TABLET | Refills: 0 | OUTPATIENT
Start: 2022-01-19 | End: 2022-02-18

## 2022-01-25 ENCOUNTER — TELEPHONE (OUTPATIENT)
Dept: FAMILY MEDICINE | Facility: CLINIC | Age: 52
End: 2022-01-25
Payer: MEDICARE

## 2022-02-09 ENCOUNTER — PATIENT MESSAGE (OUTPATIENT)
Dept: FAMILY MEDICINE | Facility: CLINIC | Age: 52
End: 2022-02-09
Payer: MEDICARE

## 2022-02-10 ENCOUNTER — HOSPITAL ENCOUNTER (OUTPATIENT)
Dept: RADIOLOGY | Facility: HOSPITAL | Age: 52
Discharge: HOME | End: 2022-02-10
Attending: INTERNAL MEDICINE
Payer: MEDICARE

## 2022-02-10 ENCOUNTER — OFFICE VISIT (OUTPATIENT)
Dept: FAMILY MEDICINE | Facility: CLINIC | Age: 52
End: 2022-02-10
Payer: MEDICARE

## 2022-02-10 VITALS
SYSTOLIC BLOOD PRESSURE: 124 MMHG | OXYGEN SATURATION: 99 % | TEMPERATURE: 98 F | WEIGHT: 136.44 LBS | BODY MASS INDEX: 25.76 KG/M2 | DIASTOLIC BLOOD PRESSURE: 76 MMHG | HEART RATE: 88 BPM | HEIGHT: 61 IN

## 2022-02-10 DIAGNOSIS — S39.012A LUMBOSACRAL STRAIN, INITIAL ENCOUNTER: ICD-10-CM

## 2022-02-10 DIAGNOSIS — M79.645 FINGER PAIN, LEFT: Primary | ICD-10-CM

## 2022-02-10 DIAGNOSIS — M79.645 FINGER PAIN, LEFT: ICD-10-CM

## 2022-02-10 PROCEDURE — 99999 PR PBB SHADOW E&M-EST. PATIENT-LVL V: CPT | Mod: PBBFAC,,, | Performed by: INTERNAL MEDICINE

## 2022-02-10 PROCEDURE — 99214 PR OFFICE/OUTPT VISIT, EST, LEVL IV, 30-39 MIN: ICD-10-PCS | Mod: S$GLB,,, | Performed by: INTERNAL MEDICINE

## 2022-02-10 PROCEDURE — 73140 X-RAY EXAM OF FINGER(S): CPT | Mod: TC,FY,PO,LT

## 2022-02-10 PROCEDURE — 3078F PR MOST RECENT DIASTOLIC BLOOD PRESSURE < 80 MM HG: ICD-10-PCS | Mod: CPTII,S$GLB,, | Performed by: INTERNAL MEDICINE

## 2022-02-10 PROCEDURE — 99999 PR PBB SHADOW E&M-EST. PATIENT-LVL V: ICD-10-PCS | Mod: PBBFAC,,, | Performed by: INTERNAL MEDICINE

## 2022-02-10 PROCEDURE — 3078F DIAST BP <80 MM HG: CPT | Mod: CPTII,S$GLB,, | Performed by: INTERNAL MEDICINE

## 2022-02-10 PROCEDURE — 3008F PR BODY MASS INDEX (BMI) DOCUMENTED: ICD-10-PCS | Mod: CPTII,S$GLB,, | Performed by: INTERNAL MEDICINE

## 2022-02-10 PROCEDURE — 73140 XR FINGER 2 OR MORE VIEWS LEFT: ICD-10-PCS | Mod: 26,LT,, | Performed by: RADIOLOGY

## 2022-02-10 PROCEDURE — 73140 X-RAY EXAM OF FINGER(S): CPT | Mod: 26,LT,, | Performed by: RADIOLOGY

## 2022-02-10 PROCEDURE — 3074F PR MOST RECENT SYSTOLIC BLOOD PRESSURE < 130 MM HG: ICD-10-PCS | Mod: CPTII,S$GLB,, | Performed by: INTERNAL MEDICINE

## 2022-02-10 PROCEDURE — 3074F SYST BP LT 130 MM HG: CPT | Mod: CPTII,S$GLB,, | Performed by: INTERNAL MEDICINE

## 2022-02-10 PROCEDURE — 99214 OFFICE O/P EST MOD 30 MIN: CPT | Mod: S$GLB,,, | Performed by: INTERNAL MEDICINE

## 2022-02-10 PROCEDURE — 1159F MED LIST DOCD IN RCRD: CPT | Mod: CPTII,S$GLB,, | Performed by: INTERNAL MEDICINE

## 2022-02-10 PROCEDURE — 3008F BODY MASS INDEX DOCD: CPT | Mod: CPTII,S$GLB,, | Performed by: INTERNAL MEDICINE

## 2022-02-10 PROCEDURE — 1159F PR MEDICATION LIST DOCUMENTED IN MEDICAL RECORD: ICD-10-PCS | Mod: CPTII,S$GLB,, | Performed by: INTERNAL MEDICINE

## 2022-02-10 NOTE — PROGRESS NOTES
Chief complaint:  Finger pain    Patient is a 51-year-old white female new to me.  She is left-handed.  She reports pain on the outer aspect of the left index finger between the MCP and the PIP.  No injury that she can remember.  She does not remember using any objects or anything which would have potentially cause a foreign body.  She has had no swelling or redness.  Only hurts when she touches that area.  She has complete range of motion of the finger without pain.  No trigger finger type symptoms.  No numbness or tingling.    She also fell down some stairs about two days ago.  She has some pain in the left flank area.  It is worse after sitting.  Moderate severity.        Review of systems:  No fevers or chills, no other myalgias arthralgias at her new          1/2022  Ad Spaulding    History of present illness:  51-year-old female I saw initially in 2005. She has lupus erythematosus with primarily skin involvement.  She has had joint aching but we felt it was more likely fibromyalgia.  She has not had joint swelling.  She does have positive anti phospholipid antibody but has no definite history of ischemic disease.  I saw her last in 2018. Since then she has been followed by Dr. Stewart.  She was last seen in July.  She was having problems with rash and had been evaluated by Dermatology.  She was on topical medication.     She states her rash comes and goes.  She remains on hydroxychloroquine but is taking 200 mg most days, she has posed to be on 400 mg. Her pain has been stable except for her back.  She has been taking Flexeril every night.  She remains on gabapentin and Cymbalta.  She was placed on Naprosyn for her back, which has been helping.  She does admit that she drags her foot on occasion but has had no joint swelling.     No fever, headache, rash, conjunctivitis, oral ulcers, dry eyes or mouth, Raynaud's phenomenon, pleurisy, chronic or bloody diarrhea, vaginal or urethral discharge or ulcer,  numbness or tingling, blood clots or phlebitis.     Physical examination was not performed, the entire time was counseling.  Laboratory:  She has a positive anti DNA antibody on the past 2 lab draws, it did previously been negative.  She had normal sed rate and CRP.  She had normal complements.  She had normal CBC and CMP.  Urinalysis was unremarkable.     Assessment:  1. Chronic cutaneous lupus erythematosus.  Patients with skin involvement can have a positive anti DNA antibody  2. Chronic pain secondary to fibromyalgia        Past Medical History:   Diagnosis Date    Chronic allergic conjunctivitis     Chronic allergic rhinitis due to animal hair and dander     COPD (chronic obstructive pulmonary disease)     Fibromyalgia     Hypothyroidism     Immune disorder     Long-term use of Plaquenil 5/7/2019    Lupus     Mild persistent asthma without complication 11/12/2019    Pt with reports daily cough, dyspnea, freqent rescue inhaler use. Recommend maintenance therapy  Treat triggers-sinus, gerd, smoking cessation.  Normal pulmonary studies.     CHEPE (obstructive sleep apnea) 11/12/2019    CHEPE on CPAP 11/12/2019    Patient with symptoms of snoring, witnessed apnea, excessive daytime sleepiness, fatigue and difficulty staying asleep. Dx with CHEPE in 2015 but difficulty tolerating cpap at time due to sinus problems.  HST 1/22/2020: AHI 7 RDI 15      Palpitations 11/12/2019    May be related to breathing issues    Recurrent urticaria 1/6/2017    S/P laparoscopic cholecystectomy 8/4/2017    Subacute cutaneous lupus erythematosus          Vitals as above  Musculoskeletal:  Examination of the left and right hands reveals no defects or asymmetry.  Does not appear to be any significant arthritis soft tissue swelling.  She has full range of motion of all fingers and wrists without defects, all joints stable in strength 5/5.  The left outer aspect of the index finger between the MCP and the PIP may have a little bit  of soft tissue swelling compared to the other finger but no redness or warmth.  I can actually squeezes soft tissues without pain but when the area is palpated against the middle of the finger bone, does reproduce some fairly intense pain.  Skin no rashes, warm to touch.  Resisted strength testing of the flexor and extensor tendons is nontender.  Wiggling the joints and the bones are nontender.  Back:  Spine is midline and straight and nontender.  The lateral aspect of the right mid Pap upper paraspinal muscle in the lumbar region is tender.  I can squeeze the ribs from side to side without any tenderness.  She has good flexion and extension without pain.    Emily was seen today for finger pain.    Diagnoses and all orders for this visit:    Finger pain, left, no known injury, given the location and the slight soft tissue swelling and tenderness, she could have had an unrecognized foreign body.  Reassured patient is unlikely to be anything related to the tendons of the joints and without any injury or reproducible tenderness to the bone I do not think it is a unrecognized fracture in any way.  X-ray will obviously rule those issues out.  Otherwise she could about some unrecognized slight injury and a ruptured blood vessel with a hematoma that is tender when pressing against the bone and that would be self limited and she would need to avoid re-injury and allow that to heal.  -     X-Ray Finger 2 or More Views Left; Future    Lumbosacral strain, initial encounter, pathophysiology discussed in that she needs to treat the spasm with frequent heat and avoid re-injury but the condition essentially needs to heal on its own and is clearly a muscular strain.

## 2022-02-18 ENCOUNTER — PES CALL (OUTPATIENT)
Dept: ADMINISTRATIVE | Facility: CLINIC | Age: 52
End: 2022-02-18
Payer: MEDICARE

## 2022-02-23 DIAGNOSIS — D84.9 IMMUNOSUPPRESSED STATUS: ICD-10-CM

## 2022-03-15 ENCOUNTER — PATIENT MESSAGE (OUTPATIENT)
Dept: RHEUMATOLOGY | Facility: CLINIC | Age: 52
End: 2022-03-15
Payer: MEDICARE

## 2022-03-15 DIAGNOSIS — Z72.0 TOBACCO ABUSE: ICD-10-CM

## 2022-03-15 DIAGNOSIS — M25.50 ARTHRALGIA, UNSPECIFIED JOINT: ICD-10-CM

## 2022-03-15 DIAGNOSIS — Z92.25 HISTORY OF IMMUNOSUPPRESSION THERAPY: ICD-10-CM

## 2022-03-15 DIAGNOSIS — L93.1 SUBACUTE CUTANEOUS LUPUS ERYTHEMATOSUS: ICD-10-CM

## 2022-03-15 DIAGNOSIS — G89.4 CHRONIC PAIN SYNDROME: ICD-10-CM

## 2022-03-15 DIAGNOSIS — M79.7 FIBROMYALGIA: ICD-10-CM

## 2022-03-15 RX ORDER — HYDROCODONE BITARTRATE AND ACETAMINOPHEN 7.5; 325 MG/1; MG/1
1 TABLET ORAL
Qty: 30 TABLET | Refills: 0 | Status: SHIPPED | OUTPATIENT
Start: 2022-03-15 | End: 2022-04-19 | Stop reason: SDUPTHER

## 2022-03-22 ENCOUNTER — PATIENT MESSAGE (OUTPATIENT)
Dept: FAMILY MEDICINE | Facility: CLINIC | Age: 52
End: 2022-03-22
Payer: MEDICARE

## 2022-03-22 DIAGNOSIS — K21.9 GASTROESOPHAGEAL REFLUX DISEASE, UNSPECIFIED WHETHER ESOPHAGITIS PRESENT: ICD-10-CM

## 2022-03-22 RX ORDER — PANTOPRAZOLE SODIUM 40 MG/1
40 TABLET, DELAYED RELEASE ORAL DAILY
Qty: 90 TABLET | Refills: 0 | Status: SHIPPED | OUTPATIENT
Start: 2022-03-22 | End: 2022-09-13

## 2022-03-22 NOTE — TELEPHONE ENCOUNTER
No new care gaps identified.  Powered by [employer]. Reference number: [id number].   3/22/2022 10:49:36 AM CDT

## 2022-04-05 ENCOUNTER — OFFICE VISIT (OUTPATIENT)
Dept: FAMILY MEDICINE | Facility: CLINIC | Age: 52
End: 2022-04-05
Payer: MEDICARE

## 2022-04-05 VITALS
HEART RATE: 99 BPM | SYSTOLIC BLOOD PRESSURE: 122 MMHG | DIASTOLIC BLOOD PRESSURE: 86 MMHG | OXYGEN SATURATION: 99 % | TEMPERATURE: 98 F | BODY MASS INDEX: 25.51 KG/M2 | HEIGHT: 61 IN | WEIGHT: 135.13 LBS

## 2022-04-05 DIAGNOSIS — J18.9 COMMUNITY ACQUIRED PNEUMONIA OF RIGHT UPPER LOBE OF LUNG: Primary | ICD-10-CM

## 2022-04-05 DIAGNOSIS — R11.2 INTRACTABLE VOMITING WITH NAUSEA, UNSPECIFIED VOMITING TYPE: ICD-10-CM

## 2022-04-05 DIAGNOSIS — J45.41 MODERATE PERSISTENT ASTHMA WITH EXACERBATION: ICD-10-CM

## 2022-04-05 PROCEDURE — 99999 PR PBB SHADOW E&M-EST. PATIENT-LVL V: ICD-10-PCS | Mod: PBBFAC,,, | Performed by: FAMILY MEDICINE

## 2022-04-05 PROCEDURE — 99214 OFFICE O/P EST MOD 30 MIN: CPT | Mod: S$GLB,,, | Performed by: FAMILY MEDICINE

## 2022-04-05 PROCEDURE — 99499 RISK ADDL DX/OHS AUDIT: ICD-10-PCS | Mod: S$GLB,,, | Performed by: FAMILY MEDICINE

## 2022-04-05 PROCEDURE — 3074F SYST BP LT 130 MM HG: CPT | Mod: CPTII,S$GLB,, | Performed by: FAMILY MEDICINE

## 2022-04-05 PROCEDURE — 3079F DIAST BP 80-89 MM HG: CPT | Mod: CPTII,S$GLB,, | Performed by: FAMILY MEDICINE

## 2022-04-05 PROCEDURE — 3079F PR MOST RECENT DIASTOLIC BLOOD PRESSURE 80-89 MM HG: ICD-10-PCS | Mod: CPTII,S$GLB,, | Performed by: FAMILY MEDICINE

## 2022-04-05 PROCEDURE — 3008F BODY MASS INDEX DOCD: CPT | Mod: CPTII,S$GLB,, | Performed by: FAMILY MEDICINE

## 2022-04-05 PROCEDURE — 99499 UNLISTED E&M SERVICE: CPT | Mod: S$GLB,,, | Performed by: FAMILY MEDICINE

## 2022-04-05 PROCEDURE — 1159F PR MEDICATION LIST DOCUMENTED IN MEDICAL RECORD: ICD-10-PCS | Mod: CPTII,S$GLB,, | Performed by: FAMILY MEDICINE

## 2022-04-05 PROCEDURE — 99999 PR PBB SHADOW E&M-EST. PATIENT-LVL V: CPT | Mod: PBBFAC,,, | Performed by: FAMILY MEDICINE

## 2022-04-05 PROCEDURE — 1160F RVW MEDS BY RX/DR IN RCRD: CPT | Mod: CPTII,S$GLB,, | Performed by: FAMILY MEDICINE

## 2022-04-05 PROCEDURE — 3074F PR MOST RECENT SYSTOLIC BLOOD PRESSURE < 130 MM HG: ICD-10-PCS | Mod: CPTII,S$GLB,, | Performed by: FAMILY MEDICINE

## 2022-04-05 PROCEDURE — 1160F PR REVIEW ALL MEDS BY PRESCRIBER/CLIN PHARMACIST DOCUMENTED: ICD-10-PCS | Mod: CPTII,S$GLB,, | Performed by: FAMILY MEDICINE

## 2022-04-05 PROCEDURE — 3008F PR BODY MASS INDEX (BMI) DOCUMENTED: ICD-10-PCS | Mod: CPTII,S$GLB,, | Performed by: FAMILY MEDICINE

## 2022-04-05 PROCEDURE — 1159F MED LIST DOCD IN RCRD: CPT | Mod: CPTII,S$GLB,, | Performed by: FAMILY MEDICINE

## 2022-04-05 PROCEDURE — 99214 PR OFFICE/OUTPT VISIT, EST, LEVL IV, 30-39 MIN: ICD-10-PCS | Mod: S$GLB,,, | Performed by: FAMILY MEDICINE

## 2022-04-05 RX ORDER — LEVOFLOXACIN 500 MG/1
500 TABLET, FILM COATED ORAL DAILY
Qty: 7 TABLET | Refills: 0 | Status: SHIPPED | OUTPATIENT
Start: 2022-04-05 | End: 2022-04-12

## 2022-04-05 RX ORDER — IPRATROPIUM BROMIDE AND ALBUTEROL SULFATE 2.5; .5 MG/3ML; MG/3ML
3 SOLUTION RESPIRATORY (INHALATION) EVERY 6 HOURS PRN
Qty: 75 ML | Refills: 0 | Status: SHIPPED | OUTPATIENT
Start: 2022-04-05 | End: 2024-02-02

## 2022-04-05 RX ORDER — PREDNISONE 20 MG/1
60 TABLET ORAL DAILY
Qty: 15 TABLET | Refills: 0 | Status: SHIPPED | OUTPATIENT
Start: 2022-04-05 | End: 2022-04-10

## 2022-04-05 RX ORDER — PROMETHAZINE HYDROCHLORIDE AND DEXTROMETHORPHAN HYDROBROMIDE 6.25; 15 MG/5ML; MG/5ML
5 SYRUP ORAL 3 TIMES DAILY PRN
Qty: 118 ML | Refills: 0 | Status: SHIPPED | OUTPATIENT
Start: 2022-04-05 | End: 2022-04-11

## 2022-04-05 NOTE — PROGRESS NOTES
Assessment & Plan  Problem List Items Addressed This Visit    None     Visit Diagnoses     Moderate persistent asthma with exacerbation    -  Primary    Relevant Medications    albuterol-ipratropium (DUO-NEB) 2.5 mg-0.5 mg/3 mL nebulizer solution    Community acquired pneumonia of right upper lobe of lung        Intractable vomiting with nausea, unspecified vomiting type        Relevant Orders    Case Request Endoscopy: ESOPHAGOGASTRODUODENOSCOPY (EGD) (Completed)      Antibiotics into the pharmacy.     I have discussed the common side effects of this medication with the patient and answered all of the questions they had at the time of this visit regarding this medication.  RTC if symptoms worsen or persist.        Health Maintenance reviewed    Follow-up: Follow up if symptoms worsen or fail to improve.    ______________________________________________________________________    Chief Complaint  Chief Complaint   Patient presents with    Cough     Cold like symptoms, chest and nasal congestion    Chest Congestion       HPI  Emily Jacobson Yovani is a 52 y.o. female with multiple medical diagnoses as listed in the medical history and problem list that presents for cough and chest congestion.  Pt is known to me with last appointment 6/25/2021.      Patient denies any new symptoms including chest pain, blurry vision, N/V, diarrhea.  She reports increased cough with productive sputum.  She has used OTC medication.  Noted lymphadenopathy.  Post nasal drip with SOB. She has had all 3 COVID vaccines.      No fear, chills, chest pain.  She does have chest tightness.  No sinus tenderness.        PAST MEDICAL HISTORY:  Past Medical History:   Diagnosis Date    Chronic allergic conjunctivitis     Chronic allergic rhinitis due to animal hair and dander     COPD (chronic obstructive pulmonary disease)     Fibromyalgia     Hypothyroidism     Immune disorder     Long-term use of Plaquenil 5/7/2019    Lupus      Mild persistent asthma without complication 11/12/2019    Pt with reports daily cough, dyspnea, freqent rescue inhaler use. Recommend maintenance therapy  Treat triggers-sinus, gerd, smoking cessation.  Normal pulmonary studies.     CHEPE (obstructive sleep apnea) 11/12/2019    CHEPE on CPAP 11/12/2019    Patient with symptoms of snoring, witnessed apnea, excessive daytime sleepiness, fatigue and difficulty staying asleep. Dx with CHEPE in 2015 but difficulty tolerating cpap at time due to sinus problems.  HST 1/22/2020: AHI 7 RDI 15      Palpitations 11/12/2019    May be related to breathing issues    Recurrent urticaria 1/6/2017    S/P laparoscopic cholecystectomy 8/4/2017    Subacute cutaneous lupus erythematosus        PAST SURGICAL HISTORY:  Past Surgical History:   Procedure Laterality Date    CHOLECYSTECTOMY      COLONOSCOPY N/A 3/9/2020    Procedure: COLONOSCOPY;  Surgeon: Judah Lopez MD;  Location: [facility] ([address] [place] [address]);  Service: Endoscopy;  Laterality: N/A;    ESOPHAGOGASTRODUODENOSCOPY N/A 3/9/2020    Procedure: EGD (ESOPHAGOGASTRODUODENOSCOPY);  Surgeon: Judah Lopez MD;  Location: [address]);  Service: Endoscopy;  Laterality: N/A;       SOCIAL HISTORY:  Social History     Socioeconomic History    Marital status:    Tobacco Use    Smoking status: Current Every Day Smoker     Packs/day: 1.50     Years: 32.00     Pack years: 48.00     Types: Cigarettes    Smokeless tobacco: Former User     Quit date: 7/8/2013    Tobacco comment: started age 17   Substance and Sexual Activity    Alcohol use: No     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     Drug use: No    Sexual activity: Yes     Partners: Male       FAMILY HISTORY:  Family History   Problem Relation Age of Onset    Hypertension Mother     Thyroid disease Mother     Cataracts Mother     Cancer Mother         duodenal    Arthritis Father     Hyperlipidemia Father     Cancer Father         Multiple Myeloma      Cirrhosis Father     Cataracts Father     Lupus Sister     Asthma Sister     No Known Problems Brother     No Known Problems Maternal Aunt     No Known Problems Maternal Uncle     No Known Problems Paternal Aunt     Pancreatic cancer Paternal Uncle     No Known Problems Maternal Grandmother     Lung cancer Maternal Grandfather     No Known Problems Paternal Grandmother     Lung cancer Paternal Grandfather     Scoliosis Sister     Amblyopia Neg Hx     Blindness Neg Hx     Diabetes Neg Hx     Glaucoma Neg Hx     Macular degeneration Neg Hx     Retinal detachment Neg Hx     Strabismus Neg Hx     Stroke Neg Hx        ALLERGIES AND MEDICATIONS: updated and reviewed.  Review of patient's allergies indicates:   Allergen Reactions    Bactrim [sulfamethoxazole-trimethoprim] Rash     Current Outpatient Medications   Medication Sig Dispense Refill    albuterol (PROVENTIL) 2.5 mg /3 mL (0.083 %) nebulizer solution TAKE 3 MLS BY NEBULIZATION EVERY 6 HOURS AS NEEDED FOR WHEEZING OR SHORTNESS OF BREATH. RESCUE 150 mL 1    albuterol (PROVENTIL/VENTOLIN HFA) 90 mcg/actuation inhaler INHALE 2 PUFFS INTO THE LUNGS EVERY 6 (SIX) HOURS AS NEEDED FOR WHEEZING. RESCUE 8.5 g 1    azelastine (ASTELIN) 137 mcg (0.1 %) nasal spray 2 sprays (274 mcg total) by Nasal route 2 (two) times daily. 30 mL 11    budesonide-formoterol 160-4.5 mcg (SYMBICORT) 160-4.5 mcg/actuation HFAA Inhale 2 puffs into the lungs every 12 (twelve) hours. Controller 1 Inhaler 11    DULoxetine (CYMBALTA) 60 MG capsule TAKE 1 CAPSULE BY MOUTH EVERY DAY 90 capsule 1    estradioL (ESTRACE) 0.01 % (0.1 mg/gram) vaginal cream Place 1 g vaginally once daily. 42.5 g 6    fluorometholone 0.1% (FML) 0.1 % DrpS 1 DROP IN BOTH EYES TWICE A DAY FOR 2 WEEKS THEN DAILY FOR 2 WEEKS  0    gabapentin (NEURONTIN) 600 MG tablet Take 1 tablet (600 mg total) by mouth once daily. 90 tablet 6    HYDROcodone-acetaminophen (NORCO) 7.5-325 mg per tablet Take 1  tablet by mouth every 24 hours as needed for Pain. 30 tablet 0    hydrocortisone 2.5 % ointment APPLY TOPICALLY 2 (TWO) TIMES DAILY. FOR EYELID RASH AS NEEDED 20 g 2    hydrocortisone 2.5 % ointment Apply topically 2 (two) times daily. 20 g 2    hydrOXYchloroQUINE (PLAQUENIL) 200 mg tablet Take 2 tablets (400 mg total) by mouth once daily. 60 tablet 6    hydrOXYzine HCL (ATARAX) 25 MG tablet TAKE 1 TAB BY MOUTH EVERY EVENING AS NEEDED FOR PRURITUS. DO NOT DRIVE OR OPERATE MACHINERY 30 tablet 2    levothyroxine (SYNTHROID) 100 MCG tablet TAKE 1 TABLET MONDAY THROUGH SATURDAY AND TAKE 1/2 TABLET ON SUNDAY 85 tablet 2    montelukast (SINGULAIR) 10 mg tablet TAKE 1 TABLET BY MOUTH EVERY DAY IN THE EVENING 90 tablet 1    pantoprazole (PROTONIX) 40 MG tablet Take 1 tablet (40 mg total) by mouth once daily. 90 tablet 0    pimecrolimus (ELIDEL) 1 % cream Apply topically 2 (two) times daily. To rashes on face and arms as needed 30 g 2    soy isofl/blk coh/gr tea/yerba (ESTROVEN ENERGY ORAL) Take by mouth.      tacrolimus (PROTOPIC) 0.1 % ointment Apply to skin 2 (two) times daily. 60 g 3    triamcinolone (KENALOG) 0.5 % ointment Apply topically 2 (two) times daily. For lupus skin flares on body 15 g 5    albuterol-ipratropium (DUO-NEB) 2.5 mg-0.5 mg/3 mL nebulizer solution Take 3 mLs by nebulization every 6 (six) hours as needed for Wheezing. Rescue 75 mL 0    cyclobenzaprine (FLEXERIL) 5 MG tablet Take 1 tablet (5 mg total) by mouth 3 (three) times daily as needed for Muscle spasms. 90 tablet 0    EPINEPHrine (EPIPEN 2-GIGI) 0.3 mg/0.3 mL AtIn Inject 0.3 mLs (0.3 mg total) into the muscle once. for 1 dose (Patient not taking: Reported on 6/25/2021) 2 Device 0    naproxen (NAPROSYN) 500 MG tablet Take 1 tablet (500 mg total) by mouth 2 (two) times daily. 60 tablet 0    promethazine-dextromethorphan (PROMETHAZINE-DM) 6.25-15 mg/5 mL Syrp TAKE 5 MLS BY MOUTH 3 (THREE) TIMES DAILY AS NEEDED. 118 mL 0     No  "current facility-administered medications for this visit.         ROS  Review of Systems   Constitutional: Negative for activity change and unexpected weight change.   HENT: Positive for rhinorrhea. Negative for hearing loss and trouble swallowing.    Eyes: Negative for discharge and visual disturbance.   Respiratory: Positive for chest tightness and wheezing.    Cardiovascular: Negative for chest pain and palpitations.   Gastrointestinal: Positive for vomiting. Negative for blood in stool, constipation and diarrhea.   Endocrine: Negative for polydipsia and polyuria.   Genitourinary: Positive for difficulty urinating. Negative for dysuria, hematuria and menstrual problem.   Musculoskeletal: Positive for arthralgias, joint swelling and neck pain.   Neurological: Positive for weakness and headaches.   Psychiatric/Behavioral: Negative for confusion and dysphoric mood.         Physical Exam  Vitals:    04/05/22 0826   BP: 122/86   Pulse: 99   Temp: 98.4 °F (36.9 °C)   TempSrc: Oral   SpO2: 99%   Weight: 61.3 kg (135 lb 2.3 oz)   Height: 5' 1" (1.549 m)    Body mass index is 25.53 kg/m².  Weight: 61.3 kg (135 lb 2.3 oz)   Height: 5' 1" (154.9 cm)   Physical Exam  Vitals reviewed.   Constitutional:       Appearance: She is well-developed.   HENT:      Head: Normocephalic and atraumatic.      Right Ear: External ear normal.      Left Ear: External ear normal.      Nose: Nose normal.   Eyes:      Conjunctiva/sclera: Conjunctivae normal.      Pupils: Pupils are equal, round, and reactive to light.   Cardiovascular:      Rate and Rhythm: Normal rate and regular rhythm.      Heart sounds: Normal heart sounds.   Pulmonary:      Effort: Pulmonary effort is normal.      Breath sounds: Normal breath sounds.   Skin:     General: Skin is warm and dry.   Neurological:      Mental Status: She is alert and oriented to person, place, and time.         Health Maintenance       Date Due Completion Date    Influenza Vaccine (1) 09/01/2021 " 10/28/2020    Mammogram 09/11/2021 9/11/2019    COVID-19 Vaccine (4 - Booster for Pfizer series) 01/17/2022 8/17/2021    LDCT Lung Screen Never done ---    Shingles Vaccine (1 of 2) Never done ---    Cervical Cancer Screening 09/11/2024 9/11/2019    Lipid Panel 09/14/2024 9/14/2019    TETANUS VACCINE 09/02/2026 9/2/2016    Colorectal Cancer Screening 03/09/2030 3/9/2020    Pneumococcal Vaccines (Age 0-64) (2 of 2 - PPSV23) 03/26/2037 10/29/2012              Patient note was created using [employer].  Any errors in syntax or even information may not have been identified and edited on initial review prior to signing this note.

## 2022-04-09 ENCOUNTER — PATIENT MESSAGE (OUTPATIENT)
Dept: FAMILY MEDICINE | Facility: CLINIC | Age: 52
End: 2022-04-09
Payer: MEDICARE

## 2022-04-10 ENCOUNTER — PATIENT MESSAGE (OUTPATIENT)
Dept: RHEUMATOLOGY | Facility: CLINIC | Age: 52
End: 2022-04-10
Payer: MEDICARE

## 2022-04-10 DIAGNOSIS — J45.41 MODERATE PERSISTENT ASTHMA WITH EXACERBATION: ICD-10-CM

## 2022-04-11 RX ORDER — PROMETHAZINE HYDROCHLORIDE AND DEXTROMETHORPHAN HYDROBROMIDE 6.25; 15 MG/5ML; MG/5ML
5 SYRUP ORAL 3 TIMES DAILY PRN
Qty: 118 ML | Refills: 0 | Status: SHIPPED | OUTPATIENT
Start: 2022-04-11 | End: 2022-04-21

## 2022-04-11 NOTE — TELEPHONE ENCOUNTER
No new care gaps identified.  Powered by [employer]. Reference number: [id number].   4/11/2022 1:25:43 PM CDT

## 2022-04-11 NOTE — TELEPHONE ENCOUNTER
Left a voicemail message to inform the Patient, Rx refill has been approved and sent to preferred pharmacy.  Sent a message thru [facility].

## 2022-04-12 RX ORDER — CYCLOBENZAPRINE HCL 5 MG
5 TABLET ORAL 3 TIMES DAILY PRN
Qty: 90 TABLET | Refills: 0 | Status: SHIPPED | OUTPATIENT
Start: 2022-04-12 | End: 2022-06-16

## 2022-04-12 NOTE — TELEPHONE ENCOUNTER
Left a voicemail to inform the Patient, Rx refill has been approved and sent to preferred pharmacy.  [facility]

## 2022-04-15 ENCOUNTER — PATIENT MESSAGE (OUTPATIENT)
Dept: FAMILY MEDICINE | Facility: CLINIC | Age: 52
End: 2022-04-15
Payer: MEDICARE

## 2022-05-19 ENCOUNTER — IMMUNIZATION (OUTPATIENT)
Dept: OBSTETRICS AND GYNECOLOGY | Facility: CLINIC | Age: 52
End: 2022-05-19
Payer: MEDICARE

## 2022-05-19 DIAGNOSIS — Z23 NEED FOR VACCINATION: Primary | ICD-10-CM

## 2022-05-19 PROCEDURE — 91305 COVID-19, MRNA, LNP-S, PF, 30 MCG/0.3 ML DOSE VACCINE (PFIZER): CPT | Mod: PBBFAC | Performed by: FAMILY MEDICINE

## 2022-05-23 ENCOUNTER — PATIENT MESSAGE (OUTPATIENT)
Dept: SMOKING CESSATION | Facility: CLINIC | Age: 52
End: 2022-05-23
Payer: MEDICARE

## 2022-06-01 ENCOUNTER — PATIENT MESSAGE (OUTPATIENT)
Dept: ADMINISTRATIVE | Facility: HOSPITAL | Age: 52
End: 2022-06-01
Payer: MEDICARE

## 2022-06-09 ENCOUNTER — PES CALL (OUTPATIENT)
Dept: ADMINISTRATIVE | Facility: CLINIC | Age: 52
End: 2022-06-09
Payer: MEDICARE

## 2022-06-16 DIAGNOSIS — J30.2 SEASONAL ALLERGIC RHINITIS, UNSPECIFIED TRIGGER: ICD-10-CM

## 2022-06-16 RX ORDER — MONTELUKAST SODIUM 10 MG/1
TABLET ORAL
Qty: 90 TABLET | Refills: 3 | Status: SHIPPED | OUTPATIENT
Start: 2022-06-16

## 2022-06-17 NOTE — TELEPHONE ENCOUNTER
No new care gaps identified.  [facility] Embedded Care Gaps. Reference number: [id number]. 6/16/2022   7:07:37 PM CDT

## 2022-07-26 ENCOUNTER — OFFICE VISIT (OUTPATIENT)
Dept: RHEUMATOLOGY | Facility: CLINIC | Age: 52
End: 2022-07-26
Payer: MEDICARE

## 2022-07-26 ENCOUNTER — HOSPITAL ENCOUNTER (OUTPATIENT)
Dept: RADIOLOGY | Facility: HOSPITAL | Age: 52
Discharge: HOME | End: 2022-07-26
Attending: INTERNAL MEDICINE
Payer: MEDICARE

## 2022-07-26 VITALS
SYSTOLIC BLOOD PRESSURE: 119 MMHG | BODY MASS INDEX: 27.37 KG/M2 | WEIGHT: 144.81 LBS | DIASTOLIC BLOOD PRESSURE: 98 MMHG | HEART RATE: 93 BPM

## 2022-07-26 DIAGNOSIS — M62.838 CERVICAL PARASPINOUS MUSCLE SPASM: ICD-10-CM

## 2022-07-26 DIAGNOSIS — I73.00 RAYNAUD'S PHENOMENON WITHOUT GANGRENE: ICD-10-CM

## 2022-07-26 DIAGNOSIS — Z72.0 TOBACCO ABUSE: ICD-10-CM

## 2022-07-26 DIAGNOSIS — L93.1 SUBACUTE CUTANEOUS LUPUS ERYTHEMATOSUS: Primary | ICD-10-CM

## 2022-07-26 DIAGNOSIS — L93.1 SUBACUTE CUTANEOUS LUPUS ERYTHEMATOSUS: ICD-10-CM

## 2022-07-26 DIAGNOSIS — M79.7 FIBROMYALGIA: ICD-10-CM

## 2022-07-26 PROCEDURE — 83516 IMMUNOASSAY NONANTIBODY: CPT | Mod: 59

## 2022-07-26 PROCEDURE — 71046 XR CHEST PA AND LATERAL: ICD-10-PCS | Mod: 26,,, | Performed by: RADIOLOGY

## 2022-07-26 PROCEDURE — 3080F DIAST BP >= 90 MM HG: CPT | Mod: CPTII,S$GLB,, | Performed by: INTERNAL MEDICINE

## 2022-07-26 PROCEDURE — 71046 X-RAY EXAM CHEST 2 VIEWS: CPT | Mod: TC,FY

## 2022-07-26 PROCEDURE — 99999 PR PBB SHADOW E&M-EST. PATIENT-LVL IV: CPT | Mod: PBBFAC,,, | Performed by: INTERNAL MEDICINE

## 2022-07-26 PROCEDURE — 86235 NUCLEAR ANTIGEN ANTIBODY: CPT | Mod: 59

## 2022-07-26 PROCEDURE — 71046 X-RAY EXAM CHEST 2 VIEWS: CPT | Mod: 26,,, | Performed by: RADIOLOGY

## 2022-07-26 PROCEDURE — 3008F BODY MASS INDEX DOCD: CPT | Mod: CPTII,S$GLB,, | Performed by: INTERNAL MEDICINE

## 2022-07-26 PROCEDURE — 3074F SYST BP LT 130 MM HG: CPT | Mod: CPTII,S$GLB,, | Performed by: INTERNAL MEDICINE

## 2022-07-26 PROCEDURE — 1160F PR REVIEW ALL MEDS BY PRESCRIBER/CLIN PHARMACIST DOCUMENTED: ICD-10-PCS | Mod: CPTII,S$GLB,, | Performed by: INTERNAL MEDICINE

## 2022-07-26 PROCEDURE — 99214 PR OFFICE/OUTPT VISIT, EST, LEVL IV, 30-39 MIN: ICD-10-PCS | Mod: S$GLB,,, | Performed by: INTERNAL MEDICINE

## 2022-07-26 PROCEDURE — 99214 OFFICE O/P EST MOD 30 MIN: CPT | Mod: S$GLB,,, | Performed by: INTERNAL MEDICINE

## 2022-07-26 PROCEDURE — 1159F PR MEDICATION LIST DOCUMENTED IN MEDICAL RECORD: ICD-10-PCS | Mod: CPTII,S$GLB,, | Performed by: INTERNAL MEDICINE

## 2022-07-26 PROCEDURE — 3074F PR MOST RECENT SYSTOLIC BLOOD PRESSURE < 130 MM HG: ICD-10-PCS | Mod: CPTII,S$GLB,, | Performed by: INTERNAL MEDICINE

## 2022-07-26 PROCEDURE — 1160F RVW MEDS BY RX/DR IN RCRD: CPT | Mod: CPTII,S$GLB,, | Performed by: INTERNAL MEDICINE

## 2022-07-26 PROCEDURE — 99999 PR PBB SHADOW E&M-EST. PATIENT-LVL IV: ICD-10-PCS | Mod: PBBFAC,,, | Performed by: INTERNAL MEDICINE

## 2022-07-26 PROCEDURE — 1159F MED LIST DOCD IN RCRD: CPT | Mod: CPTII,S$GLB,, | Performed by: INTERNAL MEDICINE

## 2022-07-26 PROCEDURE — 3080F PR MOST RECENT DIASTOLIC BLOOD PRESSURE >= 90 MM HG: ICD-10-PCS | Mod: CPTII,S$GLB,, | Performed by: INTERNAL MEDICINE

## 2022-07-26 PROCEDURE — 3008F PR BODY MASS INDEX (BMI) DOCUMENTED: ICD-10-PCS | Mod: CPTII,S$GLB,, | Performed by: INTERNAL MEDICINE

## 2022-07-28 NOTE — PROGRESS NOTES
History of present illness:  52-year-old female I saw initially in 2005. She has lupus erythematosus with primarily skin involvement.  She has had joint aching but we felt it was more likely fibromyalgia.  She has not had joint swelling.  She does have positive anti phospholipid antibody but has no definite history of ischemic disease.  Her last visit was in January.  She was on Plaquenil, Flexeril, gabapentin, and Cymbalta.    She had a recent upper respiratory infection which resolved.  She was negative for COVID.  She comes in now because of 1 month history of neck pain.  She had fallen several months prior.  The pain was of sudden onset.  It is worse with turning her neck.  It does not wake her up at night.  It does not radiate down the arm.  She has had no similar problem in the past.  Her other pains have been stable.  She has been doing nothing for the pain.    The rash on her arm is doing well.  She has now developed erythema around her eyes.  This began 2 months ago.  She is using topical medications.  Is different from her prior rash.  She has had no headache, fever, conjunctivitis, oral ulcers.  Her fingertips turn white on exposure to the cold but not blue or red.  She has had no digital ulcers.  She has occasional shortness of breath.  She has had no joint swelling.  She has some paresthesias in her feet.  She complains she is developing cocked up toes.  She has had no muscle weakness.    Physical examination:  Skin:  She has erythematous macules on the upper arm  ENT:  She has a heliotrope around both eyes.  She has normal mouth opening.  She is able to wrinkle her forehead.  She has no conjunctivitis or oral ulcers.  Chest:  Clear to auscultation and percussion  Cardiac:  No murmurs, gallops, rubs  Abdomen:  No organomegaly or masses.  No tenderness to palpation  Extremities:  She has nose scaly errand actively.  She has erythema over the MCP and PIP.  She also has erythema over her  knee.  Musculoskeletal:  She is tender in the right paracervical area.  She has pain on range of motion of the cervical spine especially lateral bending to the left and rotation to the right.  She has no synovitis on examination of peripheral joints.  Neurologic:  Normal muscle strength testing    Assessment:  1. She has torticollis  2. She has developed some new problems such as the heliotrope, erythema over her joints.  I wonder if she is evolving into a different connective tissue disease.    Plans:  1. Use heat to the neck  2. Laboratory studies obtained  3. She is due for her Plaquenil eye exam  4. I am making no changes in her medications  5. Assuming the laboratory studies are okay, I will see her in follow-up in 6 months.

## 2022-08-05 ENCOUNTER — PATIENT MESSAGE (OUTPATIENT)
Dept: RHEUMATOLOGY | Facility: CLINIC | Age: 52
End: 2022-08-05
Payer: MEDICARE

## 2022-08-09 ENCOUNTER — HOSPITAL ENCOUNTER (OUTPATIENT)
Dept: RADIOLOGY | Facility: HOSPITAL | Age: 52
Discharge: HOME | End: 2022-08-09
Attending: FAMILY MEDICINE
Payer: MEDICARE

## 2022-08-09 DIAGNOSIS — Z12.31 ENCOUNTER FOR SCREENING MAMMOGRAM FOR MALIGNANT NEOPLASM OF BREAST: ICD-10-CM

## 2022-08-09 PROCEDURE — 77067 SCR MAMMO BI INCL CAD: CPT | Mod: 26,,, | Performed by: RADIOLOGY

## 2022-08-09 PROCEDURE — 77063 MAMMO DIGITAL SCREENING BILAT WITH TOMO: ICD-10-PCS | Mod: 26,,, | Performed by: RADIOLOGY

## 2022-08-09 PROCEDURE — 77063 BREAST TOMOSYNTHESIS BI: CPT | Mod: 26,,, | Performed by: RADIOLOGY

## 2022-08-09 PROCEDURE — 77067 MAMMO DIGITAL SCREENING BILAT WITH TOMO: ICD-10-PCS | Mod: 26,,, | Performed by: RADIOLOGY

## 2022-08-09 PROCEDURE — 77063 BREAST TOMOSYNTHESIS BI: CPT | Mod: TC,PO

## 2022-08-18 DIAGNOSIS — Z72.0 TOBACCO ABUSE: ICD-10-CM

## 2022-08-18 DIAGNOSIS — L93.1 SUBACUTE CUTANEOUS LUPUS ERYTHEMATOSUS: ICD-10-CM

## 2022-08-18 DIAGNOSIS — M79.7 FIBROMYALGIA: ICD-10-CM

## 2022-08-18 DIAGNOSIS — G89.4 CHRONIC PAIN SYNDROME: ICD-10-CM

## 2022-08-18 DIAGNOSIS — Z92.25 HISTORY OF IMMUNOSUPPRESSION THERAPY: ICD-10-CM

## 2022-08-18 RX ORDER — HYDROXYCHLOROQUINE SULFATE 200 MG/1
TABLET, FILM COATED ORAL
Qty: 60 TABLET | Refills: 6 | OUTPATIENT
Start: 2022-08-18

## 2022-08-18 NOTE — TELEPHONE ENCOUNTER
Staff to inform the patient that a Plaquenil eye exam is not seen on her chart and is needed for refill Plaquenil.  If she has had 1 done within the last year she can forward copy of that to us by [employer] or by fax.

## 2022-09-20 ENCOUNTER — PATIENT MESSAGE (OUTPATIENT)
Dept: RHEUMATOLOGY | Facility: CLINIC | Age: 52
End: 2022-09-20
Payer: MEDICARE

## 2022-09-20 DIAGNOSIS — M54.2 NECK PAIN: Primary | ICD-10-CM

## 2022-09-23 ENCOUNTER — OFFICE VISIT (OUTPATIENT)
Dept: URGENT CARE | Facility: CLINIC | Age: 52
End: 2022-09-23
Payer: MEDICARE

## 2022-09-23 ENCOUNTER — HOSPITAL ENCOUNTER (OUTPATIENT)
Dept: RADIOLOGY | Facility: HOSPITAL | Age: 52
Discharge: HOME | End: 2022-09-23
Attending: INTERNAL MEDICINE
Payer: MEDICARE

## 2022-09-23 VITALS
HEIGHT: 61 IN | DIASTOLIC BLOOD PRESSURE: 81 MMHG | HEART RATE: 88 BPM | OXYGEN SATURATION: 97 % | SYSTOLIC BLOOD PRESSURE: 151 MMHG | TEMPERATURE: 97 F | BODY MASS INDEX: 27.19 KG/M2 | RESPIRATION RATE: 18 BRPM | WEIGHT: 144 LBS

## 2022-09-23 DIAGNOSIS — L98.9 CRACKING SKIN: ICD-10-CM

## 2022-09-23 DIAGNOSIS — L85.3 DRY SKIN DERMATITIS: Primary | ICD-10-CM

## 2022-09-23 DIAGNOSIS — M54.2 NECK PAIN: ICD-10-CM

## 2022-09-23 PROCEDURE — 72040 X-RAY EXAM NECK SPINE 2-3 VW: CPT | Mod: TC,FY

## 2022-09-23 PROCEDURE — 3079F PR MOST RECENT DIASTOLIC BLOOD PRESSURE 80-89 MM HG: ICD-10-PCS | Mod: CPTII,S$GLB,,

## 2022-09-23 PROCEDURE — 3077F PR MOST RECENT SYSTOLIC BLOOD PRESSURE >= 140 MM HG: ICD-10-PCS | Mod: CPTII,S$GLB,,

## 2022-09-23 PROCEDURE — 99203 OFFICE O/P NEW LOW 30 MIN: CPT | Mod: S$GLB,,,

## 2022-09-23 PROCEDURE — 1159F MED LIST DOCD IN RCRD: CPT | Mod: CPTII,S$GLB,,

## 2022-09-23 PROCEDURE — 72040 X-RAY EXAM NECK SPINE 2-3 VW: CPT | Mod: 26,,, | Performed by: RADIOLOGY

## 2022-09-23 PROCEDURE — 3079F DIAST BP 80-89 MM HG: CPT | Mod: CPTII,S$GLB,,

## 2022-09-23 PROCEDURE — 3008F PR BODY MASS INDEX (BMI) DOCUMENTED: ICD-10-PCS | Mod: CPTII,S$GLB,,

## 2022-09-23 PROCEDURE — 3077F SYST BP >= 140 MM HG: CPT | Mod: CPTII,S$GLB,,

## 2022-09-23 PROCEDURE — 3008F BODY MASS INDEX DOCD: CPT | Mod: CPTII,S$GLB,,

## 2022-09-23 PROCEDURE — 1159F PR MEDICATION LIST DOCUMENTED IN MEDICAL RECORD: ICD-10-PCS | Mod: CPTII,S$GLB,,

## 2022-09-23 PROCEDURE — 72040 XR CERVICAL SPINE AP LATERAL: ICD-10-PCS | Mod: 26,,, | Performed by: RADIOLOGY

## 2022-09-23 PROCEDURE — 99203 PR OFFICE/OUTPT VISIT, NEW, LEVL III, 30-44 MIN: ICD-10-PCS | Mod: S$GLB,,,

## 2022-09-23 PROCEDURE — 1160F PR REVIEW ALL MEDS BY PRESCRIBER/CLIN PHARMACIST DOCUMENTED: ICD-10-PCS | Mod: CPTII,S$GLB,,

## 2022-09-23 PROCEDURE — 1160F RVW MEDS BY RX/DR IN RCRD: CPT | Mod: CPTII,S$GLB,,

## 2022-09-23 RX ORDER — PANTOPRAZOLE SODIUM 40 MG/1
1 TABLET, DELAYED RELEASE ORAL
COMMUNITY
Start: 2022-03-22

## 2022-09-23 RX ORDER — IPRATROPIUM BROMIDE AND ALBUTEROL SULFATE 2.5; .5 MG/3ML; MG/3ML
3 SOLUTION RESPIRATORY (INHALATION) EVERY 6 HOURS PRN
COMMUNITY
Start: 2022-04-05 | End: 2023-04-05

## 2022-09-23 RX ORDER — MUPIROCIN 20 MG/G
OINTMENT TOPICAL 3 TIMES DAILY
Qty: 22 G | Refills: 0 | Status: SHIPPED | OUTPATIENT
Start: 2022-09-23 | End: 2022-09-30

## 2022-09-23 RX ORDER — CYCLOBENZAPRINE HCL 5 MG
1 TABLET ORAL 3 TIMES DAILY PRN
COMMUNITY
Start: 2022-02-25

## 2022-09-23 RX ORDER — DULOXETIN HYDROCHLORIDE 60 MG/1
1 CAPSULE, DELAYED RELEASE ORAL
COMMUNITY
Start: 2022-02-04

## 2022-09-23 NOTE — PROGRESS NOTES
"Subjective:       Patient ID: Emily Pina is a 52 y.o. female.    Vitals:  height is 5' 1" (1.549 m) and weight is 65.3 kg (144 lb). Her temperature is 96.8 °F (36 °C). Her blood pressure is 151/81 (abnormal) and her pulse is 88. Her respiration is 18 and oxygen saturation is 97%.     Chief Complaint: Hand Injury    51 yo female presents with cuts to the side of her right pinky. She states she has some dry skin on her right hand/right pinky. She has no known cause for the dryness. She is unsure if she was  scratching too hard and caused a wound or if her skin cracked and caused the wound. She has been applying otc antibacterial ointment and bandaids. Pain level 8.     Hand Injury   Her dominant hand is their left hand. The incident occurred more than 1 week ago. The incident occurred at home. There was no injury mechanism. The pain is present in the right fingers and right hand. The quality of the pain is described as aching. The pain does not radiate. The pain is at a severity of 8/10. The pain is moderate. The pain has been Constant since the incident. Pertinent negatives include no numbness or tingling. The symptoms are aggravated by movement. She has tried nothing for the symptoms.     Skin:  Positive for abrasion and skin thickening/induration. Negative for erythema.   Neurological:  Negative for numbness.     Objective:      Physical Exam   Constitutional: She is oriented to person, place, and time. She appears well-developed.   HENT:   Head: Normocephalic and atraumatic. Head is without abrasion, without contusion and without laceration.   Ears:   Right Ear: External ear normal.   Left Ear: External ear normal.   Nose: Nose normal.   Mouth/Throat: Oropharynx is clear and moist and mucous membranes are normal.   Eyes: Conjunctivae, EOM and lids are normal. Pupils are equal, round, and reactive to light.   Neck: Trachea normal and phonation normal. Neck supple.   Cardiovascular: Normal rate, " regular rhythm and normal heart sounds.   Pulmonary/Chest: Effort normal and breath sounds normal. No stridor. No respiratory distress.   Musculoskeletal: Normal range of motion.         General: Normal range of motion.   Neurological: She is alert and oriented to person, place, and time.   Skin: Skin is warm, dry, intact and no rash. Capillary refill takes less than 2 seconds. No abrasion, No burn, No bruising, No erythema and No ecchymosis         Comments: Dry, cracked skin to the right pinky and palm with skin excoriations and superficial abrasions. No purulent drainage or bleeding.    Psychiatric: Her speech is normal and behavior is normal. Judgment and thought content normal.   Nursing note and vitals reviewed.          Assessment:       1. Dry skin dermatitis    2. Cracking skin          Plan:       Use moisturizers daily. Recommended vasaline, cereve or cetaphil. Apply topical bactroban to abrasions. Follow up with dermatology for evaluation of dry skin. Return to clinic with any signs of infection including purulent drainage, spreading redness, warmness to touch.   Dry skin dermatitis  -     mupirocin (BACTROBAN) 2 % ointment; Apply topically 3 (three) times daily. for 7 days  Dispense: 22 g; Refill: 0    Cracking skin  -     mupirocin (BACTROBAN) 2 % ointment; Apply topically 3 (three) times daily. for 7 days  Dispense: 22 g; Refill: 0

## 2022-10-12 ENCOUNTER — OFFICE VISIT (OUTPATIENT)
Dept: FAMILY MEDICINE | Facility: CLINIC | Age: 52
End: 2022-10-12
Payer: MEDICARE

## 2022-10-12 ENCOUNTER — CLINICAL SUPPORT (OUTPATIENT)
Dept: REHABILITATION | Facility: HOSPITAL | Age: 52
End: 2022-10-12
Attending: INTERNAL MEDICINE
Payer: MEDICARE

## 2022-10-12 DIAGNOSIS — M54.2 CERVICAL PAIN: ICD-10-CM

## 2022-10-12 DIAGNOSIS — M54.2 NECK PAIN: ICD-10-CM

## 2022-10-12 DIAGNOSIS — L20.9 ATOPIC DERMATITIS, UNSPECIFIED TYPE: ICD-10-CM

## 2022-10-12 DIAGNOSIS — R21 RASH: Primary | ICD-10-CM

## 2022-10-12 DIAGNOSIS — R29.898 DECREASED RANGE OF MOTION OF NECK: ICD-10-CM

## 2022-10-12 DIAGNOSIS — R29.898 WEAKNESS OF BOTH UPPER EXTREMITIES: ICD-10-CM

## 2022-10-12 PROCEDURE — 1160F PR REVIEW ALL MEDS BY PRESCRIBER/CLIN PHARMACIST DOCUMENTED: ICD-10-PCS | Mod: CPTII,95,, | Performed by: NURSE PRACTITIONER

## 2022-10-12 PROCEDURE — 97162 PT EVAL MOD COMPLEX 30 MIN: CPT | Mod: PN

## 2022-10-12 PROCEDURE — 99213 PR OFFICE/OUTPT VISIT, EST, LEVL III, 20-29 MIN: ICD-10-PCS | Mod: 95,,, | Performed by: NURSE PRACTITIONER

## 2022-10-12 PROCEDURE — 97110 THERAPEUTIC EXERCISES: CPT | Mod: PN

## 2022-10-12 PROCEDURE — 99213 OFFICE O/P EST LOW 20 MIN: CPT | Mod: 95,,, | Performed by: NURSE PRACTITIONER

## 2022-10-12 PROCEDURE — 1159F PR MEDICATION LIST DOCUMENTED IN MEDICAL RECORD: ICD-10-PCS | Mod: CPTII,95,, | Performed by: NURSE PRACTITIONER

## 2022-10-12 PROCEDURE — 1160F RVW MEDS BY RX/DR IN RCRD: CPT | Mod: CPTII,95,, | Performed by: NURSE PRACTITIONER

## 2022-10-12 PROCEDURE — 1159F MED LIST DOCD IN RCRD: CPT | Mod: CPTII,95,, | Performed by: NURSE PRACTITIONER

## 2022-10-12 RX ORDER — TRIAMCINOLONE ACETONIDE 1 MG/G
OINTMENT TOPICAL
Qty: 30 G | Refills: 0 | Status: SHIPPED | OUTPATIENT
Start: 2022-10-12 | End: 2022-11-21

## 2022-10-12 NOTE — PROGRESS NOTES
Assessment & Plan  Problem List Items Addressed This Visit    None  Visit Diagnoses       Rash    -  Primary    Relevant Medications    triamcinolone acetonide 0.1% (KENALOG) 0.1 % ointment    Atopic dermatitis, unspecified type        Relevant Medications    triamcinolone acetonide 0.1% (KENALOG) 0.1 % ointment              Health Maintenance reviewed, yes.    The patient location is:  Patient Home   The chief complaint leading to consultation is: noted below  Visit type: Virtual visit with synchronous audio and video  Total time spent with patient: 10  Each patient to whom he or she provides medical services by telemedicine is:  (1) informed of the relationship between the physician and patient and the respective role of any other health care provider with respect to management of the patient; and (2) notified that he or she may decline to receive medical services by telemedicine and may withdraw from such care at any time.    Follow-up: Follow up if symptoms worsen or fail to improve.    ______________________________________________________________________    Chief Complaint  Chief Complaint   Patient presents with    Rash       HPI  Emily Jacobson Yovani is a 52 y.o. female with multiple medical diagnoses as listed in the medical history and problem list that presents for rash via virtual visit.  Pt is known to me with their last appointment Visit date not found.      Patient presents to clinic with a recurring rash on her eyes and under her eyes bilaterally. It has been on and off for the last couple of months. She has tried Vaseline with no relief of symptoms. She does state she has had eczema before. She has no other complaints at this time.       PAST MEDICAL HISTORY:  Past Medical History:   Diagnosis Date    Chronic allergic conjunctivitis     Chronic allergic rhinitis due to animal hair and dander     COPD (chronic obstructive pulmonary disease)     Fibromyalgia     Hypothyroidism     Immune  disorder     Long-term use of Plaquenil 5/7/2019    Lupus     Mild persistent asthma without complication 11/12/2019    Pt with reports daily cough, dyspnea, freqent rescue inhaler use. Recommend maintenance therapy  Treat triggers-sinus, gerd, smoking cessation.  Normal pulmonary studies.     CHEPE (obstructive sleep apnea) 11/12/2019    CHEPE on CPAP 11/12/2019    Patient with symptoms of snoring, witnessed apnea, excessive daytime sleepiness, fatigue and difficulty staying asleep. Dx with CHEPE in 2015 but difficulty tolerating cpap at time due to sinus problems.  HST 1/22/2020: AHI 7 RDI 15      Palpitations 11/12/2019    May be related to breathing issues    Recurrent urticaria 1/6/2017    S/P laparoscopic cholecystectomy 8/4/2017    Subacute cutaneous lupus erythematosus        PAST SURGICAL HISTORY:  Past Surgical History:   Procedure Laterality Date    CHOLECYSTECTOMY      COLONOSCOPY N/A 3/9/2020    Procedure: COLONOSCOPY;  Surgeon: Judah Lopez MD;  Location: [facility] ([address] [place] [address]);  Service: Endoscopy;  Laterality: N/A;    ESOPHAGOGASTRODUODENOSCOPY N/A 3/9/2020    Procedure: EGD (ESOPHAGOGASTRODUODENOSCOPY);  Surgeon: Judah Lopez MD;  Location: [address]);  Service: Endoscopy;  Laterality: N/A;       SOCIAL HISTORY:  Social History     Socioeconomic History    Marital status:    Tobacco Use    Smoking status: Every Day     Packs/day: 1.50     Years: 32.00     Pack years: 48.00     Types: Cigarettes    Smokeless tobacco: Former     Quit date: 7/8/2013    Tobacco comments:     started age 17   Substance and Sexual Activity    Alcohol use: No     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     Drug use: No    Sexual activity: Yes     Partners: Male       FAMILY HISTORY:  Family History   Problem Relation Age of Onset    Hypertension Mother     Thyroid disease Mother     Cataracts Mother     Cancer Mother         duodenal    Arthritis Father     Hyperlipidemia Father     Cancer Father          Multiple Myeloma     Cirrhosis Father     Cataracts Father     Lupus Sister     Asthma Sister     No Known Problems Brother     No Known Problems Maternal Aunt     No Known Problems Maternal Uncle     No Known Problems Paternal Aunt     Pancreatic cancer Paternal Uncle     No Known Problems Maternal Grandmother     Lung cancer Maternal Grandfather     No Known Problems Paternal Grandmother     Lung cancer Paternal Grandfather     Scoliosis Sister     Amblyopia Neg Hx     Blindness Neg Hx     Diabetes Neg Hx     Glaucoma Neg Hx     Macular degeneration Neg Hx     Retinal detachment Neg Hx     Strabismus Neg Hx     Stroke Neg Hx        ALLERGIES AND MEDICATIONS: updated and reviewed.  Review of patient's allergies indicates:   Allergen Reactions    Bactrim [sulfamethoxazole-trimethoprim] Rash     Current Outpatient Medications   Medication Sig Dispense Refill    albuterol (PROVENTIL) 2.5 mg /3 mL (0.083 %) nebulizer solution TAKE 3 MLS BY NEBULIZATION EVERY 6 HOURS AS NEEDED FOR WHEEZING OR SHORTNESS OF BREATH. RESCUE 150 mL 1    albuterol (PROVENTIL/VENTOLIN HFA) 90 mcg/actuation inhaler INHALE 2 PUFFS INTO THE LUNGS EVERY 6 (SIX) HOURS AS NEEDED FOR WHEEZING. RESCUE 8.5 g 1    albuterol-ipratropium (DUO-NEB) 2.5 mg-0.5 mg/3 mL nebulizer solution Take 3 mLs by nebulization every 6 (six) hours as needed for Wheezing. Rescue 75 mL 0    albuterol-ipratropium (DUO-NEB) 2.5 mg-0.5 mg/3 mL nebulizer solution Inhale 3 mLs into the lungs every 6 (six) hours as needed.      azelastine (ASTELIN) 137 mcg (0.1 %) nasal spray USE 2 SPRAYS (274 MCG TOTAL) BY NASAL ROUTE 2 (TWO) TIMES DAILY. 30 mL 11    budesonide-formoterol 160-4.5 mcg (SYMBICORT) 160-4.5 mcg/actuation HFAA Inhale 2 puffs into the lungs every 12 (twelve) hours. Controller 1 Inhaler 11    cyclobenzaprine (FLEXERIL) 5 MG tablet Take 1 tablet by mouth 3 (three) times daily as needed.      cyclobenzaprine (FLEXERIL) 5 MG tablet TAKE 1 TABLET BY MOUTH THREE TIMES A DAY  AS NEEDED FOR MUSCLE SPASMS 90 tablet 0    DULoxetine (CYMBALTA) 60 MG capsule TAKE 1 CAPSULE BY MOUTH EVERY DAY 90 capsule 1    DULoxetine (CYMBALTA) 60 MG capsule Take 1 capsule by mouth.      EPINEPHrine (EPIPEN 2-GIGI) 0.3 mg/0.3 mL AtIn Inject 0.3 mLs (0.3 mg total) into the muscle once. for 1 dose (Patient not taking: Reported on 6/25/2021) 2 Device 0    estradioL (ESTRACE) 0.01 % (0.1 mg/gram) vaginal cream PLACE 1 G VAGINALLY ONCE DAILY. 42.5 g 6    fluorometholone 0.1% (FML) 0.1 % DrpS 1 DROP IN BOTH EYES TWICE A DAY FOR 2 WEEKS THEN DAILY FOR 2 WEEKS  0    gabapentin (NEURONTIN) 600 MG tablet Take 1 tablet (600 mg total) by mouth once daily. 90 tablet 6    HYDROcodone-acetaminophen (NORCO) 7.5-325 mg per tablet Take 1 tablet by mouth every 24 hours as needed for Pain. 30 tablet 0    hydrocortisone 2.5 % ointment APPLY TOPICALLY 2 (TWO) TIMES DAILY. FOR EYELID RASH AS NEEDED 20 g 2    hydrocortisone 2.5 % ointment Apply topically 2 (two) times daily. (Patient not taking: Reported on 9/23/2022) 20 g 2    hydrOXYchloroQUINE (PLAQUENIL) 200 mg tablet Take 2 tablets (400 mg total) by mouth once daily. 60 tablet 6    hydrOXYzine HCL (ATARAX) 25 MG tablet TAKE 1 TAB BY MOUTH EVERY EVENING AS NEEDED FOR PRURITUS. DO NOT DRIVE OR OPERATE MACHINERY 30 tablet 2    levothyroxine (SYNTHROID) 100 MCG tablet TAKE 1 TABLET MONDAY THROUGH SATURDAY AND TAKE 1/2 TABLET ON SUNDAY 85 tablet 2    montelukast (SINGULAIR) 10 mg tablet TAKE 1 TABLET BY MOUTH EVERY DAY IN THE EVENING 90 tablet 3    naproxen (NAPROSYN) 500 MG tablet Take 1 tablet (500 mg total) by mouth 2 (two) times daily. (Patient not taking: Reported on 9/23/2022) 60 tablet 0    pantoprazole (PROTONIX) 40 MG tablet TAKE 1 TABLET BY MOUTH EVERY DAY 90 tablet 0    pantoprazole (PROTONIX) 40 MG tablet Take 1 tablet by mouth.      pimecrolimus (ELIDEL) 1 % cream Apply topically 2 (two) times daily. To rashes on face and arms as needed 30 g 2    soy isofl/blk coh/gr  tea/yerba (ESTROVEN ENERGY ORAL) Take by mouth.      tacrolimus (PROTOPIC) 0.1 % ointment Apply to skin 2 (two) times daily. 60 g 3    triamcinolone acetonide 0.1% (KENALOG) 0.1 % ointment Apply a thin layer to affected area 30 g 0     No current facility-administered medications for this visit.         ROS  Review of Systems   Constitutional:  Positive for fatigue. Negative for fever.   HENT:  Negative for congestion, rhinorrhea and sore throat.    Eyes:  Positive for pain.   Respiratory:  Negative for cough and shortness of breath.    Gastrointestinal:  Negative for diarrhea and vomiting.   Skin:  Positive for rash.     Rash Questionnaire (Submitted on 10/12/2022)  Chief Complaint: Rash  Chronicity: recurrent  Onset: in the past 7 days  Progression since onset: gradually worsening  Affected locations: face  Characteristics: burning, dryness, pain, redness, swelling, itchiness, peeling, scaling  Exposed to: unknown  anorexia: No  facial edema: Yes  joint pain: No  nail changes: No  Treatments tried: moisturizer  Improvement on treatment: mild  asthma: Yes  allergies: Yes  eczema: Yes  varicella: No    Physical Exam  Physical Exam  Pulmonary:      Effort: Pulmonary effort is normal. No respiratory distress.   Skin:     Findings: Rash present.   Neurological:      Mental Status: She is alert.         Health Maintenance         Date Due Completion Date    Sign Pain Contract Never done ---    Complete Opioid Risk Tool Never done ---    Shingles Vaccine (1 of 2) Never done ---    Pneumococcal Vaccines (Age 0-64) (2 - PCV) 10/29/2013 10/29/2012    LDCT Lung Screen Never done ---    COVID-19 Vaccine (5 - Booster for Pfizer series) 07/14/2022 5/19/2022    Influenza Vaccine (1) 09/01/2022 10/28/2020    Mammogram 08/09/2024 8/9/2022    Cervical Cancer Screening 09/11/2024 9/11/2019    Lipid Panel 09/14/2024 9/14/2019    TETANUS VACCINE 09/02/2026 9/2/2016    Colorectal Cancer Screening 03/09/2030 3/9/2020          [facility]  maintenance reviewed at this time.     Answers submitted by the patient for this visit:

## 2022-10-12 NOTE — PATIENT INSTRUCTIONS
Seated Thoracic Extension    Sitting on a chair with the top hitting your mid back, place your hands crossed on your shoulders.  Slowly tilt back so you are stretching your mid back.  Hold for 20-30 seconds.  Release and repeat.     OR        Sit in a chair with your buttocks along the back of the chair. Interlock your hands and place them behind your head and neck for support. Lean backwards over the back of the chair until you feel a comfortable stretch. Hold for instructed time.     Open Books    Lay on one side. Keep pelvis still. Follow hand with head and rotate backwards. Perform 10 times on each side, holding for 5-10 seconds each.     Chin Tuck with towel    While lying on your back with a small rolled up towel under the curve of your neck, tuck your chin towards your chest.      Maintain contact of the back of your with the surface you are lying on the entire time.     Hold for 5 seconds, repeat 3 sets of 10 repetitions for a total of 30 times.     Scapular Retractions      Draw your shoulder blades back and down, squeezing shoulder blades together. Hold for 5 seconds each time. Perform 10 repetitions and 3 sets, for a total of 30 times. Twice daily.     Cervical Rotation SNAG's to the Right    -Begin seated or standing  -Place towel behind the neck at the level that is bothering you   -Using the Left hand, grab the end of the towel on the Right side   -With the Right hand, grab the other end of the towel so your arms are crossed (Right over Left)  - While turning your head toward the Right, use the Right hand to gently pull towel up on a diagonal   -Return to starting position  -Hold each for 5 seconds and return back to center. Repeat 3 sets of 10 repetitions for a total of 30 times.       Towel Slides    Stand facing square to the wall with your feet staggered and weight on the balls of your feet (to engage core).  Position your arm parallel to the floor with your palm firmly pressing the towel on to the  wall.  Keep your core tight, belly button in.  Shoulder should be drawn down and back in start position (retraction and depression). Repeat for 3 sets of 10, for a total of 30 repetitions.

## 2022-10-17 NOTE — PLAN OF CARE
[facility]  Physical Therapy Initial Evaluation    Name: Emily Jacobson [facility]  Clinic Number: [id number]    Therapy Diagnosis:   Encounter Diagnoses   Name Primary?    Neck pain     Cervical pain     Weakness of both upper extremities     Decreased range of motion of neck      Physician: Ad Spaulding MD    Physician Orders: PT Eval and Treat  Medical Diagnosis from Referral: M54.2 (ICD-10-CM) - Neck pain  Evaluation Date: 10/12/2022  Authorization Period Expiration: 11/9/2022  Plan of Care Expiration: 10/12/2022 to 12/31/2022  Visit # / Visits authorized: 1/1    Time In: 10:56 AM  Time Out: 11:45 AM  Total Billable Time: 49 minutes (Moderate Complexity Evaluation, 2 TE)    Precautions: Lupus, Fibromyalgia     Subjective     Date of onset: several months    History of current condition - Emily reports: She's been experiencing R sided neck pain for the last several months. Cannot report a specific LEYLA, but states she did have a fall down some concrete stairs several months ago; states she missed a step and fell backwards hitting her head. She hudson snot recall any pain in her neck following the fall, but thinks that may have something to do with her current pain. Points to the pain as being on the right side and sometimes feels as though it radiates in a viktor's horn fashion on the right side. Describes the pain as sharp and tingling that goes from her shoulder to her elbow. Also has some painful cracking on the right side of her neck. Current aggravating factors include: driving, all cervical motions, and sleeping. Current easing factors include: mm relaxers and her pain medication for her lupus. Overall she feels as though the pain is getting worse and has even noticed some swelling near her lymph nodes just below her ear. Denies any previous neck pain, denies thoracic pain.         Medical History:   Past Medical History:   Diagnosis Date    Chronic allergic conjunctivitis      Chronic allergic rhinitis due to animal hair and dander     COPD (chronic obstructive pulmonary disease)     Fibromyalgia     Hypothyroidism     Immune disorder     Long-term use of Plaquenil 5/7/2019    Lupus     Mild persistent asthma without complication 11/12/2019    Pt with reports daily cough, dyspnea, freqent rescue inhaler use. Recommend maintenance therapy  Treat triggers-sinus, gerd, smoking cessation.  Normal pulmonary studies.     CHEPE (obstructive sleep apnea) 11/12/2019    CHEPE on CPAP 11/12/2019    Patient with symptoms of snoring, witnessed apnea, excessive daytime sleepiness, fatigue and difficulty staying asleep. Dx with CHEPE in 2015 but difficulty tolerating cpap at time due to sinus problems.  HST 1/22/2020: AHI 7 RDI 15      Palpitations 11/12/2019    May be related to breathing issues    Recurrent urticaria 1/6/2017    S/P laparoscopic cholecystectomy 8/4/2017    Subacute cutaneous lupus erythematosus        Surgical History:   Emily Jacobson Yovani  has a past surgical history that includes Cholecystectomy; Esophagogastroduodenoscopy (N/A, 3/9/2020); and Colonoscopy (N/A, 3/9/2020).    Medications:   Emily has a current medication list which includes the following prescription(s): albuterol, albuterol, albuterol-ipratropium, albuterol-ipratropium, azelastine, budesonide-formoterol 160-4.5 mcg, cyclobenzaprine, cyclobenzaprine, duloxetine, duloxetine, epinephrine, estradiol, fluorometholone 0.1%, gabapentin, hydrocodone-acetaminophen, hydrocortisone, hydrocortisone, hydroxychloroquine, hydroxyzine hcl, levothyroxine, montelukast, naproxen, pantoprazole, pantoprazole, pimecrolimus, soy isofl/blk coh/gr tea/yerba, tacrolimus, and triamcinolone.    Allergies:   Review of patient's allergies indicates:   Allergen Reactions    Bactrim [sulfamethoxazole-trimethoprim] Rash        Imaging:   X-Ray Cervical Spine (9/23/2022):   Impression:     Bilateral hypertrophic facet arthropathy with  anterolisthesis at C2-3.     No significant disc height loss.     Correlation with cross-sectional imaging if warranted.    Prior Therapy: Yes for low back  Social History:  lives with their spouse  Occupation: Not currently employed  Prior Level of Function: Independent   Current Level of Function: Difficulty with driving and end range cervical motions    Pain:  Current 6/10, worst 10/10, best 6/10   Location: right cervical spine   Description: Tingling and Sharp  Aggravating Factors: driving, all cervical motions, and sleeping  Easing Factors: mm relaxers and pain medication    Pts goals: To decrease the pain and swelling.     Objective     Posture: Rounded shoulders, slightly forward head  Palpation: Tender to moderate palpation along right cervical paraspinals  Sensation: Intact      Range of Motion/Strength:     Cervical AROM Pain/Dysfunction with Movement   Flexion 40    Extension 30    Right Sidebending 20* Right cervical pain   Left Sidebending 25    Right Rotation 32* Right cervical pain   Left Rotation 61        Shoulder Right Left Pain/Dysfunction with Movement   AROM/PROM      Flexion WNL WNL    Extension WNL WNL    Abduction WNL WNL    Adduction WNL WNL    Internal Rotation WNL WNL    ER at 90° abd WNL WNL        U/E MMT Right Left Pain/Dysfunction with Movement   Shoulder Flexion 4/5 4+/5    Shoulder Extension 4+/5 4+/5    Shoulder Abduction 4+/5 4+/5    Shoulder IR 4+/5 4+/5    Shoulder ER  @ 0* Abduction 4+/5 4+/5    Elbow Flexion  5/5 5/5    Elbow Extension 4/5 4/5    Rhomboids 3+/5 3+/5    Mid Traps 3+/5 3+/5    Low Traps 3+/5 3+/5      Joint Mobility:   - Cervical: increased pain with closure of right facet joints, decreased sideglides throughout    Special Tests:     Test Right Left Pain/Dysfunction with Movement   Sharp Surekha      Spurling's (-) (-)    Distraction (-) (-)          CMS Impairment/Limitation/Restriction for FOTO Neck Survey    Therapist reviewed FOTO scores for Emily Jacobson  "Yovani on 10/12/2022.   FOTO documents entered into [employer] - see Media section.    Current Limitation Score: 61%  Predicted Limitation Score: 48%  Category: Mobility         TREATMENT     Treatment Time In: 11:20 AM  Treatment Time Out: 11:45 AM  Total Treatment time separate from Evaluation: 25 minutes    therapeutic exercises to develop strength, ROM, and posture for 20 minutes including:  Seated thoracic extension over 1/2 foam: 5x5" holds  Sidelying Open Books: 5x5" holds, B  Supine Chin Tuck: 10x, 5" holds  Cervical Rotation SNAG's to the R: 10x, 5" holds  Seated Scapular Retractions: 10x, 5" holds  Wall slides into thoracic extension: 2 minutes  Patient education      manual therapy techniques: Joint mobilizations were applied to the: Cervical spine for 5 minutes, including:  Grade III P/A thoracic mobilizations        Home Exercises and Patient Education Provided    Education provided:   - Importance and role of physical therapy  - Proper body mechanics when performing HEP    Written Home Exercises Provided: yes.  Exercises were reviewed and Emily was able to demonstrate them prior to the end of the session.  Emily demonstrated good  understanding of the education provided.     See EMR under Patient Instructions for exercises provided 10/12/2022.    Assessment     Emily is a 52 y.o. female referred to outpatient Physical Therapy with a medical diagnosis of neck pain. Pt presents to PT with pain, decreased cervical ROM, decreased strength and flexibility of bilateral upper extremities, poor posture, and functional deficits with driving, performing ADL's and some IADL's, and sleeping. Patient's pain is reproduced with closing of the right cervical facets indicating facet dysfunction. Patient would benefit from participating in skilled physical therapy to address deficits and improve joint mobility and postural strengthening.      Pt prognosis is Fair.   Pt will benefit from skilled outpatient Physical " Therapy to address the deficits stated above and in the chart below, provide pt/family education, and to maximize pt's level of independence.     Plan of care discussed with patient: Yes  Pt's spiritual, cultural and educational needs considered and pt agreeable to plan of care and goals as stated below:     Anticipated Barriers for therapy: COVID-19      Medical Necessity is demonstrated by the following  History  Co-morbidities and personal factors that may impact the plan of care Co-morbidities:   Peripheral neuropathy, recurrent urticaria, asthma, palpitations, lupus, hypothyroidism, GERD, fibromyalgia, CHEPE on CPAP    Personal Factors:   no deficits     high   Examination  Body Structures and Functions, activity limitations and participation restrictions that may impact the plan of care Body Regions:   neck  upper extremities    Body Systems:    ROM  strength  motor control  motor learning    Participation Restrictions:   None    Activity limitations:   Learning and applying knowledge  no deficits    General Tasks and Commands  undertaking multiple tasks    Communication  no deficits    Mobility  lifting and carrying objects  driving (bike, car, motorcycle)    Self care  washing oneself (bathing, drying, washing hands)  dressing    Domestic Life  shopping  cooking  doing house work (cleaning house, washing dishes, laundry)  assisting others    Interactions/Relationships  no deficits    Life Areas  no deficits    Community and Social Life  community life  recreation and leisure         moderate   Clinical Presentation evolving clinical presentation with changing clinical characteristics moderate   Decision Making/ Complexity Score: moderate         Goals:    Short Term Goals (6 Weeks):   1. Pt will be compliant with HEP to assist PT treatment in restoring pain free motion of the R shoulder.   2. Pt will improve impaired cervical MMTs 1/2 grade B to improve strength for functional tasks.  3. Pt will improve  impaired cervical ROM by 5 degrees to improve functional mobility of UE's.   4. Pt will report pain at worst to less than or equal to 4/10.      Long Term Goals (12 Weeks):   1. Pt will improve FOTO score to </= 48% to demonstrate improvements in carrying, moving, and handling objects  2. Pt will improve impaired cervical MMTs 1 grade B to improve strength for household duties.  3. Pt will improve cervical AROM to WNL's to improve functional mobility of UE's.  4. Pt will report no pain in neck or in leslie's horn fashion.   5. Pt will report no pain with driving.   6. Pt will return to PLOF.        Plan     Plan of care Certification: 10/12/2022 to 12/31/2022.    Outpatient Physical Therapy 2 times weekly for 12 weeks to include the following interventions: Electrical Stimulation , FDN prn, Manual Therapy, Moist Heat/ Ice, Neuromuscular Re-ed, Patient Education, Therapeutic Activities, Therapeutic Exercise, and Kinesiotape.     Geovanna Burks, PT, DPT

## 2022-10-25 ENCOUNTER — CLINICAL SUPPORT (OUTPATIENT)
Dept: REHABILITATION | Facility: HOSPITAL | Age: 52
End: 2022-10-25
Attending: INTERNAL MEDICINE
Payer: MEDICARE

## 2022-10-25 DIAGNOSIS — M54.2 CERVICAL PAIN: Primary | ICD-10-CM

## 2022-10-25 DIAGNOSIS — R29.898 DECREASED RANGE OF MOTION OF NECK: ICD-10-CM

## 2022-10-25 DIAGNOSIS — R29.898 WEAKNESS OF BOTH UPPER EXTREMITIES: ICD-10-CM

## 2022-10-25 PROCEDURE — 97110 THERAPEUTIC EXERCISES: CPT | Mod: PN

## 2022-10-25 PROCEDURE — 97140 MANUAL THERAPY 1/> REGIONS: CPT | Mod: PN

## 2022-10-25 NOTE — PROGRESS NOTES
"[facility]   Physical Therapy Treatment Note     Name: Emily Jacobson [facility]  Clinic Number: [id number]    Therapy Diagnosis:   Encounter Diagnoses   Name Primary?    Cervical pain Yes    Weakness of both upper extremities     Decreased range of motion of neck      Physician: Ad Spaulding MD    Visit Date: 10/25/2022  Physician Orders: PT Eval and Treat  Medical Diagnosis from Referral: M54.2 (ICD-10-CM) - Neck pain  Evaluation Date: 10/12/2022  Authorization Period Expiration: 11/9/2022  Plan of Care Expiration: 10/12/2022 to 12/31/2022  Visit # / Visits authorized: 1/1    Precautions: Lupus, Fibromyalgia     Time In: 0930A  Time Out: 1030A  Total Billable Time: 60 minutes      SUBJECTIVE     Pt reports: She has been dealing with a loss in the family and all the logistics of the arrangements so she has not had time to do her HEP. She also notices increased tension along the R side of her neck and pain upon returning to midline from R rotation.  She was not compliant with home exercise program.  Response to previous treatment: soreness that has since resolved   Functional change: none    Pain: 5/10  Location: right neck      OBJECTIVE     Objective Measures updated at progress report unless specified.       TREATMENT     Emily received the treatments listed below:      therapeutic exercises to develop strength, ROM, and posture for 35 minutes including:  Seated thoracic extension over 1/2 foam: 5x5" holds  Sidelying Open Books: 2x8 YTB B  Supine Chin Tuck: 10x2  Cervical Rotation SNAG's - rhythmic rotations B  D2 flexion YTB AROM BUE  Wall slides into thoracic extension: 2 minutes  Pec stretch in corner [id number]  UBE 3/3         manual therapy techniques: Joint mobilizations were applied to the: Cervical spine for 25 minutes, including:  Grade III P/A thoracic mobilizations  Gr III-IV mobilizations to R facet joints for R lateral flexion  STM to cervical paraspinals, R UT and " LS  CFM over LS attachment to C2 on R      PATIENT EDUCATION AND HOME EXERCISES     Home Exercises Provided and Patient Education Provided     Education provided:   - HEP compliance    Written Home Exercises Provided: Patient instructed to cont prior HEP. Exercises were reviewed and Emily was able to demonstrate them prior to the end of the session.  Emily demonstrated fair  understanding of the education provided. See EMR under Patient Instructions for exercises provided during therapy sessions    ASSESSMENT     Emily presents with noted STR t/o R UT, LS and scalenes coupled with proximal median nerve tension. Emily responded well to manual intervention for facet mobilizations with less discomfort with R c/s rotation and decreased tension t/o R LS following CFM over LS attachment on C2. Pt will continue to require postural strengthening and VC for reducing UT compensations. Pt responded well to treatment today and is encouraged by the reduction in tension. She admits she does not frequently exercises but is ready to start.     Emily Is progressing well towards her goals.   Pt prognosis is Good.     Pt will continue to benefit from skilled outpatient physical therapy to address the deficits listed in the problem list box on initial evaluation, provide pt/family education and to maximize pt's level of independence in the home and community environment.     Pt's spiritual, cultural and educational needs considered and pt agreeable to plan of care and goals.     Anticipated barriers to physical therapy: none    Goals: Short Term Goals (6 Weeks):   1. Pt will be compliant with HEP to assist PT treatment in restoring pain free motion of the R shoulder.   2. Pt will improve impaired cervical MMTs 1/2 grade B to improve strength for functional tasks.  3. Pt will improve impaired cervical ROM by 5 degrees to improve functional mobility of UE's.   4. Pt will report pain at worst to less than or equal to 4/10.      Long Term  Goals (12 Weeks):   1. Pt will improve FOTO score to </= 48% to demonstrate improvements in carrying, moving, and handling objects  2. Pt will improve impaired cervical MMTs 1 grade B to improve strength for household duties.  3. Pt will improve cervical AROM to WNL's to improve functional mobility of UE's.  4. Pt will report no pain in neck or in leslie's horn fashion.   5. Pt will report no pain with driving.   6. Pt will return to PLOF.     PLAN     Cont POC with manual intervention for R facet upglides; cervical traction and STM t/o R UT and LS  Cont with strength training for postural stabilizers   Recommend soft tissue mobilizations over R SCM - pt occasionally demonstrates acquired torticollis     Ana Deras, PT   10/25/2022

## 2022-10-27 ENCOUNTER — IMMUNIZATION (OUTPATIENT)
Dept: OBSTETRICS AND GYNECOLOGY | Facility: CLINIC | Age: 52
End: 2022-10-27
Payer: MEDICARE

## 2022-10-27 DIAGNOSIS — Z23 NEED FOR VACCINATION: Primary | ICD-10-CM

## 2022-10-27 PROCEDURE — 91312 COVID-19, MRNA, LNP-S, BIVALENT BOOSTER, PF, 30 MCG/0.3 ML DOSE: CPT | Mod: S$GLB,,, | Performed by: FAMILY MEDICINE

## 2022-10-27 PROCEDURE — 0124A COVID-19, MRNA, LNP-S, BIVALENT BOOSTER, PF, 30 MCG/0.3 ML DOSE: CPT | Mod: PBBFAC | Performed by: FAMILY MEDICINE

## 2022-10-27 PROCEDURE — 91312 COVID-19, MRNA, LNP-S, BIVALENT BOOSTER, PF, 30 MCG/0.3 ML DOSE: ICD-10-PCS | Mod: S$GLB,,, | Performed by: FAMILY MEDICINE

## 2022-11-01 ENCOUNTER — PATIENT MESSAGE (OUTPATIENT)
Dept: FAMILY MEDICINE | Facility: CLINIC | Age: 52
End: 2022-11-01
Payer: MEDICARE

## 2022-11-07 DIAGNOSIS — Z79.899 LONG-TERM USE OF PLAQUENIL: ICD-10-CM

## 2022-11-07 DIAGNOSIS — L93.1 SUBACUTE CUTANEOUS LUPUS ERYTHEMATOSUS: Primary | ICD-10-CM

## 2022-11-08 ENCOUNTER — CLINICAL SUPPORT (OUTPATIENT)
Dept: REHABILITATION | Facility: HOSPITAL | Age: 52
End: 2022-11-08
Attending: INTERNAL MEDICINE
Payer: MEDICARE

## 2022-11-08 DIAGNOSIS — R29.898 WEAKNESS OF BOTH UPPER EXTREMITIES: ICD-10-CM

## 2022-11-08 DIAGNOSIS — M54.2 CERVICAL PAIN: Primary | ICD-10-CM

## 2022-11-08 DIAGNOSIS — R29.898 DECREASED RANGE OF MOTION OF NECK: ICD-10-CM

## 2022-11-08 PROCEDURE — 97110 THERAPEUTIC EXERCISES: CPT | Mod: PN

## 2022-11-08 PROCEDURE — 97140 MANUAL THERAPY 1/> REGIONS: CPT | Mod: PN

## 2022-11-08 NOTE — PROGRESS NOTES
"[facility]   Physical Therapy Treatment Note     Name: Emily Jacobson [facility]  Clinic Number: [id number]    Therapy Diagnosis:   Encounter Diagnoses   Name Primary?    Cervical pain Yes    Weakness of both upper extremities     Decreased range of motion of neck      Physician: Ad Spaulding MD    Visit Date: 11/8/2022  Physician Orders: PT Eval and Treat  Medical Diagnosis from Referral: M54.2 (ICD-10-CM) - Neck pain  Evaluation Date: 10/12/2022  Authorization Period Expiration: 11/9/2022  Plan of Care Expiration: 10/12/2022 to 12/31/2022  Visit # / Visits authorized: 2/11    Precautions: Lupus, Fibromyalgia     Time In: 9:35 AM  Time Out: 10:30 AM  Total Billable Time: 30 minutes      SUBJECTIVE     Pt reports: She feels slight improvements in pain since beginning therapy. States the most pain she has right now is with returning her head to center from right rotation as well as with right sidebending.   She was not compliant with home exercise program.  Response to previous treatment: soreness that has since resolved   Functional change: none    Pain: 5/10  Location: right neck      OBJECTIVE     Objective Measures updated at progress report unless specified.       TREATMENT     Emily received the treatments listed below:      Manual therapy techniques: Joint mobilizations were applied to the: Cervical and thoracic spine for 20 minutes, including:  Grade III P/A thoracic mobilizations  Right downglides throughout cervical spine  MET into R sidebending and R rotation with test-re-test in between      Therapeutic exercises to develop strength, ROM, and posture for 35 minutes including:  Cervical AROM: 20x, each direction  Sidelying Open Books: 2x8 YTB B  Seated Chin Tuck: 10x2  Seated thoracic extension over 1/2 foam: 5x5" holds  D2 flexion: 3x10, RTB AROM BUE        PATIENT EDUCATION AND HOME EXERCISES     Home Exercises Provided and Patient Education Provided     Education " provided:   - HEP compliance    Written Home Exercises Provided: Patient instructed to cont prior HEP. Exercises were reviewed and Emily was able to demonstrate them prior to the end of the session.  Emily demonstrated fair  understanding of the education provided. See EMR under Patient Instructions for exercises provided during therapy sessions    ASSESSMENT     Emily presents to therapy with reports of slightly improved pain since beginning therapy. States she's still having pain when returning to center from right rotation as well as pain with right sidebending. MET's performed for right rotation and right sidebending with patient displaying a resolution in symptoms with right rotation, but continued pain with sidebending, although ROM improved. Continued with focus on improving thoracic mobility and postural strengthening with good tolerance and moderate cueing for postural awareness.     Emily Is progressing well towards her goals.   Pt prognosis is Good.     Pt will continue to benefit from skilled outpatient physical therapy to address the deficits listed in the problem list box on initial evaluation, provide pt/family education and to maximize pt's level of independence in the home and community environment.     Pt's spiritual, cultural and educational needs considered and pt agreeable to plan of care and goals.     Anticipated barriers to physical therapy: none    Goals: Short Term Goals (6 Weeks):   1. Pt will be compliant with HEP to assist PT treatment in restoring pain free motion of the R shoulder. Progressing, Not Met  2. Pt will improve impaired cervical MMTs 1/2 grade B to improve strength for functional tasks. Progressing, Not Met  3. Pt will improve impaired cervical ROM by 5 degrees to improve functional mobility of UE's. Progressing, Not Met  4. Pt will report pain at worst to less than or equal to 4/10. Progressing, Not Met     Long Term Goals (12 Weeks):   1. Pt will improve FOTO score to </=  48% to demonstrate improvements in carrying, moving, and handling objects. Progressing, Not Met  2. Pt will improve impaired cervical MMTs 1 grade B to improve strength for household duties.Progressing, Not Met  3. Pt will improve cervical AROM to WNL's to improve functional mobility of UE's.Progressing, Not Met  4. Pt will report no pain in neck or in leslie's horn fashion. Progressing, Not Met  5. Pt will report no pain with driving. Progressing, Not Met  6. Pt will return to PLOF. Progressing, Not Met    PLAN     Update POC next session.     Cervical Rotation SNAG's - rhythmic rotations B  Wall slides into thoracic extension: 2 minutes  Pec stretch in corner [id number]  UBE 3/3    Geovanna Burks, PT   11/08/2022

## 2022-11-09 DIAGNOSIS — R11.2 INTRACTABLE VOMITING WITH NAUSEA: Primary | ICD-10-CM

## 2022-11-29 ENCOUNTER — PES CALL (OUTPATIENT)
Dept: ADMINISTRATIVE | Facility: CLINIC | Age: 52
End: 2022-11-29
Payer: MEDICARE

## 2023-01-05 ENCOUNTER — PATIENT MESSAGE (OUTPATIENT)
Dept: FAMILY MEDICINE | Facility: CLINIC | Age: 53
End: 2023-01-05
Payer: MEDICARE

## 2023-01-05 ENCOUNTER — E-VISIT (OUTPATIENT)
Dept: FAMILY MEDICINE | Facility: CLINIC | Age: 53
End: 2023-01-05
Payer: MEDICARE

## 2023-01-05 ENCOUNTER — TELEPHONE (OUTPATIENT)
Dept: FAMILY MEDICINE | Facility: CLINIC | Age: 53
End: 2023-01-05
Payer: MEDICARE

## 2023-01-05 DIAGNOSIS — J18.9 PRIMARY ATYPICAL PNEUMONIA: Primary | ICD-10-CM

## 2023-01-05 PROCEDURE — 99421 PR E&M, ONLINE DIGIT, EST, < 7 DAYS, 5-10 MINS: ICD-10-PCS | Mod: S$GLB,,, | Performed by: FAMILY MEDICINE

## 2023-01-05 PROCEDURE — 99421 OL DIG E/M SVC 5-10 MIN: CPT | Mod: S$GLB,,, | Performed by: FAMILY MEDICINE

## 2023-01-05 RX ORDER — AZITHROMYCIN 250 MG/1
TABLET, FILM COATED ORAL
Qty: 6 TABLET | Refills: 0 | Status: SHIPPED | OUTPATIENT
Start: 2023-01-05 | End: 2023-01-10

## 2023-01-05 RX ORDER — PROMETHAZINE HYDROCHLORIDE AND DEXTROMETHORPHAN HYDROBROMIDE 6.25; 15 MG/5ML; MG/5ML
5 SYRUP ORAL 3 TIMES DAILY PRN
Qty: 118 ML | Refills: 0 | Status: SHIPPED | OUTPATIENT
Start: 2023-01-05 | End: 2023-01-15

## 2023-01-05 NOTE — TELEPHONE ENCOUNTER
----- Message from Lida Sauceda sent at 1/5/2023 12:22 PM CST -----  Regarding: self [phone]  .Type:  Sooner Appointment Request    Patient is requesting a sooner appointment.  Patient declined first available appointment listed as well as another facility and provider .  Patient will not accept being placed on the waitlist and is requesting a message be sent to doctor.    Name of Caller:self    When is the first available appointment?1/23/2023    Symptoms: sore throat patient feels like she have a respiratory infection     Would the patient rather a call back or a response via [facility]? Both     Best Call Back Number: [phone]

## 2023-01-05 NOTE — TELEPHONE ENCOUNTER
Attempt to call patient and was not available. Patient went to urgent care if she needs any other assistant to call us back .

## 2023-01-05 NOTE — PROGRESS NOTES
Patient ID: Emily Pina is a 52 y.o. female.    Chief Complaint: URI    The patient initiated a request through [employer] on 1/5/2023 for evaluation and management with a chief complaint of URI     I evaluated the questionnaire submission on 1/5/23.    [facility] Peq Evisit Upper Respitatory/Cough Questionnaire    1/5/2023  1:17 PM CST - Filed by Patient   Do you agree to participate in an E-Visit? Yes   If you have any of the following symptoms, please present to your local ER or call 911:  I acknowledge   What is the main issue that you would like for your doctor to address today? Fever chills diarrhea constant deep cough every now n then burning red rash around eyes on chest and hand   Are you able to take your vital signs? No   What symptoms do you currently have?  Chills;  Cough;  Diarrhea;  Fatigue   Have you had a fever? Yes   What has been the range of your fever? Less than 100.4   When did your symptoms first appear? 1/1/2023   In the last two weeks, have you been in close contact with someone who has COVID-19 or the Flu? No   In the last two weeks, have you worked or volunteered in a healthcare facility or as a ? Healthcare facilities include a hospital, medical or dental clinic, long-term care facility, or nursing home No   Do you live in a long-term care facility, nursing home, or homeless shelter? No   List what you have done or taken to help your symptoms. Tylenol n NyQuil n  Benadryl for rashes   How severe are your symptoms? Moderate   Have you taken an at home Covid test? No   Have you taken a Flu test? No   Have you been fully vaccinated for COVID? (2 Pfizer, 2 Moderna or 1 Virgilio & Virgilio vaccine injections) Yes   Have you received a booster? Yes   Have you recieved a Flu shot? No   Do you have transportation to get tested for COVID if it is indicated and ordered for you at an [facility] location? Yes   Are you currently pregnant, could you be pregnant, or are you breast  feeding? None of the above   Provide any information you feel is important to your history not asked above    Please attach any relevant images or files           Active Problem List with Overview Notes    Diagnosis Date Noted    Cervical pain 10/12/2022    Weakness of both upper extremities 10/12/2022    Decreased range of motion of neck 10/12/2022    Change in bowel movement 03/09/2020    Palpitations 11/12/2019     May be related to breathing issues      Mild persistent asthma without complication 11/12/2019     Pt with reports daily cough, dyspnea, freqent rescue inhaler use. Recommend maintenance therapy    Treat triggers-sinus, gerd, smoking cessation.    Normal pulmonary studies.       CHEPE on CPAP 11/12/2019     Patient with symptoms of snoring, witnessed apnea, excessive daytime sleepiness, fatigue and difficulty staying asleep. Dx with CHEPE in 2015 but difficulty tolerating cpap at time due to sinus problems.   HST 1/22/2020: AHI 7 RDI 15          Refractive error 05/07/2019    Long-term use of Plaquenil 05/07/2019    Decreased functional activity tolerance 05/02/2019    Decreased ROM of lumbar spine 05/02/2019    Weakness 05/02/2019    Disorder of immune system 12/06/2017     Followed by Dr. Spaulding ([facility] de[facility]) for subacute cutaneous lupus erythematosus (10-9-2017)       Biliary colic 08/04/2017    S/P laparoscopic cholecystectomy 08/04/2017    Recurrent urticaria 01/06/2017     Dx updated per 2019 IMO Load      Other pruritus 01/06/2017    Chronic allergic conjunctivitis 11/13/2016    Chronic allergic rhinitis due to animal hair and dander 11/13/2016     Addition  topical antihistamine to regimen. Pt on singulair, flonase      Hypothyroidism 11/10/2014    Unspecified hereditary and idiopathic peripheral neuropathy 09/25/2014     Dx updated per 2019 IMO Load      Tobacco abuse 10/29/2012      smoking cessation      GERD (gastroesophageal reflux disease) 10/29/2012     Follow by GI       Subacute cutaneous lupus erythematosus       +SSA, RNP  3/30/16 per Rheumatology History of present illness: 46-year-old female with (subacute cutaneous lupus). She has had no evidence of systemic lupus. She remains on Plaquenil.       Fibromyalgia       Recent Labs Obtained:  No visits with results within 7 Day(s) from this visit.   Latest known visit with results is:   Lab Visit on 2022   Component Date Value Ref Range Status    Specimen UA 2022 Urine, Unspecified   Final    Color, UA 2022 Yellow  Yellow, Straw, Deirdre Final    Appearance, UA 2022 Clear  Clear Final    pH, UA 2022 6.0  5.0 - 8.0 Final    Specific Gravity, UA 2022 1.010  1.005 - 1.030 Final    Protein, UA 2022 Negative  Negative Final    Comment: Recommend a 24 hour urine protein or a urine   protein/creatinine ratio if globulin induced proteinuria is  clinically suspected.      Glucose, UA 2022 Negative  Negative Final    Ketones, UA 2022 Negative  Negative Final    Bilirubin (UA) 2022 Negative  Negative Final    Occult Blood UA 2022 Negative  Negative Final    Nitrite, UA 2022 Negative  Negative Final    Leukocytes, UA 2022 Negative  Negative Final       Encounter Diagnosis   Name Primary?    Primary atypical pneumonia Yes        No orders of the defined types were placed in this encounter.     Medications Ordered This Encounter   Medications    azithromycin (Z-GIGI) 250 MG tablet     Sig: Take 2 tablets by mouth on day 1; Take 1 tablet by mouth on days 2-5     Dispense:  6 tablet     Refill:  0    promethazine-dextromethorphan (PROMETHAZINE-DM) 6.25-15 mg/5 mL Syrp     Sig: Take 5 mLs by mouth 3 (three) times daily as needed.     Dispense:  118 mL     Refill:  0        E-Visit Time Trackin min  Day 1 Time (in minutes): 6     Total Time (in minutes): 6

## 2023-01-10 ENCOUNTER — PATIENT MESSAGE (OUTPATIENT)
Dept: FAMILY MEDICINE | Facility: CLINIC | Age: 53
End: 2023-01-10
Payer: MEDICARE

## 2023-01-13 ENCOUNTER — HOSPITAL ENCOUNTER (EMERGENCY)
Facility: HOSPITAL | Age: 53
Discharge: HOME | End: 2023-01-13
Attending: EMERGENCY MEDICINE
Payer: MEDICARE

## 2023-01-13 VITALS
OXYGEN SATURATION: 96 % | HEART RATE: 119 BPM | BODY MASS INDEX: 26.07 KG/M2 | SYSTOLIC BLOOD PRESSURE: 157 MMHG | WEIGHT: 138 LBS | RESPIRATION RATE: 17 BRPM | TEMPERATURE: 99 F | DIASTOLIC BLOOD PRESSURE: 89 MMHG

## 2023-01-13 DIAGNOSIS — R21 RASH: Primary | ICD-10-CM

## 2023-01-13 PROCEDURE — 99283 EMERGENCY DEPT VISIT LOW MDM: CPT

## 2023-01-13 RX ORDER — PREDNISONE 20 MG/1
40 TABLET ORAL DAILY
Qty: 10 TABLET | Refills: 0 | Status: SHIPPED | OUTPATIENT
Start: 2023-01-13 | End: 2023-01-18

## 2023-01-13 NOTE — Clinical Note
"Emily Pina (Jolie) was seen and treated in our emergency department on 1/13/2023.  She may return to work on 01/15/2023.       If you have any questions or concerns, please don't hesitate to call.      Jax Cancino MD"

## 2023-01-13 NOTE — ED PROVIDER NOTES
Encounter Date: 1/13/2023       History     Chief Complaint   Patient presents with    Rash     Around chest, eyes, right arm x2 weeks. +itching and burning     52-year-old female past medical history lupus presenting secondary rash on arms legs around face.  Patient has been using Benadryl.  Ongoing for 2 weeks.  Couple days beforehand patient had URI like symptoms.  No chest pain or shortness of breath abdominal pain urinary complaints.  No fevers or chills.  No runny nose cough congestion.    Review of patient's allergies indicates:   Allergen Reactions    Bactrim [sulfamethoxazole-trimethoprim] Rash     Past Medical History:   Diagnosis Date    Chronic allergic conjunctivitis     Chronic allergic rhinitis due to animal hair and dander     COPD (chronic obstructive pulmonary disease)     Fibromyalgia     Hypothyroidism     Immune disorder     Long-term use of Plaquenil 5/7/2019    Lupus     Mild persistent asthma without complication 11/12/2019    Pt with reports daily cough, dyspnea, freqent rescue inhaler use. Recommend maintenance therapy  Treat triggers-sinus, gerd, smoking cessation.  Normal pulmonary studies.     CHEPE (obstructive sleep apnea) 11/12/2019    CHEPE on CPAP 11/12/2019    Patient with symptoms of snoring, witnessed apnea, excessive daytime sleepiness, fatigue and difficulty staying asleep. Dx with CHEPE in 2015 but difficulty tolerating cpap at time due to sinus problems.  HST 1/22/2020: AHI 7 RDI 15      Palpitations 11/12/2019    May be related to breathing issues    Recurrent urticaria 1/6/2017    S/P laparoscopic cholecystectomy 8/4/2017    Subacute cutaneous lupus erythematosus      Past Surgical History:   Procedure Laterality Date    CHOLECYSTECTOMY      COLONOSCOPY N/A 3/9/2020    Procedure: COLONOSCOPY;  Surgeon: Judah Lopez MD;  Location: [address];  Service: Endoscopy;  Laterality: N/A;    ESOPHAGOGASTRODUODENOSCOPY N/A 3/9/2020    Procedure: EGD  (ESOPHAGOGASTRODUODENOSCOPY);  Surgeon: Judah Lopez MD;  Location: [facility] ([address] [place] [address]);  Service: Endoscopy;  Laterality: N/A;     Family History   Problem Relation Age of Onset    Hypertension Mother     Thyroid disease Mother     Cataracts Mother     Cancer Mother         duodenal    Arthritis Father     Hyperlipidemia Father     Cancer Father         Multiple Myeloma     Cirrhosis Father     Cataracts Father     Lupus Sister     Asthma Sister     No Known Problems Brother     No Known Problems Maternal Aunt     No Known Problems Maternal Uncle     No Known Problems Paternal Aunt     Pancreatic cancer Paternal Uncle     No Known Problems Maternal Grandmother     Lung cancer Maternal Grandfather     No Known Problems Paternal Grandmother     Lung cancer Paternal Grandfather     Scoliosis Sister     Amblyopia Neg Hx     Blindness Neg Hx     Diabetes Neg Hx     Glaucoma Neg Hx     Macular degeneration Neg Hx     Retinal detachment Neg Hx     Strabismus Neg Hx     Stroke Neg Hx      Social History     Tobacco Use    Smoking status: Every Day     Packs/day: 1.50     Years: 32.00     Pack years: 48.00     Types: Cigarettes    Smokeless tobacco: Former     Quit date: 7/8/2013    Tobacco comments:     started age 17   Substance Use Topics    Alcohol use: No     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     Drug use: No     Review of Systems   Constitutional:  Negative for fever.   HENT:  Negative for sore throat.    Respiratory:  Negative for shortness of breath.    Cardiovascular:  Negative for chest pain.   Gastrointestinal:  Negative for nausea.   Genitourinary:  Negative for dysuria.   Musculoskeletal:  Negative for back pain.   Skin:  Positive for rash.   Neurological:  Negative for weakness.   Hematological:  Does not bruise/bleed easily.   Psychiatric/Behavioral:  Negative for agitation.    All other systems reviewed and are negative.    Physical Exam     Initial Vitals   BP Pulse Resp Temp SpO2   01/13/23 1451  01/13/23 1451 01/13/23 1449 01/13/23 1451 01/13/23 1451   (!) 157/89 (!) 119 17 98.8 °F (37.1 °C) 96 %      MAP       --                Physical Exam    Nursing note and vitals reviewed.  Constitutional: She appears well-developed and well-nourished.   HENT:   Head: Normocephalic and atraumatic.   Mouth/Throat: Oropharynx is clear and moist and mucous membranes are normal.   Eyes: Conjunctivae and EOM are normal. Pupils are equal, round, and reactive to light. Right conjunctiva is not injected. Left conjunctiva is not injected. No scleral icterus.   Neck: Neck supple.   Normal range of motion.   Full passive range of motion without pain.     Cardiovascular:  Regular rhythm, S1 normal, S2 normal, normal heart sounds and normal pulses.     Exam reveals no gallop and no friction rub.       No murmur heard.  Pulses:       Radial pulses are 2+ on the right side and 2+ on the left side.   Mild tachycardic and low 100s.   Pulmonary/Chest: Effort normal and breath sounds normal. No respiratory distress.   Abdominal: Abdomen is soft. She exhibits no distension. There is no abdominal tenderness.   Musculoskeletal:         General: No edema. Normal range of motion.      Cervical back: Full passive range of motion without pain, normal range of motion and neck supple.      Comments: Good active ROM of all extremities. No lower extremity edema or cyanosis.      Neurological: No cranial nerve deficit. Gait normal.   A&Ox4, normal speech.   Skin: No ecchymosis noted.   Malar rash.  Dry skin on chest in wrists bilaterally left greater than right.  Not consistent with cellulitis.   Psychiatric: She has a normal mood and affect. Thought content normal.       ED Course   Procedures  Labs Reviewed - No data to display       Imaging Results    None          Medications - No data to display  Medical Decision Making:   Initial Assessment:   52-year-old female presenting today secondary rash.  This looks like a possible lupus rash.   Starting on steroids.  Dermatology referral.  Does not look like cellulitis.  Do not think patient is septic. I discussed with the patient/family the diagnosis, treatment plan, indications for return to the emergency department, and for expected follow-up. The patient/family verbalized an understanding. The patient/family is asked if there are any questions or concerns. We discuss the case, until all issues are addressed to the patient/family's satisfaction. Patient/family understands and is agreeable to the plan.   Jax Cancino                              Clinical Impression:   Final diagnoses:  [R21] Rash (Primary)        ED Disposition Condition    Discharge Stable          ED Prescriptions       Medication Sig Dispense Start Date End Date Auth. Provider    predniSONE (DELTASONE) 20 MG tablet Take 2 tablets (40 mg total) by mouth once daily. for 5 days 10 tablet 1/13/2023 1/18/2023 Jax aCncino MD          Follow-up Information       Follow up With Specialties Details Why Contact Info    Emma Faulkner MD Family Medicine, Wound Care Schedule an appointment as soon as possible for a visit in 2 days  [address] [facility]  [place] [address]  [phone]               Jax Cancino MD  01/13/23 1501

## 2023-01-13 NOTE — DISCHARGE INSTRUCTIONS
Thank you for coming to our Emergency Department today. It is important to remember that some problems are difficult to diagnose and may not be found during your first visit. Be sure to follow up with your primary care doctor and review any labs/imaging that was performed with them. If you do not have a primary care doctor, you may contact the one listed on your discharge paperwork or you may also call the [facility] Appointment Desk at [phone] to schedule an appointment with one.     All medications may potentially have side effects and it is impossible to predict which medications may give you side effects. If you feel that you are having a negative effect of any medication you should immediately stop taking them and seek medical attention.    Return to the ER with any questions/concerns, new/concerning symptoms, worsening or failure to improve. Do not drive or make any important decisions for 24 hours if you have received any pain medications, sedatives or mood altering drugs during your ER visit.

## 2023-01-19 ENCOUNTER — PATIENT MESSAGE (OUTPATIENT)
Dept: RHEUMATOLOGY | Facility: CLINIC | Age: 53
End: 2023-01-19
Payer: MEDICARE

## 2023-01-19 DIAGNOSIS — L93.1 SUBACUTE CUTANEOUS LUPUS ERYTHEMATOSUS: Primary | ICD-10-CM

## 2023-01-23 ENCOUNTER — LAB VISIT (OUTPATIENT)
Dept: LAB | Facility: HOSPITAL | Age: 53
End: 2023-01-23
Attending: FAMILY MEDICINE
Payer: MEDICARE

## 2023-01-23 ENCOUNTER — LAB VISIT (OUTPATIENT)
Dept: LAB | Facility: HOSPITAL | Age: 53
End: 2023-01-23
Attending: INTERNAL MEDICINE
Payer: COMMERCIAL

## 2023-01-23 ENCOUNTER — OFFICE VISIT (OUTPATIENT)
Dept: FAMILY MEDICINE | Facility: CLINIC | Age: 53
End: 2023-01-23
Payer: MEDICARE

## 2023-01-23 VITALS
BODY MASS INDEX: 27.14 KG/M2 | HEIGHT: 61 IN | WEIGHT: 143.75 LBS | TEMPERATURE: 97 F | DIASTOLIC BLOOD PRESSURE: 64 MMHG | SYSTOLIC BLOOD PRESSURE: 110 MMHG

## 2023-01-23 DIAGNOSIS — E03.9 HYPOTHYROIDISM, UNSPECIFIED TYPE: ICD-10-CM

## 2023-01-23 DIAGNOSIS — L93.1 SUBACUTE CUTANEOUS LUPUS ERYTHEMATOSUS: ICD-10-CM

## 2023-01-23 DIAGNOSIS — R21 RASH: ICD-10-CM

## 2023-01-23 DIAGNOSIS — M33.13 DERMATOMYOSITIS: Primary | ICD-10-CM

## 2023-01-23 DIAGNOSIS — M79.7 FIBROMYALGIA: ICD-10-CM

## 2023-01-23 DIAGNOSIS — L20.9 ATOPIC DERMATITIS, UNSPECIFIED TYPE: ICD-10-CM

## 2023-01-23 DIAGNOSIS — M33.13 DERMATOMYOSITIS: ICD-10-CM

## 2023-01-23 LAB
CK SERPL-CCNC: 40 U/L (ref 20–180)
CRP SERPL-MCNC: 5.6 MG/L (ref 0–8.2)
ERYTHROCYTE [SEDIMENTATION RATE] IN BLOOD BY PHOTOMETRIC METHOD: 11 MM/HR (ref 0–36)

## 2023-01-23 PROCEDURE — 99499 UNLISTED E&M SERVICE: CPT | Mod: S$GLB,,, | Performed by: FAMILY MEDICINE

## 2023-01-23 PROCEDURE — 1160F RVW MEDS BY RX/DR IN RCRD: CPT | Mod: CPTII,S$GLB,, | Performed by: FAMILY MEDICINE

## 2023-01-23 PROCEDURE — 85652 RBC SED RATE AUTOMATED: CPT | Performed by: FAMILY MEDICINE

## 2023-01-23 PROCEDURE — 1160F PR REVIEW ALL MEDS BY PRESCRIBER/CLIN PHARMACIST DOCUMENTED: ICD-10-PCS | Mod: CPTII,S$GLB,, | Performed by: FAMILY MEDICINE

## 2023-01-23 PROCEDURE — 3008F PR BODY MASS INDEX (BMI) DOCUMENTED: ICD-10-PCS | Mod: CPTII,S$GLB,, | Performed by: FAMILY MEDICINE

## 2023-01-23 PROCEDURE — 3078F PR MOST RECENT DIASTOLIC BLOOD PRESSURE < 80 MM HG: ICD-10-PCS | Mod: CPTII,S$GLB,, | Performed by: FAMILY MEDICINE

## 2023-01-23 PROCEDURE — 99999 PR PBB SHADOW E&M-EST. PATIENT-LVL V: CPT | Mod: PBBFAC,,, | Performed by: FAMILY MEDICINE

## 2023-01-23 PROCEDURE — 99214 PR OFFICE/OUTPT VISIT, EST, LEVL IV, 30-39 MIN: ICD-10-PCS | Mod: S$GLB,,, | Performed by: FAMILY MEDICINE

## 2023-01-23 PROCEDURE — 81003 URINALYSIS AUTO W/O SCOPE: CPT | Performed by: INTERNAL MEDICINE

## 2023-01-23 PROCEDURE — 99214 OFFICE O/P EST MOD 30 MIN: CPT | Mod: S$GLB,,, | Performed by: FAMILY MEDICINE

## 2023-01-23 PROCEDURE — 1159F MED LIST DOCD IN RCRD: CPT | Mod: CPTII,S$GLB,, | Performed by: FAMILY MEDICINE

## 2023-01-23 PROCEDURE — 82550 ASSAY OF CK (CPK): CPT | Performed by: FAMILY MEDICINE

## 2023-01-23 PROCEDURE — 82085 ASSAY OF ALDOLASE: CPT | Performed by: FAMILY MEDICINE

## 2023-01-23 PROCEDURE — 3074F SYST BP LT 130 MM HG: CPT | Mod: CPTII,S$GLB,, | Performed by: FAMILY MEDICINE

## 2023-01-23 PROCEDURE — 86140 C-REACTIVE PROTEIN: CPT | Performed by: FAMILY MEDICINE

## 2023-01-23 PROCEDURE — 99499 RISK ADDL DX/OHS AUDIT: ICD-10-PCS | Mod: S$GLB,,, | Performed by: FAMILY MEDICINE

## 2023-01-23 PROCEDURE — 3074F PR MOST RECENT SYSTOLIC BLOOD PRESSURE < 130 MM HG: ICD-10-PCS | Mod: CPTII,S$GLB,, | Performed by: FAMILY MEDICINE

## 2023-01-23 PROCEDURE — 36415 COLL VENOUS BLD VENIPUNCTURE: CPT | Mod: PO | Performed by: FAMILY MEDICINE

## 2023-01-23 PROCEDURE — 3008F BODY MASS INDEX DOCD: CPT | Mod: CPTII,S$GLB,, | Performed by: FAMILY MEDICINE

## 2023-01-23 PROCEDURE — 3078F DIAST BP <80 MM HG: CPT | Mod: CPTII,S$GLB,, | Performed by: FAMILY MEDICINE

## 2023-01-23 PROCEDURE — 1159F PR MEDICATION LIST DOCUMENTED IN MEDICAL RECORD: ICD-10-PCS | Mod: CPTII,S$GLB,, | Performed by: FAMILY MEDICINE

## 2023-01-23 PROCEDURE — 99999 PR PBB SHADOW E&M-EST. PATIENT-LVL V: ICD-10-PCS | Mod: PBBFAC,,, | Performed by: FAMILY MEDICINE

## 2023-01-23 RX ORDER — DULOXETIN HYDROCHLORIDE 60 MG/1
60 CAPSULE, DELAYED RELEASE ORAL DAILY
Qty: 90 CAPSULE | Refills: 1 | Status: SHIPPED | OUTPATIENT
Start: 2023-01-23 | End: 2023-08-28 | Stop reason: SDUPTHER

## 2023-01-23 RX ORDER — TRIAMCINOLONE ACETONIDE 1 MG/G
OINTMENT TOPICAL 2 TIMES DAILY
Qty: 80 G | Refills: 0 | Status: SHIPPED | OUTPATIENT
Start: 2023-01-23 | End: 2023-05-03 | Stop reason: SDUPTHER

## 2023-01-23 RX ORDER — HYDROXYZINE HYDROCHLORIDE 25 MG/1
TABLET, FILM COATED ORAL
Qty: 30 TABLET | Refills: 2 | Status: SHIPPED | OUTPATIENT
Start: 2023-01-23

## 2023-01-23 NOTE — PROGRESS NOTES
Assessment & Plan  Problem List Items Addressed This Visit          Immunology/Multi System    Subacute cutaneous lupus erythematosus    Overview     2010 +SSA, RNP  3/30/16 per Rheumatology History of present illness: 46-year-old female with (subacute cutaneous lupus). She has had no evidence of systemic lupus. She remains on Plaquenil.          Relevant Orders    Sedimentation rate    C-REACTIVE PROTEIN    ALDOLASE    CK       Endocrine    Hypothyroidism       Orthopedic    Fibromyalgia    Relevant Medications    DULoxetine (CYMBALTA) 60 MG capsule     Other Visit Diagnoses       Dermatomyositis    -  Primary    Relevant Orders    Ambulatory referral/consult to Dermatology    Sedimentation rate    C-REACTIVE PROTEIN    ALDOLASE    CK    Rash        Relevant Medications    hydrOXYzine HCL (ATARAX) 25 MG tablet    triamcinolone acetonide 0.1% (KENALOG) 0.1 % ointment    Atopic dermatitis, unspecified type        Relevant Medications    triamcinolone acetonide 0.1% (KENALOG) 0.1 % ointment              Health Maintenance reviewed, .    Follow-up: No follow-ups on file.    ______________________________________________________________________    Chief Complaint  Chief Complaint   Patient presents with    burn to face       HPI  Emily Jacobson Yovani is a 52 y.o. female with multiple medical diagnoses as listed in the medical history and problem list that presents for rash.  Pt is known to me with last appointment 4/5/2022.    She reports increased weakness with a rash that began 3 weeks ago.  Rash is extensive.  She does have a history of lupus.  She is currently followed by Rheumatology.  She is scheduled to meet with Rheumatology in March.  Rash is very itchy.  She does report increased fatigue.  She is on a long-term immunosuppressive therapy which may be masking her myalgias.  She does report a slight increase in muscle pain but nothing out of the ordinary.  She received oral steroids in the emergency  room after a recent upper respiratory illness.  Her rash cleared up with the steroids.  PAST MEDICAL HISTORY:  Past Medical History:   Diagnosis Date    Chronic allergic conjunctivitis     Chronic allergic rhinitis due to animal hair and dander     COPD (chronic obstructive pulmonary disease)     Fibromyalgia     Hypothyroidism     Immune disorder     Long-term use of Plaquenil 5/7/2019    Lupus     Mild persistent asthma without complication 11/12/2019    Pt with reports daily cough, dyspnea, freqent rescue inhaler use. Recommend maintenance therapy  Treat triggers-sinus, gerd, smoking cessation.  Normal pulmonary studies.     CHEPE (obstructive sleep apnea) 11/12/2019    CHEPE on CPAP 11/12/2019    Patient with symptoms of snoring, witnessed apnea, excessive daytime sleepiness, fatigue and difficulty staying asleep. Dx with CHEPE in 2015 but difficulty tolerating cpap at time due to sinus problems.  HST 1/22/2020: AHI 7 RDI 15      Palpitations 11/12/2019    May be related to breathing issues    Recurrent urticaria 1/6/2017    S/P laparoscopic cholecystectomy 8/4/2017    Subacute cutaneous lupus erythematosus        PAST SURGICAL HISTORY:  Past Surgical History:   Procedure Laterality Date    CHOLECYSTECTOMY      COLONOSCOPY N/A 3/9/2020    Procedure: COLONOSCOPY;  Surgeon: Judah Lopez MD;  Location: [address]);  Service: Endoscopy;  Laterality: N/A;    ESOPHAGOGASTRODUODENOSCOPY N/A 3/9/2020    Procedure: EGD (ESOPHAGOGASTRODUODENOSCOPY);  Surgeon: Judah Lopez MD;  Location: [address]);  Service: Endoscopy;  Laterality: N/A;       SOCIAL HISTORY:  Social History     Socioeconomic History    Marital status:    Tobacco Use    Smoking status: Every Day     Packs/day: 1.50     Years: 32.00     Pack years: 48.00     Types: Cigarettes    Smokeless tobacco: Former     Quit date: 7/8/2013    Tobacco comments:     started age 17   Substance and Sexual Activity    Alcohol use: No     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     Drug use: No    Sexual activity: Yes     Partners: Male       FAMILY HISTORY:  Family History   Problem Relation Age of Onset    Hypertension Mother     Thyroid disease Mother     Cataracts Mother     Cancer Mother         duodenal    Arthritis Father     Hyperlipidemia Father     Cancer Father         Multiple Myeloma     Cirrhosis Father     Cataracts Father     Lupus Sister     Asthma Sister     No Known Problems Brother     No Known Problems Maternal Aunt     No Known Problems Maternal Uncle     No Known Problems Paternal Aunt     Pancreatic cancer Paternal Uncle     No Known Problems Maternal Grandmother     Lung cancer Maternal Grandfather     No Known Problems Paternal Grandmother     Lung cancer Paternal Grandfather     Scoliosis Sister     Amblyopia Neg Hx     Blindness Neg Hx     Diabetes Neg Hx     Glaucoma Neg Hx     Macular degeneration Neg Hx     Retinal detachment Neg Hx     Strabismus Neg Hx     Stroke Neg Hx        ALLERGIES AND MEDICATIONS: updated and reviewed.  Review of patient's allergies indicates:   Allergen Reactions    Bactrim [sulfamethoxazole-trimethoprim] Rash     Current Outpatient Medications   Medication Sig Dispense Refill    albuterol (PROVENTIL) 2.5 mg /3 mL (0.083 %) nebulizer solution TAKE 3 MLS BY NEBULIZATION EVERY 6 HOURS AS NEEDED FOR WHEEZING OR SHORTNESS OF BREATH. RESCUE 150 mL 1    albuterol-ipratropium (DUO-NEB) 2.5 mg-0.5 mg/3 mL nebulizer solution Take 3 mLs by nebulization every 6 (six) hours as needed for Wheezing. Rescue 75 mL 0    albuterol-ipratropium (DUO-NEB) 2.5 mg-0.5 mg/3 mL nebulizer solution Inhale 3 mLs into the lungs every 6 (six) hours as needed.      cyclobenzaprine (FLEXERIL) 5 MG tablet Take 1 tablet by mouth 3 (three) times daily as needed.      gabapentin (NEURONTIN) 600 MG tablet TAKE 1 TABLET (600 MG TOTAL) BY MOUTH ONCE DAILY. 90 tablet 1    hydrOXYchloroQUINE (PLAQUENIL) 200 mg tablet Take 2 tablets (400 mg total)  by mouth once daily. 60 tablet 6    hydrOXYzine HCL (ATARAX) 25 MG tablet TAKE 1 TAB BY MOUTH EVERY EVENING AS NEEDED FOR PRURITUS. DO NOT DRIVE OR OPERATE MACHINERY 30 tablet 2    levothyroxine (SYNTHROID) 100 MCG tablet TAKE 1 TABLET MONDAY THROUGH SATURDAY AND TAKE 1/2 TABLET ON SUNDAY 85 tablet 2    montelukast (SINGULAIR) 10 mg tablet TAKE 1 TABLET BY MOUTH EVERY DAY IN THE EVENING 90 tablet 3    pantoprazole (PROTONIX) 40 MG tablet TAKE 1 TABLET BY MOUTH EVERY DAY 90 tablet 1    pimecrolimus (ELIDEL) 1 % cream Apply topically 2 (two) times daily. To rashes on face and arms as needed 30 g 2    soy isofl/blk coh/gr tea/yerba (ESTROVEN ENERGY ORAL) Take by mouth.      albuterol (PROVENTIL/VENTOLIN HFA) 90 mcg/actuation inhaler INHALE 2 PUFFS INTO THE LUNGS EVERY 6 (SIX) HOURS AS NEEDED FOR WHEEZING. RESCUE (Patient not taking: Reported on 1/23/2023) 8.5 g 1    azelastine (ASTELIN) 137 mcg (0.1 %) nasal spray USE 2 SPRAYS (274 MCG TOTAL) BY NASAL ROUTE 2 (TWO) TIMES DAILY. (Patient not taking: Reported on 1/23/2023) 30 mL 11    budesonide-formoterol 160-4.5 mcg (SYMBICORT) 160-4.5 mcg/actuation HFAA Inhale 2 puffs into the lungs every 12 (twelve) hours. Controller (Patient not taking: Reported on 1/23/2023) 1 Inhaler 11    cyclobenzaprine (FLEXERIL) 5 MG tablet TAKE 1 TABLET BY MOUTH THREE TIMES A DAY AS NEEDED FOR MUSCLE SPASMS (Patient not taking: Reported on 1/23/2023) 90 tablet 0    DULoxetine (CYMBALTA) 60 MG capsule Take 1 capsule by mouth.      DULoxetine (CYMBALTA) 60 MG capsule Take 1 capsule (60 mg total) by mouth once daily. 90 capsule 1    EPINEPHrine (EPIPEN 2-GIGI) 0.3 mg/0.3 mL AtIn Inject 0.3 mLs (0.3 mg total) into the muscle once. for 1 dose (Patient not taking: Reported on 6/25/2021) 2 Device 0    estradioL (ESTRACE) 0.01 % (0.1 mg/gram) vaginal cream PLACE 1 G VAGINALLY ONCE DAILY. 42.5 g 6    fluorometholone 0.1% (FML) 0.1 % DrpS 1 DROP IN BOTH EYES TWICE A DAY FOR 2 WEEKS THEN DAILY FOR 2  "WEEKS  0    HYDROcodone-acetaminophen (NORCO) 7.5-325 mg per tablet Take 1 tablet by mouth every 24 hours as needed for Pain. 30 tablet 0    hydrocortisone 2.5 % ointment APPLY TOPICALLY 2 (TWO) TIMES DAILY. FOR EYELID RASH AS NEEDED 20 g 2    hydrocortisone 2.5 % ointment Apply topically 2 (two) times daily. (Patient not taking: Reported on 9/23/2022) 20 g 2    naproxen (NAPROSYN) 500 MG tablet Take 1 tablet (500 mg total) by mouth 2 (two) times daily. (Patient not taking: Reported on 1/23/2023) 60 tablet 0    pantoprazole (PROTONIX) 40 MG tablet Take 1 tablet by mouth.      tacrolimus (PROTOPIC) 0.1 % ointment Apply to skin 2 (two) times daily. (Patient not taking: Reported on 1/23/2023) 60 g 3    triamcinolone acetonide 0.1% (KENALOG) 0.1 % ointment Apply topically 2 (two) times daily. 80 g 0     No current facility-administered medications for this visit.         ROS  Review of Systems   Constitutional:  Positive for fatigue. Negative for fever.   HENT:  Negative for congestion, rhinorrhea and sore throat.    Eyes:  Positive for pain.   Respiratory:  Positive for cough and shortness of breath.    Gastrointestinal:  Positive for diarrhea. Negative for vomiting.   Skin:  Positive for rash.         Physical Exam  Vitals:    01/23/23 0829   BP: 110/64   Temp: 97.1 °F (36.2 °C)   TempSrc: Oral   Weight: 65.2 kg (143 lb 11.8 oz)   Height: 5' 1" (1.549 m)    Body mass index is 27.16 kg/m².  Weight: 65.2 kg (143 lb 11.8 oz)   Height: 5' 1" (154.9 cm)   Physical Exam  Vitals reviewed.   Constitutional:       Appearance: Normal appearance. She is well-developed.   HENT:      Head: Normocephalic and atraumatic.      Right Ear: External ear normal.      Left Ear: External ear normal.      Nose: Nose normal.      Mouth/Throat:      Mouth: Mucous membranes are moist.      Pharynx: Oropharynx is clear.   Eyes:      Extraocular Movements: Extraocular movements intact.      Conjunctiva/sclera: Conjunctivae normal.      Pupils: " Pupils are equal, round, and reactive to light.   Cardiovascular:      Rate and Rhythm: Normal rate and regular rhythm.      Heart sounds: Normal heart sounds.   Pulmonary:      Effort: Pulmonary effort is normal.      Breath sounds: Normal breath sounds.   Skin:     General: Skin is warm and dry.      Findings: Rash present. Rash is macular and urticarial. Rash is not purpuric or pustular.             Comments: Extension of rash noted in red    Neurological:      Mental Status: She is alert and oriented to person, place, and time.       Health Maintenance         Date Due Completion Date    Sign Pain Contract Never done ---    Complete Opioid Risk Tool Never done ---    Shingles Vaccine (1 of 2) Never done ---    Pneumococcal Vaccines (Age 0-64) (2 - PCV) 10/29/2013 10/29/2012    LDCT Lung Screen Never done ---    Influenza Vaccine (1) 09/01/2022 10/28/2020    Mammogram 08/09/2024 8/9/2022    Cervical Cancer Screening 09/11/2024 9/11/2019    Lipid Panel 09/14/2024 9/14/2019    TETANUS VACCINE 09/02/2026 9/2/2016    Colorectal Cancer Screening 03/09/2030 3/9/2020                Patient note was created using [employer].  Any errors in syntax or even information may not have been identified and edited on initial review prior to signing this note.Answers submitted by the patient for this visit:  Rash Questionnaire (Submitted on 1/21/2023)  Chief Complaint: Rash  Chronicity: recurrent  Onset: 1 to 4 weeks ago  Progression since onset: rapidly worsening  Affected locations: face, chest, left elbow, right arm  Characteristics: burning, pain, redness, itchiness, peeling  Exposed to: nothing  anorexia: Yes  facial edema: Yes  joint pain: No  nail changes: No  Treatments tried: anti-itch cream, antibiotic cream, antibiotics, antihistamine, cold compress, moisturizer, oral steroids  Improvement on treatment: mild  asthma: Yes  allergies: Yes  eczema: Yes  varicella: Yes

## 2023-01-24 LAB
ALDOLASE SERPL-CCNC: 6.5 U/L (ref 1.2–7.6)
BILIRUB UR QL STRIP: NEGATIVE
CLARITY UR REFRACT.AUTO: CLEAR
COLOR UR AUTO: COLORLESS
GLUCOSE UR QL STRIP: NEGATIVE
HGB UR QL STRIP: NEGATIVE
KETONES UR QL STRIP: NEGATIVE
LEUKOCYTE ESTERASE UR QL STRIP: NEGATIVE
NITRITE UR QL STRIP: NEGATIVE
PH UR STRIP: 6 [PH] (ref 5–8)
PROT UR QL STRIP: NEGATIVE
SP GR UR STRIP: 1 (ref 1–1.03)
URN SPEC COLLECT METH UR: ABNORMAL

## 2023-01-25 ENCOUNTER — PATIENT MESSAGE (OUTPATIENT)
Dept: RHEUMATOLOGY | Facility: CLINIC | Age: 53
End: 2023-01-25
Payer: MEDICARE

## 2023-01-25 NOTE — PROGRESS NOTES
The patient location is: [place]  The chief complaint leading to consultation is: rash    Visit type: Audiovisual    Face to Face time with patient: 20   minutes of total time spent on the encounter, which includes face to face time and non-face to face time preparing to see the patient (eg, review of tests), Obtaining and/or reviewing separately obtained history, Documenting clinical information in the electronic or other health record, Independently interpreting results (not separately reported) and communicating results to the patient/family/caregiver, or Care coordination (not separately reported).     Each patient to whom he or she provides medical services by telemedicine is:  (1) informed of the relationship between the physician and patient and the respective role of any other health care provider with respect to management of the patient; and (2) notified that he or she may decline to receive medical services by telemedicine and may withdraw from such care at any time.    Subjective:       Patient ID:  Emily Pina is a 52 y.o. female who presents for rash.    HPI  Here for facial rash on face, left arm, right arm, and chest over past month. The rash is burning, dry, itchy, peeling, and red.  Heat and scratching make it worse. Has tried several creams for it. Cortisone seems to help.    On Plaquenil 400 mg daily.  Normally wears sunscreen.    Has been sick over past month.  Was given steroids and antibiotics x 5 days.    Had rash around eyelids before she got sick.  Saw PCP this week who gave TAC 0.1 ointment for the body (arm and chest) and has been putting vaseline on the eyelids and Aquaphor.  Then started using cortisone OTC ointment.    Review of Systems     Objective:    Physical Exam       Diagram Legend     Erythematous scaling macule/papule c/w actinic keratosis       Vascular papule c/w angioma      Pigmented verrucoid papule/plaque c/w seborrheic keratosis      Yellow  umbilicated papule c/w sebaceous hyperplasia      Irregularly shaped tan macule c/w lentigo     1-2 mm smooth white papules consistent with Milia      Movable subcutaneous cyst with punctum c/w epidermal inclusion cyst      Subcutaneous movable cyst c/w pilar cyst      Firm pink to brown papule c/w dermatofibroma      Pedunculated fleshy papule(s) c/w skin tag(s)      Evenly pigmented macule c/w junctional nevus     Mildly variegated pigmented, slightly irregular-bordered macule c/w mildly atypical nevus      Flesh colored to evenly pigmented papule c/w intradermal nevus       Pink pearly papule/plaque c/w basal cell carcinoma      Erythematous hyperkeratotic cursted plaque c/w SCC      Surgical scar with no sign of skin cancer recurrence      Open and closed comedones      Inflammatory papules and pustules      Verrucoid papule consistent consistent with wart     Erythematous eczematous patches and plaques     Dystrophic onycholytic nail with subungual debris c/w onychomycosis     Umbilicated papule    Erythematous-base heme-crusted tan verrucoid plaque consistent with inflamed seborrheic keratosis     Erythematous Silvery Scaling Plaque c/w Psoriasis     See annotation              Assessment / Plan:        Rash  -     Ambulatory referral/consult to Dermatology  -     hydrocortisone 2.5 % ointment; Apply topically 2 (two) times daily. To rash on eyelids PRN  Dispense: 28 g; Refill: 5    Appears to be concerning for cutaneous lupus flare - advise pt come in for biopsy to test for this vs allergic contact dermatitis - patch testing may also be needed, but biopsy would be first step    Pt carries a diagnosis of lupus and is on Plaquenil for this    Rv soon for biopsy.           No follow-ups on file.

## 2023-01-27 ENCOUNTER — OFFICE VISIT (OUTPATIENT)
Dept: DERMATOLOGY | Facility: CLINIC | Age: 53
End: 2023-01-27
Payer: MEDICARE

## 2023-01-27 DIAGNOSIS — R21 RASH: ICD-10-CM

## 2023-01-27 PROCEDURE — 99213 PR OFFICE/OUTPT VISIT, EST, LEVL III, 20-29 MIN: ICD-10-PCS | Mod: 95,,, | Performed by: DERMATOLOGY

## 2023-01-27 PROCEDURE — 99213 OFFICE O/P EST LOW 20 MIN: CPT | Mod: 95,,, | Performed by: DERMATOLOGY

## 2023-01-27 RX ORDER — HYDROCORTISONE 25 MG/G
OINTMENT TOPICAL 2 TIMES DAILY
Qty: 28 G | Refills: 5 | Status: SHIPPED | OUTPATIENT
Start: 2023-01-27 | End: 2023-05-03 | Stop reason: SDUPTHER

## 2023-01-28 ENCOUNTER — PATIENT MESSAGE (OUTPATIENT)
Dept: DERMATOLOGY | Facility: CLINIC | Age: 53
End: 2023-01-28
Payer: MEDICARE

## 2023-01-31 ENCOUNTER — PATIENT MESSAGE (OUTPATIENT)
Dept: DERMATOLOGY | Facility: CLINIC | Age: 53
End: 2023-01-31
Payer: MEDICARE

## 2023-02-01 ENCOUNTER — OFFICE VISIT (OUTPATIENT)
Dept: DERMATOLOGY | Facility: CLINIC | Age: 53
End: 2023-02-01
Payer: MEDICARE

## 2023-02-01 ENCOUNTER — PATIENT MESSAGE (OUTPATIENT)
Dept: RHEUMATOLOGY | Facility: CLINIC | Age: 53
End: 2023-02-01
Payer: MEDICARE

## 2023-02-01 DIAGNOSIS — L30.9 DERMATITIS: Primary | ICD-10-CM

## 2023-02-01 PROCEDURE — 99999 PR PBB SHADOW E&M-EST. PATIENT-LVL II: CPT | Mod: PBBFAC,,,

## 2023-02-01 PROCEDURE — 99999 PR PBB SHADOW E&M-EST. PATIENT-LVL II: ICD-10-PCS | Mod: PBBFAC,,,

## 2023-02-01 PROCEDURE — 11104 PUNCH BX SKIN SINGLE LESION: CPT | Mod: S$GLB,,, | Performed by: DERMATOLOGY

## 2023-02-01 PROCEDURE — 88305 TISSUE EXAM BY PATHOLOGIST: CPT | Mod: 59 | Performed by: DERMATOLOGY

## 2023-02-01 PROCEDURE — 99213 PR OFFICE/OUTPT VISIT, EST, LEVL III, 20-29 MIN: ICD-10-PCS | Mod: 25,S$GLB,, | Performed by: DERMATOLOGY

## 2023-02-01 PROCEDURE — 88313 SPECIAL STAINS GROUP 2: CPT | Mod: 26,,, | Performed by: DERMATOLOGY

## 2023-02-01 PROCEDURE — 88313 PR  SPECIAL STAINS,GROUP II: ICD-10-PCS | Mod: 26,,, | Performed by: DERMATOLOGY

## 2023-02-01 PROCEDURE — 11104 PR PUNCH BIOPSY, SKIN, SINGLE LESION: ICD-10-PCS | Mod: S$GLB,,, | Performed by: DERMATOLOGY

## 2023-02-01 PROCEDURE — 99213 OFFICE O/P EST LOW 20 MIN: CPT | Mod: 25,S$GLB,, | Performed by: DERMATOLOGY

## 2023-02-01 PROCEDURE — 88305 TISSUE EXAM BY PATHOLOGIST: CPT | Mod: 26,,, | Performed by: DERMATOLOGY

## 2023-02-01 PROCEDURE — 88305 TISSUE EXAM BY PATHOLOGIST: ICD-10-PCS | Mod: 26,,, | Performed by: DERMATOLOGY

## 2023-02-01 PROCEDURE — 88313 SPECIAL STAINS GROUP 2: CPT | Mod: 59 | Performed by: DERMATOLOGY

## 2023-02-01 PROCEDURE — 88312 SPECIAL STAINS GROUP 1: CPT | Mod: 26,,, | Performed by: DERMATOLOGY

## 2023-02-01 PROCEDURE — 88312 SPECIAL STAINS GROUP 1: CPT | Mod: 59 | Performed by: DERMATOLOGY

## 2023-02-01 PROCEDURE — 88312 PR  SPECIAL STAINS,GROUP I: ICD-10-PCS | Mod: 26,,, | Performed by: DERMATOLOGY

## 2023-02-01 NOTE — PROGRESS NOTES
Subjective:       Patient ID:  Emily Pina is a 52 y.o. female who presents for   Chief Complaint   Patient presents with    Rash     Patient presents for evaluation of a ~1 month history of erythematous, burning rash involving the hands, chest, and eyelids. Patient was seen via telederm 1/27/23 and given hydrocortisone 2.5% for the face. Endorses history of infection requiring steroids and antibiotics prior to the development of her eruption. Today, she reports that she has not tried the HC yet, but she has used TAC to the body as needed when the burning is severe. She also has a history of Lupus, and she has been maintained on Plaquenil 400mg daily.          Rash - Follow-up  Symptom course: worsening  Affected locations: left eye, right eye, left cheek and right cheek  Signs / symptoms: burning, peeling and dryness  Severity: moderate      Review of Systems   Constitutional:  Negative for fever, chills, fatigue and malaise.   HENT:  Negative for trouble swallowing.    Gastrointestinal:  Negative for nausea and vomiting.   Musculoskeletal:  Negative for myalgias, joint swelling and arthralgias.   Skin:  Positive for rash and dry skin. Negative for recent sunburn.      Objective:    Physical Exam   Constitutional: She appears well-developed and well-nourished. No distress.   Psychiatric: She has a normal mood and affect.   Skin:               Diagram Legend     Erythematous scaling macule/papule c/w actinic keratosis       Vascular papule c/w angioma      Pigmented verrucoid papule/plaque c/w seborrheic keratosis      Yellow umbilicated papule c/w sebaceous hyperplasia      Irregularly shaped tan macule c/w lentigo     1-2 mm smooth white papules consistent with Milia      Movable subcutaneous cyst with punctum c/w epidermal inclusion cyst      Subcutaneous movable cyst c/w pilar cyst      Firm pink to brown papule c/w dermatofibroma      Pedunculated fleshy papule(s) c/w skin tag(s)      Evenly  pigmented macule c/w junctional nevus     Mildly variegated pigmented, slightly irregular-bordered macule c/w mildly atypical nevus      Flesh colored to evenly pigmented papule c/w intradermal nevus       Pink pearly papule/plaque c/w basal cell carcinoma      Erythematous hyperkeratotic cursted plaque c/w SCC      Surgical scar with no sign of skin cancer recurrence      Open and closed comedones      Inflammatory papules and pustules      Verrucoid papule consistent consistent with wart     Erythematous eczematous patches and plaques     Dystrophic onycholytic nail with subungual debris c/w onychomycosis     Umbilicated papule    Erythematous-base heme-crusted tan verrucoid plaque consistent with inflamed seborrheic keratosis     Erythematous Silvery Scaling Plaque c/w Psoriasis     See annotation                        Assessment / Plan:      Pathology Orders:       Normal Orders This Visit    Specimen to Pathology, Dermatology     Questions:    Procedure Type: Dermatology and skin neoplasms    Number of Specimens: 2    ------------------------: -------------------------    Spec 1 Procedure: Biopsy    Spec 1 Clinical Impression: Dermatomyositis vs atopic dermatitis    Spec 1 Source: Right periorbirtal skin    ------------------------: -------------------------    Spec 2 Procedure: Biopsy    Spec 2 Clinical Impression: Dermatomyositis vs atopic dermatitis    Spec 2 Source: Right dorsal hand    Clinical Information: 53yo F with a history of cutaneous lupus presenting with a one month history of erythematous, burning rash around the eyes, to the anterior chest, dorsal hands, and forearms.    Release to patient: Immediate          Dermatitis  -     Specimen to Pathology, Dermatology    - Clinical presentation of rash most consistent with dermatomyositis with heliotrope. Of note, recent labs reviewed showing grossly normal CK and aldolase, but amyopathic DM remains in the differential. Differential also includes  cutaneous lupus vs TRACIE vs atopic dermatitis vs contact dermatitis   - Punch biopsy x 2 performed in the clinic today. See procedure note below.  - Continue with HC 2.5% for the eyes/face and TAC 0.1% for the body BID PRN  - Gentle skin care precautions and sun precautions reviewed    Punch biopsy procedure note:  Punch biopsy performed after verbal consent obtained. Area marked and prepped with alcohol- see photos above Approximately 2cc of 1% lidocaine with epinephrine injected. 3 mm disposable punch used to remove lesion. Hemostasis obtained and biopsy site closed with 2 Prolene sutures. Wound care instructions reviewed with patient and handout given.         Follow up in about 2 weeks (around 2/15/2023) for suture removal.

## 2023-02-02 ENCOUNTER — PATIENT MESSAGE (OUTPATIENT)
Dept: DERMATOLOGY | Facility: CLINIC | Age: 53
End: 2023-02-02
Payer: MEDICARE

## 2023-02-06 ENCOUNTER — PATIENT MESSAGE (OUTPATIENT)
Dept: DERMATOLOGY | Facility: CLINIC | Age: 53
End: 2023-02-06
Payer: MEDICARE

## 2023-02-15 ENCOUNTER — CLINICAL SUPPORT (OUTPATIENT)
Dept: DERMATOLOGY | Facility: CLINIC | Age: 53
End: 2023-02-15
Payer: MEDICARE

## 2023-02-15 DIAGNOSIS — Z48.02 ENCOUNTER FOR REMOVAL OF SUTURES: Primary | ICD-10-CM

## 2023-02-15 PROCEDURE — 99024 PR POST-OP FOLLOW-UP VISIT: ICD-10-PCS | Mod: S$GLB,,, | Performed by: DERMATOLOGY

## 2023-02-15 PROCEDURE — 99024 POSTOP FOLLOW-UP VISIT: CPT | Mod: S$GLB,,, | Performed by: DERMATOLOGY

## 2023-02-15 NOTE — PROGRESS NOTES
Patient presents for suture removal.  Informed patient that biopsy is still in process and will be contacted once results have been received.  The wound is well healed without signs of infection.  The sutures are removed.  Wound care and activity instructions given.  Return prn.

## 2023-02-16 LAB
FINAL PATHOLOGIC DIAGNOSIS: NORMAL
GROSS: NORMAL
Lab: NORMAL
MICROSCOPIC EXAM: NORMAL

## 2023-02-17 ENCOUNTER — CLINICAL SUPPORT (OUTPATIENT)
Dept: OPHTHALMOLOGY | Facility: CLINIC | Age: 53
End: 2023-02-17
Payer: MEDICARE

## 2023-02-17 ENCOUNTER — OFFICE VISIT (OUTPATIENT)
Dept: OPTOMETRY | Facility: CLINIC | Age: 53
End: 2023-02-17
Payer: MEDICARE

## 2023-02-17 DIAGNOSIS — Z79.899 LONG-TERM USE OF PLAQUENIL: ICD-10-CM

## 2023-02-17 DIAGNOSIS — L93.1 SUBACUTE CUTANEOUS LUPUS ERYTHEMATOSUS: Primary | ICD-10-CM

## 2023-02-17 DIAGNOSIS — H52.7 REFRACTIVE ERROR: ICD-10-CM

## 2023-02-17 DIAGNOSIS — L93.1 SUBACUTE CUTANEOUS LUPUS ERYTHEMATOSUS: ICD-10-CM

## 2023-02-17 PROCEDURE — 92014 COMPRE OPH EXAM EST PT 1/>: CPT | Mod: S$GLB,,, | Performed by: OPTOMETRIST

## 2023-02-17 PROCEDURE — 92015 PR REFRACTION: ICD-10-PCS | Mod: S$GLB,,, | Performed by: OPTOMETRIST

## 2023-02-17 PROCEDURE — 1160F PR REVIEW ALL MEDS BY PRESCRIBER/CLIN PHARMACIST DOCUMENTED: ICD-10-PCS | Mod: CPTII,S$GLB,, | Performed by: OPTOMETRIST

## 2023-02-17 PROCEDURE — 92015 DETERMINE REFRACTIVE STATE: CPT | Mod: S$GLB,,, | Performed by: OPTOMETRIST

## 2023-02-17 PROCEDURE — 92014 PR EYE EXAM, EST PATIENT,COMPREHESV: ICD-10-PCS | Mod: S$GLB,,, | Performed by: OPTOMETRIST

## 2023-02-17 PROCEDURE — 1159F MED LIST DOCD IN RCRD: CPT | Mod: CPTII,S$GLB,, | Performed by: OPTOMETRIST

## 2023-02-17 PROCEDURE — 1160F RVW MEDS BY RX/DR IN RCRD: CPT | Mod: CPTII,S$GLB,, | Performed by: OPTOMETRIST

## 2023-02-17 PROCEDURE — 1159F PR MEDICATION LIST DOCUMENTED IN MEDICAL RECORD: ICD-10-PCS | Mod: CPTII,S$GLB,, | Performed by: OPTOMETRIST

## 2023-02-17 PROCEDURE — 99999 PR PBB SHADOW E&M-EST. PATIENT-LVL I: CPT | Mod: PBBFAC,,, | Performed by: OPTOMETRIST

## 2023-02-17 PROCEDURE — 99999 PR PBB SHADOW E&M-EST. PATIENT-LVL I: ICD-10-PCS | Mod: PBBFAC,,, | Performed by: OPTOMETRIST

## 2023-02-17 NOTE — PROGRESS NOTES
Subjective:       Patient ID: Emily Pina is a 52 y.o. female      Chief Complaint   Patient presents with    Concerns About Ocular Health    Plaquenil Eye Exam     History of Present Illness  Dls: 7/6/21 Dr. Wisdom     51 y/o female presents today for plaquenil ck.   Pt c/o blurry vision at distance and near ou.   Pt lost single vision glasses one for distance and one for near.     + ou  tearing  + ou  itching  No burning  No pain  No ha's  + ou floaters  No flashes    Eye meds  Otc gtts ou prn          Assessment/Plan:     1. Subacute cutaneous lupus erythematosus  2. Long-term use of Plaquenil  No evidence of plaquenil maculopathy on exam, can continue with plaquenil therapy. OCT mac and HVF WNL OU today. Color vision normal. Return in 1 years for DFE, HVF 10-2, and OCT macula.     3. Refractive error  Educated patient on refractive error and discussed lens options. Dispensed updated spectacle Rx. Educated about adaptation period to new specs.    Eyeglass Final Rx       Eyeglass Final Rx         Sphere Cylinder Axis Add    Right -1.00 +1.00 160 +2.25    Left -0.25 +0.50 030 +2.25      Expiration Date: 2/17/2024                      Follow up in about 1 year (around 2/17/2024) for Plaquenil check, HVF 10-2, OCT macula.

## 2023-02-17 NOTE — PROGRESS NOTES
Visual field test done.  Patient stated no latex allergies used coverlet      Cylinder Axis Add     Right -0.75 +1.00 165 +2.00   Left -0.50 +0.50 020 +2.00   Expiration Date: 7/7/2022

## 2023-02-20 ENCOUNTER — PATIENT MESSAGE (OUTPATIENT)
Dept: DERMATOLOGY | Facility: CLINIC | Age: 53
End: 2023-02-20
Payer: MEDICARE

## 2023-02-20 ENCOUNTER — PATIENT MESSAGE (OUTPATIENT)
Dept: RHEUMATOLOGY | Facility: CLINIC | Age: 53
End: 2023-02-20
Payer: MEDICARE

## 2023-02-20 ENCOUNTER — TELEPHONE (OUTPATIENT)
Dept: RHEUMATOLOGY | Facility: CLINIC | Age: 53
End: 2023-02-20
Payer: MEDICARE

## 2023-02-20 DIAGNOSIS — L93.1 SUBACUTE CUTANEOUS LUPUS ERYTHEMATOSUS: Primary | ICD-10-CM

## 2023-03-02 ENCOUNTER — LAB VISIT (OUTPATIENT)
Dept: LAB | Facility: HOSPITAL | Age: 53
End: 2023-03-02
Attending: INTERNAL MEDICINE
Payer: MEDICARE

## 2023-03-02 DIAGNOSIS — L93.1 SUBACUTE CUTANEOUS LUPUS ERYTHEMATOSUS: ICD-10-CM

## 2023-03-02 PROCEDURE — 86235 NUCLEAR ANTIGEN ANTIBODY: CPT | Mod: 59 | Performed by: INTERNAL MEDICINE

## 2023-03-02 PROCEDURE — 36415 COLL VENOUS BLD VENIPUNCTURE: CPT | Mod: PO | Performed by: INTERNAL MEDICINE

## 2023-03-02 PROCEDURE — 83516 IMMUNOASSAY NONANTIBODY: CPT | Mod: 59 | Performed by: INTERNAL MEDICINE

## 2023-03-02 PROCEDURE — 83520 IMMUNOASSAY QUANT NOS NONAB: CPT | Performed by: INTERNAL MEDICINE

## 2023-03-06 LAB — ENA SCL70 AB SER-ACNC: 0.6 U/ML

## 2023-03-06 NOTE — PROGRESS NOTES
1. Skin, right periorbital skin, punch biopsy:   --VACUOLAR INTERFACE DERMATITIS CONSISTENT WITH A CONNECTIVE TISSUE DISEASE,   see comment   2. Skin, right dorsal hand, punch biopsy:   --VACUOLAR INTERFACE DERMATITIS CONSISTENT WITH A CONNECTIVE TISSUE DISEASE,   see comment   COMMENT: These histologic features may be compatible with systemic lupus,   discoid lupus, or subacute cutaneous lupus.    Given the mild spongiosis and   this keratotic keratinocytes that extend to the upper granular layer subacute   cutaneous lupus is favored. Clinical and serologic correlation is advised.     Patient following up with rheumatology in regards to treatment.  Biopsies favor SCLE.

## 2023-03-10 ENCOUNTER — OFFICE VISIT (OUTPATIENT)
Dept: RHEUMATOLOGY | Facility: CLINIC | Age: 53
End: 2023-03-10
Payer: MEDICARE

## 2023-03-10 ENCOUNTER — LAB VISIT (OUTPATIENT)
Dept: LAB | Facility: HOSPITAL | Age: 53
End: 2023-03-10
Attending: INTERNAL MEDICINE
Payer: MEDICARE

## 2023-03-10 VITALS
HEART RATE: 96 BPM | WEIGHT: 145.5 LBS | BODY MASS INDEX: 27.49 KG/M2 | SYSTOLIC BLOOD PRESSURE: 119 MMHG | DIASTOLIC BLOOD PRESSURE: 76 MMHG

## 2023-03-10 DIAGNOSIS — I73.00 RAYNAUD'S PHENOMENON WITHOUT GANGRENE: ICD-10-CM

## 2023-03-10 DIAGNOSIS — L93.1 SUBACUTE CUTANEOUS LUPUS ERYTHEMATOSUS: ICD-10-CM

## 2023-03-10 DIAGNOSIS — L93.1 SUBACUTE CUTANEOUS LUPUS ERYTHEMATOSUS: Primary | ICD-10-CM

## 2023-03-10 DIAGNOSIS — M47.812 OSTEOARTHRITIS OF CERVICAL SPINE, UNSPECIFIED SPINAL OSTEOARTHRITIS COMPLICATION STATUS: ICD-10-CM

## 2023-03-10 LAB
HAV IGG SER QL IA: NORMAL
HBV CORE AB SERPL QL IA: NORMAL
HBV SURFACE AB SER-ACNC: 11.54 MIU/ML
HBV SURFACE AB SER-ACNC: NORMAL M[IU]/ML
HBV SURFACE AG SERPL QL IA: NORMAL
HCV AB SERPL QL IA: NORMAL
HIV 1+2 AB+HIV1 P24 AG SERPL QL IA: NORMAL

## 2023-03-10 PROCEDURE — 87389 HIV-1 AG W/HIV-1&-2 AB AG IA: CPT | Performed by: INTERNAL MEDICINE

## 2023-03-10 PROCEDURE — 36415 COLL VENOUS BLD VENIPUNCTURE: CPT | Performed by: INTERNAL MEDICINE

## 2023-03-10 PROCEDURE — 1160F RVW MEDS BY RX/DR IN RCRD: CPT | Mod: CPTII,S$GLB,, | Performed by: INTERNAL MEDICINE

## 2023-03-10 PROCEDURE — 3074F SYST BP LT 130 MM HG: CPT | Mod: CPTII,S$GLB,, | Performed by: INTERNAL MEDICINE

## 2023-03-10 PROCEDURE — 86790 VIRUS ANTIBODY NOS: CPT | Performed by: INTERNAL MEDICINE

## 2023-03-10 PROCEDURE — 86787 VARICELLA-ZOSTER ANTIBODY: CPT | Performed by: INTERNAL MEDICINE

## 2023-03-10 PROCEDURE — 86704 HEP B CORE ANTIBODY TOTAL: CPT | Performed by: INTERNAL MEDICINE

## 2023-03-10 PROCEDURE — 3078F PR MOST RECENT DIASTOLIC BLOOD PRESSURE < 80 MM HG: ICD-10-PCS | Mod: CPTII,S$GLB,, | Performed by: INTERNAL MEDICINE

## 2023-03-10 PROCEDURE — 3078F DIAST BP <80 MM HG: CPT | Mod: CPTII,S$GLB,, | Performed by: INTERNAL MEDICINE

## 2023-03-10 PROCEDURE — 99214 PR OFFICE/OUTPT VISIT, EST, LEVL IV, 30-39 MIN: ICD-10-PCS | Mod: S$GLB,,, | Performed by: INTERNAL MEDICINE

## 2023-03-10 PROCEDURE — 99999 PR PBB SHADOW E&M-EST. PATIENT-LVL III: ICD-10-PCS | Mod: PBBFAC,,, | Performed by: INTERNAL MEDICINE

## 2023-03-10 PROCEDURE — 0034U TPMT NUDT15 GENES: CPT | Performed by: INTERNAL MEDICINE

## 2023-03-10 PROCEDURE — 1160F PR REVIEW ALL MEDS BY PRESCRIBER/CLIN PHARMACIST DOCUMENTED: ICD-10-PCS | Mod: CPTII,S$GLB,, | Performed by: INTERNAL MEDICINE

## 2023-03-10 PROCEDURE — 99214 OFFICE O/P EST MOD 30 MIN: CPT | Mod: S$GLB,,, | Performed by: INTERNAL MEDICINE

## 2023-03-10 PROCEDURE — 86803 HEPATITIS C AB TEST: CPT | Performed by: INTERNAL MEDICINE

## 2023-03-10 PROCEDURE — 3008F PR BODY MASS INDEX (BMI) DOCUMENTED: ICD-10-PCS | Mod: CPTII,S$GLB,, | Performed by: INTERNAL MEDICINE

## 2023-03-10 PROCEDURE — 3008F BODY MASS INDEX DOCD: CPT | Mod: CPTII,S$GLB,, | Performed by: INTERNAL MEDICINE

## 2023-03-10 PROCEDURE — 86682 HELMINTH ANTIBODY: CPT | Performed by: INTERNAL MEDICINE

## 2023-03-10 PROCEDURE — 99999 PR PBB SHADOW E&M-EST. PATIENT-LVL III: CPT | Mod: PBBFAC,,, | Performed by: INTERNAL MEDICINE

## 2023-03-10 PROCEDURE — 86706 HEP B SURFACE ANTIBODY: CPT | Mod: 91 | Performed by: INTERNAL MEDICINE

## 2023-03-10 PROCEDURE — 3074F PR MOST RECENT SYSTOLIC BLOOD PRESSURE < 130 MM HG: ICD-10-PCS | Mod: CPTII,S$GLB,, | Performed by: INTERNAL MEDICINE

## 2023-03-10 PROCEDURE — 1159F PR MEDICATION LIST DOCUMENTED IN MEDICAL RECORD: ICD-10-PCS | Mod: CPTII,S$GLB,, | Performed by: INTERNAL MEDICINE

## 2023-03-10 PROCEDURE — 86592 SYPHILIS TEST NON-TREP QUAL: CPT | Performed by: INTERNAL MEDICINE

## 2023-03-10 PROCEDURE — 1159F MED LIST DOCD IN RCRD: CPT | Mod: CPTII,S$GLB,, | Performed by: INTERNAL MEDICINE

## 2023-03-10 PROCEDURE — 87340 HEPATITIS B SURFACE AG IA: CPT | Performed by: INTERNAL MEDICINE

## 2023-03-11 LAB — RPR SER QL: NORMAL

## 2023-03-11 NOTE — PROGRESS NOTES
History of present illness:  52-year-old female I saw initially in 2005. She has lupus erythematosus with primarily skin involvement.  She has had joint aching but we felt it was more likely fibromyalgia.  She has not had joint swelling.  She does have positive anti phospholipid antibody but has no definite history of ischemic disease.  Her last visit was in January.  She was on Plaquenil, Flexeril, gabapentin, and Cymbalta.    Her neck is doing better.  She went to therapy which helped.  Flexeril had helped.  She is not taking it regularly.    Her rash is been flaring for the past several months.  She is developed periorbital erythema and erythema on the arms and legs.  She was seen by Dermatology.  She had a skin biopsy.  It is compatible with autoimmune disease.  She is had no fever or headache.  She is occasional redness of the eyes.  She has no oral ulcers.  She is occasional shortness of breath but no pleurisy.  She has Raynaud's phenomena but no digital ulcers.  She is had no abdominal pain.  She is had no joint swelling.  She is had no numbness or tingling.  She denies muscle weakness.      Physical examination:  Skin:  She has diffuse erythema on the face, arms, and legs.  ENT: Adequate tears in saliva.  No conjunctivitis or oral ulcers.    Chest:  Clear to auscultation  Cardiac:  No murmurs, gallops, rubs   Abdomen:  No organomegaly or masses.  No tenderness to palpation   Extremities:  No pedal edema   Musculoskeletal:  Full range of motion of all joints.  No synovitis.    Neurologic:  Normal muscle strength testing   I did capillary microscopy.  She has some vessel dropout but has no abnormal blood vessels.    Assessment:  At the present time her disease just seems to be cutaneous.  I do not find evidence to suggest systemic disease.  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  Further laboratory studies obtained  2. I have referred her to Infectious  Disease for pre DMARD workup as any vaccines  3. I will see her in follow-up in 1 month.  I will need to add another DMARD at that time.  It depends on the laboratory workup.      Answers submitted by the patient for this visit:  Rheumatology Questionnaire (Submitted on 3/10/2023)  fever: No  eye redness: Yes  mouth sores: No  headaches: No  shortness of breath: Yes  chest pain: No  trouble swallowing: No  diarrhea: No  constipation: No  unexpected weight change: No  genital sore: No  dysuria: No  During the last 3 days, have you had a skin rash?: Yes  Bruises or bleeds easily: Yes  cough: Yes

## 2023-03-13 LAB
NUDT15 GENOTYPE: NORMAL
NUDT15 PHENOTYPE: NORMAL
STRONGYLOIDES ANTIBODY IGG: NEGATIVE
TPMT ADDITIONAL INFORMATION: NORMAL
TPMT DISCLAIMER: NORMAL
TPMT GENOTYPE RESULT: NORMAL
TPMT INTERPRETATION: NORMAL
TPMT METHOD: NORMAL
TPMT PHENOTYPE: NORMAL
TPMT REVIEWED BY: NORMAL
VARICELLA INTERPRETATION: POSITIVE
VARICELLA ZOSTER IGG: >4000 AU/ML

## 2023-03-14 ENCOUNTER — OFFICE VISIT (OUTPATIENT)
Dept: INFECTIOUS DISEASES | Facility: CLINIC | Age: 53
End: 2023-03-14
Payer: MEDICARE

## 2023-03-14 ENCOUNTER — CLINICAL SUPPORT (OUTPATIENT)
Dept: INFECTIOUS DISEASES | Facility: CLINIC | Age: 53
End: 2023-03-14
Payer: MEDICARE

## 2023-03-14 VITALS
HEIGHT: 61 IN | BODY MASS INDEX: 27.09 KG/M2 | TEMPERATURE: 99 F | SYSTOLIC BLOOD PRESSURE: 141 MMHG | DIASTOLIC BLOOD PRESSURE: 88 MMHG | HEART RATE: 97 BPM | WEIGHT: 143.5 LBS

## 2023-03-14 DIAGNOSIS — Z71.85 VACCINE COUNSELING: ICD-10-CM

## 2023-03-14 DIAGNOSIS — L93.1 SUBACUTE CUTANEOUS LUPUS ERYTHEMATOSUS: ICD-10-CM

## 2023-03-14 DIAGNOSIS — Z71.85 VACCINE COUNSELING: Primary | ICD-10-CM

## 2023-03-14 PROCEDURE — 1159F PR MEDICATION LIST DOCUMENTED IN MEDICAL RECORD: ICD-10-PCS | Mod: CPTII,S$GLB,, | Performed by: STUDENT IN AN ORGANIZED HEALTH CARE EDUCATION/TRAINING PROGRAM

## 2023-03-14 PROCEDURE — 3008F BODY MASS INDEX DOCD: CPT | Mod: CPTII,S$GLB,, | Performed by: STUDENT IN AN ORGANIZED HEALTH CARE EDUCATION/TRAINING PROGRAM

## 2023-03-14 PROCEDURE — 90471 IMMUNIZATION ADMIN: CPT | Mod: S$GLB,,, | Performed by: STUDENT IN AN ORGANIZED HEALTH CARE EDUCATION/TRAINING PROGRAM

## 2023-03-14 PROCEDURE — 90677 PCV20 VACCINE IM: CPT | Mod: S$GLB,,, | Performed by: STUDENT IN AN ORGANIZED HEALTH CARE EDUCATION/TRAINING PROGRAM

## 2023-03-14 PROCEDURE — 90471 HEPATITIS A VACCINE ADULT IM: ICD-10-PCS | Mod: S$GLB,,, | Performed by: STUDENT IN AN ORGANIZED HEALTH CARE EDUCATION/TRAINING PROGRAM

## 2023-03-14 PROCEDURE — 90632 HEPA VACCINE ADULT IM: CPT | Mod: S$GLB,,, | Performed by: STUDENT IN AN ORGANIZED HEALTH CARE EDUCATION/TRAINING PROGRAM

## 2023-03-14 PROCEDURE — 3077F PR MOST RECENT SYSTOLIC BLOOD PRESSURE >= 140 MM HG: ICD-10-PCS | Mod: CPTII,S$GLB,, | Performed by: STUDENT IN AN ORGANIZED HEALTH CARE EDUCATION/TRAINING PROGRAM

## 2023-03-14 PROCEDURE — G0008 PNEUMOCOCCAL CONJUGATE VACCINE 20-VALENT: ICD-10-PCS | Mod: 59,S$GLB,, | Performed by: STUDENT IN AN ORGANIZED HEALTH CARE EDUCATION/TRAINING PROGRAM

## 2023-03-14 PROCEDURE — G0009 FLU VACCINE (QUAD) GREATER THAN OR EQUAL TO 3YO PRESERVATIVE FREE IM: ICD-10-PCS | Mod: 59,S$GLB,, | Performed by: STUDENT IN AN ORGANIZED HEALTH CARE EDUCATION/TRAINING PROGRAM

## 2023-03-14 PROCEDURE — 3079F PR MOST RECENT DIASTOLIC BLOOD PRESSURE 80-89 MM HG: ICD-10-PCS | Mod: CPTII,S$GLB,, | Performed by: STUDENT IN AN ORGANIZED HEALTH CARE EDUCATION/TRAINING PROGRAM

## 2023-03-14 PROCEDURE — G0009 ADMIN PNEUMOCOCCAL VACCINE: HCPCS | Mod: 59,S$GLB,, | Performed by: STUDENT IN AN ORGANIZED HEALTH CARE EDUCATION/TRAINING PROGRAM

## 2023-03-14 PROCEDURE — 99203 OFFICE O/P NEW LOW 30 MIN: CPT | Mod: 25,S$GLB,, | Performed by: STUDENT IN AN ORGANIZED HEALTH CARE EDUCATION/TRAINING PROGRAM

## 2023-03-14 PROCEDURE — 1160F PR REVIEW ALL MEDS BY PRESCRIBER/CLIN PHARMACIST DOCUMENTED: ICD-10-PCS | Mod: CPTII,S$GLB,, | Performed by: STUDENT IN AN ORGANIZED HEALTH CARE EDUCATION/TRAINING PROGRAM

## 2023-03-14 PROCEDURE — G0008 ADMIN INFLUENZA VIRUS VAC: HCPCS | Mod: 59,S$GLB,, | Performed by: STUDENT IN AN ORGANIZED HEALTH CARE EDUCATION/TRAINING PROGRAM

## 2023-03-14 PROCEDURE — 3077F SYST BP >= 140 MM HG: CPT | Mod: CPTII,S$GLB,, | Performed by: STUDENT IN AN ORGANIZED HEALTH CARE EDUCATION/TRAINING PROGRAM

## 2023-03-14 PROCEDURE — 1160F RVW MEDS BY RX/DR IN RCRD: CPT | Mod: CPTII,S$GLB,, | Performed by: STUDENT IN AN ORGANIZED HEALTH CARE EDUCATION/TRAINING PROGRAM

## 2023-03-14 PROCEDURE — 90677 PNEUMOCOCCAL CONJUGATE VACCINE 20-VALENT: ICD-10-PCS | Mod: S$GLB,,, | Performed by: STUDENT IN AN ORGANIZED HEALTH CARE EDUCATION/TRAINING PROGRAM

## 2023-03-14 PROCEDURE — 90739 HEPB VACC 2/4 DOSE ADULT IM: CPT | Mod: S$GLB,,, | Performed by: STUDENT IN AN ORGANIZED HEALTH CARE EDUCATION/TRAINING PROGRAM

## 2023-03-14 PROCEDURE — 1159F MED LIST DOCD IN RCRD: CPT | Mod: CPTII,S$GLB,, | Performed by: STUDENT IN AN ORGANIZED HEALTH CARE EDUCATION/TRAINING PROGRAM

## 2023-03-14 PROCEDURE — G0010 ADMIN HEPATITIS B VACCINE: HCPCS | Mod: 59,S$GLB,, | Performed by: STUDENT IN AN ORGANIZED HEALTH CARE EDUCATION/TRAINING PROGRAM

## 2023-03-14 PROCEDURE — 90632 HEPATITIS A VACCINE ADULT IM: ICD-10-PCS | Mod: S$GLB,,, | Performed by: STUDENT IN AN ORGANIZED HEALTH CARE EDUCATION/TRAINING PROGRAM

## 2023-03-14 PROCEDURE — G0010 HEPATITIS B (RECOMBINANT) ADJUVANTED, 2 DOSE: ICD-10-PCS | Mod: 59,S$GLB,, | Performed by: STUDENT IN AN ORGANIZED HEALTH CARE EDUCATION/TRAINING PROGRAM

## 2023-03-14 PROCEDURE — 90686 IIV4 VACC NO PRSV 0.5 ML IM: CPT | Mod: S$GLB,,, | Performed by: STUDENT IN AN ORGANIZED HEALTH CARE EDUCATION/TRAINING PROGRAM

## 2023-03-14 PROCEDURE — 99999 PR PBB SHADOW E&M-EST. PATIENT-LVL III: CPT | Mod: PBBFAC,,, | Performed by: STUDENT IN AN ORGANIZED HEALTH CARE EDUCATION/TRAINING PROGRAM

## 2023-03-14 PROCEDURE — 99999 PR PBB SHADOW E&M-EST. PATIENT-LVL III: ICD-10-PCS | Mod: PBBFAC,,, | Performed by: STUDENT IN AN ORGANIZED HEALTH CARE EDUCATION/TRAINING PROGRAM

## 2023-03-14 PROCEDURE — 90739 HEPATITIS B (RECOMBINANT) ADJUVANTED, 2 DOSE: ICD-10-PCS | Mod: S$GLB,,, | Performed by: STUDENT IN AN ORGANIZED HEALTH CARE EDUCATION/TRAINING PROGRAM

## 2023-03-14 PROCEDURE — 99203 PR OFFICE/OUTPT VISIT, NEW, LEVL III, 30-44 MIN: ICD-10-PCS | Mod: 25,S$GLB,, | Performed by: STUDENT IN AN ORGANIZED HEALTH CARE EDUCATION/TRAINING PROGRAM

## 2023-03-14 PROCEDURE — 90686 FLU VACCINE (QUAD) GREATER THAN OR EQUAL TO 3YO PRESERVATIVE FREE IM: ICD-10-PCS | Mod: S$GLB,,, | Performed by: STUDENT IN AN ORGANIZED HEALTH CARE EDUCATION/TRAINING PROGRAM

## 2023-03-14 PROCEDURE — 3079F DIAST BP 80-89 MM HG: CPT | Mod: CPTII,S$GLB,, | Performed by: STUDENT IN AN ORGANIZED HEALTH CARE EDUCATION/TRAINING PROGRAM

## 2023-03-14 PROCEDURE — 3008F PR BODY MASS INDEX (BMI) DOCUMENTED: ICD-10-PCS | Mod: CPTII,S$GLB,, | Performed by: STUDENT IN AN ORGANIZED HEALTH CARE EDUCATION/TRAINING PROGRAM

## 2023-03-14 NOTE — PROGRESS NOTES
Pre Biologic Response Modifier Therapy Consult  BMR Recipient Evaluation    Requesting Physician: Jasson    Reason for Visit: Vaccine counseling    History of Present Illness  52 y.o. female with advanced Lupus, FM and allergies presents for vaccine counseling.  Has been on plaquenil for 30 years for lupus, potential DMARD in the future.     Patient denies any recent fever, chills, or infective infective illnesses.    Review of Systems   Constitutional:  Negative for chills and fever.   All other systems reviewed and are negative.     Past Medical History:   Diagnosis Date    Chronic allergic conjunctivitis     Chronic allergic rhinitis due to animal hair and dander     COPD (chronic obstructive pulmonary disease)     Fibromyalgia     Hypothyroidism     Immune disorder     Long-term use of Plaquenil 5/7/2019    Lupus     Mild persistent asthma without complication 11/12/2019    Pt with reports daily cough, dyspnea, freqent rescue inhaler use. Recommend maintenance therapy  Treat triggers-sinus, gerd, smoking cessation.  Normal pulmonary studies.     CHEPE (obstructive sleep apnea) 11/12/2019    CHEPE on CPAP 11/12/2019    Patient with symptoms of snoring, witnessed apnea, excessive daytime sleepiness, fatigue and difficulty staying asleep. Dx with CHEPE in 2015 but difficulty tolerating cpap at time due to sinus problems.  HST 1/22/2020: AHI 7 RDI 15      Osteoarthritis of cervical spine 3/10/2023    Palpitations 11/12/2019    May be related to breathing issues    Recurrent urticaria 1/6/2017    S/P laparoscopic cholecystectomy 8/4/2017    Subacute cutaneous lupus erythematosus        Past Surgical History:   Procedure Laterality Date    CHOLECYSTECTOMY      COLONOSCOPY N/A 3/9/2020    Procedure: COLONOSCOPY;  Surgeon: Judah Lopez MD;  Location: [address];  Service: Endoscopy;  Laterality: N/A;    ESOPHAGOGASTRODUODENOSCOPY N/A 3/9/2020    Procedure: EGD (ESOPHAGOGASTRODUODENOSCOPY);  Surgeon: Judah CARLOS  MD John;  Location: [facility] ([address] [place] [address]);  Service: Endoscopy;  Laterality: N/A;       Family History   Problem Relation Age of Onset    Hypertension Mother     Thyroid disease Mother     Cataracts Mother     Cancer Mother         duodenal    Arthritis Father     Hyperlipidemia Father     Cancer Father         Multiple Myeloma     Cirrhosis Father     Cataracts Father     Lupus Sister     Asthma Sister     Scoliosis Sister     No Known Problems Brother     No Known Problems Maternal Aunt     No Known Problems Maternal Uncle     No Known Problems Paternal Aunt     Pancreatic cancer Paternal Uncle     No Known Problems Maternal Grandmother     Lung cancer Maternal Grandfather     No Known Problems Paternal Grandmother     Lung cancer Paternal Grandfather     No Known Problems Other     Amblyopia Neg Hx     Blindness Neg Hx     Diabetes Neg Hx     Glaucoma Neg Hx     Macular degeneration Neg Hx     Retinal detachment Neg Hx     Strabismus Neg Hx     Stroke Neg Hx        Social History     Socioeconomic History    Marital status:    Tobacco Use    Smoking status: Every Day     Packs/day: 1.50     Years: 32.00     Pack years: 48.00     Types: Cigarettes    Smokeless tobacco: Former     Quit date: 7/8/2013    Tobacco comments:     started age 17   Substance and Sexual Activity    Alcohol use: No     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     Drug use: No    Sexual activity: Yes     Partners: Male       Review of patient's allergies indicates:   Allergen Reactions    Bactrim [sulfamethoxazole-trimethoprim] Rash       Medications:  Current Outpatient Medications on File Prior to Visit   Medication Sig Dispense Refill    albuterol (PROVENTIL) 2.5 mg /3 mL (0.083 %) nebulizer solution TAKE 3 MLS BY NEBULIZATION EVERY 6 HOURS AS NEEDED FOR WHEEZING OR SHORTNESS OF BREATH. RESCUE 150 mL 1    albuterol-ipratropium (DUO-NEB) 2.5 mg-0.5 mg/3 mL nebulizer solution Take 3 mLs by nebulization every 6 (six) hours as needed for  Wheezing. Rescue 75 mL 0    albuterol-ipratropium (DUO-NEB) 2.5 mg-0.5 mg/3 mL nebulizer solution Inhale 3 mLs into the lungs every 6 (six) hours as needed.      azelastine (ASTELIN) 137 mcg (0.1 %) nasal spray USE 2 SPRAYS (274 MCG TOTAL) BY NASAL ROUTE 2 (TWO) TIMES DAILY. 30 mL 11    cyclobenzaprine (FLEXERIL) 5 MG tablet Take 1 tablet by mouth 3 (three) times daily as needed.      cyclobenzaprine (FLEXERIL) 5 MG tablet TAKE 1 TABLET BY MOUTH THREE TIMES A DAY AS NEEDED FOR MUSCLE SPASMS 90 tablet 0    DULoxetine (CYMBALTA) 60 MG capsule Take 1 capsule by mouth.      DULoxetine (CYMBALTA) 60 MG capsule Take 1 capsule (60 mg total) by mouth once daily. 90 capsule 1    estradioL (ESTRACE) 0.01 % (0.1 mg/gram) vaginal cream PLACE 1 G VAGINALLY ONCE DAILY. 42.5 g 6    fluorometholone 0.1% (FML) 0.1 % DrpS 1 DROP IN BOTH EYES TWICE A DAY FOR 2 WEEKS THEN DAILY FOR 2 WEEKS  0    gabapentin (NEURONTIN) 600 MG tablet TAKE 1 TABLET (600 MG TOTAL) BY MOUTH ONCE DAILY. 90 tablet 1    HYDROcodone-acetaminophen (NORCO) 7.5-325 mg per tablet Take 1 tablet by mouth every 24 hours as needed for Pain. 30 tablet 0    hydrocortisone 2.5 % ointment APPLY TOPICALLY 2 (TWO) TIMES DAILY. FOR EYELID RASH AS NEEDED 20 g 2    hydrocortisone 2.5 % ointment Apply topically 2 (two) times daily. 20 g 2    hydrocortisone 2.5 % ointment Apply topically 2 (two) times daily. To rash on eyelids PRN 28 g 5    hydrOXYchloroQUINE (PLAQUENIL) 200 mg tablet Take 2 tablets (400 mg total) by mouth once daily. 60 tablet 6    hydrOXYzine HCL (ATARAX) 25 MG tablet TAKE 1 TAB BY MOUTH EVERY EVENING AS NEEDED FOR PRURITUS. DO NOT DRIVE OR OPERATE MACHINERY 30 tablet 2    levothyroxine (SYNTHROID) 100 MCG tablet TAKE 1 TABLET MONDAY THROUGH SATURDAY AND TAKE 1/2 TABLET ON SUNDAY 85 tablet 2    montelukast (SINGULAIR) 10 mg tablet TAKE 1 TABLET BY MOUTH EVERY DAY IN THE EVENING 90 tablet 3    naproxen (NAPROSYN) 500 MG tablet Take 1 tablet (500 mg total) by  "mouth 2 (two) times daily. 60 tablet 0    pantoprazole (PROTONIX) 40 MG tablet Take 1 tablet by mouth.      pantoprazole (PROTONIX) 40 MG tablet TAKE 1 TABLET BY MOUTH EVERY DAY 90 tablet 1    pimecrolimus (ELIDEL) 1 % cream Apply topically 2 (two) times daily. To rashes on face and arms as needed 30 g 2    soy isofl/blk coh/gr tea/yerba (ESTROVEN ENERGY ORAL) Take by mouth.      tacrolimus (PROTOPIC) 0.1 % ointment Apply to skin 2 (two) times daily. 60 g 3    triamcinolone acetonide 0.1% (KENALOG) 0.1 % ointment Apply topically 2 (two) times daily. 80 g 0    budesonide-formoterol 160-4.5 mcg (SYMBICORT) 160-4.5 mcg/actuation HFAA Inhale 2 puffs into the lungs every 12 (twelve) hours. Controller (Patient not taking: Reported on 1/23/2023) 1 Inhaler 11    EPINEPHrine (EPIPEN 2-GIGI) 0.3 mg/0.3 mL AtIn Inject 0.3 mLs (0.3 mg total) into the muscle once. for 1 dose (Patient not taking: Reported on 6/25/2021) 2 Device 0     No current facility-administered medications on file prior to visit.       Objective:   BP (!) 141/88   Pulse 97   Temp 98.5 °F (36.9 °C) (Oral)   Ht 5' 1" (1.549 m)   Wt 65.1 kg (143 lb 8.3 oz)   LMP 05/14/2014 (Approximate)   BMI 27.12 kg/m²   General: Afebrile, alert, comfortable, no acute distress.   Pulmonary: Non labored  Skin: No jaundice, rashes, or visible lesions.   Neurological:  Alert and oriented x 4.      1) Do you have a history of:         YES NO   Diabetes   []        [x]     Autoimmune disease  [x]        []   Cancer               []        [x]   Surgical Removal of Spleen []        [x]       2) Have you had recurrent infections:             YES NO  Sinus infections  []        [x]   Lung infections  []        [x]              Urinary Tract Infections []        [x]                                              Intestinal Infections  []        [x]      Skin Infections   []        [x]       Musculoskeletal Infections    []        [x]   Reproductive Infections []        [x] "   Periodontal Disease  []        [x]        3)Have you ever had: YES     NO       Chicken Pox   [x]         []          Shingles   []         [x]            Orolabial Herpes             []         [x]          Genital Herpes  []         [x]           Genital Warts   []         [x]             Cytomegalovirus  []         [x]          Reyes-Barr Virus  []         [x]              Hepatitis A   []         [x]          Hepatitis B   []         [x]          Hepatitis C   []         [x]            Syphilis   []         [x]          Gonorrhea   []         [x]         Chlamydia    []         [x]           Parasites / worms  []         [x]         Fungal Infections  []         [x]         Bloodstream Infections []         [x]             4) Tuberculosis             YES NO  Exposure to person with active TB?  []         [x]   H/o homeless?    []         [x]   H/o imprisonment?    []         [x]   Have you ever had a positive PPD?      []         [x]   If yes, what treatment did you receive:          5) Travel    What states have you lived in? [place]    What countries have you visited for more than 2 weeks?    none                               YES     NO    Are you planning to travel outside of [place]?    []          [x]     6) Animal Exposure                  YES NO  Do you have pets living in your house?   [x]         []   If yes, describe: cat    Do you spend time or live on a farm?    []         [x]   If yes, which ones:    Do you have a fish tank?         []   [x]    Do you have a litter box?     [x]         []     Do you fish or hunt?      [x]         []   Do you clean or skin fish or animals?    []         [x]     Consume raw or undercooked meat, fish, shellfish?  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    Going out to eat           YES     NO  Do you garden or otherwise work in the soil?  []         [x]   Do you hike, camp, or spend  time in wooded areas?  []         [x]       9) The patient's immunization history was reviewed.     Have you ever received:  YES NO   Routine Childhood vaccines  [x]         []       Influenza vaccine   []         [x]  Not this year   Prevnar    [x]         []   2016  Pneumovax    []         []     Tetanus-diptheria -pertussis  [x]         []     Hepatitis A vaccine series       []         [x]     Hepatitis B vaccine series         []         [x]     Meningitis vaccine   []         [x]     Zoster vaccine    []         [x]        Significant labs reviewed:    HepA Ab neg  HepBs Ab neg  HepBs Ag neg  HepBc Ab neg  HepC Ab neg    HIV neg  RPR neg  Quant gold neg        Microbiology x 7d:   Microbiology Results (last 7 days)       ** No results found for the last 168 hours. **            Immunization History   Administered Date(s) Administered    COVID-19, MRNA, LN-S, PF (Pfizer) (Gray Cap) 05/19/2022    COVID-19, MRNA, LN-S, PF (Pfizer) (Purple Cap) 03/16/2021, 03/16/2021, 04/06/2021, 04/06/2021, 08/17/2021    COVID-19, mRNA, LNP-S, bivalent booster, PF (PFIZER OMICRON) 10/27/2022    Influenza 11/02/2007, 09/29/2009, 11/16/2010, 10/29/2012    Influenza - Quadrivalent 09/25/2014, 09/02/2016    Influenza - Quadrivalent - PF *Preferred* (6 months and older) 09/02/2016, 11/07/2017, 09/11/2019, 10/28/2020    Influenza - Trivalent (ADULT) 11/02/2007, 09/29/2009, 11/16/2010, 10/29/2012    Influenza Split 10/29/2012, 10/29/2012    Pneumococcal Polysaccharide - 23 Valent 10/29/2012    Tdap 09/02/2016       Assessment:     Vaccine Counseling  Immunosuppression    Subacute cutaneous lupus erythematosus  -     Ambulatory referral to Infectious Disease         Plan:       Biologic Response Modifier Candidacy:   Based on available information, there are no identified significant barriers to BRMs from an infectious disease standpoint pending acceptable serologies.  Final determination of BRM candidacy will be made once evaluation is  complete and reviewed.    Except for inactivated influenza vaccine, vaccination during immune suppression might be suboptimal. Patients vaccinated within a 14-day period before starting immunosuppressive therapy should be revaccination at least 3 months after immune suppressive therapy is discontinued (and if on rituximab wait 6 months after stopping).     Immune suppression should be delayed 4 weeks after a live virus vaccine.     Counseling:  - I discussed with the patient the risk for increased susceptibility to infections following BRM therapy including increased risk for infection.    - Specific guidance has been provided to the patient regarding the patients occupation, hobbies and activities to avoid future infectious complications including but not limited to avoiding undercooked meats and seafood, proper hygiene, and contact with animals.  - The patients has been counseled on the importance of vaccinations including but not limited to a yearly flu vaccine.    Immunizations:  Based on the patients immunization history and serologies, immunizations were ordered: \  - Influenza vaccine  - Prevnar 20  - Hepatitis A 0, 6 months  - Hepatitis B 0, 1, months  - Shingrix 0, 2 months  - prescription given to obtain at retail pharmacy              The patient was encouraged to contact us about any problems that may develop after immunization and possible side effects were reviewed.     The patient is aware that people with suppressed immune systems are more likely to have complications from shingles (such as more severe rash that lasts longer and have increased risk of developing disseminated disease). Shingrex is an inactivated vaccine, so it is safe in people with suppressed immune systems. It is recommended in all adults over the age of 50 years old regardless of their immune system and may not be covered by insurance until that age.    These recommendations have been sent to and/or discussed with the following  providers:   - Dr. Ad Nguyen MD  Infectious Disease        30 minutes of total time spent on the encounter, which includes face to face time and non-face to face time preparing to see the patient (eg, review of tests), obtaining and/or reviewing separately obtained history, documenting clinical information in the electronic or other health record, independently interpreting results (not separately reported) and communicating results to the patient/family/caregiver, or care coordination (not separately reported).

## 2023-03-14 NOTE — PROGRESS NOTES
Patient received Hep A, PCV20 IM in right arm & Heplisav B, Influenza IM in left arm.  Patient tolerated well and left clinic NAD after observation.  Follow up vaccine appointments scheduled with pt.

## 2023-03-18 LAB — RNAP III AB SER-ACNC: <20 UNITS

## 2023-03-22 LAB
ANTI-PM/SCL AB: <20 UNITS
ANTI-SS-A 52 KD AB, IGG: 23 UNITS
EJ AB SER QL: NEGATIVE
ENA JO1 AB SER IA-ACNC: <20 UNITS
ENA SM+RNP AB SER IA-ACNC: <20 UNITS
FIBRILLARIN (U3 RNP): NEGATIVE
KU AB SER QL: NEGATIVE
MDA-5 (P140): <20 UNITS
MI2 AB SER QL: NEGATIVE
NXP-2 (P140): <20 UNITS
OJ AB SER QL: NEGATIVE
PL12 AB SER QL: NEGATIVE
PL7 AB SER QL: NEGATIVE
SRP AB SERPL QL: NEGATIVE
TIF1 GAMMA (P155/140): <20 UNITS
U2 SNRNP: NEGATIVE

## 2023-03-22 NOTE — PROGRESS NOTES
See poc for eval note   62M from [facility], h/o traumatic subdural hemorrhage following a fall down the stairs while drunk, s/p r craniotomy at [facility] in 01/2023, s/p trach/PEG BIBEMS for tachycardia 170s, RR 27, increased secretions admitted to  for sepsis secondary to RUL pneumonia with tracheostomy to 3L NC.  3/6 Febrile 101.7  03/07: No events overnight. On [place]-trach at 3 L and tolerating well. Hemodynamically stable. Cultures in progress. C/w Vanco and Cefepime. F/u Vanco trough. Hypokalemic this AM and replacement ordered.   03/08: Afebrile. Vanco discontinued since MRSA negative. + yeast in sputum; + Candida Galbrata in urine (Bran in place with clear urine and afebrile).   Dr. Peres following. Blood cx NGTD. C/w Cefepime.   03/09: Febrile, tachycardic and tachypneic this AM. Code sepsis called. Sepsis w/u in progress.  Started on Vanco. ID following, . Discussed with Dr. Peres. F/u CXR. Worsening Leukocytosis. IVF bolus with LR (2,300 ml)  per protocol. Lactate 1.9.   3/11: afebrile over past 24hrs, hemodynamically stable. Vanco trough 11.7  03/12: Vanco trough this PM. Blood cx remains NGTD. C/w Vanco and Cefepime. ID following. Persistent Transaminitis.  Hep C non-reactive. F/u remaining Hepatitis panel. Patient on high dose statin which could be contributing to transaminitis. Hold for now and re-eval. Given that patient is unable to verbalize/demonstrate if symptomatic will order Liver US.   03/13: Spiked fevers overnight. Tachycardic and hypotensive. IVF bolus given. Blood cx in progress.  F/u Procal and Lactate. Vanco increased to 1500 mg BID and Meropenem added and Cefepime discontinued Discussed wih Dr. Peres and reccs appreciated  Worsening leukocytosis Pending Abdominal US 2/2 transaminitis.   03/14: Febrile. Urinary retention. On Flomax. Bladder scan and Straight cath Q 4-6 hours. On Vanco and Jose Elias. F/u cx. Abd US unremarkable. CXR 3/13 negative for acute findings.   3/15 Bladder scan 550. Bran cath reinserted.

## 2023-03-28 ENCOUNTER — PATIENT MESSAGE (OUTPATIENT)
Dept: INFECTIOUS DISEASES | Facility: CLINIC | Age: 53
End: 2023-03-28
Payer: MEDICARE

## 2023-04-06 ENCOUNTER — PES CALL (OUTPATIENT)
Dept: ADMINISTRATIVE | Facility: CLINIC | Age: 53
End: 2023-04-06
Payer: MEDICARE

## 2023-04-18 ENCOUNTER — CLINICAL SUPPORT (OUTPATIENT)
Dept: INFECTIOUS DISEASES | Facility: CLINIC | Age: 53
End: 2023-04-18
Payer: MEDICARE

## 2023-04-18 DIAGNOSIS — L93.1 SUBACUTE CUTANEOUS LUPUS ERYTHEMATOSUS: ICD-10-CM

## 2023-04-18 DIAGNOSIS — Z71.85 VACCINE COUNSELING: ICD-10-CM

## 2023-04-18 PROCEDURE — G0010 HEPATITIS B (RECOMBINANT) ADJUVANTED, 2 DOSE: ICD-10-PCS | Mod: S$GLB,,, | Performed by: STUDENT IN AN ORGANIZED HEALTH CARE EDUCATION/TRAINING PROGRAM

## 2023-04-18 PROCEDURE — G0010 ADMIN HEPATITIS B VACCINE: HCPCS | Mod: S$GLB,,, | Performed by: STUDENT IN AN ORGANIZED HEALTH CARE EDUCATION/TRAINING PROGRAM

## 2023-04-18 PROCEDURE — 90739 HEPB VACC 2/4 DOSE ADULT IM: CPT | Mod: S$GLB,,, | Performed by: STUDENT IN AN ORGANIZED HEALTH CARE EDUCATION/TRAINING PROGRAM

## 2023-04-18 PROCEDURE — 90739 HEPATITIS B (RECOMBINANT) ADJUVANTED, 2 DOSE: ICD-10-PCS | Mod: S$GLB,,, | Performed by: STUDENT IN AN ORGANIZED HEALTH CARE EDUCATION/TRAINING PROGRAM

## 2023-04-18 NOTE — PROGRESS NOTES
Patient received Heplisav B IM in right arm, pt tolerated well and left clinic NAD after observation.

## 2023-04-20 ENCOUNTER — NURSE TRIAGE (OUTPATIENT)
Dept: ADMINISTRATIVE | Facility: CLINIC | Age: 53
End: 2023-04-20
Payer: MEDICARE

## 2023-04-20 ENCOUNTER — HOSPITAL ENCOUNTER (EMERGENCY)
Facility: HOSPITAL | Age: 53
Discharge: HOME | End: 2023-04-20
Attending: EMERGENCY MEDICINE
Payer: MEDICARE

## 2023-04-20 VITALS
DIASTOLIC BLOOD PRESSURE: 75 MMHG | OXYGEN SATURATION: 99 % | SYSTOLIC BLOOD PRESSURE: 158 MMHG | BODY MASS INDEX: 26.62 KG/M2 | WEIGHT: 141 LBS | RESPIRATION RATE: 17 BRPM | TEMPERATURE: 99 F | HEIGHT: 61 IN | HEART RATE: 85 BPM

## 2023-04-20 DIAGNOSIS — Z87.09 HISTORY OF COPD: ICD-10-CM

## 2023-04-20 DIAGNOSIS — R07.9 CHEST PAIN, UNSPECIFIED TYPE: ICD-10-CM

## 2023-04-20 DIAGNOSIS — U07.1 POSITIVE SELF-ADMINISTERED ANTIGEN TEST FOR COVID-19: ICD-10-CM

## 2023-04-20 DIAGNOSIS — R06.02 SOB (SHORTNESS OF BREATH): Primary | ICD-10-CM

## 2023-04-20 DIAGNOSIS — Z87.09 HISTORY OF ASTHMA: ICD-10-CM

## 2023-04-20 DIAGNOSIS — J06.9 URI WITH COUGH AND CONGESTION: ICD-10-CM

## 2023-04-20 LAB
ALBUMIN SERPL BCP-MCNC: 4.2 G/DL (ref 3.5–5.2)
ALP SERPL-CCNC: 89 U/L (ref 55–135)
ALT SERPL W/O P-5'-P-CCNC: 17 U/L (ref 10–44)
ANION GAP SERPL CALC-SCNC: 12 MMOL/L (ref 8–16)
AST SERPL-CCNC: 20 U/L (ref 10–40)
BASOPHILS # BLD AUTO: 0.04 K/UL (ref 0–0.2)
BASOPHILS NFR BLD: 0.4 % (ref 0–1.9)
BILIRUB SERPL-MCNC: 0.2 MG/DL (ref 0.1–1)
BNP SERPL-MCNC: 18 PG/ML (ref 0–99)
BUN SERPL-MCNC: 5 MG/DL (ref 6–20)
CALCIUM SERPL-MCNC: 9.1 MG/DL (ref 8.7–10.5)
CHLORIDE SERPL-SCNC: 102 MMOL/L (ref 95–110)
CO2 SERPL-SCNC: 25 MMOL/L (ref 23–29)
CREAT SERPL-MCNC: 0.8 MG/DL (ref 0.5–1.4)
CTP QC/QA: YES
CTP QC/QA: YES
D DIMER PPP IA.FEU-MCNC: 0.42 MG/L FEU
DIFFERENTIAL METHOD: ABNORMAL
EOSINOPHIL # BLD AUTO: 0.2 K/UL (ref 0–0.5)
EOSINOPHIL NFR BLD: 1.4 % (ref 0–8)
ERYTHROCYTE [DISTWIDTH] IN BLOOD BY AUTOMATED COUNT: 13.1 % (ref 11.5–14.5)
EST. GFR  (NO RACE VARIABLE): >60 ML/MIN/1.73 M^2
GLUCOSE SERPL-MCNC: 113 MG/DL (ref 70–110)
HCT VFR BLD AUTO: 41.2 % (ref 37–48.5)
HGB BLD-MCNC: 13.7 G/DL (ref 12–16)
IMM GRANULOCYTES # BLD AUTO: 0.03 K/UL (ref 0–0.04)
IMM GRANULOCYTES NFR BLD AUTO: 0.3 % (ref 0–0.5)
LYMPHOCYTES # BLD AUTO: 1.8 K/UL (ref 1–4.8)
LYMPHOCYTES NFR BLD: 16.4 % (ref 18–48)
MCH RBC QN AUTO: 29.7 PG (ref 27–31)
MCHC RBC AUTO-ENTMCNC: 33.3 G/DL (ref 32–36)
MCV RBC AUTO: 89 FL (ref 82–98)
MONOCYTES # BLD AUTO: 0.3 K/UL (ref 0.3–1)
MONOCYTES NFR BLD: 2.9 % (ref 4–15)
NEUTROPHILS # BLD AUTO: 8.4 K/UL (ref 1.8–7.7)
NEUTROPHILS NFR BLD: 78.6 % (ref 38–73)
NRBC BLD-RTO: 0 /100 WBC
PLATELET # BLD AUTO: 315 K/UL (ref 150–450)
PMV BLD AUTO: 10.1 FL (ref 9.2–12.9)
POC MOLECULAR INFLUENZA A AGN: NEGATIVE
POC MOLECULAR INFLUENZA B AGN: NEGATIVE
POTASSIUM SERPL-SCNC: 3.5 MMOL/L (ref 3.5–5.1)
PROT SERPL-MCNC: 8.6 G/DL (ref 6–8.4)
RBC # BLD AUTO: 4.61 M/UL (ref 4–5.4)
SARS-COV-2 RDRP RESP QL NAA+PROBE: NEGATIVE
SODIUM SERPL-SCNC: 139 MMOL/L (ref 136–145)
TROPONIN I SERPL DL<=0.01 NG/ML-MCNC: <0.006 NG/ML (ref 0–0.03)
WBC # BLD AUTO: 10.69 K/UL (ref 3.9–12.7)

## 2023-04-20 PROCEDURE — 94760 N-INVAS EAR/PLS OXIMETRY 1: CPT

## 2023-04-20 PROCEDURE — 93010 EKG 12-LEAD: ICD-10-PCS | Mod: ,,, | Performed by: INTERNAL MEDICINE

## 2023-04-20 PROCEDURE — 99285 EMERGENCY DEPT VISIT HI MDM: CPT | Mod: 25

## 2023-04-20 PROCEDURE — 25000242 PHARM REV CODE 250 ALT 637 W/ HCPCS: Performed by: NURSE PRACTITIONER

## 2023-04-20 PROCEDURE — 87502 INFLUENZA DNA AMP PROBE: CPT

## 2023-04-20 PROCEDURE — 85025 COMPLETE CBC W/AUTO DIFF WBC: CPT | Performed by: NURSE PRACTITIONER

## 2023-04-20 PROCEDURE — 94640 AIRWAY INHALATION TREATMENT: CPT

## 2023-04-20 PROCEDURE — 93005 ELECTROCARDIOGRAM TRACING: CPT

## 2023-04-20 PROCEDURE — 80053 COMPREHEN METABOLIC PANEL: CPT | Performed by: NURSE PRACTITIONER

## 2023-04-20 PROCEDURE — 93010 ELECTROCARDIOGRAM REPORT: CPT | Mod: ,,, | Performed by: INTERNAL MEDICINE

## 2023-04-20 PROCEDURE — 85379 FIBRIN DEGRADATION QUANT: CPT | Performed by: NURSE PRACTITIONER

## 2023-04-20 PROCEDURE — 84484 ASSAY OF TROPONIN QUANT: CPT | Performed by: NURSE PRACTITIONER

## 2023-04-20 PROCEDURE — 83880 ASSAY OF NATRIURETIC PEPTIDE: CPT | Performed by: NURSE PRACTITIONER

## 2023-04-20 PROCEDURE — 63600175 PHARM REV CODE 636 W HCPCS: Performed by: NURSE PRACTITIONER

## 2023-04-20 RX ORDER — PROMETHAZINE HYDROCHLORIDE AND DEXTROMETHORPHAN HYDROBROMIDE 6.25; 15 MG/5ML; MG/5ML
5 SYRUP ORAL
Qty: 118 ML | Refills: 0 | Status: SHIPPED | OUTPATIENT
Start: 2023-04-20 | End: 2023-04-30

## 2023-04-20 RX ORDER — ALBUTEROL SULFATE 90 UG/1
1-2 AEROSOL, METERED RESPIRATORY (INHALATION) EVERY 6 HOURS PRN
Qty: 6.7 G | Refills: 0 | Status: SHIPPED | OUTPATIENT
Start: 2023-04-20 | End: 2024-02-02

## 2023-04-20 RX ORDER — AZITHROMYCIN 500 MG/1
500 TABLET, FILM COATED ORAL DAILY
Qty: 5 TABLET | Refills: 0 | Status: SHIPPED | OUTPATIENT
Start: 2023-04-20 | End: 2023-04-25

## 2023-04-20 RX ORDER — PREDNISONE 20 MG/1
40 TABLET ORAL DAILY
Qty: 10 TABLET | Refills: 0 | Status: SHIPPED | OUTPATIENT
Start: 2023-04-20 | End: 2023-04-25

## 2023-04-20 RX ORDER — PREDNISONE 20 MG/1
60 TABLET ORAL
Status: COMPLETED | OUTPATIENT
Start: 2023-04-20 | End: 2023-04-20

## 2023-04-20 RX ORDER — ALBUTEROL SULFATE 2.5 MG/.5ML
2.5 SOLUTION RESPIRATORY (INHALATION)
Status: COMPLETED | OUTPATIENT
Start: 2023-04-20 | End: 2023-04-20

## 2023-04-20 RX ADMIN — PREDNISONE 60 MG: 20 TABLET ORAL at 12:04

## 2023-04-20 RX ADMIN — ALBUTEROL SULFATE 2.5 MG: 2.5 SOLUTION RESPIRATORY (INHALATION) at 12:04

## 2023-04-20 NOTE — ED NOTES
Patient c/o frequent cough starting approximately 2 days ago, and right anterior chest pressure worsening with cough starting today. Patient states that she took 2 covid test today. One was positive, one negative. Called her PCP and was advised to come to ER.

## 2023-04-20 NOTE — DISCHARGE INSTRUCTIONS
§ Please return to the Emergency Department for any new or worsening symptoms including: fever, chest pain, shortness of breath, loss of consciousness, dizziness, weakness, or any other concerns.     § Schedule an appointment for follow up with your Primary Care Doctor as soon as possible for a recheck of your symptoms. If you do not have one, contact the one listed on your discharge paperwork or call the [facility] Appointment Desk at [phone] to schedule an appointment.     § Please take all medication as prescribed.  Please read the FDA handout on the molnupiravir medication.

## 2023-04-20 NOTE — ED PROVIDER NOTES
"Encounter Date: 4/20/2023       History     Chief Complaint   Patient presents with    COVID-19 Concerns     52 yo female to triage for cough, runny nose, congestion, SOB, chest soreness. Pt says she took 2 home Covid test this morning, one was negative and one was positive. Hx of Lupus and Asthma. 98% on RA     Emily Pina is a 52 yo female smoker with a hx of Lupus and asthma who presents to ED c/o constant SOB which onset yesterday after having a coughing fit. Exacerbated with exertion. Reports productive cough with clear sputum, chills, congestion, rhinorrhea x3 days ago. Also c/o mid-sternal chest pain described as "pressure" which started this morning around 9 AM that is worse with coughing, and rated 6-7/10 at this time. Reports lightheadedness with coughing.  She had one positive and one negative at-home COVID test this morning. Prior tx includes Symbicort inhaler with some relief last night. Reports recent Hep B vaccination around time initial symptoms began. Denies fever, chest tightness, diaphoresis, leg swelling, palpitations, HA, dizziness, syncope, N/V/D, abd pain, sore throat, trouble swallowing, dysuria, hematuria, constipation, blood in stool, or any other sxs at this time. She smokes 1 ppd.    The history is provided by the patient. No  was used.   Review of patient's allergies indicates:   Allergen Reactions    Bactrim [sulfamethoxazole-trimethoprim] Rash     Past Medical History:   Diagnosis Date    Chronic allergic conjunctivitis     Chronic allergic rhinitis due to animal hair and dander     COPD (chronic obstructive pulmonary disease)     Fibromyalgia     Hypothyroidism     Immune disorder     Long-term use of Plaquenil 5/7/2019    Lupus     Mild persistent asthma without complication 11/12/2019    Pt with reports daily cough, dyspnea, freqent rescue inhaler use. Recommend maintenance therapy  Treat triggers-sinus, gerd, smoking cessation.  Normal pulmonary " studies.     CHEPE (obstructive sleep apnea) 11/12/2019    CHEPE on CPAP 11/12/2019    Patient with symptoms of snoring, witnessed apnea, excessive daytime sleepiness, fatigue and difficulty staying asleep. Dx with CHEPE in 2015 but difficulty tolerating cpap at time due to sinus problems.  HST 1/22/2020: AHI 7 RDI 15      Osteoarthritis of cervical spine 3/10/2023    Palpitations 11/12/2019    May be related to breathing issues    Recurrent urticaria 1/6/2017    S/P laparoscopic cholecystectomy 8/4/2017    Subacute cutaneous lupus erythematosus      Past Surgical History:   Procedure Laterality Date    CHOLECYSTECTOMY      COLONOSCOPY N/A 3/9/2020    Procedure: COLONOSCOPY;  Surgeon: Judah Lopez MD;  [facility]  Service: Endoscopy;  Laterality: N/A;    ESOPHAGOGASTRODUODENOSCOPY N/A 3/9/2020    Procedure: EGD (ESOPHAGOGASTRODUODENOSCOPY);  Surgeon: Judah Lopez MD;  Location: [facility] [place] [facility]  Service: Endoscopy;  Laterality: N/A;     Family History   Problem Relation Age of Onset    Hypertension Mother     Thyroid disease Mother     Cataracts Mother     Cancer Mother         duodenal    Arthritis Father     Hyperlipidemia Father     Cancer Father         Multiple Myeloma     Cirrhosis Father     Cataracts Father     Lupus Sister     Asthma Sister     Scoliosis Sister     No Known Problems Brother     No Known Problems Maternal Aunt     No Known Problems Maternal Uncle     No Known Problems Paternal Aunt     Pancreatic cancer Paternal Uncle     No Known Problems Maternal Grandmother     Lung cancer Maternal Grandfather     No Known Problems Paternal Grandmother     Lung cancer Paternal Grandfather     No Known Problems Other     Amblyopia Neg Hx     Blindness Neg Hx     Diabetes Neg Hx     Glaucoma Neg Hx     Macular degeneration Neg Hx     Retinal detachment Neg Hx     Strabismus Neg Hx     Stroke Neg Hx      Social History     Tobacco Use    Smoking status: Every Day     Packs/day:  1.00     Years: 32.00     Pack years: 32.00     Types: Cigarettes    Smokeless tobacco: Former     Quit date: 7/8/2013    Tobacco comments:     started age 17   Substance Use Topics    Alcohol use: No    Drug use: No     Review of Systems   Constitutional:  Positive for chills. Negative for diaphoresis and fever.   HENT:  Positive for congestion and rhinorrhea. Negative for ear pain, sore throat and trouble swallowing.    Eyes:  Negative for pain.   Respiratory:  Positive for cough and shortness of breath. Negative for chest tightness and wheezing.    Cardiovascular:  Positive for chest pain. Negative for palpitations and leg swelling.   Gastrointestinal:  Negative for abdominal pain, blood in stool, constipation, diarrhea, nausea and vomiting.   Genitourinary:  Negative for dysuria and hematuria.   Musculoskeletal:  Negative for myalgias.   Skin:  Negative for rash.   Neurological:  Positive for light-headedness. Negative for dizziness, syncope and headaches.     Physical Exam     Initial Vitals [04/20/23 1113]   BP Pulse Resp Temp SpO2   135/82 97 19 98.9 °F (37.2 °C) 99 %      MAP       --         Physical Exam    Nursing note and vitals reviewed.  Constitutional: She appears well-developed and well-nourished. She is not diaphoretic. She is cooperative.  Non-toxic appearance. She does not have a sickly appearance. She does not appear ill. No distress.   HENT:   Head: Normocephalic and atraumatic.   Right Ear: External ear normal.   Left Ear: External ear normal.   Nose: Nose normal.   Mouth/Throat: Oropharynx is clear and moist and mucous membranes are normal. No trismus in the jaw. No oropharyngeal exudate.   Eyes: Conjunctivae and EOM are normal. Pupils are equal, round, and reactive to light.   Neck: Phonation normal. Neck supple. No tracheal deviation present.   Normal range of motion.  Cardiovascular:  Normal rate, regular rhythm, normal heart sounds and intact distal pulses.           Pulses:       Radial  pulses are 2+ on the right side and 2+ on the left side.   Pulmonary/Chest: Effort normal. No accessory muscle usage or stridor. No tachypnea and no bradypnea. No respiratory distress. She has no wheezes. She has rhonchi in the left lower field. She has no rales. She exhibits no tenderness.   Abdominal: Abdomen is soft. She exhibits no distension. There is no abdominal tenderness.   Musculoskeletal:         General: Normal range of motion.      Cervical back: Normal range of motion and neck supple.     Lymphadenopathy:        Right cervical: No superficial cervical adenopathy present.       Left cervical: No superficial cervical adenopathy present.   Neurological: She is alert and oriented to person, place, and time. She has normal strength. No sensory deficit. Coordination and gait normal. GCS score is 15. GCS eye subscore is 4. GCS verbal subscore is 5. GCS motor subscore is 6.   Skin: Skin is warm, dry and intact. Capillary refill takes less than 2 seconds. No bruising and no rash noted. No cyanosis or erythema. Nails show no clubbing.   Psychiatric: She has a normal mood and affect. Her behavior is normal. Judgment and thought content normal.       ED Course   Procedures  Labs Reviewed   CBC W/ AUTO DIFFERENTIAL - Abnormal; Notable for the following components:       Result Value    Gran # (ANC) 8.4 (*)     Gran % 78.6 (*)     Lymph % 16.4 (*)     Mono % 2.9 (*)     All other components within normal limits   COMPREHENSIVE METABOLIC PANEL - Abnormal; Notable for the following components:    Glucose 113 (*)     BUN 5 (*)     Total Protein 8.6 (*)     All other components within normal limits   TROPONIN I   B-TYPE NATRIURETIC PEPTIDE   D DIMER, QUANTITATIVE   POCT INFLUENZA A/B MOLECULAR   SARS-COV-2 RDRP GENE     EKG Readings: (Independently Interpreted)   Initial Reading: No STEMI. Rhythm: Normal Sinus Rhythm. Heart Rate: 98. Ectopy: No Ectopy. ST Segments: Normal ST Segments. Axis: Normal.     Imaging Results               X-Ray Chest PA And Lateral (Final result)  Result time 04/20/23 12:40:55      Final result by Nik Bravo MD (04/20/23 12:40:55)                   Impression:      See above      Electronically signed by: Nik Bravo MD  Date:    04/20/2023  Time:    12:40               Narrative:    EXAMINATION:  XR CHEST PA AND LATERAL    CLINICAL HISTORY:  Chest Pain;    TECHNIQUE:  PA and lateral views of the chest were performed.    COMPARISON:  07/26/2022    FINDINGS:  Cardiac size is normal.  Lungs are clear.  No infiltrate is noted.                                       Medications   albuterol sulfate nebulizer solution 2.5 mg (2.5 mg Nebulization Given 4/20/23 1256)   predniSONE tablet 60 mg (60 mg Oral Given 4/20/23 1242)           APC / Resident Notes:   This is an evaluation of a 53 y.o. female that presents to the Emergency Department for chest pain and shortness or breath.  Reports ongoing cough, chest pain associated with cough.  Patient took 2 COVID tests at home, was positive, 1 was negative. Physical Exam shows a non-toxic, afebrile, and well appearing female.  Ears and throat without infection.  Breath sounds with intermittent rhonchi heard greatest on left.  No retractions or stridor.  Speaking full complete sentences.  No cervical lymphadenopathy or meningeal signs.  Moves all extremities.  Lower extremities soft, no calf tenderness.  No asymmetric swelling or edema. Vital signs are reassuring. If available, previous records reviewed.  Given the patient's past medical history, the decision was made to initiate workup to rule out cardiac or pulmonary processes.  RESULTS:  CBC without leukocytosis or anemia.  Normal platelet count.  CMP with a glucose of 113, protein 8.6, BUN 5, otherwise unremarkable.  Negative troponin.  Negative BNP.  Negative D-dimer.  Flu and COVID negative. Covid Risk Score: 2.  EKG normal sinus rhythm rate 90 beats per minute.  No evidence acute ischemia or  arrhythmia.  Questionable Flattening versus TWI lead 3.    My overall impression is shortness of breath with chest pain, history of COPD/asthma, suspect flare.  Questionable COVID (1 positive home test), given the patient's history, I have offered molpavinir treatment and given the patient the FDA fact sheet.  I considered, but at this time, do not suspect ACS, PE, electrolyte disturbance, status asthmaticus, acute decompensated heart failure, respiratory failure.  Given her history of COPD/asthma, will cover for flare.    The diagnosis, treatment plan, instructions for follow-up as well as ED return precautions were discussed. All questions have been answered.  KATYA Triana FNP-C      ED Course as of 04/20/23 1441   Thu Apr 20, 2023   1340 Reassessment:  After breathing treatment patient reports symptoms are improving.  Breath sounds are clear to auscultation.  No wheezing or rhonchi. [AF]      ED Course User Index  [AF] NANCY Mccall                 Clinical Impression:   Final diagnoses:  [R06.02] SOB (shortness of breath) (Primary)  [R07.9] Chest pain, unspecified type  [J06.9] URI with cough and congestion  [Z87.09] History of COPD  [Z87.09] History of asthma  [U07.1] Positive self-administered antigen test for COVID-19        ED Disposition Condition    Discharge Stable          ED Prescriptions       Medication Sig Dispense Start Date End Date Auth. Provider    molnupiravir 200 mg capsule (EUA) Take 4 capsules (800 mg total) by mouth every 12 (twelve) hours. for 5 days 40 capsule 4/20/2023 4/25/2023 NANCY Mccall    albuterol (PROVENTIL/VENTOLIN HFA) 90 mcg/actuation inhaler Inhale 1-2 puffs into the lungs every 6 (six) hours as needed for Wheezing or Shortness of Breath. 6.7 g 4/20/2023 5/20/2023 NANCY Mccall    promethazine-dextromethorphan (PROMETHAZINE-DM) 6.25-15 mg/5 mL Syrp Take 5 mLs by mouth every 4 to 6 hours as needed (Cough). 118 mL 4/20/2023 4/30/2023 Jun Duran  NANCY    predniSONE (DELTASONE) 20 MG tablet Take 2 tablets (40 mg total) by mouth once daily. for 5 days 10 tablet 4/20/2023 4/25/2023 NANCY Mccall    azithromycin (ZITHROMAX) 500 MG tablet Take 1 tablet (500 mg total) by mouth once daily. for 5 days 5 tablet 4/20/2023 4/25/2023 NANCY Mccall          Follow-up Information       Follow up With Specialties Details Why Contact Info    Your Primary Care Doctor  Schedule an appointment as soon as possible for a visit  Please call and schedule an appointment for follow up this week.     [facility] Emergency Dept Emergency Medicine Go to  If symptoms worsen [address]  [facility] [address] [phone]             NANCY Mccall  04/20/23 1429       NANCY Mccall  04/20/23 1444

## 2023-04-20 NOTE — TELEPHONE ENCOUNTER
Last night she was gasping for air and she has a deep cough. Pt has Lupus. Cough started on Tuesday. Pt stated she had her second series of Hep B vaccine. Chest pressure is constant. Care advice recommend pt go to Er. Pt verbalized understanding.   Reason for Disposition   SEVERE or constant chest pain or pressure  (Exception: Mild central chest pain, present only when coughing.)    Additional Information   Negative: SEVERE difficulty breathing (e.g., struggling for each breath, speaks in single words)   Negative: Difficult to awaken or acting confused (e.g., disoriented, slurred speech)   Negative: Bluish (or gray) lips or face now   Negative: Shock suspected (e.g., cold/pale/clammy skin, too weak to stand, low BP, rapid pulse)   Negative: Sounds like a life-threatening emergency to the triager    Protocols used: Coronavirus (COVID-19) Diagnosed or [id number]

## 2023-04-28 DIAGNOSIS — L93.1 SUBACUTE CUTANEOUS LUPUS ERYTHEMATOSUS: ICD-10-CM

## 2023-04-28 DIAGNOSIS — M25.50 ARTHRALGIA, UNSPECIFIED JOINT: ICD-10-CM

## 2023-04-28 DIAGNOSIS — G89.4 CHRONIC PAIN SYNDROME: ICD-10-CM

## 2023-04-28 DIAGNOSIS — Z72.0 TOBACCO ABUSE: ICD-10-CM

## 2023-04-28 DIAGNOSIS — Z92.25 HISTORY OF IMMUNOSUPPRESSION THERAPY: ICD-10-CM

## 2023-04-28 DIAGNOSIS — M79.7 FIBROMYALGIA: ICD-10-CM

## 2023-04-29 ENCOUNTER — PATIENT MESSAGE (OUTPATIENT)
Dept: RHEUMATOLOGY | Facility: CLINIC | Age: 53
End: 2023-04-29
Payer: MEDICARE

## 2023-05-01 RX ORDER — HYDROCODONE BITARTRATE AND ACETAMINOPHEN 7.5; 325 MG/1; MG/1
1 TABLET ORAL
Qty: 30 TABLET | Refills: 0 | Status: SHIPPED | OUTPATIENT
Start: 2023-05-01 | End: 2023-05-31 | Stop reason: SDUPTHER

## 2023-05-02 ENCOUNTER — OFFICE VISIT (OUTPATIENT)
Dept: ALLERGY | Facility: CLINIC | Age: 53
End: 2023-05-02
Payer: MEDICARE

## 2023-05-02 ENCOUNTER — PATIENT MESSAGE (OUTPATIENT)
Dept: ALLERGY | Facility: CLINIC | Age: 53
End: 2023-05-02

## 2023-05-02 ENCOUNTER — TELEPHONE (OUTPATIENT)
Dept: ALLERGY | Facility: CLINIC | Age: 53
End: 2023-05-02
Payer: MEDICARE

## 2023-05-02 ENCOUNTER — LAB VISIT (OUTPATIENT)
Dept: LAB | Facility: HOSPITAL | Age: 53
End: 2023-05-02
Attending: FAMILY MEDICINE
Payer: MEDICARE

## 2023-05-02 VITALS — HEIGHT: 61 IN | BODY MASS INDEX: 26.81 KG/M2 | WEIGHT: 142 LBS

## 2023-05-02 DIAGNOSIS — L93.1 SUBACUTE CUTANEOUS LUPUS ERYTHEMATOSUS: ICD-10-CM

## 2023-05-02 DIAGNOSIS — J30.9 ALLERGIC RHINITIS, UNSPECIFIED SEASONALITY, UNSPECIFIED TRIGGER: ICD-10-CM

## 2023-05-02 DIAGNOSIS — L30.9 DERMATITIS: Primary | ICD-10-CM

## 2023-05-02 DIAGNOSIS — J45.998 OTHER ASTHMA: ICD-10-CM

## 2023-05-02 DIAGNOSIS — H10.10 ALLERGIC CONJUNCTIVITIS, UNSPECIFIED LATERALITY: ICD-10-CM

## 2023-05-02 PROCEDURE — 1160F RVW MEDS BY RX/DR IN RCRD: CPT | Mod: CPTII,S$GLB,, | Performed by: STUDENT IN AN ORGANIZED HEALTH CARE EDUCATION/TRAINING PROGRAM

## 2023-05-02 PROCEDURE — 86003 ALLG SPEC IGE CRUDE XTRC EA: CPT | Mod: 59 | Performed by: STUDENT IN AN ORGANIZED HEALTH CARE EDUCATION/TRAINING PROGRAM

## 2023-05-02 PROCEDURE — 1159F MED LIST DOCD IN RCRD: CPT | Mod: CPTII,S$GLB,, | Performed by: STUDENT IN AN ORGANIZED HEALTH CARE EDUCATION/TRAINING PROGRAM

## 2023-05-02 PROCEDURE — 3008F BODY MASS INDEX DOCD: CPT | Mod: CPTII,S$GLB,, | Performed by: STUDENT IN AN ORGANIZED HEALTH CARE EDUCATION/TRAINING PROGRAM

## 2023-05-02 PROCEDURE — 3008F PR BODY MASS INDEX (BMI) DOCUMENTED: ICD-10-PCS | Mod: CPTII,S$GLB,, | Performed by: STUDENT IN AN ORGANIZED HEALTH CARE EDUCATION/TRAINING PROGRAM

## 2023-05-02 PROCEDURE — 86003 ALLG SPEC IGE CRUDE XTRC EA: CPT | Performed by: STUDENT IN AN ORGANIZED HEALTH CARE EDUCATION/TRAINING PROGRAM

## 2023-05-02 PROCEDURE — 99204 OFFICE O/P NEW MOD 45 MIN: CPT | Mod: S$GLB,,, | Performed by: STUDENT IN AN ORGANIZED HEALTH CARE EDUCATION/TRAINING PROGRAM

## 2023-05-02 PROCEDURE — 99999 PR PBB SHADOW E&M-EST. PATIENT-LVL IV: CPT | Mod: PBBFAC,,, | Performed by: STUDENT IN AN ORGANIZED HEALTH CARE EDUCATION/TRAINING PROGRAM

## 2023-05-02 PROCEDURE — 1160F PR REVIEW ALL MEDS BY PRESCRIBER/CLIN PHARMACIST DOCUMENTED: ICD-10-PCS | Mod: CPTII,S$GLB,, | Performed by: STUDENT IN AN ORGANIZED HEALTH CARE EDUCATION/TRAINING PROGRAM

## 2023-05-02 PROCEDURE — 1159F PR MEDICATION LIST DOCUMENTED IN MEDICAL RECORD: ICD-10-PCS | Mod: CPTII,S$GLB,, | Performed by: STUDENT IN AN ORGANIZED HEALTH CARE EDUCATION/TRAINING PROGRAM

## 2023-05-02 PROCEDURE — 36415 COLL VENOUS BLD VENIPUNCTURE: CPT | Mod: PO | Performed by: STUDENT IN AN ORGANIZED HEALTH CARE EDUCATION/TRAINING PROGRAM

## 2023-05-02 PROCEDURE — 99204 PR OFFICE/OUTPT VISIT, NEW, LEVL IV, 45-59 MIN: ICD-10-PCS | Mod: S$GLB,,, | Performed by: STUDENT IN AN ORGANIZED HEALTH CARE EDUCATION/TRAINING PROGRAM

## 2023-05-02 PROCEDURE — 99999 PR PBB SHADOW E&M-EST. PATIENT-LVL IV: ICD-10-PCS | Mod: PBBFAC,,, | Performed by: STUDENT IN AN ORGANIZED HEALTH CARE EDUCATION/TRAINING PROGRAM

## 2023-05-02 RX ORDER — AZELASTINE HYDROCHLORIDE 0.5 MG/ML
1 SOLUTION/ DROPS OPHTHALMIC 2 TIMES DAILY PRN
Qty: 6 ML | Refills: 11 | Status: SHIPPED | OUTPATIENT
Start: 2023-05-02

## 2023-05-02 RX ORDER — OLOPATADINE HYDROCHLORIDE 2 MG/ML
1 SOLUTION/ DROPS OPHTHALMIC DAILY PRN
Qty: 2.5 ML | Refills: 11 | Status: SHIPPED | OUTPATIENT
Start: 2023-05-02 | End: 2023-08-10

## 2023-05-02 NOTE — PROGRESS NOTES
ALLERGY & IMMUNOLOGY CLINIC - INITIAL CONSULTATION     HISTORY OF PRESENT ILLNESS     Patient ID: Emily Pina is a 53 y.o. female    CC: rash and allergic rhinoconjunctivitis.    HPI: Emily Pina is a 53 y.o. 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  She was last seen in [facility] allergy [facility] by Dr. Turner in 6/2018.    She has been dealing with itchy rash for a few months now. Started around 1/2023. She says it can get somewhat swollen, and then the skin starts pealing. It can be painful. Her eyes water a lot, which can then make the skin burn. She says there are times when her vision gets blurry, but she wonders if this is due to skin flakes getting into her eyes.   She says this hasn't happened prior to this year, but allergy note from 2018 notes comments on an eczematous dermatitis around eyes that she had gotten in the past. It would get go away and come back. She recalls it went away temporarily with prednisone.   Its around her eyes mostly, and she can get it on the dorsum of her hands, antecubital fossas and popliteal fossas.  She says it itches, burns, feels like something is biting her.    She uses hydrocortisone 2.5% ointment on the face. It helps with the dryness, but doesn't fully make it go away.  She has triamcinolone for the body, but not sure if it helps.  She has hydroxyzine to use prn, which does help with the pruritus.    She has a history of allergic rhinoconjunctivitis, with allergy testing positive to cats and dogs. She has a cat. She takes zyrtec. She stopped flonase, she didn't like the taste and says it felt strong. She has azelastine 2 SEN which she uses prn. She uses zaditor eye drops prn. She says symptoms are under fairly good control. She says the itchy and watery eyes have been somewhat worse recently. This can happen away from her cat. She says the zaditor helps a little.   She saw her eye doctor recently  and was told everything looks fine.  She is on montelukast.       MEDICAL HISTORY     Vaccines:   Immunization History   Administered Date(s) Administered    COVID-19, MRNA, LN-S, PF (Pfizer) (Gray Cap) 05/19/2022    COVID-19, MRNA, LN-S, PF (Pfizer) (Purple Cap) 03/16/2021, 03/16/2021, 04/06/2021, 04/06/2021, 08/17/2021    COVID-19, mRNA, LNP-S, bivalent booster, PF (PFIZER OMICRON) 10/27/2022    Hepatitis A, Adult 03/14/2023    Hepatitis B (recombinant) Adjuvanted, 2 dose 03/14/2023, 04/18/2023    Influenza 11/02/2007, 09/29/2009, 11/16/2010, 10/29/2012    Influenza - Quadrivalent 09/25/2014, 09/02/2016    Influenza - Quadrivalent - PF *Preferred* (6 months and older) 09/02/2016, 11/07/2017, 09/11/2019, 10/28/2020, 03/14/2023    Influenza - Trivalent (ADULT) 11/02/2007, 09/29/2009, 11/16/2010, 10/29/2012    Influenza Split 10/29/2012, 10/29/2012    Pneumococcal Conjugate - 20 Valent 03/14/2023    Pneumococcal Polysaccharide - 23 Valent 10/29/2012    Tdap 09/02/2016     Medical Hx:   Patient Active Problem List   Diagnosis    Subacute cutaneous lupus erythematosus    Fibromyalgia    Tobacco abuse    GERD (gastroesophageal reflux disease)    Unspecified hereditary and idiopathic peripheral neuropathy    Hypothyroidism    Chronic allergic conjunctivitis    Chronic allergic rhinitis due to animal hair and dander    Recurrent urticaria    Other pruritus    Biliary colic    S/P laparoscopic cholecystectomy    Disorder of immune system    Decreased functional activity tolerance    Decreased ROM of lumbar spine    Weakness    Refractive error    Long-term use of Plaquenil    Palpitations    Mild persistent asthma without complication    CHEPE on CPAP    Change in bowel movement    Cervical pain    Weakness of both upper extremities    Decreased range of motion of neck    Osteoarthritis of cervical spine    Raynaud's phenomenon without gangrene     Surgical Hx:   Past Surgical History:   Procedure Laterality Date     CHOLECYSTECTOMY      COLONOSCOPY N/A 3/9/2020    Procedure: COLONOSCOPY;  Surgeon: Judah Lopez MD;  [facility]  Service: Endoscopy;  Laterality: N/A;    ESOPHAGOGASTRODUODENOSCOPY N/A 3/9/2020    Procedure: EGD (ESOPHAGOGASTRODUODENOSCOPY);  Surgeon: Judah Lopez MD;  Location: [facility] ([address] [place] [address]);  Service: Endoscopy;  Laterality: N/A;     Medications:   Current Outpatient Medications on File Prior to Visit   Medication Sig Dispense Refill    albuterol (PROVENTIL/VENTOLIN HFA) 90 mcg/actuation inhaler Inhale 1-2 puffs into the lungs every 6 (six) hours as needed for Wheezing or Shortness of Breath. 6.7 g 0    azelastine (ASTELIN) 137 mcg (0.1 %) nasal spray USE 2 SPRAYS (274 MCG TOTAL) BY NASAL ROUTE 2 (TWO) TIMES DAILY. 30 mL 11    budesonide-formoterol 160-4.5 mcg (SYMBICORT) 160-4.5 mcg/actuation HFAA Inhale 2 puffs into the lungs every 12 (twelve) hours. Controller 1 Inhaler 11    cyclobenzaprine (FLEXERIL) 5 MG tablet Take 1 tablet by mouth 3 (three) times daily as needed.      cyclobenzaprine (FLEXERIL) 5 MG tablet TAKE 1 TABLET BY MOUTH THREE TIMES A DAY AS NEEDED FOR MUSCLE SPASMS 90 tablet 0    DULoxetine (CYMBALTA) 60 MG capsule Take 1 capsule by mouth.      DULoxetine (CYMBALTA) 60 MG capsule Take 1 capsule (60 mg total) by mouth once daily. 90 capsule 1    EPINEPHrine (EPIPEN 2-GIGI) 0.3 mg/0.3 mL AtIn Inject 0.3 mLs (0.3 mg total) into the muscle once. for 1 dose 2 Device 0    estradioL (ESTRACE) 0.01 % (0.1 mg/gram) vaginal cream PLACE 1 G VAGINALLY ONCE DAILY. 42.5 g 6    fluorometholone 0.1% (FML) 0.1 % DrpS 1 DROP IN BOTH EYES TWICE A DAY FOR 2 WEEKS THEN DAILY FOR 2 WEEKS  0    gabapentin (NEURONTIN) 600 MG tablet TAKE 1 TABLET (600 MG TOTAL) BY MOUTH ONCE DAILY. 90 tablet 1    HYDROcodone-acetaminophen (NORCO) 7.5-325 mg per tablet Take 1 tablet by mouth every 24 hours as needed for Pain. 30 tablet 0    hydrocortisone 2.5 % ointment APPLY TOPICALLY 2 (TWO) TIMES  DAILY. FOR EYELID RASH AS NEEDED 20 g 2    hydrocortisone 2.5 % ointment Apply topically 2 (two) times daily. 20 g 2    hydrocortisone 2.5 % ointment Apply topically 2 (two) times daily. To rash on eyelids PRN 28 g 5    hydrOXYchloroQUINE (PLAQUENIL) 200 mg tablet Take 2 tablets (400 mg total) by mouth once daily. 60 tablet 6    hydrOXYzine HCL (ATARAX) 25 MG tablet TAKE 1 TAB BY MOUTH EVERY EVENING AS NEEDED FOR PRURITUS. DO NOT DRIVE OR OPERATE MACHINERY 30 tablet 2    levothyroxine (SYNTHROID) 100 MCG tablet TAKE 1 TABLET MONDAY THROUGH SATURDAY AND TAKE 1/2 TABLET ON SUNDAY 85 tablet 2    montelukast (SINGULAIR) 10 mg tablet TAKE 1 TABLET BY MOUTH EVERY DAY IN THE EVENING 90 tablet 3    pantoprazole (PROTONIX) 40 MG tablet Take 1 tablet by mouth.      pantoprazole (PROTONIX) 40 MG tablet TAKE 1 TABLET BY MOUTH EVERY DAY 90 tablet 1    soy isofl/blk coh/gr tea/yerba (ESTROVEN ENERGY ORAL) Take by mouth.      tacrolimus (PROTOPIC) 0.1 % ointment Apply to skin 2 (two) times daily. 60 g 3    triamcinolone acetonide 0.1% (KENALOG) 0.1 % ointment Apply topically 2 (two) times daily. 80 g 0    albuterol-ipratropium (DUO-NEB) 2.5 mg-0.5 mg/3 mL nebulizer solution Take 3 mLs by nebulization every 6 (six) hours as needed for Wheezing. Rescue (Patient not taking: Reported on 5/2/2023) 75 mL 0    naproxen (NAPROSYN) 500 MG tablet Take 1 tablet (500 mg total) by mouth 2 (two) times daily. (Patient not taking: Reported on 5/2/2023) 60 tablet 0    pimecrolimus (ELIDEL) 1 % cream Apply topically 2 (two) times daily. To rashes on face and arms as needed (Patient not taking: Reported on 5/2/2023) 30 g 2     No current facility-administered medications on file prior to visit.     H/o Asthma: endorses, she has symbicort 160 mcg and albuterol, both of which she uses prn. She estimates she uses her inhalers about once per week recently, once per month in the past.   H/o Rhinitis: endorses    Drug Allergies:   Review of patient's  "allergies indicates:   Allergen Reactions    Bactrim [sulfamethoxazole-trimethoprim] Rash     Env/Occ:   Pets: cat, can trigger symptoms     Social Hx:   Social History     Tobacco Use    Smoking status: Every Day     Packs/day: 1.00     Years: 32.00     Pack years: 32.00     Types: Cigarettes    Smokeless tobacco: Former     Quit date: 7/8/2013    Tobacco comments:     started age 17   Substance Use Topics    Alcohol use: No    Drug use: No     Family Hx:   Family History   Problem Relation Age of Onset    Hypertension Mother     Thyroid disease Mother     Cataracts Mother     Cancer Mother         duodenal    Arthritis Father     Hyperlipidemia Father     Cancer Father         Multiple Myeloma     Cirrhosis Father     Cataracts Father     Lupus Sister     Asthma Sister     Scoliosis Sister     No Known Problems Brother     No Known Problems Maternal Aunt     No Known Problems Maternal Uncle     No Known Problems Paternal Aunt     Pancreatic cancer Paternal Uncle     No Known Problems Maternal Grandmother     Lung cancer Maternal Grandfather     No Known Problems Paternal Grandmother     Lung cancer Paternal Grandfather     No Known Problems Other     Amblyopia Neg Hx     Blindness Neg Hx     Diabetes Neg Hx     Glaucoma Neg Hx     Macular degeneration Neg Hx     Retinal detachment Neg Hx     Strabismus Neg Hx     Stroke Neg Hx       PHYSICAL EXAM     VS: Ht 5' 1" (1.549 m)   Wt 64.4 kg (141 lb 15.6 oz)   LMP 05/14/2014 (Approximate)   BMI 26.83 kg/m²   GENERAL: Alert, NAD, well-appearing  EYES: EOMI, no conjunctival injection, no discharge  NOSE: NT 2 + B/L, no stringing mucus, no polyps visualized  ORAL: MMM, no ulcers, no thrush  LUNGS: CTAB, no w/r/c, no increased WOB  HEART: RRR, normal S1/S2, no m/g/r  DERM: bilateral orbital xerosis, erythema; and scaling; patches of erythema of bilateral popliteal fossas (R>L)  NEURO: normal speech, normal gait, no facial asymmetry     LABORATORY STUDIES     Component    "   Latest Ref Rng & Units 4/20/2023 3/10/2023 1/23/2023   WBC      3.90 - 12.70 K/uL 10.69  8.52   RBC      4.00 - 5.40 M/uL 4.61  4.51   Hemoglobin      12.0 - 16.0 g/dL 13.7  13.4   Hematocrit      37.0 - 48.5 % 41.2  42.0   MCV      82 - 98 fL 89  93   MCH      27.0 - 31.0 pg 29.7  29.7   MCHC      32.0 - 36.0 g/dL 33.3  31.9 (L)   RDW      11.5 - 14.5 % 13.1  13.1   Platelets      150 - 450 K/uL 315  360   MPV      9.2 - 12.9 fL 10.1  10.8   Immature Granulocytes      0.0 - 0.5 % 0.3  0.6 (H)   Gran # (ANC)      1.8 - 7.7 K/uL 8.4 (H)  5.1   Immature Grans (Abs)      0.00 - 0.04 K/uL 0.03  0.05 (H)   Lymph #      1.0 - 4.8 K/uL 1.8  2.6   Mono #      0.3 - 1.0 K/uL 0.3  0.6   Eos #      0.0 - 0.5 K/uL 0.2  0.1   Baso #      0.00 - 0.20 K/uL 0.04  0.05   Differential Method       Automated  Automated   Sodium      136 - 145 mmol/L 139  140   Potassium      3.5 - 5.1 mmol/L 3.5  3.7   Chloride      95 - 110 mmol/L 102  102   CO2      23 - 29 mmol/L 25  26   Glucose      70 - 110 mg/dL 113 (H)  112 (H)   BUN      6 - 20 mg/dL 5 (L)  8   Creatinine      0.5 - 1.4 mg/dL 0.8  0.9   Calcium      8.7 - 10.5 mg/dL 9.1  9.6   PROTEIN TOTAL      6.0 - 8.4 g/dL 8.6 (H)  8.5 (H)   Albumin      3.5 - 5.2 g/dL 4.2  4.1   BILIRUBIN TOTAL      0.1 - 1.0 mg/dL 0.2  0.2   Alkaline Phosphatase      55 - 135 U/L 89  68   AST      10 - 40 U/L 20  16   ALT      10 - 44 U/L 17  14   HIV 1/2 Ag/Ab      Non-reactive  Non-reactive    Hepatitis B Surface Ag      Non-reactive  Non-reactive    Strongyloides Ab IgG      Negative  Negative    RPR      Non-reactive  Non-reactive       ALLERGEN TESTING     Skin Prick:   11/11/16 Inhalent Skin Test results (by Dr. Mancera): Positive to cat and dog.    Immunocaps: Ordered.     PULMONARY FUNCTION TESTING     Date 11/12/19:  FVC:         99%ile -> - 3%  FEV1:         96%ile -> + 4%  FEV1/FVC: 79%  FEF 25-75: 89%ile  DLCO:        100%ile  Interpretation: Spirometry is normal. Spirometry remains  unimproved following bronchodilator. Lung volume determination is normal. DLCO is normal.     IMAGING & OTHER DIAGNOSTICS     Skin Biopsy 2/1/23:  1. Skin, right periorbital skin, punch biopsy:   --VACUOLAR INTERFACE DERMATITIS CONSISTENT WITH A CONNECTIVE TISSUE DISEASE, see comment   2. Skin, right dorsal hand, punch biopsy:   --VACUOLAR INTERFACE DERMATITIS CONSISTENT WITH A CONNECTIVE TISSUE DISEASE, see comment   COMMENT: These histologic features may be compatible with systemic lupus, discoid lupus, or subacute cutaneous lupus. Given the mild spongiosis and this keratotic keratinocytes that extend to the upper granular layer subacute cutaneous lupus is favored. Clinical and serologic correlation is advised.     CXR 4/20/23:  FINDINGS:  Cardiac size is normal.  Lungs are clear.  No infiltrate is noted.     CHART REVIEW     Reviewed derm note, rheum note, ID note, prior allergy note, labs, imaging, PFT's, pathology.     ASSESSMENT & PLAN     Emily CentenoKyungBurgess is a 53 y.o. female with     # Dermatitis, Cutaneous lupus: She reports the rashes started around 1/2023. Her lupus was previously well controlled on plaquenil. Explained that given the biopsy results, which are consistent with connective tissue disease favoring subacute cutaneous lupus, an allergic or atopic etiology for her dermatitis is unlikely. Topical steroids help somewhat. She has follow up with dermatology tomorrow. She is also following with rheumatology who plans on adding another DMARD to her regimen.  -recommend continued management per dermatology and rheumatology.    # Allergic rhinoconjunctivitis: She says symptoms are mostly under good control, but her ocular symptoms have been bothersome, especially when her eyes water and creates burning sensation on her periocular rash. Skin prick testing in 2016 positive to cat and dog. She has a cat, but reports she can get symptoms even away from cat. She doesn't like the taste of flonase.  She finds azelastine nasal spray helpful. Zaditor eye drops help somewhat.  -immunocaps for aeroallergens ordered.  -pataday not covered. Prescribed optivar eye drops to replace zaditor.   -continue azelastine nasal spray 2 SEN prn. Advised she can use this up to 2 SEN BID.  -advised that if rhinitis worsens, she can add over the counter nasonex or nasacort to her regimen.  -continue daily cetirizine.    # History of asthma: She reports she uses both her symbicort 160 mcg and albuterol as needed (about once per week). Spirometry normal in 2019.  -advised that if symptoms worsen, she increase her symbicort use to 2 puffs BID.   -otherwise, can continue symbicort and albuterol prn.      Follow up: 2 months.      Radha Rizo MD  Allergy/Immunology

## 2023-05-02 NOTE — TELEPHONE ENCOUNTER
Called pt to let her know the provider prescribed a different eye drop. No answer lvm to give our office a call back.

## 2023-05-03 ENCOUNTER — OFFICE VISIT (OUTPATIENT)
Dept: DERMATOLOGY | Facility: CLINIC | Age: 53
End: 2023-05-03
Payer: MEDICARE

## 2023-05-03 DIAGNOSIS — L93.2 CUTANEOUS LUPUS ERYTHEMATOSUS: ICD-10-CM

## 2023-05-03 DIAGNOSIS — R21 RASH: ICD-10-CM

## 2023-05-03 DIAGNOSIS — M35.9 AUTOIMMUNE CONNECTIVE TISSUE DISORDER: ICD-10-CM

## 2023-05-03 DIAGNOSIS — L30.9 DERMATITIS: ICD-10-CM

## 2023-05-03 DIAGNOSIS — L20.9 ATOPIC DERMATITIS, UNSPECIFIED TYPE: ICD-10-CM

## 2023-05-03 PROCEDURE — 99999 PR PBB SHADOW E&M-EST. PATIENT-LVL III: CPT | Mod: PBBFAC,,,

## 2023-05-03 PROCEDURE — 1160F PR REVIEW ALL MEDS BY PRESCRIBER/CLIN PHARMACIST DOCUMENTED: ICD-10-PCS | Mod: CPTII,S$GLB,, | Performed by: DERMATOLOGY

## 2023-05-03 PROCEDURE — 1160F RVW MEDS BY RX/DR IN RCRD: CPT | Mod: CPTII,S$GLB,, | Performed by: DERMATOLOGY

## 2023-05-03 PROCEDURE — 99999 PR PBB SHADOW E&M-EST. PATIENT-LVL III: ICD-10-PCS | Mod: PBBFAC,,,

## 2023-05-03 PROCEDURE — 99214 OFFICE O/P EST MOD 30 MIN: CPT | Mod: S$GLB,,, | Performed by: DERMATOLOGY

## 2023-05-03 PROCEDURE — 1159F PR MEDICATION LIST DOCUMENTED IN MEDICAL RECORD: ICD-10-PCS | Mod: CPTII,S$GLB,, | Performed by: DERMATOLOGY

## 2023-05-03 PROCEDURE — 1159F MED LIST DOCD IN RCRD: CPT | Mod: CPTII,S$GLB,, | Performed by: DERMATOLOGY

## 2023-05-03 PROCEDURE — 99214 PR OFFICE/OUTPT VISIT, EST, LEVL IV, 30-39 MIN: ICD-10-PCS | Mod: S$GLB,,, | Performed by: DERMATOLOGY

## 2023-05-03 RX ORDER — HYDROCORTISONE 25 MG/G
OINTMENT TOPICAL 2 TIMES DAILY
Qty: 28 G | Refills: 5 | Status: SHIPPED | OUTPATIENT
Start: 2023-05-03 | End: 2023-08-10

## 2023-05-03 RX ORDER — PIMECROLIMUS 10 MG/G
CREAM TOPICAL 2 TIMES DAILY
Qty: 30 G | Refills: 3 | Status: SHIPPED | OUTPATIENT
Start: 2023-05-03 | End: 2023-08-15 | Stop reason: SDUPTHER

## 2023-05-03 RX ORDER — TRIAMCINOLONE ACETONIDE 1 MG/G
OINTMENT TOPICAL 2 TIMES DAILY
Qty: 80 G | Refills: 3 | Status: SHIPPED | OUTPATIENT
Start: 2023-05-03 | End: 2024-02-02 | Stop reason: SDUPTHER

## 2023-05-03 NOTE — PROGRESS NOTES
Subjective:      Patient ID:  Emily Pina is a 53 y.o. female who presents for   Chief Complaint   Patient presents with    Rash     Ms. 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 She has been on plaquenil x 30 years, but within the past few months has flared significantly with rash on the eyelids, chest and hands. She reports rash resolved with topicals (hydrocortisone 2.5% ointment for face, triamcinolone 0.1% ointment for body) but it is returning, with more flaky eczematous rash to eyelids. No changes in make up or fragrance. Also endorses join pains in fingers, hands and feet as well as muscle weakness in her arms. She reports dropping items frequently and becoming fatigued quickly when brushing her hair. She is tolerating plaquenil 200 mg twice a day. She was recently evaluated by ID for vaccine and is preparing to start other immunosuppressant with rheumatology. She is due for colonoscopy and mammogram.    1. Skin, right periorbital skin, punch biopsy:   --VACUOLAR INTERFACE DERMATITIS CONSISTENT WITH A CONNECTIVE TISSUE DISEASE,   see comment   2. Skin, right dorsal hand, punch biopsy:   --VACUOLAR INTERFACE DERMATITIS CONSISTENT WITH A CONNECTIVE TISSUE DISEASE,   see comment   COMMENT: These histologic features may be compatible with systemic lupus,   discoid lupus, or subacute cutaneous lupus.    Given the mild spongiosis and   this keratotic keratinocytes that extend to the upper granular layer subacute   cutaneous lupus is favored. Clinical and serologic correlation is advised.     Rash      Review of Systems   Constitutional:  Positive for  fatigue. Negative for fever and chills.   HENT:  Negative for congestion and sore throat.    Eyes:  Positive for eyelid inflammation. Negative for itching, eye irritation and visual change.   Respiratory:  Negative for cough and shortness of breath.    Cardiovascular:  Negative for chest pain.   Gastrointestinal:  Negative for nausea, vomiting and diarrhea.   Genitourinary:  Negative for dysuria, frequency and hematuria.   Musculoskeletal:  Positive for arthralgias and muscle weakness.   Skin:  Positive for itching, rash and sun sensitivity.   Neurological:  Negative for numbness.   Psychiatric/Behavioral:  Negative for depressed mood.      Objective:   Physical Exam   Constitutional: She appears well-developed and well-nourished. No distress.   Neurological: She is alert and oriented to person, place, and time. She is not disoriented.   Psychiatric: She has a normal mood and affect.   Skin:                 Diagram Legend     Erythematous scaling macule/papule c/w actinic keratosis       Vascular papule c/w angioma      Pigmented verrucoid papule/plaque c/w seborrheic keratosis      Yellow umbilicated papule c/w sebaceous hyperplasia      Irregularly shaped tan macule c/w lentigo     1-2 mm smooth white papules consistent with Milia      Movable subcutaneous cyst with punctum c/w epidermal inclusion cyst      Subcutaneous movable cyst c/w pilar cyst      Firm pink to brown papule c/w dermatofibroma      Pedunculated fleshy papule(s) c/w skin tag(s)      Evenly pigmented macule c/w junctional nevus     Mildly variegated pigmented, slightly irregular-bordered macule c/w mildly atypical nevus      Flesh colored to evenly pigmented papule c/w intradermal nevus       Pink pearly papule/plaque c/w basal cell carcinoma      Erythematous hyperkeratotic cursted plaque c/w SCC      Surgical scar with no sign of skin cancer recurrence      Open and closed comedones      Inflammatory papules and pustules      Verrucoid papule  consistent consistent with wart     Erythematous eczematous patches and plaques     Dystrophic onycholytic nail with subungual debris c/w onychomycosis     Umbilicated papule    Erythematous-base heme-crusted tan verrucoid plaque consistent with inflamed seborrheic keratosis     Erythematous Silvery Scaling Plaque c/w Psoriasis     See annotation      Assessment / Plan:        Cutaneous lupus erythematosus  -     pimecrolimus (ELIDEL) 1 % cream; Apply topically 2 (two) times daily. To face  Dispense: 30 g; Refill: 3    Autoimmune connective tissue disorder    Dermatitis  -     pimecrolimus (ELIDEL) 1 % cream; Apply topically 2 (two) times daily. To face  Dispense: 30 g; Refill: 3    Rash  -     hydrocortisone 2.5 % ointment; Apply topically 2 (two) times daily. To rash on eyelids PRN  Dispense: 28 g; Refill: 5  -     triamcinolone acetonide 0.1% (KENALOG) 0.1 % ointment; Apply topically 2 (two) times daily.  Dispense: 80 g; Refill: 3    Patient with systemic lupus erythematosus, with some dermatomyositis overlap with muscle weakness and heliotrope rash, gottron papules. Patient re-biopsied 2/2023 with Bx results c/w with vacuolar interface dermatitis, suggestive of lupus.  Recent blood work w/ negative myositis negative, patient +anti SSA and now negative dsDNA which has been positive in the past, last time 9 months ago. At this point patient flaring with cutaneous skin involvement and joint pains/weakness, and has been on plaquenil >30 years. Recommend other DMARD such as methotrexate, mycophenolate mofetil.  - Encourage patient to follow up with PCP regarding age appropriate cancer screenings including colonoscopy, mammogram  - Recommend methtorexate or other DMARD as plaquenil likely no longer providing benefit  - Start pimecrolimus 1% cream applied to face twice daily  - Continue hydrocortisone 2.5% cream applied to face daily for up to two weeks, refilled  - Continue triamcinolone 0.01% ointment applied to  hands or truncal rash twice daily, refilled  - Continue aggressive sun protection with SPF minimum 30 and sun avoidance     RTC 3 months    Shakila Appiah MD  Dermatology PGY-II

## 2023-05-04 ENCOUNTER — OFFICE VISIT (OUTPATIENT)
Dept: FAMILY MEDICINE | Facility: CLINIC | Age: 53
End: 2023-05-04
Payer: MEDICARE

## 2023-05-04 ENCOUNTER — LAB VISIT (OUTPATIENT)
Dept: LAB | Facility: HOSPITAL | Age: 53
End: 2023-05-04
Payer: MEDICARE

## 2023-05-04 VITALS
HEIGHT: 61 IN | OXYGEN SATURATION: 96 % | SYSTOLIC BLOOD PRESSURE: 122 MMHG | HEART RATE: 98 BPM | BODY MASS INDEX: 27.12 KG/M2 | TEMPERATURE: 99 F | DIASTOLIC BLOOD PRESSURE: 82 MMHG | WEIGHT: 143.63 LBS

## 2023-05-04 DIAGNOSIS — R05.9 COUGH, UNSPECIFIED TYPE: ICD-10-CM

## 2023-05-04 DIAGNOSIS — F17.210 NICOTINE DEPENDENCE, CIGARETTES, UNCOMPLICATED: ICD-10-CM

## 2023-05-04 DIAGNOSIS — R79.9 ABNORMAL FINDING OF BLOOD CHEMISTRY, UNSPECIFIED: ICD-10-CM

## 2023-05-04 DIAGNOSIS — F17.200 CURRENT SMOKER: Primary | ICD-10-CM

## 2023-05-04 LAB
ESTIMATED AVG GLUCOSE: 114 MG/DL (ref 68–131)
HBA1C MFR BLD: 5.6 % (ref 4–5.6)

## 2023-05-04 PROCEDURE — 1159F PR MEDICATION LIST DOCUMENTED IN MEDICAL RECORD: ICD-10-PCS | Mod: CPTII,S$GLB,,

## 2023-05-04 PROCEDURE — 99999 PR PBB SHADOW E&M-EST. PATIENT-LVL V: CPT | Mod: PBBFAC,,,

## 2023-05-04 PROCEDURE — 83036 HEMOGLOBIN GLYCOSYLATED A1C: CPT

## 2023-05-04 PROCEDURE — 3008F PR BODY MASS INDEX (BMI) DOCUMENTED: ICD-10-PCS | Mod: CPTII,S$GLB,,

## 2023-05-04 PROCEDURE — 99214 OFFICE O/P EST MOD 30 MIN: CPT | Mod: S$GLB,,,

## 2023-05-04 PROCEDURE — 3008F BODY MASS INDEX DOCD: CPT | Mod: CPTII,S$GLB,,

## 2023-05-04 PROCEDURE — 99214 PR OFFICE/OUTPT VISIT, EST, LEVL IV, 30-39 MIN: ICD-10-PCS | Mod: S$GLB,,,

## 2023-05-04 PROCEDURE — 1159F MED LIST DOCD IN RCRD: CPT | Mod: CPTII,S$GLB,,

## 2023-05-04 PROCEDURE — 3074F PR MOST RECENT SYSTOLIC BLOOD PRESSURE < 130 MM HG: ICD-10-PCS | Mod: CPTII,S$GLB,,

## 2023-05-04 PROCEDURE — 3074F SYST BP LT 130 MM HG: CPT | Mod: CPTII,S$GLB,,

## 2023-05-04 PROCEDURE — 36415 COLL VENOUS BLD VENIPUNCTURE: CPT | Mod: PO

## 2023-05-04 PROCEDURE — 3079F PR MOST RECENT DIASTOLIC BLOOD PRESSURE 80-89 MM HG: ICD-10-PCS | Mod: CPTII,S$GLB,,

## 2023-05-04 PROCEDURE — 99999 PR PBB SHADOW E&M-EST. PATIENT-LVL V: ICD-10-PCS | Mod: PBBFAC,,,

## 2023-05-04 PROCEDURE — 3079F DIAST BP 80-89 MM HG: CPT | Mod: CPTII,S$GLB,,

## 2023-05-04 NOTE — PROGRESS NOTES
HPI     Chief Complaint:  Chief Complaint   Patient presents with    Follow-up     Er follow up for deep cough, they did a ekg during her er visit        Emily CentenoKyungBurgess is a 53 y.o. female with multiple medical diagnoses as listed in the medical history and problem list that presents for ED follow up SOB.  Pt is not known to me with her last appointment 1/23/2023.      HPI  Follow up ED on 4/20/23 for SOB. Pt doing much better, but still has cough. Pt coughing up brown phlegm. Pt still with occasional SOB and wheezing, albuterol helps. Cough worse at night. Prescribed nebulizer, prednisone, z-pack, promethazine dm, molnupiravir, albuterol inhaler in ED. Completed Prednisone and Z-pack. ED Chest X-ray clear, EKG WNL, labs WNL.        Assessment & Plan     Problem List Items Addressed This Visit    None  Visit Diagnoses       Current smoker    -  Primary  Pt interested in quitting smoking. Discussed benefits of smoking cessation and improvement in health. Will refer to smoking cessation. Will order Lung screening.    Relevant Orders    CT Chest Lung Screening Low Dose    Ambulatory referral/consult to Smoking Cessation Program    Nicotine dependence, cigarettes, uncomplicated        Relevant Orders  Due for CT Lung screening, current daily smoker.    CT Chest Lung Screening Low Dose    Abnormal finding of blood chemistry, unspecified      Pt overweight with multiple comorbidities. Due for Hemoglobin A1C.    Relevant Orders    Hemoglobin A1C    Cough, unspecified type      Follow up ED visit for SOB and cough. Completed Prednisone and Z-pack. ED Chest X-ray clear, EKG WNL, labs WNL. SOB improved, cough improving. Lung sounds clear on exam today. Continue Promethazine DM at night for cough.             --------------------------------------------      Health Maintenance:  Health Maintenance         Date Due Completion Date    Sign Pain Contract Never done ---    Complete Opioid Risk Tool Never done ---  "   Hemoglobin A1c (Diabetic Prevention Screening) Never done ---    LDCT Lung Screen Never done ---    Shingles Vaccine (2 of 2) 06/27/2023 5/2/2023    Mammogram 08/09/2024 8/9/2022    Cervical Cancer Screening 09/11/2024 9/11/2019    Lipid Panel 09/14/2024 9/14/2019    TETANUS VACCINE 09/02/2026 9/2/2016    Colorectal Cancer Screening 03/09/2030 3/9/2020            Health maintenance reviewed and A1c ordered    Follow Up:  Follow up in about 6 months (around 11/4/2023).    Exam     Review of Systems:  (as noted above)  Review of Systems   Constitutional:  Negative for appetite change and fever.   Respiratory:  Positive for cough, shortness of breath and wheezing.    Gastrointestinal:  Negative for nausea and vomiting.   Neurological:  Positive for light-headedness. Negative for headaches.     Physical Exam:   Physical Exam  Constitutional:       General: She is not in acute distress.     Appearance: She is not ill-appearing or diaphoretic.   HENT:      Head: Normocephalic and atraumatic.   Cardiovascular:      Rate and Rhythm: Normal rate and regular rhythm.      Heart sounds: No murmur heard.    No friction rub. No gallop.   Pulmonary:      Effort: Pulmonary effort is normal. No respiratory distress.      Breath sounds: Normal breath sounds.   Chest:      Chest wall: No tenderness.   Musculoskeletal:      Cervical back: No rigidity.   Neurological:      Mental Status: She is alert.     Vitals:    05/04/23 0940   BP: 122/82   Pulse: 98   Temp: 99 °F (37.2 °C)   SpO2: 96%   Weight: 65.2 kg (143 lb 10.1 oz)   Height: 5' 1" (1.549 m)      Body mass index is 27.14 kg/m².        History     Past Medical History:  Past Medical History:   Diagnosis Date    Chronic allergic conjunctivitis     Chronic allergic rhinitis due to animal hair and dander     COPD (chronic obstructive pulmonary disease)     Fibromyalgia     Hypothyroidism     Immune disorder     Long-term use of Plaquenil 5/7/2019    Lupus     Mild persistent " asthma without complication 11/12/2019    Pt with reports daily cough, dyspnea, freqent rescue inhaler use. Recommend maintenance therapy  Treat triggers-sinus, gerd, smoking cessation.  Normal pulmonary studies.     CHEPE (obstructive sleep apnea) 11/12/2019    CHEPE on CPAP 11/12/2019    Patient with symptoms of snoring, witnessed apnea, excessive daytime sleepiness, fatigue and difficulty staying asleep. Dx with CHEPE in 2015 but difficulty tolerating cpap at time due to sinus problems.  HST 1/22/2020: AHI 7 RDI 15      Osteoarthritis of cervical spine 3/10/2023    Palpitations 11/12/2019    May be related to breathing issues    Recurrent urticaria 1/6/2017    S/P laparoscopic cholecystectomy 8/4/2017    Subacute cutaneous lupus erythematosus        Past Surgical History:  Past Surgical History:   Procedure Laterality Date    CHOLECYSTECTOMY      COLONOSCOPY N/A 3/9/2020    Procedure: COLONOSCOPY;  Surgeon: Judah Lopez MD;  [facility]  Service: Endoscopy;  Laterality: N/A;    ESOPHAGOGASTRODUODENOSCOPY N/A 3/9/2020    Procedure: EGD (ESOPHAGOGASTRODUODENOSCOPY);  Surgeon: Judah Lopez MD;  Location: [facility] ([address] [place] [address]);  Service: Endoscopy;  Laterality: N/A;       Social History:  Social History     Socioeconomic History    Marital status:    Tobacco Use    Smoking status: Every Day     Packs/day: 1.00     Years: 32.00     Pack years: 32.00     Types: Cigarettes    Smokeless tobacco: Former     Quit date: 7/8/2013    Tobacco comments:     started age 17   Substance and Sexual Activity    Alcohol use: No    Drug use: No    Sexual activity: Yes     Partners: Male       Family History:  Family History   Problem Relation Age of Onset    Hypertension Mother     Thyroid disease Mother     Cataracts Mother     Cancer Mother         duodenal    Arthritis Father     Hyperlipidemia Father     Cancer Father         Multiple Myeloma     Cirrhosis Father     Cataracts Father     Lupus Sister      Asthma Sister     Scoliosis Sister     No Known Problems Brother     No Known Problems Maternal Aunt     No Known Problems Maternal Uncle     No Known Problems Paternal Aunt     Pancreatic cancer Paternal Uncle     No Known Problems Maternal Grandmother     Lung cancer Maternal Grandfather     No Known Problems Paternal Grandmother     Lung cancer Paternal Grandfather     No Known Problems Other     Amblyopia Neg Hx     Blindness Neg Hx     Diabetes Neg Hx     Glaucoma Neg Hx     Macular degeneration Neg Hx     Retinal detachment Neg Hx     Strabismus Neg Hx     Stroke Neg Hx        Allergies and Medications: (updated and reviewed)  Review of patient's allergies indicates:   Allergen Reactions    Bactrim [sulfamethoxazole-trimethoprim] Rash     Current Outpatient Medications   Medication Sig Dispense Refill    albuterol (PROVENTIL/VENTOLIN HFA) 90 mcg/actuation inhaler Inhale 1-2 puffs into the lungs every 6 (six) hours as needed for Wheezing or Shortness of Breath. 6.7 g 0    albuterol-ipratropium (DUO-NEB) 2.5 mg-0.5 mg/3 mL nebulizer solution Take 3 mLs by nebulization every 6 (six) hours as needed for Wheezing. Rescue 75 mL 0    azelastine (ASTELIN) 137 mcg (0.1 %) nasal spray USE 2 SPRAYS (274 MCG TOTAL) BY NASAL ROUTE 2 (TWO) TIMES DAILY. 30 mL 11    azelastine (OPTIVAR) 0.05 % ophthalmic solution Place 1 drop into both eyes 2 (two) times daily as needed (itchy eyes). 6 mL 11    budesonide-formoterol 160-4.5 mcg (SYMBICORT) 160-4.5 mcg/actuation HFAA Inhale 2 puffs into the lungs every 12 (twelve) hours. Controller 1 Inhaler 11    cyclobenzaprine (FLEXERIL) 5 MG tablet Take 1 tablet by mouth 3 (three) times daily as needed.      cyclobenzaprine (FLEXERIL) 5 MG tablet TAKE 1 TABLET BY MOUTH THREE TIMES A DAY AS NEEDED FOR MUSCLE SPASMS 90 tablet 0    DULoxetine (CYMBALTA) 60 MG capsule Take 1 capsule by mouth.      estradioL (ESTRACE) 0.01 % (0.1 mg/gram) vaginal cream PLACE 1 G VAGINALLY ONCE DAILY. 42.5 g  6    fluorometholone 0.1% (FML) 0.1 % DrpS 1 DROP IN BOTH EYES TWICE A DAY FOR 2 WEEKS THEN DAILY FOR 2 WEEKS  0    gabapentin (NEURONTIN) 600 MG tablet TAKE 1 TABLET (600 MG TOTAL) BY MOUTH ONCE DAILY. 90 tablet 1    HYDROcodone-acetaminophen (NORCO) 7.5-325 mg per tablet Take 1 tablet by mouth every 24 hours as needed for Pain. 30 tablet 0    hydrocortisone 2.5 % ointment APPLY TOPICALLY 2 (TWO) TIMES DAILY. FOR EYELID RASH AS NEEDED 20 g 2    hydrOXYchloroQUINE (PLAQUENIL) 200 mg tablet Take 2 tablets (400 mg total) by mouth once daily. 60 tablet 6    hydrOXYzine HCL (ATARAX) 25 MG tablet TAKE 1 TAB BY MOUTH EVERY EVENING AS NEEDED FOR PRURITUS. DO NOT DRIVE OR OPERATE MACHINERY 30 tablet 2    levothyroxine (SYNTHROID) 100 MCG tablet TAKE 1 TABLET MONDAY THROUGH SATURDAY AND TAKE 1/2 TABLET ON SUNDAY 85 tablet 2    montelukast (SINGULAIR) 10 mg tablet TAKE 1 TABLET BY MOUTH EVERY DAY IN THE EVENING 90 tablet 3    olopatadine (PATADAY ONCE DAILY RELIEF) 0.2 % Drop Place 1 drop into both eyes daily as needed (itchy or watery eyes). 2.5 mL 11    pantoprazole (PROTONIX) 40 MG tablet Take 1 tablet by mouth.      pimecrolimus (ELIDEL) 1 % cream Apply topically 2 (two) times daily. To rashes on face and arms as needed 30 g 2    soy isofl/blk coh/gr tea/yerba (ESTROVEN ENERGY ORAL) Take by mouth.      tacrolimus (PROTOPIC) 0.1 % ointment Apply to skin 2 (two) times daily. 60 g 3    triamcinolone acetonide 0.1% (KENALOG) 0.1 % ointment Apply topically 2 (two) times daily. 80 g 3    DULoxetine (CYMBALTA) 60 MG capsule Take 1 capsule (60 mg total) by mouth once daily. 90 capsule 1    EPINEPHrine (EPIPEN 2-GIGI) 0.3 mg/0.3 mL AtIn Inject 0.3 mLs (0.3 mg total) into the muscle once. for 1 dose (Patient not taking: Reported on 5/4/2023) 2 Device 0    hydrocortisone 2.5 % ointment Apply topically 2 (two) times daily. 20 g 2    hydrocortisone 2.5 % ointment Apply topically 2 (two) times daily. To rash on eyelids PRN 28 g 5     naproxen (NAPROSYN) 500 MG tablet Take 1 tablet (500 mg total) by mouth 2 (two) times daily. (Patient not taking: Reported on 5/4/2023) 60 tablet 0    pantoprazole (PROTONIX) 40 MG tablet TAKE 1 TABLET BY MOUTH EVERY DAY 90 tablet 1    pimecrolimus (ELIDEL) 1 % cream Apply topically 2 (two) times daily. To face 30 g 3     No current facility-administered medications for this visit.       Patient Care Team:  Emma Faulkner MD as PCP - General (Family Medicine)  Champ Gordon II, MD as Consulting Physician (Gastroenterology)  Leonardo Jones OD as Consulting Physician (Optometry)  Irasema Lemus LPN as Licensed Practical Nurse         - The patient is given an After Visit Summary that lists all medications with directions, allergies, education, orders placed during this encounter and follow-up instructions.      - I have reviewed the patient's medical information including past medical, family, and social history sections including the medications and allergies.      - We discussed the patient's current medications.     This note was created by combination of typed  and MModal dictation.  Transcription errors may be present.  If there are any questions, please contact me.        Sherin Randolph NP

## 2023-05-04 NOTE — PATIENT INSTRUCTIONS
Medical Fitness--[phone]  Imaging, Xray, CT, MRI, Ultrasound---[phone]  Bariatrics---[phone]  Breast Surgery---[phone]  Case Management---[phone]  Colonoscopy---[phone]  DME---[phone]  Infectious Disease---[phone]  Interventional Radiology---[phone]  Medical Records---[phone]  [facility] On [facility]---[phone]  Optometry/Ophthalmology---[phone]  [facility]---[phone]  Physical Therapy---[phone]  Psychiatry---[phone] or [phone]  Plastic Surgery---[phone]  Recovery--[phone] option 2, or [phone].  Sleep Study---[phone]  Smoking Cessation---[phone]  Wound Care---[phone]  Referral Desk---[phone]

## 2023-05-05 ENCOUNTER — OFFICE VISIT (OUTPATIENT)
Dept: RHEUMATOLOGY | Facility: CLINIC | Age: 53
End: 2023-05-05
Payer: MEDICARE

## 2023-05-05 ENCOUNTER — PATIENT MESSAGE (OUTPATIENT)
Dept: DERMATOLOGY | Facility: CLINIC | Age: 53
End: 2023-05-05
Payer: MEDICARE

## 2023-05-05 ENCOUNTER — PATIENT MESSAGE (OUTPATIENT)
Dept: ALLERGY | Facility: CLINIC | Age: 53
End: 2023-05-05
Payer: MEDICARE

## 2023-05-05 ENCOUNTER — PATIENT MESSAGE (OUTPATIENT)
Dept: INFECTIOUS DISEASES | Facility: CLINIC | Age: 53
End: 2023-05-05
Payer: MEDICARE

## 2023-05-05 DIAGNOSIS — L93.1 SUBACUTE CUTANEOUS LUPUS ERYTHEMATOSUS: Primary | ICD-10-CM

## 2023-05-05 DIAGNOSIS — Z79.60 LONG-TERM USE OF IMMUNOSUPPRESSANT MEDICATION: ICD-10-CM

## 2023-05-05 DIAGNOSIS — M79.7 FIBROMYALGIA: ICD-10-CM

## 2023-05-05 LAB
A ALTERNATA IGE QN: <0.1 KU/L
A FUMIGATUS IGE QN: <0.1 KU/L
ALLERGEN BOXELDER MAPLE TREE IGE: <0.1 KU/L
ALLERGEN MAPLE (BOX ELDER) CLASS: NORMAL
ALLERGEN PIGWEED IGE: <0.1 KU/L
ALLERGEN WHITE ASH TREE IGE: <0.1 KU/L
BAHIA GRASS IGE QN: <0.1 KU/L
BERMUDA GRASS IGE QN: <0.1 KU/L
CAT DANDER IGE QN: >100 KU/L
CEDAR IGE QN: <0.1 KU/L
COMMON PIGWEED CLASS: NORMAL
COTTONWOOD IGE QN: <0.1 KU/L
D FARINAE IGE QN: <0.1 KU/L
D PTERONYSS IGE QN: 0.15 KU/L
DEPRECATED A ALTERNATA IGE RAST QL: NORMAL
DEPRECATED A FUMIGATUS IGE RAST QL: NORMAL
DEPRECATED BAHIA GRASS IGE RAST QL: NORMAL
DEPRECATED BERMUDA GRASS IGE RAST QL: NORMAL
DEPRECATED CAT DANDER IGE RAST QL: ABNORMAL
DEPRECATED CEDAR IGE RAST QL: NORMAL
DEPRECATED COTTONWOOD IGE RAST QL: NORMAL
DEPRECATED D FARINAE IGE RAST QL: NORMAL
DEPRECATED D PTERONYSS IGE RAST QL: ABNORMAL
DEPRECATED DOG DANDER IGE RAST QL: ABNORMAL
DEPRECATED ELDER IGE RAST QL: NORMAL
DEPRECATED ENGL PLANTAIN IGE RAST QL: NORMAL
DEPRECATED HACKBERRY TREE IGE RAST QL: NORMAL
DEPRECATED JOHNSON GRASS IGE RAST QL: NORMAL
DEPRECATED PECAN/HICK TREE IGE RAST QL: NORMAL
DEPRECATED ROACH IGE RAST QL: ABNORMAL
DEPRECATED SALTWORT IGE RAST QL: NORMAL
DEPRECATED SHEEP SORREL IGE RAST QL: NORMAL
DEPRECATED SILVER BIRCH IGE RAST QL: NORMAL
DEPRECATED TIMOTHY IGE RAST QL: NORMAL
DEPRECATED WEST RAGWEED IGE RAST QL: NORMAL
DEPRECATED WHITE OAK IGE RAST QL: NORMAL
DOG DANDER IGE QN: 34.7 KU/L
ELDER IGE QN: <0.1 KU/L
ENGL PLANTAIN IGE QN: <0.1 KU/L
HACKBERRY TREE IGE QN: <0.1 KU/L
JOHNSON GRASS IGE QN: <0.1 KU/L
PECAN/HICK TREE IGE QN: <0.1 KU/L
ROACH IGE QN: 0.11 KU/L
SALTWORT IGE QN: <0.1 KU/L
SHEEP SORREL IGE QN: <0.1 KU/L
SILVER BIRCH IGE QN: <0.1 KU/L
TIMOTHY IGE QN: <0.1 KU/L
WEST RAGWEED IGE QN: 0.1 KU/L
WHITE ASH CLASS: NORMAL
WHITE OAK IGE QN: <0.1 KU/L

## 2023-05-05 PROCEDURE — 99214 OFFICE O/P EST MOD 30 MIN: CPT | Mod: 95,,, | Performed by: INTERNAL MEDICINE

## 2023-05-05 PROCEDURE — 3044F HG A1C LEVEL LT 7.0%: CPT | Mod: CPTII,95,, | Performed by: INTERNAL MEDICINE

## 2023-05-05 PROCEDURE — 1160F PR REVIEW ALL MEDS BY PRESCRIBER/CLIN PHARMACIST DOCUMENTED: ICD-10-PCS | Mod: CPTII,95,, | Performed by: INTERNAL MEDICINE

## 2023-05-05 PROCEDURE — 1160F RVW MEDS BY RX/DR IN RCRD: CPT | Mod: CPTII,95,, | Performed by: INTERNAL MEDICINE

## 2023-05-05 PROCEDURE — 1159F MED LIST DOCD IN RCRD: CPT | Mod: CPTII,95,, | Performed by: INTERNAL MEDICINE

## 2023-05-05 PROCEDURE — 3044F PR MOST RECENT HEMOGLOBIN A1C LEVEL <7.0%: ICD-10-PCS | Mod: CPTII,95,, | Performed by: INTERNAL MEDICINE

## 2023-05-05 PROCEDURE — 1159F PR MEDICATION LIST DOCUMENTED IN MEDICAL RECORD: ICD-10-PCS | Mod: CPTII,95,, | Performed by: INTERNAL MEDICINE

## 2023-05-05 PROCEDURE — 99214 PR OFFICE/OUTPT VISIT, EST, LEVL IV, 30-39 MIN: ICD-10-PCS | Mod: 95,,, | Performed by: INTERNAL MEDICINE

## 2023-05-05 RX ORDER — FOLIC ACID 1 MG/1
1 TABLET ORAL DAILY
Qty: 30 TABLET | Refills: 11 | Status: SHIPPED | OUTPATIENT
Start: 2023-05-05 | End: 2024-05-04

## 2023-05-05 RX ORDER — METHOTREXATE 2.5 MG/1
15 TABLET ORAL
Qty: 30 TABLET | Refills: 3 | Status: SHIPPED | OUTPATIENT
Start: 2023-05-05 | End: 2023-08-10

## 2023-05-05 NOTE — PROGRESS NOTES
Rapid3 Question Responses and Scores 5/5/2023   MDHAQ Score 1   Psychologic Score 3.3   Pain Score 7   When you awakened in the morning OVER THE LAST WEEK, did you feel stiff? No   If Yes, please indicate the number of hours until you are as limber as you will be for the day 3   Fatigue Score 8.5   Global Health Score 5.5   RAPID3 Score 5.28     Answers submitted by the patient for this visit:  Rheumatology Questionnaire (Submitted on 5/5/2023)  fever: No  eye redness: Yes  mouth sores: No  headaches: No  shortness of breath: Yes  chest pain: No  trouble swallowing: No  diarrhea: No  constipation: No  unexpected weight change: No  genital sore: No  dysuria: No  During the last 3 days, have you had a skin rash?: Yes  Bruises or bleeds easily: Yes  cough: Yes

## 2023-05-07 NOTE — PROGRESS NOTES
The patient location is: home  The chief complaint leading to consultation is: follow-up    Visit type: audiovisual    Face to Face time with patient: 10 min  30 minutes of total time spent on the encounter, which includes face to face time and non-face to face time preparing to see the patient (eg, review of tests), Obtaining and/or reviewing separately obtained history, Documenting clinical information in the electronic or other health record, Independently interpreting results (not separately reported) and communicating results to the patient/family/caregiver, or Care coordination (not separately reported).         Each patient to whom he or she provides medical services by telemedicine is:  (1) informed of the relationship between the physician and patient and the respective role of any other health care provider with respect to management of the patient; and (2) notified that he or she may decline to receive medical services by telemedicine and may withdraw from such care at any time.        History of present illness:  53-year-old female I saw initially in 2005. She has lupus erythematosus with primarily skin involvement.  She has had joint aching but we felt it was more likely fibromyalgia.  She has not had joint swelling.  She does have positive anti phospholipid antibody but has no definite history of ischemic disease.  Her last visit was in January.  She was on Plaquenil, Flexeril, gabapentin, and Cymbalta.  I saw her 6 weeks ago.  Her rash was doing worse.  She would seen by Dermatology and had biopsy, which was compatible with a connective tissue disease.  I did further laboratory studies to make sure I was not missing another connective tissue disease.  I also did a pre DMARD workup on her.  She returns to discuss further treatment.      Her rash persists.  She had an asthma attack but this resolved.  She still has some cough.  She is had no fever.  She has some nasal congestion but no nosebleeds.  She  complains of some joint aching but no definite joint swelling.  She is had no pleurisy or shortness of breath.  She is had no conjunctivitis, oral ulcers, dry eye or mouth.  She has no muscle weakness.      Physical examination was not performed, the entire time was counseling.     Assessment:  Subacute cutaneous lupus     Plans:  I discussed several alternatives with her and elected to put her on methotrexate starting at 15 mg weekly.  I explained to her how to take the methotrexate.  She will also take folic acid 1 mg daily.  She will have monthly laboratory studies starting next month.  I will see her in follow-up in 3 months.    Answers submitted by the patient for this visit:  Rheumatology Questionnaire (Submitted on 5/5/2023)  fever: No  eye redness: Yes  mouth sores: No  headaches: No  shortness of breath: Yes  chest pain: No  trouble swallowing: No  diarrhea: No  constipation: No  unexpected weight change: No  genital sore: No  dysuria: No  During the last 3 days, have you had a skin rash?: Yes  Bruises or bleeds easily: Yes  cough: Yes

## 2023-05-09 ENCOUNTER — PATIENT MESSAGE (OUTPATIENT)
Dept: ALLERGY | Facility: CLINIC | Age: 53
End: 2023-05-09
Payer: MEDICARE

## 2023-05-09 ENCOUNTER — PATIENT MESSAGE (OUTPATIENT)
Dept: FAMILY MEDICINE | Facility: CLINIC | Age: 53
End: 2023-05-09
Payer: MEDICARE

## 2023-05-10 ENCOUNTER — CLINICAL SUPPORT (OUTPATIENT)
Dept: SMOKING CESSATION | Facility: CLINIC | Age: 53
End: 2023-05-10
Payer: COMMERCIAL

## 2023-05-10 DIAGNOSIS — F17.200 NICOTINE DEPENDENCE: Primary | ICD-10-CM

## 2023-05-10 PROCEDURE — 99999 PR PBB SHADOW E&M-EST. PATIENT-LVL II: ICD-10-PCS | Mod: PBBFAC,,,

## 2023-05-10 PROCEDURE — 99404 PR PREVENT COUNSEL,INDIV,60 MIN: ICD-10-PCS | Mod: S$GLB,,,

## 2023-05-10 PROCEDURE — 99999 PR PBB SHADOW E&M-EST. PATIENT-LVL II: CPT | Mod: PBBFAC,,,

## 2023-05-10 PROCEDURE — 99404 PREV MED CNSL INDIV APPRX 60: CPT | Mod: S$GLB,,,

## 2023-05-10 RX ORDER — IBUPROFEN 200 MG
1 TABLET ORAL DAILY
Qty: 14 PATCH | Refills: 0 | Status: SHIPPED | OUTPATIENT
Start: 2023-05-10 | End: 2023-05-24 | Stop reason: SDUPTHER

## 2023-05-10 RX ORDER — MICONAZOLE NITRATE 2 %
2 CREAM (GRAM) TOPICAL
Qty: 100 EACH | Refills: 0 | Status: SHIPPED | OUTPATIENT
Start: 2023-05-10 | End: 2023-05-24

## 2023-05-10 NOTE — Clinical Note
PATIENT PRESENTS FOR INTAKE SMOKING 1 PK PER DAY, FAMILIAR WITH THE PATIENT AND AFTER LOOKING BACK SHE WAS FOLLOWED BY TTS FOR A WHILE BACK IN 2016-17, SHE WAS SMOKING MUCH MORE THEN BUT NEVER REALLY QUIT JUST CUT BACK,SHE IS MORE MOTIVATED AND DETERMINED THIS QUIT ATTEMPT, SHE STATES SHE USED THE NICOTINE PATCH LAST ATTEMPT BUT HAD ISSUES WITH IT STICKING, SHE HAS NEVER TRIED ORAL NRT, RECOMMEND RETRYING THE NICOTINE PATCH AND TRY THE OPAQUE VERSION AS WELL AS SOME SORT OF ORAL NRT TO HELP WITH HER HABIT TIMES. AFTER ASSESSMENT AND DISCUSSION RECOMMEND AN ABRUPT QUIT USING THE 21MG NICOTINE PATCH AND THE 2MG NICOTINE GUM, STRATEGIES AND SESSION HANDOUT DISCUSSED, WILL FOLLOW

## 2023-05-11 ENCOUNTER — HOSPITAL ENCOUNTER (OUTPATIENT)
Dept: RADIOLOGY | Facility: HOSPITAL | Age: 53
Discharge: HOME | End: 2023-05-11
Payer: MEDICARE

## 2023-05-11 DIAGNOSIS — F17.210 NICOTINE DEPENDENCE, CIGARETTES, UNCOMPLICATED: ICD-10-CM

## 2023-05-11 DIAGNOSIS — F17.200 CURRENT SMOKER: ICD-10-CM

## 2023-05-11 PROCEDURE — 71271 CT THORAX LUNG CANCER SCR C-: CPT | Mod: TC

## 2023-05-11 PROCEDURE — 71271 CT THORAX LUNG CANCER SCR C-: CPT | Mod: 26,,, | Performed by: RADIOLOGY

## 2023-05-11 PROCEDURE — 71271 CT CHEST LUNG SCREENING LOW DOSE: ICD-10-PCS | Mod: 26,,, | Performed by: RADIOLOGY

## 2023-05-15 ENCOUNTER — TELEPHONE (OUTPATIENT)
Dept: DERMATOLOGY | Facility: CLINIC | Age: 53
End: 2023-05-15
Payer: MEDICARE

## 2023-05-24 ENCOUNTER — CLINICAL SUPPORT (OUTPATIENT)
Dept: SMOKING CESSATION | Facility: CLINIC | Age: 53
End: 2023-05-24
Payer: COMMERCIAL

## 2023-05-24 DIAGNOSIS — F17.200 NICOTINE DEPENDENCE: ICD-10-CM

## 2023-05-24 PROCEDURE — 99403 PR PREVENT COUNSEL,INDIV,45 MIN: ICD-10-PCS | Mod: S$GLB,,,

## 2023-05-24 PROCEDURE — 99999 PR PBB SHADOW E&M-EST. PATIENT-LVL II: ICD-10-PCS | Mod: PBBFAC,,,

## 2023-05-24 PROCEDURE — 99403 PREV MED CNSL INDIV APPRX 45: CPT | Mod: S$GLB,,,

## 2023-05-24 PROCEDURE — 99999 PR PBB SHADOW E&M-EST. PATIENT-LVL II: CPT | Mod: PBBFAC,,,

## 2023-05-24 RX ORDER — DM/P-EPHED/ACETAMINOPH/DOXYLAM 30-7.5/3
2 LIQUID (ML) ORAL
Qty: 100 LOZENGE | Refills: 0 | Status: SHIPPED | OUTPATIENT
Start: 2023-05-24 | End: 2023-06-07 | Stop reason: SDUPTHER

## 2023-05-24 RX ORDER — IBUPROFEN 200 MG
1 TABLET ORAL DAILY
Qty: 14 PATCH | Refills: 0 | Status: SHIPPED | OUTPATIENT
Start: 2023-05-24 | End: 2023-06-07 | Stop reason: SDUPTHER

## 2023-05-24 NOTE — PROGRESS NOTES
Individual Follow-Up Form    5/24/2023    Quit Date: n/a    Clinical Status of Patient: Outpatient    Length of Service: 45 minutes    Continuing Medication: yes  Patches or Nicotine gum    Other Medications: N/A     Target Symptoms: Withdrawal and medication side effects. The following were  rated moderate (3) to severe (4) on TCRS:  Moderate (3): 0  Severe (4): 0  Comments: PATIENT PRESENTS FOR FOLLOW UP SMOKING 6 CIGARETTES PER DAY WHEN WEARING THE NICOTINE PATCH, BUT WAS GIVEN THE CLEAR PATCH INSTEAD OF THE OPAQUE VERSION AND IT IS NOT STICKING WELL TO HER SKIN, PATIENT REPORTED THAT SHE LIKED THE ADHESIVE IN THE OPAQUE VERSION AND HAS BETTER RESULTS WITH THAT PATCH THEREFORE PUT THIS ON SCRIPT AND SHE STILL LEFT WITH THE CLEAR AFTER LAST APPT, INFORMED HER TO CALL [facility] TODAY AND MAKE SURE THAT THEY ARE PROVIDING THE O[place]QUE PATCH ONLY, THIS WAY SHE WILL GET BETTER RESULTS, IF THE PATCH IS NOT STAYING ON OR SHE HAS NEGATIVE FEELINGS TOWARD THE CLEAR THEN SHE WILL NOT BE SUCCESSFUL, SHE WAS ALSO DID NOT LIKE THE NICOTINE GUM THEREFORE WOULD LIKE TO TRY THE LOZENGE, SHE IS STILL IN THE TRIAL AND ERROR PART OF THIS ALL. RECOMMEND GETTING THE O[place]QUE VERSION OF THE 21MG NICOTINE PATCH DAILY AND THE 2MG NICOTINE LOZENGE TO GIVER HER THE BEST RESULTS FOR SUCCESS WITH THE NRT. SHE QUESTIONED CHANTIX, EXPLAINED TO HER WHAT CHANTIX WAS A ND HOW IT WORKED AND IF THE NRT IS NOT WORKING WELL FOR HER FURTHER DISCUSSION ON CHANTIX CAN BE DISCUSSED. FOR NOW LEAST INVASIVE. STRATEGIES AND ENCOURAGEMENT DISCUSSED WILL FOLLOW     Diagnosis: F17.210    Next Visit: 2 weeks

## 2023-05-25 ENCOUNTER — PATIENT MESSAGE (OUTPATIENT)
Dept: DERMATOLOGY | Facility: CLINIC | Age: 53
End: 2023-05-25
Payer: MEDICARE

## 2023-05-26 DIAGNOSIS — L93.2 CUTANEOUS LUPUS ERYTHEMATOSUS: Primary | ICD-10-CM

## 2023-05-26 RX ORDER — CLOBETASOL PROPIONATE 0.5 MG/G
OINTMENT TOPICAL 2 TIMES DAILY
Qty: 60 G | Refills: 2 | Status: SHIPPED | OUTPATIENT
Start: 2023-05-26 | End: 2023-07-05 | Stop reason: SDUPTHER

## 2023-05-30 ENCOUNTER — PES CALL (OUTPATIENT)
Dept: ADMINISTRATIVE | Facility: CLINIC | Age: 53
End: 2023-05-30
Payer: MEDICARE

## 2023-05-31 DIAGNOSIS — M25.50 ARTHRALGIA, UNSPECIFIED JOINT: ICD-10-CM

## 2023-05-31 DIAGNOSIS — M79.7 FIBROMYALGIA: ICD-10-CM

## 2023-05-31 DIAGNOSIS — Z72.0 TOBACCO ABUSE: ICD-10-CM

## 2023-05-31 DIAGNOSIS — G89.4 CHRONIC PAIN SYNDROME: ICD-10-CM

## 2023-05-31 DIAGNOSIS — Z92.25 HISTORY OF IMMUNOSUPPRESSION THERAPY: ICD-10-CM

## 2023-05-31 DIAGNOSIS — L93.1 SUBACUTE CUTANEOUS LUPUS ERYTHEMATOSUS: ICD-10-CM

## 2023-05-31 RX ORDER — HYDROCODONE BITARTRATE AND ACETAMINOPHEN 7.5; 325 MG/1; MG/1
1 TABLET ORAL
Qty: 30 TABLET | Refills: 0 | Status: SHIPPED | OUTPATIENT
Start: 2023-05-31 | End: 2023-07-03 | Stop reason: SDUPTHER

## 2023-06-02 ENCOUNTER — PATIENT MESSAGE (OUTPATIENT)
Dept: DERMATOLOGY | Facility: CLINIC | Age: 53
End: 2023-06-02
Payer: MEDICARE

## 2023-06-05 ENCOUNTER — LAB VISIT (OUTPATIENT)
Dept: LAB | Facility: HOSPITAL | Age: 53
End: 2023-06-05
Attending: INTERNAL MEDICINE
Payer: MEDICARE

## 2023-06-05 DIAGNOSIS — L93.1 SUBACUTE CUTANEOUS LUPUS ERYTHEMATOSUS: ICD-10-CM

## 2023-06-05 DIAGNOSIS — Z79.60 LONG-TERM USE OF IMMUNOSUPPRESSANT MEDICATION: ICD-10-CM

## 2023-06-05 LAB
ALBUMIN SERPL BCP-MCNC: 3.6 G/DL (ref 3.5–5.2)
ALP SERPL-CCNC: 81 U/L (ref 55–135)
ALT SERPL W/O P-5'-P-CCNC: 24 U/L (ref 10–44)
ANION GAP SERPL CALC-SCNC: 9 MMOL/L (ref 8–16)
AST SERPL-CCNC: 18 U/L (ref 10–40)
BASOPHILS # BLD AUTO: 0.04 K/UL (ref 0–0.2)
BASOPHILS NFR BLD: 0.7 % (ref 0–1.9)
BILIRUB SERPL-MCNC: 0.2 MG/DL (ref 0.1–1)
BUN SERPL-MCNC: 8 MG/DL (ref 6–20)
CALCIUM SERPL-MCNC: 9 MG/DL (ref 8.7–10.5)
CHLORIDE SERPL-SCNC: 103 MMOL/L (ref 95–110)
CO2 SERPL-SCNC: 27 MMOL/L (ref 23–29)
CREAT SERPL-MCNC: 0.8 MG/DL (ref 0.5–1.4)
DIFFERENTIAL METHOD: NORMAL
EOSINOPHIL # BLD AUTO: 0.2 K/UL (ref 0–0.5)
EOSINOPHIL NFR BLD: 2.8 % (ref 0–8)
ERYTHROCYTE [DISTWIDTH] IN BLOOD BY AUTOMATED COUNT: 12.8 % (ref 11.5–14.5)
EST. GFR  (NO RACE VARIABLE): >60 ML/MIN/1.73 M^2
GLUCOSE SERPL-MCNC: 90 MG/DL (ref 70–110)
HCT VFR BLD AUTO: 40 % (ref 37–48.5)
HGB BLD-MCNC: 13.1 G/DL (ref 12–16)
IMM GRANULOCYTES # BLD AUTO: 0.02 K/UL (ref 0–0.04)
IMM GRANULOCYTES NFR BLD AUTO: 0.3 % (ref 0–0.5)
LYMPHOCYTES # BLD AUTO: 2.3 K/UL (ref 1–4.8)
LYMPHOCYTES NFR BLD: 38 % (ref 18–48)
MCH RBC QN AUTO: 29.6 PG (ref 27–31)
MCHC RBC AUTO-ENTMCNC: 32.8 G/DL (ref 32–36)
MCV RBC AUTO: 90 FL (ref 82–98)
MONOCYTES # BLD AUTO: 0.3 K/UL (ref 0.3–1)
MONOCYTES NFR BLD: 4.9 % (ref 4–15)
NEUTROPHILS # BLD AUTO: 3.2 K/UL (ref 1.8–7.7)
NEUTROPHILS NFR BLD: 53.3 % (ref 38–73)
NRBC BLD-RTO: 0 /100 WBC
PLATELET # BLD AUTO: 275 K/UL (ref 150–450)
PMV BLD AUTO: 11.1 FL (ref 9.2–12.9)
POTASSIUM SERPL-SCNC: 3.8 MMOL/L (ref 3.5–5.1)
PROT SERPL-MCNC: 7.4 G/DL (ref 6–8.4)
RBC # BLD AUTO: 4.43 M/UL (ref 4–5.4)
SODIUM SERPL-SCNC: 139 MMOL/L (ref 136–145)
WBC # BLD AUTO: 6.08 K/UL (ref 3.9–12.7)

## 2023-06-05 PROCEDURE — 85025 COMPLETE CBC W/AUTO DIFF WBC: CPT | Performed by: INTERNAL MEDICINE

## 2023-06-05 PROCEDURE — 36415 COLL VENOUS BLD VENIPUNCTURE: CPT | Mod: PO | Performed by: INTERNAL MEDICINE

## 2023-06-05 PROCEDURE — 80053 COMPREHEN METABOLIC PANEL: CPT | Performed by: INTERNAL MEDICINE

## 2023-06-07 ENCOUNTER — CLINICAL SUPPORT (OUTPATIENT)
Dept: SMOKING CESSATION | Facility: CLINIC | Age: 53
End: 2023-06-07
Payer: COMMERCIAL

## 2023-06-07 DIAGNOSIS — F17.200 NICOTINE DEPENDENCE: Primary | ICD-10-CM

## 2023-06-07 PROCEDURE — 99999 PR PBB SHADOW E&M-EST. PATIENT-LVL II: ICD-10-PCS | Mod: PBBFAC,,,

## 2023-06-07 PROCEDURE — 99403 PR PREVENT COUNSEL,INDIV,45 MIN: ICD-10-PCS | Mod: S$GLB,,,

## 2023-06-07 PROCEDURE — 99999 PR PBB SHADOW E&M-EST. PATIENT-LVL II: CPT | Mod: PBBFAC,,,

## 2023-06-07 PROCEDURE — 99403 PREV MED CNSL INDIV APPRX 45: CPT | Mod: S$GLB,,,

## 2023-06-07 RX ORDER — IBUPROFEN 200 MG
1 TABLET ORAL DAILY
Qty: 14 PATCH | Refills: 0 | Status: SHIPPED | OUTPATIENT
Start: 2023-06-07 | End: 2023-07-05 | Stop reason: SDUPTHER

## 2023-06-07 RX ORDER — DM/P-EPHED/ACETAMINOPH/DOXYLAM 30-7.5/3
2 LIQUID (ML) ORAL
Qty: 144 LOZENGE | Refills: 0 | Status: SHIPPED | OUTPATIENT
Start: 2023-06-07 | End: 2023-07-07

## 2023-06-07 NOTE — PROGRESS NOTES
Individual Follow-Up Form    6/7/2023    Quit Date: n/a    Clinical Status of Patient: Outpatient    Length of Service: 45 minutes    Continuing Medication: yes  Patches or Nicotine Lozenges    Other Medications: N/A     Target Symptoms: Withdrawal and medication side effects. The following were  rated moderate (3) to severe (4) on TCRS:  Moderate (3): 0  Severe (4): 0    Comments: PATIENT DROPPED OFF THE SCHEDULE FOR IN CLINIC APPT FOLLOW UP THEREFORE [facility] REACHED OUT TO HER TO CHECK HER PROGRESS AND GET HER RESCHEDULED. SHE WAS FRUSTRATED BECAUSE EVEN AFTER PUTTING ON ORDER FOR [facility] TO PROVIDED A CERTAIN TYPE OR PATCH AND LOZENGE SHE RECEIVED WRONG KIND, SHE IS STARTING CHEMO FOR HER LUPUS AND REALLY WANTS TO GET THE SMOKING UNDER CONTROL, SHE IS REQUESTING A PHARMACY CHANGE. SHE IS CURRENTLY STILL SMOKING THE SAME AMOUNT SHE WAS TWO WEEKS AGO, SHE INQUIRED ABOUT VIRTUAL APPTS, INFORMED THAT [facility] DOES NOT DO VIRTUAL APPTS AT THIS LOCATION BUT DUE TO HER SITUATION WITH STARTING CHEMO AND NOT FEELING UP FOR CLINIC APPTS, A TELEPHONIC APPT CAN BE DONE FOR A FEW OF HER FOLLOW UPS. ENCOURAGEMENT AND STRATEGIES DISCUSSED WILL FOLLOW     Diagnosis: F17.200    Next Visit: 2 weeks

## 2023-06-15 ENCOUNTER — PATIENT MESSAGE (OUTPATIENT)
Dept: DERMATOLOGY | Facility: CLINIC | Age: 53
End: 2023-06-15
Payer: MEDICARE

## 2023-06-26 ENCOUNTER — TELEPHONE (OUTPATIENT)
Dept: FAMILY MEDICINE | Facility: CLINIC | Age: 53
End: 2023-06-26
Payer: MEDICARE

## 2023-06-26 NOTE — TELEPHONE ENCOUNTER
----- Message from Christel Trevino sent at 6/26/2023  9:31 AM CDT -----  Name of Who is Calling: MAGDIEL ARMSTRONG [[id number]]            What is the request in detail: Patient is requesting call back to get same day or urgent apt for spots that will not heal on body              Can the clinic reply by [facility]: yes              What Number to Call Back if not in [facility]: [phone]

## 2023-06-27 ENCOUNTER — CLINICAL SUPPORT (OUTPATIENT)
Dept: SMOKING CESSATION | Facility: CLINIC | Age: 53
End: 2023-06-27
Payer: COMMERCIAL

## 2023-06-27 ENCOUNTER — TELEPHONE (OUTPATIENT)
Dept: SMOKING CESSATION | Facility: CLINIC | Age: 53
End: 2023-06-27
Payer: MEDICARE

## 2023-06-27 ENCOUNTER — OFFICE VISIT (OUTPATIENT)
Dept: FAMILY MEDICINE | Facility: CLINIC | Age: 53
End: 2023-06-27
Payer: MEDICARE

## 2023-06-27 VITALS
BODY MASS INDEX: 26.65 KG/M2 | SYSTOLIC BLOOD PRESSURE: 124 MMHG | OXYGEN SATURATION: 98 % | TEMPERATURE: 99 F | HEIGHT: 61 IN | DIASTOLIC BLOOD PRESSURE: 78 MMHG | HEART RATE: 84 BPM | WEIGHT: 141.13 LBS

## 2023-06-27 DIAGNOSIS — L93.1 SUBACUTE CUTANEOUS LUPUS ERYTHEMATOSUS: ICD-10-CM

## 2023-06-27 DIAGNOSIS — F17.200 NICOTINE DEPENDENCE: Primary | ICD-10-CM

## 2023-06-27 DIAGNOSIS — L30.9 DERMATITIS: Primary | ICD-10-CM

## 2023-06-27 PROCEDURE — 99214 OFFICE O/P EST MOD 30 MIN: CPT | Mod: S$GLB,,, | Performed by: INTERNAL MEDICINE

## 2023-06-27 PROCEDURE — 3074F SYST BP LT 130 MM HG: CPT | Mod: CPTII,S$GLB,, | Performed by: INTERNAL MEDICINE

## 2023-06-27 PROCEDURE — 99999 PR PBB SHADOW E&M-EST. PATIENT-LVL IV: ICD-10-PCS | Mod: PBBFAC,,, | Performed by: INTERNAL MEDICINE

## 2023-06-27 PROCEDURE — 3008F PR BODY MASS INDEX (BMI) DOCUMENTED: ICD-10-PCS | Mod: CPTII,S$GLB,, | Performed by: INTERNAL MEDICINE

## 2023-06-27 PROCEDURE — 3044F HG A1C LEVEL LT 7.0%: CPT | Mod: CPTII,S$GLB,, | Performed by: INTERNAL MEDICINE

## 2023-06-27 PROCEDURE — 99999 PR PBB SHADOW E&M-EST. PATIENT-LVL I: CPT | Mod: PBBFAC,,,

## 2023-06-27 PROCEDURE — 99407 PR TOBACCO USE CESSATION INTENSIVE >10 MINUTES: ICD-10-PCS | Mod: S$GLB,,,

## 2023-06-27 PROCEDURE — 3078F DIAST BP <80 MM HG: CPT | Mod: CPTII,S$GLB,, | Performed by: INTERNAL MEDICINE

## 2023-06-27 PROCEDURE — 3074F PR MOST RECENT SYSTOLIC BLOOD PRESSURE < 130 MM HG: ICD-10-PCS | Mod: CPTII,S$GLB,, | Performed by: INTERNAL MEDICINE

## 2023-06-27 PROCEDURE — 3078F PR MOST RECENT DIASTOLIC BLOOD PRESSURE < 80 MM HG: ICD-10-PCS | Mod: CPTII,S$GLB,, | Performed by: INTERNAL MEDICINE

## 2023-06-27 PROCEDURE — 1159F PR MEDICATION LIST DOCUMENTED IN MEDICAL RECORD: ICD-10-PCS | Mod: CPTII,S$GLB,, | Performed by: INTERNAL MEDICINE

## 2023-06-27 PROCEDURE — 1159F MED LIST DOCD IN RCRD: CPT | Mod: CPTII,S$GLB,, | Performed by: INTERNAL MEDICINE

## 2023-06-27 PROCEDURE — 99999 PR PBB SHADOW E&M-EST. PATIENT-LVL I: ICD-10-PCS | Mod: PBBFAC,,,

## 2023-06-27 PROCEDURE — 99407 BEHAV CHNG SMOKING > 10 MIN: CPT | Mod: S$GLB,,,

## 2023-06-27 PROCEDURE — 99999 PR PBB SHADOW E&M-EST. PATIENT-LVL IV: CPT | Mod: PBBFAC,,, | Performed by: INTERNAL MEDICINE

## 2023-06-27 PROCEDURE — 99214 PR OFFICE/OUTPT VISIT, EST, LEVL IV, 30-39 MIN: ICD-10-PCS | Mod: S$GLB,,, | Performed by: INTERNAL MEDICINE

## 2023-06-27 PROCEDURE — 3044F PR MOST RECENT HEMOGLOBIN A1C LEVEL <7.0%: ICD-10-PCS | Mod: CPTII,S$GLB,, | Performed by: INTERNAL MEDICINE

## 2023-06-27 PROCEDURE — 3008F BODY MASS INDEX DOCD: CPT | Mod: CPTII,S$GLB,, | Performed by: INTERNAL MEDICINE

## 2023-06-27 NOTE — PROGRESS NOTES
Pain: Itchy bumps    Patient is a 53-year-old white male new to me.  She does have a skin disorder due to lupus but those particular rashes appear to be different.  For couple of weeks after going out in the yd visiting her father she developed some itchy bumps on the arms and legs in a concentration of them behind the knees.  They itch to a moderate degree.  They start out as a small black dot under the skin that is slightly raised like a scab and she did excoriated and pick some of them on the left forearm but was not able to extract any foreign bodies, insects or pus.  They are healing up and we discussed watching for secondary infection.  We did discuss that the pattern over skin issues and the intense itching would suggest something like chiggers under the clinical circumstances.  We discussed a topical cream that she can purchase for that purpose which contains benzocaine as a topical anesthetic.  She does have some triamcinolone at home and she can apply that as well as well as Benadryl cream.  She never did fill the prescription steroids secondary to cost.    Numerous pictures reviewed and numerous telephone messages, emails, dermatology evaluation all reviewed and summarized as below        pics of rashes that itch not sure what to put on them? Heat rash lupus rash or instect bites? Did do gardening n cut grass for my mom this week    Dear Emily -  This does look like a skin lupus flare. I see that Dr. Spaulding started you on methotrexate recently which I agree with. It can take afew months for the medication to take effect in your system, so please continue to take that as directed.  I sent a new ointment for your arms to your [facility] on [address] (clobetasol)  The other medication (Elidel) would be ideal for face and eyelids or you can use the 2.5% hydrocortisone ointment - you have 5 refills of that remaining at your [facility].  Please take care to avoid the sun and make sure you are wearing sunscreen if you leave  the house.  I am so sorry I can sense how uncomfortable you are.  We will get this managed but it can take some time to find the right combination of treatments that will keep the rash calm.  Cold compresses are a great idea - please continue these too.  If you are not feeling better by next week please let me know.  Dr. Thomas    5/3   Rash      Ms. 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 She has been on plaquenil x 30 years, but within the past few months has flared significantly with rash on the eyelids, chest and hands. She reports rash resolved with topicals (hydrocortisone 2.5% ointment for face, triamcinolone 0.1% ointment for body) but it is returning, with more flaky eczematous rash to eyelids. No changes in make up or fragrance. Also endorses join pains in fingers, hands and feet as well as muscle weakness in her arms. She reports dropping items frequently and becoming fatigued quickly when brushing her hair. She is tolerating plaquenil 200 mg twice a day. She was recently evaluated by ID for vaccine and is preparing to start other immunosuppressant with rheumatology. She is due for colonoscopy and mammogram.     1. Skin, right periorbital skin, punch biopsy:   --VACUOLAR INTERFACE DERMATITIS CONSISTENT WITH A CONNECTIVE TISSUE DISEASE,   see comment   2. Skin, right dorsal hand, punch biopsy:   --VACUOLAR INTERFACE DERMATITIS CONSISTENT WITH A CONNECTIVE TISSUE DISEASE,   see comment   COMMENT: These histologic features may be compatible with systemic lupus,   discoid lupus, or subacute cutaneous lupus.    Given the mild spongiosis and   this keratotic keratinocytes  that extend to the upper granular layer subacute   cutaneous lupus is favored. Clinical and serologic correlation is advised.      Past Medical History:   Diagnosis Date    Chronic allergic conjunctivitis     Chronic allergic rhinitis due to animal hair and dander     COPD (chronic obstructive pulmonary disease)     Fibromyalgia     Hypothyroidism     Immune disorder     Long-term use of Plaquenil 5/7/2019    Lupus     Mild persistent asthma without complication 11/12/2019    Pt with reports daily cough, dyspnea, freqent rescue inhaler use. Recommend maintenance therapy  Treat triggers-sinus, gerd, smoking cessation.  Normal pulmonary studies.     CHEPE (obstructive sleep apnea) 11/12/2019    CHEPE on CPAP 11/12/2019    Patient with symptoms of snoring, witnessed apnea, excessive daytime sleepiness, fatigue and difficulty staying asleep. Dx with CHEPE in 2015 but difficulty tolerating cpap at time due to sinus problems.  HST 1/22/2020: AHI 7 RDI 15      Osteoarthritis of cervical spine 3/10/2023    Palpitations 11/12/2019    May be related to breathing issues    Recurrent urticaria 1/6/2017    S/P laparoscopic cholecystectomy 8/4/2017    Subacute cutaneous lupus erythematosus      General: Pleasant, nontoxic   Skin:  Examined as above with some excoriations on the left forearm and patches of slightly raised inflamed insect appearing bites be high and the knees, few on the lower extremities.  No secondary infection     Diagnoses and all orders for this visit:    Dermatitis, appears to be insect bites based on the clinical presentation and appearance.  Reassured patient that if indeed it is chiggers they are not laying eggs under the skin and that they are dead and she needs to handle the inflammatory response and avoid any reexposure.  We discussed and looked up on the Internet topical medications, topical anesthetics and other creams as above.    Subacute cutaneous lupus erythematosus, dermatology notes reviewed and this  does not appear to be a manifestation of lupus and does not appear to be any immune complex issue.

## 2023-07-05 ENCOUNTER — PATIENT MESSAGE (OUTPATIENT)
Dept: DERMATOLOGY | Facility: CLINIC | Age: 53
End: 2023-07-05
Payer: MEDICARE

## 2023-07-05 ENCOUNTER — LAB VISIT (OUTPATIENT)
Dept: LAB | Facility: HOSPITAL | Age: 53
End: 2023-07-05
Attending: INTERNAL MEDICINE
Payer: MEDICARE

## 2023-07-05 ENCOUNTER — CLINICAL SUPPORT (OUTPATIENT)
Dept: SMOKING CESSATION | Facility: CLINIC | Age: 53
End: 2023-07-05
Payer: COMMERCIAL

## 2023-07-05 DIAGNOSIS — Z79.60 LONG-TERM USE OF IMMUNOSUPPRESSANT MEDICATION: ICD-10-CM

## 2023-07-05 DIAGNOSIS — F17.200 NICOTINE DEPENDENCE: ICD-10-CM

## 2023-07-05 DIAGNOSIS — L93.2 CUTANEOUS LUPUS ERYTHEMATOSUS: ICD-10-CM

## 2023-07-05 DIAGNOSIS — L93.1 SUBACUTE CUTANEOUS LUPUS ERYTHEMATOSUS: ICD-10-CM

## 2023-07-05 LAB
ALBUMIN SERPL BCP-MCNC: 3.6 G/DL (ref 3.5–5.2)
ALP SERPL-CCNC: 80 U/L (ref 55–135)
ALT SERPL W/O P-5'-P-CCNC: 25 U/L (ref 10–44)
ANION GAP SERPL CALC-SCNC: 8 MMOL/L (ref 8–16)
AST SERPL-CCNC: 24 U/L (ref 10–40)
BASOPHILS # BLD AUTO: 0.03 K/UL (ref 0–0.2)
BASOPHILS NFR BLD: 0.5 % (ref 0–1.9)
BILIRUB SERPL-MCNC: 0.2 MG/DL (ref 0.1–1)
BUN SERPL-MCNC: 7 MG/DL (ref 6–20)
CALCIUM SERPL-MCNC: 8.9 MG/DL (ref 8.7–10.5)
CHLORIDE SERPL-SCNC: 103 MMOL/L (ref 95–110)
CO2 SERPL-SCNC: 27 MMOL/L (ref 23–29)
CREAT SERPL-MCNC: 0.8 MG/DL (ref 0.5–1.4)
DIFFERENTIAL METHOD: ABNORMAL
EOSINOPHIL # BLD AUTO: 0.1 K/UL (ref 0–0.5)
EOSINOPHIL NFR BLD: 2.3 % (ref 0–8)
ERYTHROCYTE [DISTWIDTH] IN BLOOD BY AUTOMATED COUNT: 14.8 % (ref 11.5–14.5)
EST. GFR  (NO RACE VARIABLE): >60 ML/MIN/1.73 M^2
GLUCOSE SERPL-MCNC: 100 MG/DL (ref 70–110)
HCT VFR BLD AUTO: 38.9 % (ref 37–48.5)
HGB BLD-MCNC: 12.7 G/DL (ref 12–16)
IMM GRANULOCYTES # BLD AUTO: 0.01 K/UL (ref 0–0.04)
IMM GRANULOCYTES NFR BLD AUTO: 0.2 % (ref 0–0.5)
LYMPHOCYTES # BLD AUTO: 1.6 K/UL (ref 1–4.8)
LYMPHOCYTES NFR BLD: 26.5 % (ref 18–48)
MCH RBC QN AUTO: 29.9 PG (ref 27–31)
MCHC RBC AUTO-ENTMCNC: 32.6 G/DL (ref 32–36)
MCV RBC AUTO: 92 FL (ref 82–98)
MONOCYTES # BLD AUTO: 0.7 K/UL (ref 0.3–1)
MONOCYTES NFR BLD: 11.2 % (ref 4–15)
NEUTROPHILS # BLD AUTO: 3.5 K/UL (ref 1.8–7.7)
NEUTROPHILS NFR BLD: 59.3 % (ref 38–73)
NRBC BLD-RTO: 0 /100 WBC
PLATELET # BLD AUTO: 279 K/UL (ref 150–450)
PMV BLD AUTO: 10.8 FL (ref 9.2–12.9)
POTASSIUM SERPL-SCNC: 3.4 MMOL/L (ref 3.5–5.1)
PROT SERPL-MCNC: 7.4 G/DL (ref 6–8.4)
RBC # BLD AUTO: 4.25 M/UL (ref 4–5.4)
SODIUM SERPL-SCNC: 138 MMOL/L (ref 136–145)
WBC # BLD AUTO: 5.97 K/UL (ref 3.9–12.7)

## 2023-07-05 PROCEDURE — 99402 PR PREVENT COUNSEL,INDIV,30 MIN: ICD-10-PCS | Mod: S$GLB,,,

## 2023-07-05 PROCEDURE — 99999 PR PBB SHADOW E&M-EST. PATIENT-LVL I: CPT | Mod: PBBFAC,,,

## 2023-07-05 PROCEDURE — 80053 COMPREHEN METABOLIC PANEL: CPT | Performed by: INTERNAL MEDICINE

## 2023-07-05 PROCEDURE — 85025 COMPLETE CBC W/AUTO DIFF WBC: CPT | Performed by: INTERNAL MEDICINE

## 2023-07-05 PROCEDURE — 99402 PREV MED CNSL INDIV APPRX 30: CPT | Mod: S$GLB,,,

## 2023-07-05 PROCEDURE — 36415 COLL VENOUS BLD VENIPUNCTURE: CPT | Mod: PO | Performed by: INTERNAL MEDICINE

## 2023-07-05 PROCEDURE — 99999 PR PBB SHADOW E&M-EST. PATIENT-LVL I: ICD-10-PCS | Mod: PBBFAC,,,

## 2023-07-05 RX ORDER — IBUPROFEN 200 MG
1 TABLET ORAL DAILY
Qty: 14 PATCH | Refills: 0 | Status: SHIPPED | OUTPATIENT
Start: 2023-07-05 | End: 2023-07-19 | Stop reason: SDUPTHER

## 2023-07-05 NOTE — PROGRESS NOTES
Individual Follow-Up Form    7/5/2023    Quit Date: 7/5    Clinical Status of Patient: Outpatient    Length of Service: 45 minutes    Continuing Medication: yes  Patches or Nicotine Lozenges    Other Medications: N/A     Target Symptoms: Withdrawal and medication side effects. The following were  rated moderate (3) to severe (4) on TCRS:  Moderate (3): DESIRE FOR NICOTINE   Severe (4): 0    Comments: PATIENT PRESENTS FOR FOLLOW UP DOWN FROM 1 PK TO 1/2 PACK PER DAY, WEARING THE 21MG NICOTINE PATCH AND THE 2MG NICOTINE LOZENGE. NOT DOING MAY, FORGETTING TO USE, RECOMMEND ADDING THE 14MG NICOTINE PATCH, THE [place] VERSION WORKS WELL FOR HER THEREFORE WILL ORDER THAT VERSION, RECOMMEND MENTALLY GETTING TO THE POINT THAT SHE IS READY TO WEAR THE NICOTINE PATCH 14MG WHEN SHE IS READY TO COMPLETELY QUIT SMOKING, THIS WAY SHE DOES AN ABRUPT QUIT ONCE STARTING THE 14MG, SHE DOES FEEL THAT SHE IS CRAVING THE NICOTINE AND THE DESIRE FOR THE NICOTINE IS STRONG, RECOMMEND USING THE LOZENGE AS NEEDED AND IN PLACE OF THOSE FEELING TO SMOKE. STRATEGIES AND SESSION HANDOUT DISCUSSED WILL FOLLOW     Diagnosis: F17.200    Next Visit: 2 weeks

## 2023-07-07 ENCOUNTER — PATIENT MESSAGE (OUTPATIENT)
Dept: FAMILY MEDICINE | Facility: CLINIC | Age: 53
End: 2023-07-07
Payer: MEDICARE

## 2023-07-10 RX ORDER — CLOBETASOL PROPIONATE 0.5 MG/G
OINTMENT TOPICAL 2 TIMES DAILY
Qty: 60 G | Refills: 2 | Status: SHIPPED | OUTPATIENT
Start: 2023-07-10 | End: 2023-08-15 | Stop reason: SDUPTHER

## 2023-07-13 ENCOUNTER — PES CALL (OUTPATIENT)
Dept: ADMINISTRATIVE | Facility: CLINIC | Age: 53
End: 2023-07-13
Payer: MEDICARE

## 2023-07-18 ENCOUNTER — OFFICE VISIT (OUTPATIENT)
Dept: ALLERGY | Facility: CLINIC | Age: 53
End: 2023-07-18
Payer: MEDICARE

## 2023-07-18 VITALS — HEIGHT: 61 IN | BODY MASS INDEX: 26.3 KG/M2 | WEIGHT: 139.31 LBS

## 2023-07-18 DIAGNOSIS — L30.9 DERMATITIS: ICD-10-CM

## 2023-07-18 DIAGNOSIS — H10.10 ALLERGIC CONJUNCTIVITIS, UNSPECIFIED LATERALITY: ICD-10-CM

## 2023-07-18 DIAGNOSIS — L93.1 SUBACUTE CUTANEOUS LUPUS ERYTHEMATOSUS: ICD-10-CM

## 2023-07-18 DIAGNOSIS — Z87.09 HISTORY OF ASTHMA: ICD-10-CM

## 2023-07-18 DIAGNOSIS — J30.9 ALLERGIC RHINITIS, UNSPECIFIED SEASONALITY, UNSPECIFIED TRIGGER: Primary | ICD-10-CM

## 2023-07-18 PROCEDURE — 1159F PR MEDICATION LIST DOCUMENTED IN MEDICAL RECORD: ICD-10-PCS | Mod: CPTII,S$GLB,, | Performed by: STUDENT IN AN ORGANIZED HEALTH CARE EDUCATION/TRAINING PROGRAM

## 2023-07-18 PROCEDURE — 99999 PR PBB SHADOW E&M-EST. PATIENT-LVL IV: ICD-10-PCS | Mod: PBBFAC,,, | Performed by: STUDENT IN AN ORGANIZED HEALTH CARE EDUCATION/TRAINING PROGRAM

## 2023-07-18 PROCEDURE — 3008F PR BODY MASS INDEX (BMI) DOCUMENTED: ICD-10-PCS | Mod: CPTII,S$GLB,, | Performed by: STUDENT IN AN ORGANIZED HEALTH CARE EDUCATION/TRAINING PROGRAM

## 2023-07-18 PROCEDURE — 3008F BODY MASS INDEX DOCD: CPT | Mod: CPTII,S$GLB,, | Performed by: STUDENT IN AN ORGANIZED HEALTH CARE EDUCATION/TRAINING PROGRAM

## 2023-07-18 PROCEDURE — 99215 PR OFFICE/OUTPT VISIT, EST, LEVL V, 40-54 MIN: ICD-10-PCS | Mod: S$GLB,,, | Performed by: STUDENT IN AN ORGANIZED HEALTH CARE EDUCATION/TRAINING PROGRAM

## 2023-07-18 PROCEDURE — 3044F HG A1C LEVEL LT 7.0%: CPT | Mod: CPTII,S$GLB,, | Performed by: STUDENT IN AN ORGANIZED HEALTH CARE EDUCATION/TRAINING PROGRAM

## 2023-07-18 PROCEDURE — 1160F RVW MEDS BY RX/DR IN RCRD: CPT | Mod: CPTII,S$GLB,, | Performed by: STUDENT IN AN ORGANIZED HEALTH CARE EDUCATION/TRAINING PROGRAM

## 2023-07-18 PROCEDURE — 99215 OFFICE O/P EST HI 40 MIN: CPT | Mod: S$GLB,,, | Performed by: STUDENT IN AN ORGANIZED HEALTH CARE EDUCATION/TRAINING PROGRAM

## 2023-07-18 PROCEDURE — 99999 PR PBB SHADOW E&M-EST. PATIENT-LVL IV: CPT | Mod: PBBFAC,,, | Performed by: STUDENT IN AN ORGANIZED HEALTH CARE EDUCATION/TRAINING PROGRAM

## 2023-07-18 PROCEDURE — 3044F PR MOST RECENT HEMOGLOBIN A1C LEVEL <7.0%: ICD-10-PCS | Mod: CPTII,S$GLB,, | Performed by: STUDENT IN AN ORGANIZED HEALTH CARE EDUCATION/TRAINING PROGRAM

## 2023-07-18 PROCEDURE — 1159F MED LIST DOCD IN RCRD: CPT | Mod: CPTII,S$GLB,, | Performed by: STUDENT IN AN ORGANIZED HEALTH CARE EDUCATION/TRAINING PROGRAM

## 2023-07-18 PROCEDURE — 1160F PR REVIEW ALL MEDS BY PRESCRIBER/CLIN PHARMACIST DOCUMENTED: ICD-10-PCS | Mod: CPTII,S$GLB,, | Performed by: STUDENT IN AN ORGANIZED HEALTH CARE EDUCATION/TRAINING PROGRAM

## 2023-07-18 NOTE — PROGRESS NOTES
ALLERGY & IMMUNOLOGY CLINIC - FOLLOW UP     HISTORY OF PRESENT ILLNESS     Patient ID: Emily Pina is a 53 y.o. female    CC: allergic rhinoconjunctivitis and rash    HPI: Emily Pina is a 53 y.o. female with a history of asthma and cutaneous lupus following up for allergic rhinoconjunctivitis and rash.    She was started on methotrexate in May.  She is dealing with side effects including nausea, diarrhea, fatigue, feeling like weights on the body. She plans on discussing this with rheum.  It does seem to help with facial rash (she can still get the facial rash, but its covering less surface area when it occurs).    She developed a rash on her chest yesterday or the day before. She is using an antibiotic/pain relief cream (OTC) which sooths it. It is itchy and painful. She has a little on her hands as well. She hasn't had a rash like this before. She hasn't tried her triamcinolone on it.   She was out in sun in pool on Saturday.  She also used bug spray over the weekend.     She reports her allergies are doing okay. She says the pataday made her eyes stay watery. So she went back to the zaditor prn. She is still using the azelastine nasal spray 2 SEN prn. She is still taking daily zyrtec. She finds symptoms are well enough controlled.    She hasn't had a cigarette in 7 days. She is using the patches. She says she hasn't needed her inhalers at all recently (not in the past few weeks at least).     MEDICAL HISTORY     Vaccines:   Immunization History   Administered Date(s) Administered    COVID-19, MRNA, LN-S, PF (Pfizer) (Gray Cap) 05/19/2022    COVID-19, MRNA, LN-S, PF (Pfizer) (Purple Cap) 03/16/2021, 03/16/2021, 04/06/2021, 04/06/2021, 08/17/2021    COVID-19, mRNA, LNP-S, bivalent booster, PF (PFIZER OMICRON) 10/27/2022    Hepatitis A, Adult 03/14/2023    Hepatitis B (recombinant) Adjuvanted, 2 dose 03/14/2023, 04/18/2023    Influenza 11/02/2007, 09/29/2009, 11/16/2010,  10/29/2012    Influenza - Quadrivalent 09/25/2014, 09/02/2016    Influenza - Quadrivalent - PF *Preferred* (6 months and older) 09/02/2016, 11/07/2017, 09/11/2019, 10/28/2020, 03/14/2023    Influenza - Trivalent (ADULT) 11/02/2007, 09/29/2009, 11/16/2010, 10/29/2012    Influenza Split 10/29/2012, 10/29/2012    Pneumococcal Conjugate - 20 Valent 03/14/2023    Pneumococcal Polysaccharide - 23 Valent 10/29/2012    Tdap 09/02/2016    Zoster Recombinant 05/02/2023     Medical Hx:   Patient Active Problem List   Diagnosis    Subacute cutaneous lupus erythematosus    Fibromyalgia    Tobacco abuse    GERD (gastroesophageal reflux disease)    Unspecified hereditary and idiopathic peripheral neuropathy    Hypothyroidism    Chronic allergic conjunctivitis    Chronic allergic rhinitis due to animal hair and dander    Recurrent urticaria    Other pruritus    Biliary colic    S/P laparoscopic cholecystectomy    Disorder of immune system    Decreased functional activity tolerance    Decreased ROM of lumbar spine    Weakness    Refractive error    Long-term use of Plaquenil    Palpitations    Mild persistent asthma without complication    CHEPE on CPAP    Change in bowel movement    Cervical pain    Weakness of both upper extremities    Decreased range of motion of neck    Osteoarthritis of cervical spine    Raynaud's phenomenon without gangrene     Surgical Hx:   Past Surgical History:   Procedure Laterality Date    CHOLECYSTECTOMY      COLONOSCOPY N/A 3/9/2020    Procedure: COLONOSCOPY;  Surgeon: Judah oLpez MD;  Location: [address]);  Service: Endoscopy;  Laterality: N/A;    ESOPHAGOGASTRODUODENOSCOPY N/A 3/9/2020    Procedure: EGD (ESOPHAGOGASTRODUODENOSCOPY);  Surgeon: Judah Lopez MD;  Location: [address]);  Service: Endoscopy;  Laterality: N/A;     Medications:   Current Outpatient Medications on File Prior to Visit   Medication Sig Dispense Refill    albuterol (PROVENTIL/VENTOLIN HFA) 90  mcg/actuation inhaler Inhale 1-2 puffs into the lungs every 6 (six) hours as needed for Wheezing or Shortness of Breath. 6.7 g 0    albuterol-ipratropium (DUO-NEB) 2.5 mg-0.5 mg/3 mL nebulizer solution Take 3 mLs by nebulization every 6 (six) hours as needed for Wheezing. Rescue 75 mL 0    azelastine (ASTELIN) 137 mcg (0.1 %) nasal spray USE 2 SPRAYS (274 MCG TOTAL) BY NASAL ROUTE 2 (TWO) TIMES DAILY. 30 mL 11    budesonide-formoterol 160-4.5 mcg (SYMBICORT) 160-4.5 mcg/actuation HFAA Inhale 2 puffs into the lungs every 12 (twelve) hours. Controller 1 Inhaler 11    cyclobenzaprine (FLEXERIL) 5 MG tablet TAKE 1 TABLET BY MOUTH THREE TIMES A DAY AS NEEDED FOR MUSCLE SPASMS 90 tablet 0    EPINEPHrine (EPIPEN 2-GIGI) 0.3 mg/0.3 mL AtIn Inject 0.3 mLs (0.3 mg total) into the muscle once. for 1 dose 2 Device 0    estradioL (ESTRACE) 0.01 % (0.1 mg/gram) vaginal cream PLACE 1 G VAGINALLY ONCE DAILY. 42.5 g 6    fluorometholone 0.1% (FML) 0.1 % DrpS 1 DROP IN BOTH EYES TWICE A DAY FOR 2 WEEKS THEN DAILY FOR 2 WEEKS  0    folic acid (FOLVITE) 1 MG tablet Take 1 tablet (1 mg total) by mouth once daily. 30 tablet 11    gabapentin (NEURONTIN) 600 MG tablet TAKE 1 TABLET (600 MG TOTAL) BY MOUTH ONCE DAILY. 90 tablet 1    HYDROcodone-acetaminophen (NORCO) 7.5-325 mg per tablet Take 1 tablet by mouth every 24 hours as needed for Pain. 30 tablet 0    hydrocortisone 2.5 % ointment APPLY TOPICALLY 2 (TWO) TIMES DAILY. FOR EYELID RASH AS NEEDED 20 g 2    hydrOXYchloroQUINE (PLAQUENIL) 200 mg tablet Take 2 tablets (400 mg total) by mouth once daily. 60 tablet 6    hydrOXYzine HCL (ATARAX) 25 MG tablet TAKE 1 TAB BY MOUTH EVERY EVENING AS NEEDED FOR PRURITUS. DO NOT DRIVE OR OPERATE MACHINERY 30 tablet 2    levothyroxine (SYNTHROID) 100 MCG tablet TAKE 1 TABLET MONDAY THROUGH SATURDAY AND TAKE 1/2 TABLET ON SUNDAY 85 tablet 2    methotrexate 2.5 MG Tab Take 6 tablets (15 mg total) by mouth every 7 days. for 5 doses 30 tablet 3     montelukast (SINGULAIR) 10 mg tablet TAKE 1 TABLET BY MOUTH EVERY DAY IN THE EVENING 90 tablet 3    nicotine (NICODERM CQ) 14 mg/24 hr Place 1 patch onto the skin once daily. WEAR DAILY IN CONJUNCTION WITH THE 21MG NICOTINE PATCH. 14 patch 0    nicotine (NICODERM CQ) 21 mg/24 hr Place 1 patch onto the skin once daily. WEAR DAILY IN CONJUNCTION WITH THE 14MG NICOTINE PATCH. 14 patch 0    olopatadine (PATADAY ONCE DAILY RELIEF) 0.2 % Drop Place 1 drop into both eyes daily as needed (itchy or watery eyes). 2.5 mL 11    pantoprazole (PROTONIX) 40 MG tablet TAKE 1 TABLET BY MOUTH EVERY DAY 90 tablet 1    soy isofl/blk coh/gr tea/yerba (ESTROVEN ENERGY ORAL) Take by mouth.      tacrolimus (PROTOPIC) 0.1 % ointment Apply to skin 2 (two) times daily. 60 g 3    triamcinolone acetonide 0.1% (KENALOG) 0.1 % ointment Apply topically 2 (two) times daily. 80 g 3    azelastine (OPTIVAR) 0.05 % ophthalmic solution Place 1 drop into both eyes 2 (two) times daily as needed (itchy eyes). (Patient not taking: Reported on 7/18/2023) 6 mL 11    clobetasol 0.05% (TEMOVATE) 0.05 % Oint Apply topically 2 (two) times daily. To rash on body PRN (Patient not taking: Reported on 7/18/2023) 60 g 2    cyclobenzaprine (FLEXERIL) 5 MG tablet Take 1 tablet by mouth 3 (three) times daily as needed.      DULoxetine (CYMBALTA) 60 MG capsule Take 1 capsule by mouth.      DULoxetine (CYMBALTA) 60 MG capsule Take 1 capsule (60 mg total) by mouth once daily. 90 capsule 1    hydrocortisone 2.5 % ointment Apply topically 2 (two) times daily. (Patient not taking: Reported on 7/18/2023) 20 g 2    hydrocortisone 2.5 % ointment Apply topically 2 (two) times daily. To rash on eyelids PRN (Patient not taking: Reported on 7/18/2023) 28 g 5    naproxen (NAPROSYN) 500 MG tablet Take 1 tablet (500 mg total) by mouth 2 (two) times daily. (Patient not taking: Reported on 7/18/2023) 60 tablet 0    pantoprazole (PROTONIX) 40 MG tablet Take 1 tablet by mouth.       "pimecrolimus (ELIDEL) 1 % cream Apply topically 2 (two) times daily. To rashes on face and arms as needed (Patient not taking: Reported on 7/18/2023) 30 g 2    pimecrolimus (ELIDEL) 1 % cream Apply topically 2 (two) times daily. To face (Patient not taking: Reported on 7/18/2023) 30 g 3     No current facility-administered medications on file prior to visit.     Drug Allergies:   Review of patient's allergies indicates:   Allergen Reactions    Bactrim [sulfamethoxazole-trimethoprim] Rash     Social Hx:   Social History     Tobacco Use    Smoking status: Former     Packs/day: 1.00     Years: 32.00     Pack years: 32.00     Types: Cigarettes    Smokeless tobacco: Former     Quit date: 7/8/2023    Tobacco comments:     Started age 17   Substance Use Topics    Alcohol use: No    Drug use: No     Additional History Obtained at Initial Visit:  H/o Asthma: endorses, she has symbicort 160 mcg and albuterol, both of which she uses prn. She estimates she uses her inhalers about once per week recently, once per month in the past.   H/o Rhinitis: endorses  Env/Occ:   Pets: cat, can trigger symptoms      PHYSICAL EXAM     VS: Ht 5' 1" (1.549 m)   Wt 63.2 kg (139 lb 5.3 oz)   LMP 05/14/2014 (Approximate)   BMI 26.33 kg/m²   GENERAL: Alert, NAD, well-appearing  EYES: EOMI, no conjunctival injection, no discharge  NOSE: NT 1-2 + B/L, no stringing mucus, no polyps visualized  ORAL: MMM, no ulcers, no thrush  LUNGS: CTAB, no w/r/c, no increased WOB  HEART: RRR, normal S1/S2, no m/g/r  DERM: upper chest with brightly erythematous, scaly, xerotic patches and plaques; similar but milder appearance to dorsum of bilateral hands.  NEURO: normal speech, normal gait, no facial asymmetry     LABORATORY STUDIES     Component      Latest Ref Rng & Units 7/5/2023 6/5/2023   WBC      3.90 - 12.70 K/uL 5.97 6.08   RBC      4.00 - 5.40 M/uL 4.25 4.43   Hemoglobin      12.0 - 16.0 g/dL 12.7 13.1   Hematocrit      37.0 - 48.5 % 38.9 40.0   MCV     "  82 - 98 fL 92 90   MCH      27.0 - 31.0 pg 29.9 29.6   MCHC      32.0 - 36.0 g/dL 32.6 32.8   RDW      11.5 - 14.5 % 14.8 (H) 12.8   Platelets      150 - 450 K/uL 279 275   MPV      9.2 - 12.9 fL 10.8 11.1   Immature Granulocytes      0.0 - 0.5 % 0.2 0.3   Gran # (ANC)      1.8 - 7.7 K/uL 3.5 3.2   Immature Grans (Abs)      0.00 - 0.04 K/uL 0.01 0.02   Lymph #      1.0 - 4.8 K/uL 1.6 2.3   Mono #      0.3 - 1.0 K/uL 0.7 0.3   Eos #      0.0 - 0.5 K/uL 0.1 0.2   Baso #      0.00 - 0.20 K/uL 0.03 0.04   Differential Method       Automated Automated   Sodium      136 - 145 mmol/L 138 139   Potassium      3.5 - 5.1 mmol/L 3.4 (L) 3.8   Chloride      95 - 110 mmol/L 103 103   CO2      23 - 29 mmol/L 27 27   Glucose      70 - 110 mg/dL 100 90   BUN      6 - 20 mg/dL 7 8   Creatinine      0.5 - 1.4 mg/dL 0.8 0.8   Calcium      8.7 - 10.5 mg/dL 8.9 9.0   PROTEIN TOTAL      6.0 - 8.4 g/dL 7.4 7.4   Albumin      3.5 - 5.2 g/dL 3.6 3.6   BILIRUBIN TOTAL      0.1 - 1.0 mg/dL 0.2 0.2   Alkaline Phosphatase      55 - 135 U/L 80 81   AST      10 - 40 U/L 24 18   ALT      10 - 44 U/L 25 24     Component      Latest Ref Rng & Units 3/10/2023   HIV 1/2 Ag/Ab      Non-reactive Non-reactive   Hepatitis B Surface Ag      Non-reactive Non-reactive   Strongyloides Ab IgG      Negative Negative   RPR      Non-reactive Non-reactive      ALLERGEN TESTING     Skin Prick:   11/11/16 Inhalent Skin Test results (by Dr. Mancera): Positive to cat and dog.    Immunocaps:   Component      Latest Ref Rng & Units 5/2/2023 5/2/2023 5/2/2023          11:31 AM 11:31 AM 11:31 AM   D. farinae      <0.10 kU/L   <0.10   D. farinae Class         CLASS 0   Mite Dust Pteronyssinus IgE      <0.10 kU/L   0.15 (H)   D. pteronyssinus Class         CLASS 0/1   BERMUDA GRASS      <0.10 kU/L   <0.10   [place] Grass Class         CLASS 0   Andrés Grass      <0.10 kU/L   <0.10   Andrés Grass Class         CLASS 0   [place] IgE      <0.10 kU/L   <0.10   [place] Class          CLASS 0   Plantain      <0.10 kU/L   <0.10   English Plantain Class         CLASS 0   White Oak(Quercus alba) IgE      <0.10 kU/L   <0.10   Walnut, Class         CLASS 0   Pecan [place] Tree      <0.10 kU/L   <0.10   Pecan, Class         CLASS 0   Marshelder IgE      <0.10 kU/L   <0.10   Marshelder Class         CLASS 0   Ragweed, Western IgE      <0.10 kU/L   0.10   Ragweed, Western, Class         CLASS 0/1   Alternaria alternata      <0.10 kU/L   <0.10   Altern. alternata Class         CLASS 0   Aspergillus Fumigatus IgE      <0.10 kU/L   <0.10   A. fumigatus Class         CLASS 0   Cat Dander      <0.10 kU/L   >100.00 (H)   Cat Epithelium Class         CLASS 6   Cockroach, IgE      <0.10 kU/L CLASS 0/1 0.11 (H)    Dog Dander, IgE      <0.10 kU/L   34.70 (H)   Dog Dander Class         CLASS 4   WHITE TYLER TREE      <0.10 kU/L   <0.10   White [place] Class         CLASS 0   Silver Birch IgE      <0.10 kU/L   <0.10   Silver Birch Class         CLASS 0   Allergen Maple ([place]) IgE      <0.10 kU/L   <0.10   Allergen Maple ([place]) Class         CLASS 0   [place] IgE      <0.10 kU/L   <0.10   [place] Class         CLASS 0   Hackberry Celtis, IgE      <0.10 kU/L   <0.10   Hackberry Celtis Class         CLASS 0   Common Pigweed IgE      <0.10 kU/L   <0.10   Common Pigweed Class         CLASS 0   Russian Thistle IgE      <0.10 kU/L   <0.10   Russian Thistle Class         CLASS 0   Allergen Sheep [place] ([place]) IgE      <0.10 kU/L   <0.10   Allergen Sheep [place] ([place]) Class         CLASS 0   SARAH GRASS      <0.10 kU/L   <0.10   Sarah Grass Class         CLASS 0   BAHIA GRASS      <0.10 kU/L   <0.10   [place] Class         CLASS 0      PULMONARY FUNCTION TESTING     Date 11/12/19:  FVC:         99%ile -> - 3%  FEV1:         96%ile -> + 4%  FEV1/FVC: 79%  FEF 25-75: 89%ile  DLCO:        100%ile  Interpretation: Spirometry is normal. Spirometry remains unimproved following bronchodilator. Lung volume  determination is normal. DLCO is normal.     IMAGING & OTHER DIAGNOSTICS     CT chest low dose 5/11/23:  FINDINGS:  Lungs: The largest opacity in the right lung appears solid and measures 0.3-cm on series 4, image 152.  The largest opacity in the left lung appears subsolid and measures 0.4-cm on series 4, image 165.  The lungs show no findings consistent with emphysema.  Pleura: No pleural effusion.  Base of the neck: Imaged portions of the base of the neck are grossly unremarkable.  Heart and pericardium: Normal size. No significant pericardial thickening. Minimal coronary artery calcifications.  Aorta and vasculature: 3 branch vessel configuration of a left-sided type 1 aortic arch. No oleksandr aneurysmal degeneration, hyperdense crescent sign, periaortic fluid or periaortic fat stranding.  Chest wall and skeletal structures: Imaged soft tissues are grossly unremarkable. Prominent bilateral axillary and subpectoral lymph nodes that are not enlarged by radiologic size criteria but, no oleksandr axillary or mediastinal lymphadenopathy.  Evaluation of hilar lymph nodes is limited without IV contrast.  Minimal multilevel degenerative changes of the imaged spine. No appreciable suspicious lytic or blastic osseous lesions.  Upper abdomen: Imaged portions of the upper abdomen are grossly unremarkable.  Impression:  1. Lung-RADS Category:  2 - Benign Appearance or Behavior - continue annual screening with LDCT in 12 months.  2. Clinically or potentially clinically significant non lung cancer finding:  S - Significant.  3. Prior Lung Cancer Modifier:  No history of prior lung cancer.    Skin Biopsy 2/1/23:  1. Skin, right periorbital skin, punch biopsy:   --VACUOLAR INTERFACE DERMATITIS CONSISTENT WITH A CONNECTIVE TISSUE DISEASE, see comment   2. Skin, right dorsal hand, punch biopsy:   --VACUOLAR INTERFACE DERMATITIS CONSISTENT WITH A CONNECTIVE TISSUE DISEASE, see comment   COMMENT: These histologic features may be compatible  with systemic lupus, discoid lupus, or subacute cutaneous lupus. Given the mild spongiosis and this keratotic keratinocytes that extend to the upper granular layer subacute cutaneous lupus is favored. Clinical and serologic correlation is advised.     CXR 4/20/23:  FINDINGS:  Cardiac size is normal.  Lungs are clear.  No infiltrate is noted.     CHART REVIEW     Reviewed rheum note, derm note, labs, imaging.     ASSESSMENT & PLAN     Emily Pina is a 53 y.o. female with     # Allergic rhinoconjunctivitis: Immunocaps positive to cat and dog, equivocal to dust mite and cockroach. She reports symptoms currently under good control. She has a cat, but hasn't noticed her own cat as a trigger. She doesn't like the taste of flonase. She finds azelastine nasal spray helpful. She found the pataday made her eyes water, so switched back to zaditor.  -continue azelastine nasal spray 2 SEN prn. Advised she can use this up to 2 SEN BID.  -if rhinitis worsens, can add over the counter nasonex or nasacort to her regimen.  -continue daily cetirizine.  -continue zaditor eye drops BID prn.   -patient is interested in getting another pet (likely another cat, but possibly a dog). Counseled that it can be worthwhile to spend time with an animal before adopting, to determine if that particular animal is a strong trigger for symptoms. Also discussed the option of AIT if needed. Advised that if she does get another pet, and symptoms become uncontrolled, she schedule f/u with me.    # History of asthma: She reports she uses both her symbicort 160 mcg and albuterol as needed, was requiring it about once per week, but hasn't needed it in recent weeks (possibly due to recently quitting smoking). Spirometry normal in 2019.  -if symptoms worsen, would increase symbicort use to 2 puffs BID.  -otherwise, can continue symbicort and albuterol prn.    # Dermatitis, Cutaneous lupus: Facial rashes started around 1/2023. Her lupus was  previously well controlled on plaquenil. Skin biopsy was consistent with connective tissue disease favoring subacute cutaneous lupus.  She is also following with derm and rheumatology. Rheum started her on methotrexate, which helps somewhat with the facial rash, but she is reporting side effects (advised her to discuss this with rheum). She now has a new rash on her chest (x2-3 days), pruritic, painful, erythematous, and scaly. Unclear etiology. Possibly a photodermatitis as she did have sun exposure 3 days ago (both methotrexate and cutaneous lupus can cause photosensitivity). Allergic contact dermatitis is also on ddx (she used a bug spray 1-2 days prior to symptoms starting), but suspicion for this is lower based on appearance.   -advised she discuss her new rash with her dermatologist.    -in the meantime, recommend she use her triamcinolone 0.1% ointment on the chest rash.  -recommend continued management of her cutaneous lupus per dermatology and rheumatology.      Follow up: as needed    I spent a total of 40 minutes on the day of the visit.  This includes face to face time and non-face to face time preparing to see the patient (eg, review of tests), obtaining and/or reviewing separately obtained history, documenting clinical information in the electronic or other health record, independently interpreting results and communicating results to the patient/family/caregiver, or care coordinator.    Radha Rizo MD  Allergy/Immunology

## 2023-07-19 ENCOUNTER — CLINICAL SUPPORT (OUTPATIENT)
Dept: SMOKING CESSATION | Facility: CLINIC | Age: 53
End: 2023-07-19
Payer: COMMERCIAL

## 2023-07-19 DIAGNOSIS — F17.200 NICOTINE DEPENDENCE: Primary | ICD-10-CM

## 2023-07-19 PROCEDURE — 99999 PR PBB SHADOW E&M-EST. PATIENT-LVL I: ICD-10-PCS | Mod: PBBFAC,,,

## 2023-07-19 PROCEDURE — 99999 PR PBB SHADOW E&M-EST. PATIENT-LVL I: CPT | Mod: PBBFAC,,,

## 2023-07-19 PROCEDURE — 99403 PREV MED CNSL INDIV APPRX 45: CPT | Mod: S$GLB,,,

## 2023-07-19 PROCEDURE — 99403 PR PREVENT COUNSEL,INDIV,45 MIN: ICD-10-PCS | Mod: S$GLB,,,

## 2023-07-19 RX ORDER — DM/P-EPHED/ACETAMINOPH/DOXYLAM 30-7.5/3
2 LIQUID (ML) ORAL
Qty: 144 LOZENGE | Refills: 0 | Status: SHIPPED | OUTPATIENT
Start: 2023-07-19 | End: 2023-08-23

## 2023-07-19 RX ORDER — IBUPROFEN 200 MG
1 TABLET ORAL DAILY
Qty: 14 PATCH | Refills: 0 | Status: SHIPPED | OUTPATIENT
Start: 2023-07-19 | End: 2023-08-02 | Stop reason: SDUPTHER

## 2023-07-19 RX ORDER — IBUPROFEN 200 MG
1 TABLET ORAL DAILY
Qty: 14 PATCH | Refills: 0 | Status: SHIPPED | OUTPATIENT
Start: 2023-07-19 | End: 2023-08-02 | Stop reason: DRUGHIGH

## 2023-07-19 NOTE — PROGRESS NOTES
Individual Follow-Up Form    7/19/2023    Quit Date: 7/11    Clinical Status of Patient: Outpatient    Length of Service: 45 minutes    Continuing Medication: yes  Patches    Other Medications: ADDING THE NICOTINE LOZENGE      Target Symptoms: Withdrawal and medication side effects. The following were  rated moderate (3) to severe (4) on TCRS:  Moderate (3): 0  Severe (4): 0    Comments: PATIENT PRESENTS FOR FOLLOW UP IN CLINIC, SHE QUIT SMOKING ON 7/11 AND HAS NOT HAD ONE SLIP! IT WAS RECOMMENDED AND DISCUSSED TO DO THE 14MG NICOTINE PATCH IN CONJUNCTION WITH THE 21MG NICOTINE PATCH AND THAT WAS THE ANSWER!! SHE IS EXCITED AND MOTIVATED TO CONTINUE, SHE IS COMFORTABLE BUT HAVING STRONG NICOTINE DESIRES, UNCLEAR IF THIS IS HABIT OR DESIRE FOR THE NICOTINE ?? SHE IS NOT SURE EITHER, DISCUSSED ADDING THE 2MG NICOTINE LOZENGE TO HER REGIMEN, SHE IS WANTING TO TRY, RECOMMEND USING THIS IN PLACE OF THOSE STRONG DESIRES TO SMOKE TO HELP PREVENT LAPSE, RECOMMEND REMAINING ON THE SAME REGIMEN WITH THE DOUBLE NICOTINE PATCH 21MG AND THE 14MG DAILY, INFORMED HER THAT SHE MAY BE ON THIS FOR A MONTH OR COULD BE LONGER, NO RUSH IN WEANING, WILL CONSIDER HOW SHE IS FEELING BEFORE WEANING THE PATCH TO ONE 21MG, SESSION HANDOUT AND STRATEGIES PROVIDED AS WELL AS ENCOURAGEMENT     Diagnosis: F17.200    Next Visit: 2 weeks

## 2023-08-02 ENCOUNTER — CLINICAL SUPPORT (OUTPATIENT)
Dept: SMOKING CESSATION | Facility: CLINIC | Age: 53
End: 2023-08-02
Payer: COMMERCIAL

## 2023-08-02 DIAGNOSIS — F17.200 NICOTINE DEPENDENCE: Primary | ICD-10-CM

## 2023-08-02 PROCEDURE — 99407 BEHAV CHNG SMOKING > 10 MIN: CPT | Mod: S$GLB,,, | Performed by: DIETITIAN, REGISTERED

## 2023-08-02 PROCEDURE — 99407 PR TOBACCO USE CESSATION INTENSIVE >10 MINUTES: ICD-10-PCS | Mod: S$GLB,,, | Performed by: DIETITIAN, REGISTERED

## 2023-08-02 PROCEDURE — 99999 PR PBB SHADOW E&M-EST. PATIENT-LVL III: CPT | Mod: PBBFAC,,,

## 2023-08-02 PROCEDURE — 99999 PR PBB SHADOW E&M-EST. PATIENT-LVL III: ICD-10-PCS | Mod: PBBFAC,,,

## 2023-08-02 RX ORDER — IBUPROFEN 200 MG
1 TABLET ORAL DAILY
Qty: 14 PATCH | Refills: 0 | Status: SHIPPED | OUTPATIENT
Start: 2023-08-02 | End: 2023-08-16 | Stop reason: DRUGHIGH

## 2023-08-02 NOTE — PROGRESS NOTES
Audio visit. Called pt after she had cancelled in-clinic visit.  See Smoking Cessation Individual Phone Form

## 2023-08-07 ENCOUNTER — LAB VISIT (OUTPATIENT)
Dept: LAB | Facility: HOSPITAL | Age: 53
End: 2023-08-07
Attending: INTERNAL MEDICINE
Payer: MEDICARE

## 2023-08-07 DIAGNOSIS — L93.1 SUBACUTE CUTANEOUS LUPUS ERYTHEMATOSUS: ICD-10-CM

## 2023-08-07 DIAGNOSIS — Z79.60 LONG-TERM USE OF IMMUNOSUPPRESSANT MEDICATION: ICD-10-CM

## 2023-08-07 LAB
ALBUMIN SERPL BCP-MCNC: 4 G/DL (ref 3.5–5.2)
ALP SERPL-CCNC: 91 U/L (ref 55–135)
ALT SERPL W/O P-5'-P-CCNC: 20 U/L (ref 10–44)
ANION GAP SERPL CALC-SCNC: 14 MMOL/L (ref 8–16)
AST SERPL-CCNC: 19 U/L (ref 10–40)
BASOPHILS # BLD AUTO: 0.05 K/UL (ref 0–0.2)
BASOPHILS NFR BLD: 0.9 % (ref 0–1.9)
BILIRUB SERPL-MCNC: 0.3 MG/DL (ref 0.1–1)
BUN SERPL-MCNC: 8 MG/DL (ref 6–20)
CALCIUM SERPL-MCNC: 9.7 MG/DL (ref 8.7–10.5)
CHLORIDE SERPL-SCNC: 101 MMOL/L (ref 95–110)
CO2 SERPL-SCNC: 23 MMOL/L (ref 23–29)
CREAT SERPL-MCNC: 0.8 MG/DL (ref 0.5–1.4)
DIFFERENTIAL METHOD: ABNORMAL
EOSINOPHIL # BLD AUTO: 0.2 K/UL (ref 0–0.5)
EOSINOPHIL NFR BLD: 3.1 % (ref 0–8)
ERYTHROCYTE [DISTWIDTH] IN BLOOD BY AUTOMATED COUNT: 15.4 % (ref 11.5–14.5)
EST. GFR  (NO RACE VARIABLE): >60 ML/MIN/1.73 M^2
GLUCOSE SERPL-MCNC: 131 MG/DL (ref 70–110)
HCT VFR BLD AUTO: 41.5 % (ref 37–48.5)
HGB BLD-MCNC: 13.8 G/DL (ref 12–16)
IMM GRANULOCYTES # BLD AUTO: 0.02 K/UL (ref 0–0.04)
IMM GRANULOCYTES NFR BLD AUTO: 0.4 % (ref 0–0.5)
LYMPHOCYTES # BLD AUTO: 1.7 K/UL (ref 1–4.8)
LYMPHOCYTES NFR BLD: 30.6 % (ref 18–48)
MCH RBC QN AUTO: 30.7 PG (ref 27–31)
MCHC RBC AUTO-ENTMCNC: 33.3 G/DL (ref 32–36)
MCV RBC AUTO: 92 FL (ref 82–98)
MONOCYTES # BLD AUTO: 0.5 K/UL (ref 0.3–1)
MONOCYTES NFR BLD: 8.3 % (ref 4–15)
NEUTROPHILS # BLD AUTO: 3.1 K/UL (ref 1.8–7.7)
NEUTROPHILS NFR BLD: 56.7 % (ref 38–73)
NRBC BLD-RTO: 0 /100 WBC
PLATELET # BLD AUTO: 315 K/UL (ref 150–450)
PMV BLD AUTO: 11.1 FL (ref 9.2–12.9)
POTASSIUM SERPL-SCNC: 4.1 MMOL/L (ref 3.5–5.1)
PROT SERPL-MCNC: 7.9 G/DL (ref 6–8.4)
RBC # BLD AUTO: 4.5 M/UL (ref 4–5.4)
SODIUM SERPL-SCNC: 138 MMOL/L (ref 136–145)
WBC # BLD AUTO: 5.52 K/UL (ref 3.9–12.7)

## 2023-08-07 PROCEDURE — 80053 COMPREHEN METABOLIC PANEL: CPT | Performed by: INTERNAL MEDICINE

## 2023-08-07 PROCEDURE — 85025 COMPLETE CBC W/AUTO DIFF WBC: CPT | Performed by: INTERNAL MEDICINE

## 2023-08-07 PROCEDURE — 36415 COLL VENOUS BLD VENIPUNCTURE: CPT | Mod: PO | Performed by: INTERNAL MEDICINE

## 2023-08-10 ENCOUNTER — OFFICE VISIT (OUTPATIENT)
Dept: RHEUMATOLOGY | Facility: CLINIC | Age: 53
End: 2023-08-10
Payer: MEDICARE

## 2023-08-10 DIAGNOSIS — L93.1 SUBACUTE CUTANEOUS LUPUS ERYTHEMATOSUS: Primary | ICD-10-CM

## 2023-08-10 DIAGNOSIS — Z79.60 LONG-TERM USE OF IMMUNOSUPPRESSANT MEDICATION: ICD-10-CM

## 2023-08-10 PROCEDURE — 1160F PR REVIEW ALL MEDS BY PRESCRIBER/CLIN PHARMACIST DOCUMENTED: ICD-10-PCS | Mod: CPTII,95,, | Performed by: INTERNAL MEDICINE

## 2023-08-10 PROCEDURE — 3044F PR MOST RECENT HEMOGLOBIN A1C LEVEL <7.0%: ICD-10-PCS | Mod: CPTII,95,, | Performed by: INTERNAL MEDICINE

## 2023-08-10 PROCEDURE — 1159F PR MEDICATION LIST DOCUMENTED IN MEDICAL RECORD: ICD-10-PCS | Mod: CPTII,95,, | Performed by: INTERNAL MEDICINE

## 2023-08-10 PROCEDURE — 99214 OFFICE O/P EST MOD 30 MIN: CPT | Mod: 95,,, | Performed by: INTERNAL MEDICINE

## 2023-08-10 PROCEDURE — 3044F HG A1C LEVEL LT 7.0%: CPT | Mod: CPTII,95,, | Performed by: INTERNAL MEDICINE

## 2023-08-10 PROCEDURE — 1159F MED LIST DOCD IN RCRD: CPT | Mod: CPTII,95,, | Performed by: INTERNAL MEDICINE

## 2023-08-10 PROCEDURE — 1160F RVW MEDS BY RX/DR IN RCRD: CPT | Mod: CPTII,95,, | Performed by: INTERNAL MEDICINE

## 2023-08-10 PROCEDURE — 99214 PR OFFICE/OUTPT VISIT, EST, LEVL IV, 30-39 MIN: ICD-10-PCS | Mod: 95,,, | Performed by: INTERNAL MEDICINE

## 2023-08-10 RX ORDER — AZATHIOPRINE 50 MG/1
50 TABLET ORAL DAILY
Qty: 30 TABLET | Refills: 2 | Status: SHIPPED | OUTPATIENT
Start: 2023-08-10 | End: 2023-09-05

## 2023-08-12 NOTE — PROGRESS NOTES
The patient location is: home  The chief complaint leading to consultation is: follow-up    Visit type: audiovisual    Face to Face time with patient: 6 min  20 minutes of total time spent on the encounter, which includes face to face time and non-face to face time preparing to see the patient (eg, review of tests), Obtaining and/or reviewing separately obtained history, Documenting clinical information in the electronic or other health record, Independently interpreting results (not separately reported) and communicating results to the patient/family/caregiver, or Care coordination (not separately reported).         Each patient to whom he or she provides medical services by telemedicine is:  (1) informed of the relationship between the physician and patient and the respective role of any other health care provider with respect to management of the patient; and (2) notified that he or she may decline to receive medical services by telemedicine and may withdraw from such care at any time.        History of present illness:  53-year-old female I saw initially in 2005. She has lupus erythematosus with primarily skin involvement.  She has had joint aching but we felt it was more likely fibromyalgia.  She has not had joint swelling.  She does have positive anti phospholipid antibody but has no definite history of ischemic disease.  She was on Plaquenil, Flexeril, gabapentin, and Cymbalta.  She had a virtual visit in May.  I started her on methotrexate.    Her rash is somewhat better.  She is not tolerating the methotrexate.  She is developed diarrhea, vomiting, and feeling weak.  This lasts for several days after the dose of the medication.  She is had no other symptoms.      Physical examination was not performed, the entire time was counseling.     Assessment:  Subacute cutaneous lupus     Plans:  1. I discussed with her various options and elected to place her on azathioprine, beginning at 50 mg weekly.  2. She will  have monthly laboratory studies and if stable I will increase the dosage by 50 mg at a time up to 150 mg  3.  Return in 4 months.    Answers submitted by the patient for this visit:  Rheumatology Questionnaire (Submitted on 8/10/2023)  fever: No  eye redness: Yes  mouth sores: No  headaches: No  shortness of breath: Yes  chest pain: No  trouble swallowing: No  diarrhea: Yes  constipation: No  unexpected weight change: No  genital sore: No  dysuria: No  During the last 3 days, have you had a skin rash?: Yes  Bruises or bleeds easily: Yes  cough: No

## 2023-08-15 ENCOUNTER — OFFICE VISIT (OUTPATIENT)
Dept: DERMATOLOGY | Facility: CLINIC | Age: 53
End: 2023-08-15
Payer: MEDICARE

## 2023-08-15 ENCOUNTER — TELEPHONE (OUTPATIENT)
Dept: SMOKING CESSATION | Facility: CLINIC | Age: 53
End: 2023-08-15
Payer: MEDICARE

## 2023-08-15 DIAGNOSIS — L93.2 CUTANEOUS LUPUS ERYTHEMATOSUS: ICD-10-CM

## 2023-08-15 DIAGNOSIS — L30.9 DERMATITIS: ICD-10-CM

## 2023-08-15 PROCEDURE — 99999 PR PBB SHADOW E&M-EST. PATIENT-LVL II: CPT | Mod: PBBFAC,,, | Performed by: DERMATOLOGY

## 2023-08-15 PROCEDURE — 1159F PR MEDICATION LIST DOCUMENTED IN MEDICAL RECORD: ICD-10-PCS | Mod: CPTII,S$GLB,, | Performed by: DERMATOLOGY

## 2023-08-15 PROCEDURE — 99214 OFFICE O/P EST MOD 30 MIN: CPT | Mod: S$GLB,,, | Performed by: DERMATOLOGY

## 2023-08-15 PROCEDURE — 99999 PR PBB SHADOW E&M-EST. PATIENT-LVL II: ICD-10-PCS | Mod: PBBFAC,,, | Performed by: DERMATOLOGY

## 2023-08-15 PROCEDURE — 3044F PR MOST RECENT HEMOGLOBIN A1C LEVEL <7.0%: ICD-10-PCS | Mod: CPTII,S$GLB,, | Performed by: DERMATOLOGY

## 2023-08-15 PROCEDURE — 1160F RVW MEDS BY RX/DR IN RCRD: CPT | Mod: CPTII,S$GLB,, | Performed by: DERMATOLOGY

## 2023-08-15 PROCEDURE — 1159F MED LIST DOCD IN RCRD: CPT | Mod: CPTII,S$GLB,, | Performed by: DERMATOLOGY

## 2023-08-15 PROCEDURE — 3044F HG A1C LEVEL LT 7.0%: CPT | Mod: CPTII,S$GLB,, | Performed by: DERMATOLOGY

## 2023-08-15 PROCEDURE — 99214 PR OFFICE/OUTPT VISIT, EST, LEVL IV, 30-39 MIN: ICD-10-PCS | Mod: S$GLB,,, | Performed by: DERMATOLOGY

## 2023-08-15 PROCEDURE — 1160F PR REVIEW ALL MEDS BY PRESCRIBER/CLIN PHARMACIST DOCUMENTED: ICD-10-PCS | Mod: CPTII,S$GLB,, | Performed by: DERMATOLOGY

## 2023-08-15 RX ORDER — CLOBETASOL PROPIONATE 0.5 MG/G
OINTMENT TOPICAL 2 TIMES DAILY
Qty: 60 G | Refills: 2 | Status: SHIPPED | OUTPATIENT
Start: 2023-08-15

## 2023-08-15 RX ORDER — PIMECROLIMUS 10 MG/G
CREAM TOPICAL 2 TIMES DAILY
Qty: 30 G | Refills: 3 | Status: SHIPPED | OUTPATIENT
Start: 2023-08-15

## 2023-08-15 NOTE — PROGRESS NOTES
Subjective:      Patient ID:  Emily Pina is a 53 y.o. female who presents for   Chief Complaint   Patient presents with    Rash     F/U- better      Pt states rash is better but still present- face, hands and now chest    Rash - Follow-up  Symptom course: improving  Currently using: RX & OTC cortisone.  Affected locations: face, chest, left hand and right hand  Signs / symptoms: itching  Severity: mild      Pt with recent flare of cutaneous lupus; was treated with MTX but developed severe nausea and fatigue so last week started Imuran 50 mg daily.  Over past week or two she had a flare of rash on chest (she was also outdoors in the pool). Not sure what to use. In general though facial rashes much better.    Review of Systems   Skin:  Positive for itching and rash.       Objective:   Physical Exam   Constitutional: She appears well-developed and well-nourished. No distress.   Neurological: She is alert and oriented to person, place, and time. She is not disoriented.   Psychiatric: She has a normal mood and affect.   Skin:   Areas Examined (abnormalities noted in diagram):   Head / Face Inspection Performed  Neck Inspection Performed  Chest / Axilla Inspection Performed  Back Inspection Performed  RUE Inspected  LUE Inspection Performed            Diagram Legend     Erythematous scaling macule/papule c/w actinic keratosis       Vascular papule c/w angioma      Pigmented verrucoid papule/plaque c/w seborrheic keratosis      Yellow umbilicated papule c/w sebaceous hyperplasia      Irregularly shaped tan macule c/w lentigo     1-2 mm smooth white papules consistent with Milia      Movable subcutaneous cyst with punctum c/w epidermal inclusion cyst      Subcutaneous movable cyst c/w pilar cyst      Firm pink to brown papule c/w dermatofibroma      Pedunculated fleshy papule(s) c/w skin tag(s)      Evenly pigmented macule c/w junctional nevus     Mildly variegated pigmented, slightly irregular-bordered  macule c/w mildly atypical nevus      Flesh colored to evenly pigmented papule c/w intradermal nevus       Pink pearly papule/plaque c/w basal cell carcinoma      Erythematous hyperkeratotic cursted plaque c/w SCC      Surgical scar with no sign of skin cancer recurrence      Open and closed comedones      Inflammatory papules and pustules      Verrucoid papule consistent consistent with wart     Erythematous eczematous patches and plaques     Dystrophic onycholytic nail with subungual debris c/w onychomycosis     Umbilicated papule    Erythematous-base heme-crusted tan verrucoid plaque consistent with inflamed seborrheic keratosis     Erythematous Silvery Scaling Plaque c/w Psoriasis     See annotation      Assessment / Plan:        Cutaneous lupus erythematosus - improved; continues on Plaquenil and now Imuran  -     pimecrolimus (ELIDEL) 1 % cream; Apply topically 2 (two) times daily. To face  Dispense: 30 g; Refill: 3  -     clobetasol 0.05% (TEMOVATE) 0.05 % Oint; Apply topically 2 (two) times daily. To rash on body PRN  Dispense: 60 g; Refill: 2    Dermatitis  -     pimecrolimus (ELIDEL) 1 % cream; Apply topically 2 (two) times daily. To face  Dispense: 30 g; Refill: 3    Patient instructed in importance in daily sun protection of at least spf 50. Sun avoidance and topical protection discussed.     Patient encouraged to wear hat for all outdoor exposure.     Also discussed sun protective clothing.    Sun guard  (adds SPF to your clothes, available at [facility])    Daily SPF 50 or higher to face chest and arms    Elidel cream to face 1-2 times daily     Clobetasol ointment to rash on body 1-2 times daily (stop when rash gone)           No follow-ups on file.

## 2023-08-15 NOTE — PATIENT INSTRUCTIONS
Sun guard  (adds SPF to your clothes, available at [facility])    Daily SPF 50 or higher to face chest and arms    Elidel cream to face 1-2 times daily     Clobetasol ointment to rash on body 1-2 times daily (stop when rash gone)

## 2023-08-16 ENCOUNTER — CLINICAL SUPPORT (OUTPATIENT)
Dept: SMOKING CESSATION | Facility: CLINIC | Age: 53
End: 2023-08-16
Payer: COMMERCIAL

## 2023-08-16 ENCOUNTER — TELEPHONE (OUTPATIENT)
Dept: PHARMACY | Facility: CLINIC | Age: 53
End: 2023-08-16
Payer: MEDICARE

## 2023-08-16 DIAGNOSIS — F17.200 NICOTINE DEPENDENCE: Primary | ICD-10-CM

## 2023-08-16 PROCEDURE — 99404 PR PREVENT COUNSEL,INDIV,60 MIN: ICD-10-PCS | Mod: S$GLB,,, | Performed by: DIETITIAN, REGISTERED

## 2023-08-16 PROCEDURE — 99999 PR PBB SHADOW E&M-EST. PATIENT-LVL III: CPT | Mod: PBBFAC,,,

## 2023-08-16 PROCEDURE — 99404 PREV MED CNSL INDIV APPRX 60: CPT | Mod: S$GLB,,, | Performed by: DIETITIAN, REGISTERED

## 2023-08-16 PROCEDURE — 99999 PR PBB SHADOW E&M-EST. PATIENT-LVL III: ICD-10-PCS | Mod: PBBFAC,,,

## 2023-08-16 RX ORDER — IBUPROFEN 200 MG
1 TABLET ORAL DAILY
Qty: 14 PATCH | Refills: 0 | Status: SHIPPED | OUTPATIENT
Start: 2023-08-16

## 2023-08-16 NOTE — PROGRESS NOTES
Individual Follow-Up Form    8/16/2023    Quit Date: 7/11/2023    Clinical Status of Patient: Outpatient    Continuing Medication: yes  Patches or Nicotine Lozenges    Other Medications: none     Target Symptoms: Withdrawal and medication side effects. The following were  rated moderate (3) to severe (4) on TCRS:  Moderate (3): crave/ desire  Severe (4): none    Comments: Patient presents in clinic today for follow having quit on 7/11/2023. Pt remains on tobacco cessation medication of  21 mg nicotine patch QD. No adverse effects noted at this time. Pt shares she still uses some 2mg nicotine lozenges intermittently, and feels like she is ready to wean down from the nicotine patches. Will order 14mg nicotine patches for 10 days and follow-up in about two weeks. Reviewed coping strategies/habitual behavior/relapse prevention with patient. Patient understands she can call CTTS at any time.     Diagnosis: F17.200    Next Visit: 2 weeks

## 2023-08-28 ENCOUNTER — PATIENT MESSAGE (OUTPATIENT)
Dept: FAMILY MEDICINE | Facility: CLINIC | Age: 53
End: 2023-08-28
Payer: MEDICARE

## 2023-08-28 DIAGNOSIS — M79.7 FIBROMYALGIA: ICD-10-CM

## 2023-08-28 DIAGNOSIS — K21.9 GASTROESOPHAGEAL REFLUX DISEASE, UNSPECIFIED WHETHER ESOPHAGITIS PRESENT: ICD-10-CM

## 2023-08-28 DIAGNOSIS — E03.9 HYPOTHYROIDISM, UNSPECIFIED TYPE: ICD-10-CM

## 2023-08-28 RX ORDER — DULOXETIN HYDROCHLORIDE 60 MG/1
60 CAPSULE, DELAYED RELEASE ORAL DAILY
Qty: 90 CAPSULE | Refills: 1 | Status: SHIPPED | OUTPATIENT
Start: 2023-08-28 | End: 2024-02-02 | Stop reason: SDUPTHER

## 2023-08-28 RX ORDER — PANTOPRAZOLE SODIUM 40 MG/1
40 TABLET, DELAYED RELEASE ORAL DAILY
Qty: 90 TABLET | Refills: 1 | Status: SHIPPED | OUTPATIENT
Start: 2023-08-28 | End: 2024-02-02 | Stop reason: SDUPTHER

## 2023-08-28 RX ORDER — LEVOTHYROXINE SODIUM 100 UG/1
TABLET ORAL
Qty: 85 TABLET | Refills: 2 | Status: SHIPPED | OUTPATIENT
Start: 2023-08-28

## 2023-08-28 NOTE — TELEPHONE ENCOUNTER
No care due was identified.  [facility] Embedded Care Due Messages. Reference number: [id number].   8/28/2023 12:07:04 PM CDT

## 2023-08-28 NOTE — TELEPHONE ENCOUNTER
No care due was identified.  [facility] Embedded Care Due Messages. Reference number: [id number].   8/28/2023 12:02:33 PM CDT

## 2023-08-30 ENCOUNTER — TELEPHONE (OUTPATIENT)
Dept: SMOKING CESSATION | Facility: CLINIC | Age: 53
End: 2023-08-30
Payer: MEDICARE

## 2023-08-30 NOTE — TELEPHONE ENCOUNTER
Called pt after 15 min no show to Smoking Cessation in-clinic appointment. Pt did not answer phone. Left voicemail to call CTTS back at [phone].

## 2023-09-04 ENCOUNTER — PATIENT MESSAGE (OUTPATIENT)
Dept: RHEUMATOLOGY | Facility: CLINIC | Age: 53
End: 2023-09-04
Payer: MEDICARE

## 2023-09-05 RX ORDER — AZATHIOPRINE 50 MG/1
150 TABLET ORAL DAILY
Qty: 90 TABLET | Refills: 6 | Status: SHIPPED | OUTPATIENT
Start: 2023-09-05 | End: 2023-10-05

## 2023-09-07 DIAGNOSIS — M25.50 ARTHRALGIA, UNSPECIFIED JOINT: ICD-10-CM

## 2023-09-07 DIAGNOSIS — Z92.25 HISTORY OF IMMUNOSUPPRESSION THERAPY: ICD-10-CM

## 2023-09-07 DIAGNOSIS — M79.7 FIBROMYALGIA: ICD-10-CM

## 2023-09-07 DIAGNOSIS — G89.4 CHRONIC PAIN SYNDROME: ICD-10-CM

## 2023-09-07 DIAGNOSIS — Z72.0 TOBACCO ABUSE: ICD-10-CM

## 2023-09-07 DIAGNOSIS — L93.1 SUBACUTE CUTANEOUS LUPUS ERYTHEMATOSUS: ICD-10-CM

## 2023-09-07 RX ORDER — HYDROCODONE BITARTRATE AND ACETAMINOPHEN 7.5; 325 MG/1; MG/1
1 TABLET ORAL
Qty: 30 TABLET | Refills: 0 | Status: SHIPPED | OUTPATIENT
Start: 2023-09-07 | End: 2023-10-11 | Stop reason: SDUPTHER

## 2023-10-04 ENCOUNTER — PATIENT MESSAGE (OUTPATIENT)
Dept: FAMILY MEDICINE | Facility: CLINIC | Age: 53
End: 2023-10-04
Payer: MEDICARE

## 2023-10-05 RX ORDER — AZATHIOPRINE 50 MG/1
150 TABLET ORAL
Qty: 270 TABLET | Refills: 0 | Status: SHIPPED | OUTPATIENT
Start: 2023-10-05 | End: 2024-01-09

## 2023-10-05 NOTE — TELEPHONE ENCOUNTER
Refill Decision Note   Emily Pina  is requesting a refill authorization.  Brief Assessment and Rationale for Refill:  Approve     Medication Therapy Plan:  Request for 90-day supply. DMARD protocol passed. Pt canceled previous lab appt. Will approve 3-month supply.      Comments:     Note composed:2:12 PM 10/05/2023

## 2023-10-11 DIAGNOSIS — L93.1 SUBACUTE CUTANEOUS LUPUS ERYTHEMATOSUS: ICD-10-CM

## 2023-10-11 DIAGNOSIS — M25.50 ARTHRALGIA, UNSPECIFIED JOINT: ICD-10-CM

## 2023-10-11 DIAGNOSIS — Z92.25 HISTORY OF IMMUNOSUPPRESSION THERAPY: ICD-10-CM

## 2023-10-11 DIAGNOSIS — Z72.0 TOBACCO ABUSE: ICD-10-CM

## 2023-10-11 DIAGNOSIS — M79.7 FIBROMYALGIA: ICD-10-CM

## 2023-10-11 DIAGNOSIS — G89.4 CHRONIC PAIN SYNDROME: ICD-10-CM

## 2023-10-13 RX ORDER — HYDROCODONE BITARTRATE AND ACETAMINOPHEN 7.5; 325 MG/1; MG/1
1 TABLET ORAL
Qty: 30 TABLET | Refills: 0 | Status: SHIPPED | OUTPATIENT
Start: 2023-10-13 | End: 2023-12-12 | Stop reason: SDUPTHER

## 2023-11-13 ENCOUNTER — TELEPHONE (OUTPATIENT)
Dept: SMOKING CESSATION | Facility: CLINIC | Age: 53
End: 2023-11-13
Payer: MEDICARE

## 2023-12-12 DIAGNOSIS — G89.4 CHRONIC PAIN SYNDROME: ICD-10-CM

## 2023-12-12 DIAGNOSIS — M25.50 ARTHRALGIA, UNSPECIFIED JOINT: ICD-10-CM

## 2023-12-12 DIAGNOSIS — Z92.25 HISTORY OF IMMUNOSUPPRESSION THERAPY: ICD-10-CM

## 2023-12-12 DIAGNOSIS — M79.7 FIBROMYALGIA: ICD-10-CM

## 2023-12-12 DIAGNOSIS — Z72.0 TOBACCO ABUSE: ICD-10-CM

## 2023-12-12 DIAGNOSIS — L93.1 SUBACUTE CUTANEOUS LUPUS ERYTHEMATOSUS: ICD-10-CM

## 2023-12-12 RX ORDER — HYDROCODONE BITARTRATE AND ACETAMINOPHEN 7.5; 325 MG/1; MG/1
1 TABLET ORAL
Qty: 30 TABLET | Refills: 0 | Status: SHIPPED | OUTPATIENT
Start: 2023-12-12 | End: 2024-01-24 | Stop reason: SDUPTHER

## 2024-01-09 RX ORDER — AZATHIOPRINE 50 MG/1
150 TABLET ORAL
Qty: 270 TABLET | Refills: 0 | Status: SHIPPED | OUTPATIENT
Start: 2024-01-09

## 2024-01-11 DIAGNOSIS — Z00.00 ENCOUNTER FOR MEDICARE ANNUAL WELLNESS EXAM: ICD-10-CM

## 2024-01-24 DIAGNOSIS — M25.50 ARTHRALGIA, UNSPECIFIED JOINT: ICD-10-CM

## 2024-01-24 DIAGNOSIS — G89.4 CHRONIC PAIN SYNDROME: ICD-10-CM

## 2024-01-24 DIAGNOSIS — M79.7 FIBROMYALGIA: ICD-10-CM

## 2024-01-24 DIAGNOSIS — L93.1 SUBACUTE CUTANEOUS LUPUS ERYTHEMATOSUS: ICD-10-CM

## 2024-01-24 DIAGNOSIS — Z92.25 HISTORY OF IMMUNOSUPPRESSION THERAPY: ICD-10-CM

## 2024-01-24 DIAGNOSIS — Z72.0 TOBACCO ABUSE: ICD-10-CM

## 2024-01-24 RX ORDER — HYDROCODONE BITARTRATE AND ACETAMINOPHEN 7.5; 325 MG/1; MG/1
1 TABLET ORAL
Qty: 30 TABLET | Refills: 0 | Status: SHIPPED | OUTPATIENT
Start: 2024-01-24 | End: 2024-02-24 | Stop reason: SDUPTHER

## 2024-02-02 ENCOUNTER — OFFICE VISIT (OUTPATIENT)
Dept: FAMILY MEDICINE | Facility: CLINIC | Age: 54
End: 2024-02-02
Payer: MEDICARE

## 2024-02-02 ENCOUNTER — PATIENT MESSAGE (OUTPATIENT)
Dept: FAMILY MEDICINE | Facility: CLINIC | Age: 54
End: 2024-02-02

## 2024-02-02 VITALS
TEMPERATURE: 98 F | HEART RATE: 113 BPM | OXYGEN SATURATION: 98 % | WEIGHT: 144.19 LBS | HEIGHT: 61 IN | BODY MASS INDEX: 27.22 KG/M2 | SYSTOLIC BLOOD PRESSURE: 128 MMHG | DIASTOLIC BLOOD PRESSURE: 86 MMHG

## 2024-02-02 DIAGNOSIS — L30.1 DYSHIDROTIC ECZEMA: ICD-10-CM

## 2024-02-02 DIAGNOSIS — L93.1 SUBACUTE CUTANEOUS LUPUS ERYTHEMATOSUS: Primary | ICD-10-CM

## 2024-02-02 DIAGNOSIS — R21 RASH: ICD-10-CM

## 2024-02-02 DIAGNOSIS — D84.9 IMMUNODEFICIENCY, UNSPECIFIED: ICD-10-CM

## 2024-02-02 DIAGNOSIS — L20.9 ATOPIC DERMATITIS, UNSPECIFIED TYPE: ICD-10-CM

## 2024-02-02 DIAGNOSIS — F41.9 ANXIETY: ICD-10-CM

## 2024-02-02 PROCEDURE — 99999 PR PBB SHADOW E&M-EST. PATIENT-LVL V: CPT | Mod: PBBFAC,,, | Performed by: FAMILY MEDICINE

## 2024-02-02 PROCEDURE — 99214 OFFICE O/P EST MOD 30 MIN: CPT | Mod: S$GLB,,, | Performed by: FAMILY MEDICINE

## 2024-02-02 RX ORDER — FLUOXETINE 10 MG/1
10 CAPSULE ORAL DAILY
Qty: 30 CAPSULE | Refills: 11 | Status: SHIPPED | OUTPATIENT
Start: 2024-02-02 | End: 2025-02-01

## 2024-02-02 RX ORDER — TRIAMCINOLONE ACETONIDE 1 MG/G
OINTMENT TOPICAL NIGHTLY
Qty: 80 G | Refills: 0 | Status: SHIPPED | OUTPATIENT
Start: 2024-02-02

## 2024-02-02 NOTE — PROGRESS NOTES
Assessment & Plan  Problem List Items Addressed This Visit          Immunology/Multi System    Disorder of immune system    Overview     Followed by Dr. Spaulding ([facility] de[facility]) for subacute cutaneous lupus erythematosus (10-9-2017)          Current Assessment & Plan     Noted          Subacute cutaneous lupus erythematosus - Primary    Overview     2010 +SSA, RNP  3/30/16 per Rheumatology History of present illness: 46-year-old female with (subacute cutaneous lupus). She has had no evidence of systemic lupus. She remains on Plaquenil.          Current Assessment & Plan     Patient is stable.  Assess and addressed all modifiable risk factors.  Continue with appropriate management to prevent complications.            Other Visit Diagnoses       Dyshidrotic eczema        Relevant Medications    triamcinolone acetonide 0.1% (KENALOG) 0.1 % ointment    Rash        Atopic dermatitis, unspecified type        Relevant Medications    triamcinolone acetonide 0.1% (KENALOG) 0.1 % ointment    Anxiety        Relevant Medications    FLUoxetine 10 MG capsule    Other Relevant Orders    Ambulatory referral/consult to Psychiatry              Health Maintenance reviewed,.    Follow-up: No follow-ups on file.    ______________________________________________________________________    Chief Complaint  Chief Complaint   Patient presents with    Night Sweats       HPI  Emily Jacobson Yovani is a 53 y.o. female with multiple medical diagnoses as listed in the medical history and problem list that presents for night.  Pt is known to me with last appointment 1/23/2023.    Cut on her right hand:  she has had a cut for about ayear.  It has not healed.  She does wash her hands frequently     Night sweats and anxiety:  she is concerned that she has anxiety.  She is scared as she is hearing negative comments about herself from other people.  She will hear comments that are negative against her.  She has mentioned concern  with her sister as well.  Her niece has anxiety.  The comments are always negative and in the voice of someone she is familiar with.  Symptoms began in 2020.  No history of covid illness.      She could not tolerate chemo med for lupus.  Prescribed azothioprine      PAST MEDICAL HISTORY:  Past Medical History:   Diagnosis Date    Chronic allergic conjunctivitis     Chronic allergic rhinitis due to animal hair and dander     COPD (chronic obstructive pulmonary disease)     Fibromyalgia     Hypothyroidism     Immune disorder     Long-term use of Plaquenil 5/7/2019    Lupus     Mild persistent asthma without complication 11/12/2019    Pt with reports daily cough, dyspnea, freqent rescue inhaler use. Recommend maintenance therapy  Treat triggers-sinus, gerd, smoking cessation.  Normal pulmonary studies.     CHEPE (obstructive sleep apnea) 11/12/2019    CHEPE on CPAP 11/12/2019    Patient with symptoms of snoring, witnessed apnea, excessive daytime sleepiness, fatigue and difficulty staying asleep. Dx with CHEPE in 2015 but difficulty tolerating cpap at time due to sinus problems.  HST 1/22/2020: AHI 7 RDI 15      Osteoarthritis of cervical spine 3/10/2023    Palpitations 11/12/2019    May be related to breathing issues    Recurrent urticaria 1/6/2017    S/P laparoscopic cholecystectomy 8/4/2017    Subacute cutaneous lupus erythematosus        PAST SURGICAL HISTORY:  Past Surgical History:   Procedure Laterality Date    CHOLECYSTECTOMY      COLONOSCOPY N/A 3/9/2020    Procedure: COLONOSCOPY;  Surgeon: Judah Lopez MD;  Location: [address];  Service: Endoscopy;  Laterality: N/A;    ESOPHAGOGASTRODUODENOSCOPY N/A 3/9/2020    Procedure: EGD (ESOPHAGOGASTRODUODENOSCOPY);  Surgeon: Judah Lopez MD;  Location: [address]);  Service: Endoscopy;  Laterality: N/A;       SOCIAL HISTORY:  Social History     Socioeconomic History    Marital status:    Tobacco Use    Smoking status: Former     Current  packs/day: 0.00     Average packs/day: 1 pack/day for 32.0 years (32.0 ttl pk-yrs)     Types: Cigarettes     Start date: 1991     Quit date: 2023     Years since quittin.5    Smokeless tobacco: Former     Quit date: 2023    Tobacco comments:     Started age 17   Substance and Sexual Activity    Alcohol use: No    Drug use: No    Sexual activity: Yes     Partners: Male     Social Determinants of Health     Stress: No Stress Concern Present (2020)    [facility] [place] [facility] Occupational [facility] - Occupational Stress Questionnaire     Feeling of Stress : Only a little       FAMILY HISTORY:  Family History   Problem Relation Age of Onset    Hypertension Mother     Thyroid disease Mother     Cataracts Mother     Cancer Mother         duodenal    Arthritis Father     Hyperlipidemia Father     Cancer Father         Multiple Myeloma     Cirrhosis Father     Cataracts Father     Lupus Sister     Asthma Sister     Scoliosis Sister     No Known Problems Brother     No Known Problems Maternal Aunt     No Known Problems Maternal Uncle     No Known Problems Paternal Aunt     Pancreatic cancer Paternal Uncle     No Known Problems Maternal Grandmother     Lung cancer Maternal Grandfather     No Known Problems Paternal Grandmother     Lung cancer Paternal Grandfather     No Known Problems Other     Amblyopia Neg Hx     Blindness Neg Hx     Diabetes Neg Hx     Glaucoma Neg Hx     Macular degeneration Neg Hx     Retinal detachment Neg Hx     Strabismus Neg Hx     Stroke Neg Hx        ALLERGIES AND MEDICATIONS: updated and reviewed.  Review of patient's allergies indicates:   Allergen Reactions    Bactrim [sulfamethoxazole-trimethoprim] Rash     Current Outpatient Medications   Medication Sig Dispense Refill    albuterol (PROVENTIL/VENTOLIN HFA) 90 mcg/actuation inhaler Inhale 1-2 puffs into the lungs every 6 (six) hours as needed for Wheezing or Shortness of Breath. 6.7 g 0    albuterol-ipratropium (DUO-NEB) 2.5  mg-0.5 mg/3 mL nebulizer solution Take 3 mLs by nebulization every 6 (six) hours as needed for Wheezing. Rescue 75 mL 0    azaTHIOprine (IMURAN) 50 mg Tab TAKE 3 TABLETS BY MOUTH EVERY  tablet 0    azelastine (ASTELIN) 137 mcg (0.1 %) nasal spray USE 2 SPRAYS (274 MCG TOTAL) BY NASAL ROUTE 2 (TWO) TIMES DAILY. 30 mL 11    azelastine (OPTIVAR) 0.05 % ophthalmic solution Place 1 drop into both eyes 2 (two) times daily as needed (itchy eyes). 6 mL 11    budesonide-formoterol 160-4.5 mcg (SYMBICORT) 160-4.5 mcg/actuation HFAA Inhale 2 puffs into the lungs every 12 (twelve) hours. Controller 1 Inhaler 11    clobetasol 0.05% (TEMOVATE) 0.05 % Oint Apply topically 2 (two) times daily. To rash on body PRN 60 g 2    cyclobenzaprine (FLEXERIL) 5 MG tablet Take 1 tablet by mouth 3 (three) times daily as needed.      DULoxetine (CYMBALTA) 60 MG capsule Take 1 capsule by mouth.      estradioL (ESTRACE) 0.01 % (0.1 mg/gram) vaginal cream PLACE 1 G VAGINALLY ONCE DAILY. 42.5 g 6    fluorometholone 0.1% (FML) 0.1 % DrpS 1 DROP IN BOTH EYES TWICE A DAY FOR 2 WEEKS THEN DAILY FOR 2 WEEKS  0    folic acid (FOLVITE) 1 MG tablet Take 1 tablet (1 mg total) by mouth once daily. 30 tablet 11    gabapentin (NEURONTIN) 600 MG tablet TAKE 1 TABLET (600 MG TOTAL) BY MOUTH ONCE DAILY. 90 tablet 1    HYDROcodone-acetaminophen (NORCO) 7.5-325 mg per tablet Take 1 tablet by mouth every 24 hours as needed for Pain. 30 tablet 0    hydrocortisone 2.5 % ointment Apply topically 2 (two) times daily. 20 g 2    hydrOXYzine HCL (ATARAX) 25 MG tablet TAKE 1 TAB BY MOUTH EVERY EVENING AS NEEDED FOR PRURITUS. DO NOT DRIVE OR OPERATE MACHINERY 30 tablet 2    levothyroxine (SYNTHROID) 100 MCG tablet TAKE 1 TABLET MONDAY THROUGH SATURDAY AND TAKE 1/2 TABLET ON SUNDAY  Strength: 100 mcg 85 tablet 2    montelukast (SINGULAIR) 10 mg tablet TAKE 1 TABLET BY MOUTH EVERY DAY IN THE EVENING 90 tablet 3    nicotine (NICODERM CQ) 14 mg/24 hr Place 1 patch onto  the skin once daily. 14 patch 0    pantoprazole (PROTONIX) 40 MG tablet Take 1 tablet by mouth.      pimecrolimus (ELIDEL) 1 % cream Apply topically  to face 2 (two) times daily. 30 g 3    soy isofl/blk coh/gr tea/yerba (ESTROVEN ENERGY ORAL) Take by mouth.      tacrolimus (PROTOPIC) 0.1 % ointment Apply to skin 2 (two) times daily. 60 g 3    EPINEPHrine (EPIPEN 2-GIGI) 0.3 mg/0.3 mL AtIn Inject 0.3 mLs (0.3 mg total) into the muscle once. for 1 dose 2 Device 0    FLUoxetine 10 MG capsule Take 1 capsule (10 mg total) by mouth once daily. 30 capsule 11    hydrOXYchloroQUINE (PLAQUENIL) 200 mg tablet TAKE 2 TABLETS BY MOUTH ONCE DAILY (Patient not taking: Reported on 2/2/2024) 60 tablet 6    triamcinolone acetonide 0.1% (KENALOG) 0.1 % ointment Apply topically every evening. 80 g 0     No current facility-administered medications for this visit.         ROS  Review of Systems   Constitutional:  Negative for activity change, appetite change, fatigue, fever and unexpected weight change.   HENT: Negative.  Negative for ear discharge, ear pain, rhinorrhea and sore throat.    Eyes: Negative.    Respiratory:  Negative for apnea, cough, chest tightness, shortness of breath and wheezing.    Cardiovascular:  Negative for chest pain, palpitations and leg swelling.   Gastrointestinal:  Negative for abdominal distention, abdominal pain, constipation, diarrhea and vomiting.   Endocrine: Negative for cold intolerance, heat intolerance, polydipsia and polyuria.   Genitourinary:  Negative for decreased urine volume and urgency.   Musculoskeletal: Negative.    Skin:  Negative for rash.   Neurological:  Negative for dizziness and headaches.   Hematological:  Does not bruise/bleed easily.   Psychiatric/Behavioral:  Positive for agitation, confusion and dysphoric mood. Negative for sleep disturbance and suicidal ideas.            Physical Exam  Vitals:    02/02/24 1005   BP: 128/86   Pulse: (!) 113   Temp: 97.8 °F (36.6 °C)   TempSrc:  "Oral   SpO2: 98%   Weight: 65.4 kg (144 lb 2.9 oz)   Height: 5' 1" (1.549 m)    Body mass index is 27.24 kg/m².  Weight: 65.4 kg (144 lb 2.9 oz)   Height: 5' 1" (154.9 cm)   Physical Exam  Vitals reviewed.   Constitutional:       Appearance: Normal appearance. She is well-developed.   HENT:      Head: Normocephalic and atraumatic.      Right Ear: External ear normal.      Left Ear: External ear normal.      Nose: Nose normal.      Mouth/Throat:      Mouth: Mucous membranes are moist.      Pharynx: Oropharynx is clear.   Eyes:      Extraocular Movements: Extraocular movements intact.      Conjunctiva/sclera: Conjunctivae normal.      Pupils: Pupils are equal, round, and reactive to light.   Cardiovascular:      Rate and Rhythm: Normal rate and regular rhythm.      Heart sounds: Normal heart sounds.   Pulmonary:      Effort: Pulmonary effort is normal.      Breath sounds: Normal breath sounds.   Skin:     General: Skin is warm and dry.   Neurological:      Mental Status: She is alert and oriented to person, place, and time.           Health Maintenance         Date Due Completion Date    Sign Pain Contract Never done ---    Complete Opioid Risk Tool Never done ---    Shingles Vaccine (2 of 2) 06/27/2023 5/2/2023    Influenza Vaccine (1) 09/01/2023 3/14/2023    COVID-19 Vaccine (8 - 2023-24 season) 09/01/2023 10/27/2022    LDCT Lung Screen 05/11/2024 5/11/2023    Mammogram 08/09/2024 8/9/2022    Cervical Cancer Screening 09/11/2024 9/11/2019    Lipid Panel 09/14/2024 9/14/2019    Hemoglobin A1c (Diabetic Prevention Screening) 05/04/2026 5/4/2023    TETANUS VACCINE 09/02/2026 9/2/2016    Colorectal Cancer Screening 03/09/2030 3/9/2020                Patient note was created using TrashOut.  Any errors in syntax or even information may not have been identified and edited on initial review prior to signing this note.  "

## 2024-02-21 ENCOUNTER — PATIENT MESSAGE (OUTPATIENT)
Dept: RHEUMATOLOGY | Facility: CLINIC | Age: 54
End: 2024-02-21
Payer: MEDICARE

## 2024-02-21 ENCOUNTER — PATIENT MESSAGE (OUTPATIENT)
Dept: FAMILY MEDICINE | Facility: CLINIC | Age: 54
End: 2024-02-21
Payer: MEDICARE

## 2024-02-24 DIAGNOSIS — M79.7 FIBROMYALGIA: ICD-10-CM

## 2024-02-24 DIAGNOSIS — M25.50 ARTHRALGIA, UNSPECIFIED JOINT: ICD-10-CM

## 2024-02-24 DIAGNOSIS — L93.1 SUBACUTE CUTANEOUS LUPUS ERYTHEMATOSUS: ICD-10-CM

## 2024-02-24 DIAGNOSIS — Z72.0 TOBACCO ABUSE: ICD-10-CM

## 2024-02-24 DIAGNOSIS — Z92.25 HISTORY OF IMMUNOSUPPRESSION THERAPY: ICD-10-CM

## 2024-02-24 DIAGNOSIS — G89.4 CHRONIC PAIN SYNDROME: ICD-10-CM

## 2024-02-28 RX ORDER — HYDROCODONE BITARTRATE AND ACETAMINOPHEN 7.5; 325 MG/1; MG/1
1 TABLET ORAL
Qty: 30 TABLET | Refills: 0 | Status: SHIPPED | OUTPATIENT
Start: 2024-02-28 | End: 2024-03-28 | Stop reason: SDUPTHER

## 2024-03-04 ENCOUNTER — PATIENT MESSAGE (OUTPATIENT)
Dept: FAMILY MEDICINE | Facility: CLINIC | Age: 54
End: 2024-03-04
Payer: MEDICARE

## 2024-03-07 NOTE — TELEPHONE ENCOUNTER
Spoke with patient scheduled with first available virtual visit, patient requesting earlier visit.Appointment added to wait list at time. Patient verbalized understanding.

## 2024-03-12 ENCOUNTER — HOSPITAL ENCOUNTER (EMERGENCY)
Facility: HOSPITAL | Age: 54
Discharge: PSYCHIATRIC OR SUBSTANCE USE FACILITY | End: 2024-03-12
Attending: EMERGENCY MEDICINE
Payer: MEDICARE

## 2024-03-12 ENCOUNTER — OFFICE VISIT (OUTPATIENT)
Dept: FAMILY MEDICINE | Facility: CLINIC | Age: 54
End: 2024-03-12
Payer: MEDICARE

## 2024-03-12 DIAGNOSIS — R44.0 AUDITORY HALLUCINATION: Primary | ICD-10-CM

## 2024-03-12 DIAGNOSIS — F43.21 DYSFUNCTIONAL GRIEVING: ICD-10-CM

## 2024-03-12 DIAGNOSIS — R45.89 DEPRESSED MOOD: ICD-10-CM

## 2024-03-12 DIAGNOSIS — R45.851 SUICIDAL IDEATION: ICD-10-CM

## 2024-03-12 DIAGNOSIS — E87.6 HYPOKALEMIA: ICD-10-CM

## 2024-03-12 DIAGNOSIS — R45.89 DYSPHORIC MOOD: ICD-10-CM

## 2024-03-12 DIAGNOSIS — R00.0 TACHYCARDIA: ICD-10-CM

## 2024-03-12 LAB
ALBUMIN SERPL BCP-MCNC: 4 G/DL (ref 3.5–5.2)
ALP SERPL-CCNC: 75 U/L (ref 55–135)
ALT SERPL W/O P-5'-P-CCNC: 20 U/L (ref 10–44)
AMPHET+METHAMPHET UR QL: NEGATIVE
ANION GAP SERPL CALC-SCNC: 12 MMOL/L (ref 8–16)
APAP SERPL-MCNC: <3 UG/ML (ref 10–20)
AST SERPL-CCNC: 22 U/L (ref 10–40)
BARBITURATES UR QL SCN>200 NG/ML: NEGATIVE
BASOPHILS # BLD AUTO: 0.05 K/UL (ref 0–0.2)
BASOPHILS NFR BLD: 0.6 % (ref 0–1.9)
BENZODIAZ UR QL SCN>200 NG/ML: NEGATIVE
BILIRUB SERPL-MCNC: 0.2 MG/DL (ref 0.1–1)
BILIRUB UR QL STRIP: NEGATIVE
BUN SERPL-MCNC: 7 MG/DL (ref 6–20)
BZE UR QL SCN: NEGATIVE
CALCIUM SERPL-MCNC: 9.3 MG/DL (ref 8.7–10.5)
CANNABINOIDS UR QL SCN: ABNORMAL
CHLORIDE SERPL-SCNC: 100 MMOL/L (ref 95–110)
CLARITY UR REFRACT.AUTO: CLEAR
CO2 SERPL-SCNC: 26 MMOL/L (ref 23–29)
COLOR UR AUTO: COLORLESS
CREAT SERPL-MCNC: 0.9 MG/DL (ref 0.5–1.4)
CREAT UR-MCNC: 8 MG/DL (ref 15–325)
DIFFERENTIAL METHOD BLD: ABNORMAL
EOSINOPHIL # BLD AUTO: 0 K/UL (ref 0–0.5)
EOSINOPHIL NFR BLD: 0.2 % (ref 0–8)
ERYTHROCYTE [DISTWIDTH] IN BLOOD BY AUTOMATED COUNT: 12.7 % (ref 11.5–14.5)
EST. GFR  (NO RACE VARIABLE): >60 ML/MIN/1.73 M^2
ETHANOL SERPL-MCNC: <10 MG/DL
GLUCOSE SERPL-MCNC: 110 MG/DL (ref 70–110)
GLUCOSE UR QL STRIP: NEGATIVE
HCT VFR BLD AUTO: 39.2 % (ref 37–48.5)
HCV AB SERPL QL IA: NORMAL
HGB BLD-MCNC: 13.1 G/DL (ref 12–16)
HGB UR QL STRIP: NEGATIVE
HIV 1+2 AB+HIV1 P24 AG SERPL QL IA: NORMAL
IMM GRANULOCYTES # BLD AUTO: 0.03 K/UL (ref 0–0.04)
IMM GRANULOCYTES NFR BLD AUTO: 0.4 % (ref 0–0.5)
KETONES UR QL STRIP: NEGATIVE
LEUKOCYTE ESTERASE UR QL STRIP: NEGATIVE
LYMPHOCYTES # BLD AUTO: 1.3 K/UL (ref 1–4.8)
LYMPHOCYTES NFR BLD: 15.5 % (ref 18–48)
MAGNESIUM SERPL-MCNC: 1.9 MG/DL (ref 1.6–2.6)
MCH RBC QN AUTO: 30.2 PG (ref 27–31)
MCHC RBC AUTO-ENTMCNC: 33.4 G/DL (ref 32–36)
MCV RBC AUTO: 90 FL (ref 82–98)
METHADONE UR QL SCN>300 NG/ML: NEGATIVE
MONOCYTES # BLD AUTO: 0.4 K/UL (ref 0.3–1)
MONOCYTES NFR BLD: 5.1 % (ref 4–15)
NEUTROPHILS # BLD AUTO: 6.3 K/UL (ref 1.8–7.7)
NEUTROPHILS NFR BLD: 78.2 % (ref 38–73)
NITRITE UR QL STRIP: NEGATIVE
NRBC BLD-RTO: 0 /100 WBC
OHS QRS DURATION: 68 MS
OHS QTC CALCULATION: 468 MS
OPIATES UR QL SCN: NEGATIVE
PCP UR QL SCN>25 NG/ML: NEGATIVE
PH UR STRIP: 7 [PH] (ref 5–8)
PLATELET # BLD AUTO: 374 K/UL (ref 150–450)
PMV BLD AUTO: 10.4 FL (ref 9.2–12.9)
POTASSIUM SERPL-SCNC: 2.6 MMOL/L (ref 3.5–5.1)
PROT SERPL-MCNC: 8.2 G/DL (ref 6–8.4)
PROT UR QL STRIP: NEGATIVE
RBC # BLD AUTO: 4.34 M/UL (ref 4–5.4)
SODIUM SERPL-SCNC: 138 MMOL/L (ref 136–145)
SP GR UR STRIP: 1 (ref 1–1.03)
TOXICOLOGY INFORMATION: ABNORMAL
TSH SERPL DL<=0.005 MIU/L-ACNC: 3.68 UIU/ML (ref 0.4–4)
URN SPEC COLLECT METH UR: ABNORMAL
WBC # BLD AUTO: 8.04 K/UL (ref 3.9–12.7)

## 2024-03-12 PROCEDURE — 93005 ELECTROCARDIOGRAM TRACING: CPT

## 2024-03-12 PROCEDURE — 81003 URINALYSIS AUTO W/O SCOPE: CPT | Performed by: EMERGENCY MEDICINE

## 2024-03-12 PROCEDURE — 80053 COMPREHEN METABOLIC PANEL: CPT | Performed by: EMERGENCY MEDICINE

## 2024-03-12 PROCEDURE — 82077 ASSAY SPEC XCP UR&BREATH IA: CPT | Performed by: EMERGENCY MEDICINE

## 2024-03-12 PROCEDURE — 99215 OFFICE O/P EST HI 40 MIN: CPT | Mod: 95,,, | Performed by: FAMILY MEDICINE

## 2024-03-12 PROCEDURE — 86803 HEPATITIS C AB TEST: CPT | Performed by: PHYSICIAN ASSISTANT

## 2024-03-12 PROCEDURE — 80143 DRUG ASSAY ACETAMINOPHEN: CPT | Performed by: EMERGENCY MEDICINE

## 2024-03-12 PROCEDURE — 80307 DRUG TEST PRSMV CHEM ANLYZR: CPT | Performed by: EMERGENCY MEDICINE

## 2024-03-12 PROCEDURE — 84443 ASSAY THYROID STIM HORMONE: CPT | Performed by: EMERGENCY MEDICINE

## 2024-03-12 PROCEDURE — 87389 HIV-1 AG W/HIV-1&-2 AB AG IA: CPT | Performed by: PHYSICIAN ASSISTANT

## 2024-03-12 PROCEDURE — 83735 ASSAY OF MAGNESIUM: CPT | Performed by: PHYSICIAN ASSISTANT

## 2024-03-12 PROCEDURE — S4991 NICOTINE PATCH NONLEGEND: HCPCS | Performed by: PHYSICIAN ASSISTANT

## 2024-03-12 PROCEDURE — 25000003 PHARM REV CODE 250: Performed by: PHYSICIAN ASSISTANT

## 2024-03-12 PROCEDURE — 85025 COMPLETE CBC W/AUTO DIFF WBC: CPT | Performed by: EMERGENCY MEDICINE

## 2024-03-12 PROCEDURE — 93010 ELECTROCARDIOGRAM REPORT: CPT | Mod: ,,, | Performed by: INTERNAL MEDICINE

## 2024-03-12 PROCEDURE — 99285 EMERGENCY DEPT VISIT HI MDM: CPT | Mod: 25

## 2024-03-12 RX ORDER — POTASSIUM CHLORIDE 20 MEQ/1
40 TABLET, EXTENDED RELEASE ORAL
Status: COMPLETED | OUTPATIENT
Start: 2024-03-12 | End: 2024-03-12

## 2024-03-12 RX ORDER — LORAZEPAM 1 MG/1
1 TABLET ORAL
Status: COMPLETED | OUTPATIENT
Start: 2024-03-12 | End: 2024-03-12

## 2024-03-12 RX ORDER — IBUPROFEN 200 MG
1 TABLET ORAL
Status: DISCONTINUED | OUTPATIENT
Start: 2024-03-12 | End: 2024-03-13 | Stop reason: HOSPADM

## 2024-03-12 RX ADMIN — POTASSIUM CHLORIDE 40 MEQ: 1500 TABLET, EXTENDED RELEASE ORAL at 06:03

## 2024-03-12 RX ADMIN — LORAZEPAM 1 MG: 1 TABLET ORAL at 03:03

## 2024-03-12 RX ADMIN — Medication 1 PATCH: at 06:03

## 2024-03-12 NOTE — PROGRESS NOTES
Assessment & Plan  Problem List Items Addressed This Visit    None  Visit Diagnoses       Auditory hallucination    -  Primary    Relevant Orders    Ambulatory referral/consult to Psychiatry    Dysphoric mood        Relevant Orders    Ambulatory referral/consult to Psychiatry    Depressed mood        Relevant Orders    Ambulatory referral/consult to Psychiatry    Suicidal ideation        Dysfunctional grieving             To ER now. Will contact [facility] office if patient does not head to ER today     Assessment & Plan  1. Schizophrenia/suicidal ideations.  I am very concerned about her safety and wellbeing. I will send her to the ER to meet with a psychiatrist that is on call.    Follow-up  The patient will follow up as needed.    Results    Concerned for her safety instructed her to report to the ER at [facility] [address].  She was inconsolable and had a flight of ideas.     Health Maintenance reviewed.      ______________________________________________________________________    Chief Complaint  No chief complaint on file.      HPI  Emily Jacobson Yovani is a 53 y.o. female with multiple medical diagnoses as listed in the medical history and problem list that presents for dysphoric mood.  Pt is known to me with last appointment 2/2/2024.    History of Present Illness  The patient presents for evaluation of multiple medical concerns.    She would like to get the schizophrenia test done as soon as she can. She has not met with a psychiatrist yet. She is worried as her  and her family are trying to make her crazy. She was foggy headed. She flew to her brother's house and tried telling him stuff. She feels like her family did not want her there and they kept saying it. She recorded a video, but she felt like they kept saying they did not want her around. She felt like they were saying it under their breast. She has been hearing a lot of things under her  and her family, which is driving her  crazy. She felt like she should have taped a cord on her body, so she started to do videos to see if she really hears this really happened. She pulled back home and started remembering stuff more clearly. She tried to take her  on tape, but she is not sure if she got the information she needs or not. She went to her brother because she got scared and did not feel safe last night. She kept crying and hearing under her 's voice. She feels like her  is trying to cut her. She feels like he killed his first light. She is hoping it is just schizophrenia. She lives by herself. Her father  and she feels like it amplified everything. She has not been to the emergency room since she started having these thoughts. She could not function for the first 3 days at her brother's house. She was vomiting.           PAST MEDICAL HISTORY:  Past Medical History:   Diagnosis Date    Chronic allergic conjunctivitis     Chronic allergic rhinitis due to animal hair and dander     COPD (chronic obstructive pulmonary disease)     Fibromyalgia     Hypothyroidism     Immune disorder     Long-term use of Plaquenil 2019    Lupus     Mild persistent asthma without complication 2019    Pt with reports daily cough, dyspnea, freqent rescue inhaler use. Recommend maintenance therapy  Treat triggers-sinus, gerd, smoking cessation.  Normal pulmonary studies.     CHEPE (obstructive sleep apnea) 2019    CHEPE on CPAP 2019    Patient with symptoms of snoring, witnessed apnea, excessive daytime sleepiness, fatigue and difficulty staying asleep. Dx with CHEPE in  but difficulty tolerating cpap at time due to sinus problems.  HST 2020: AHI 7 RDI 15      Osteoarthritis of cervical spine 3/10/2023    Palpitations 2019    May be related to breathing issues    Recurrent urticaria 2017    S/P laparoscopic cholecystectomy 2017    Subacute cutaneous lupus erythematosus        PAST SURGICAL  HISTORY:  Past Surgical History:   Procedure Laterality Date    CHOLECYSTECTOMY      COLONOSCOPY N/A 3/9/2020    Procedure: COLONOSCOPY;  Surgeon: Judah Lopez MD;  Location: [address]);  Service: Endoscopy;  Laterality: N/A;    ESOPHAGOGASTRODUODENOSCOPY N/A 3/9/2020    Procedure: EGD (ESOPHAGOGASTRODUODENOSCOPY);  Surgeon: Judah Lopez MD;  Location: [address]);  Service: Endoscopy;  Laterality: N/A;       SOCIAL HISTORY:  Social History     Socioeconomic History    Marital status:    Tobacco Use    Smoking status: Former     Current packs/day: 0.00     Average packs/day: 1 pack/day for 32.0 years (32.0 ttl pk-yrs)     Types: Cigarettes     Start date: 1991     Quit date: 2023     Years since quittin.6    Smokeless tobacco: Former     Quit date: 2023    Tobacco comments:     Started age 17   Substance and Sexual Activity    Alcohol use: No    Drug use: No    Sexual activity: Yes     Partners: Male     Social Determinants of Health     Financial Resource Strain: Patient Declined (3/12/2024)    Overall Financial Resource Strain (CARDIA)     Difficulty of Paying Living Expenses: Patient declined   Food Insecurity: Patient Declined (3/12/2024)    Hunger Vital Sign     Worried About Running Out of Food in the Last Year: Patient declined     Ran Out of Food in the Last Year: Patient declined   Transportation Needs: No Transportation Needs (3/12/2024)    PRAPARE - Transportation     Lack of Transportation (Medical): No     Lack of Transportation (Non-Medical): No   Physical Activity: Inactive (3/12/2024)    Exercise Vital Sign     Days of Exercise per Week: 0 days     Minutes of Exercise per Session: 0 min   Stress: No Stress Concern Present (3/12/2024)    [employer] [employer] - Occupational Stress Questionnaire     Feeling of Stress : Not at all   Social Connections: Unknown (3/12/2024)    Social Connection and Isolation Panel [NHANES]      Frequency of Communication with Friends and Family: Patient declined     Frequency of Social Gatherings with Friends and Family: Patient declined     Active Member of Clubs or Organizations: No     Attends Club or Organization Meetings: Never     Marital Status:    Housing Stability: Patient Declined (3/12/2024)    Housing Stability Vital Sign     Unable to Pay for Housing in the Last Year: Patient declined     Number of Places Lived in the Last Year: 1     Unstable Housing in the Last Year: Patient declined       FAMILY HISTORY:  Family History   Problem Relation Age of Onset    Hypertension Mother     Thyroid disease Mother     Cataracts Mother     Cancer Mother         duodenal    Arthritis Father     Hyperlipidemia Father     Cancer Father         Multiple Myeloma     Cirrhosis Father     Cataracts Father     Lupus Sister     Asthma Sister     Scoliosis Sister     No Known Problems Brother     No Known Problems Maternal Aunt     No Known Problems Maternal Uncle     No Known Problems Paternal Aunt     Pancreatic cancer Paternal Uncle     No Known Problems Maternal Grandmother     Lung cancer Maternal Grandfather     No Known Problems Paternal Grandmother     Lung cancer Paternal Grandfather     No Known Problems Other     Amblyopia Neg Hx     Blindness Neg Hx     Diabetes Neg Hx     Glaucoma Neg Hx     Macular degeneration Neg Hx     Retinal detachment Neg Hx     Strabismus Neg Hx     Stroke Neg Hx        ALLERGIES AND MEDICATIONS: updated and reviewed.  Review of patient's allergies indicates:   Allergen Reactions    Bactrim [sulfamethoxazole-trimethoprim] Rash     Current Outpatient Medications   Medication Sig Dispense Refill    albuterol (PROVENTIL/VENTOLIN HFA) 90 mcg/actuation inhaler Inhale 1-2 puffs into the lungs every 6 (six) hours as needed for Wheezing or Shortness of Breath. 6.7 g 0    albuterol-ipratropium (DUO-NEB) 2.5 mg-0.5 mg/3 mL nebulizer solution Take 3 mLs by nebulization every 6  (six) hours as needed for Wheezing. Rescue 75 mL 0    azaTHIOprine (IMURAN) 50 mg Tab TAKE 3 TABLETS BY MOUTH EVERY  tablet 0    azelastine (ASTELIN) 137 mcg (0.1 %) nasal spray USE 2 SPRAYS (274 MCG TOTAL) BY NASAL ROUTE 2 (TWO) TIMES DAILY. 30 mL 11    azelastine (OPTIVAR) 0.05 % ophthalmic solution Place 1 drop into both eyes 2 (two) times daily as needed (itchy eyes). 6 mL 11    budesonide-formoterol 160-4.5 mcg (SYMBICORT) 160-4.5 mcg/actuation HFAA Inhale 2 puffs into the lungs every 12 (twelve) hours. Controller 1 Inhaler 11    clobetasol 0.05% (TEMOVATE) 0.05 % Oint Apply topically 2 (two) times daily. To rash on body PRN 60 g 2    cyclobenzaprine (FLEXERIL) 5 MG tablet Take 1 tablet by mouth 3 (three) times daily as needed.      DULoxetine (CYMBALTA) 60 MG capsule Take 1 capsule by mouth.      EPINEPHrine (EPIPEN 2-GIGI) 0.3 mg/0.3 mL AtIn Inject 0.3 mLs (0.3 mg total) into the muscle once. for 1 dose 2 Device 0    estradioL (ESTRACE) 0.01 % (0.1 mg/gram) vaginal cream PLACE 1 G VAGINALLY ONCE DAILY. 42.5 g 6    fluorometholone 0.1% (FML) 0.1 % DrpS 1 DROP IN BOTH EYES TWICE A DAY FOR 2 WEEKS THEN DAILY FOR 2 WEEKS  0    FLUoxetine 10 MG capsule Take 1 capsule (10 mg total) by mouth once daily. 30 capsule 11    folic acid (FOLVITE) 1 MG tablet Take 1 tablet (1 mg total) by mouth once daily. 30 tablet 11    gabapentin (NEURONTIN) 600 MG tablet TAKE 1 TABLET (600 MG TOTAL) BY MOUTH ONCE DAILY. 90 tablet 1    HYDROcodone-acetaminophen (NORCO) 7.5-325 mg per tablet Take 1 tablet by mouth every 24 hours as needed for Pain. 30 tablet 0    hydrocortisone 2.5 % ointment Apply topically 2 (two) times daily. 20 g 2    hydrOXYchloroQUINE (PLAQUENIL) 200 mg tablet TAKE 2 TABLETS BY MOUTH ONCE DAILY (Patient not taking: Reported on 2/2/2024) 60 tablet 6    hydrOXYzine HCL (ATARAX) 25 MG tablet TAKE 1 TAB BY MOUTH EVERY EVENING AS NEEDED FOR PRURITUS. DO NOT DRIVE OR OPERATE MACHINERY 30 tablet 2    levothyroxine  (SYNTHROID) 100 MCG tablet TAKE 1 TABLET MONDAY THROUGH SATURDAY AND TAKE 1/2 TABLET ON SUNDAY  Strength: 100 mcg 85 tablet 2    montelukast (SINGULAIR) 10 mg tablet TAKE 1 TABLET BY MOUTH EVERY DAY IN THE EVENING 90 tablet 3    nicotine (NICODERM CQ) 14 mg/24 hr Place 1 patch onto the skin once daily. 14 patch 0    pantoprazole (PROTONIX) 40 MG tablet Take 1 tablet by mouth.      pimecrolimus (ELIDEL) 1 % cream Apply topically  to face 2 (two) times daily. 30 g 3    soy isofl/blk coh/gr tea/yerba (ESTROVEN ENERGY ORAL) Take by mouth.      tacrolimus (PROTOPIC) 0.1 % ointment Apply to skin 2 (two) times daily. 60 g 3    triamcinolone acetonide 0.1% (KENALOG) 0.1 % ointment Apply topically every evening. 80 g 0     No current facility-administered medications for this visit.         ROS  Review of Systems   Constitutional:  Positive for activity change. Negative for unexpected weight change.   HENT:  Negative for hearing loss, rhinorrhea and trouble swallowing.    Eyes:  Negative for discharge and visual disturbance.   Respiratory:  Negative for chest tightness and wheezing.    Cardiovascular:  Positive for palpitations. Negative for chest pain.   Gastrointestinal:  Positive for diarrhea and vomiting. Negative for blood in stool and constipation.   Endocrine: Positive for polydipsia. Negative for polyuria.   Genitourinary:  Negative for difficulty urinating, dysuria, hematuria and menstrual problem.   Musculoskeletal:  Positive for arthralgias. Negative for joint swelling and neck pain.   Neurological:  Negative for weakness and headaches.   Psychiatric/Behavioral:  Positive for confusion, dysphoric mood and suicidal ideas. Negative for self-injury. The patient is nervous/anxious.            Physical Exam  There were no vitals filed for this visit. There is no height or weight on file to calculate BMI.          Physical Exam  Constitutional:       Appearance: Normal appearance. She is well-developed.   HENT:       Head: Normocephalic and atraumatic.      Right Ear: External ear normal.      Left Ear: External ear normal.      Nose: Nose normal.   Eyes:      Extraocular Movements: Extraocular movements intact.      Conjunctiva/sclera: Conjunctivae normal.   Pulmonary:      Effort: Pulmonary effort is normal.   Neurological:      Mental Status: She is alert and oriented to person, place, and time.   Psychiatric:         Mood and Affect: Mood is anxious. Affect is tearful.         Speech: Speech is tangential.         Behavior: Behavior normal.         Thought Content: Thought content is paranoid. Thought content includes suicidal ideation. Thought content includes suicidal plan.        Physical Exam           Health Maintenance         Date Due Completion Date    Sign Pain Contract Never done ---    Complete Opioid Risk Tool Never done ---    Shingles Vaccine (2 of 2) 06/27/2023 5/2/2023    Influenza Vaccine (1) 09/01/2023 3/14/2023    COVID-19 Vaccine (8 - 2023-24 season) 09/01/2023 10/27/2022    LDCT Lung Screen 05/11/2024 5/11/2023    Mammogram 08/09/2024 8/9/2022    Cervical Cancer Screening 09/11/2024 9/11/2019    Lipid Panel 09/14/2024 9/14/2019    Hemoglobin A1c (Diabetic Prevention Screening) 05/04/2026 5/4/2023    TETANUS VACCINE 09/02/2026 9/2/2016    Colorectal Cancer Screening 03/09/2030 3/9/2020                Patient note was created using [employer].  Any errors in syntax or even information may not have been identified and edited on initial review prior to signing this note.

## 2024-03-12 NOTE — ED PROVIDER NOTES
"Encounter Date: 3/12/2024       History     Chief Complaint   Patient presents with    Psychiatric Evaluation     My dr told me come see psychiatry, keep hearing  and family talking negative about me     The history is provided by the patient and medical records. No  was used.     Emily Pina is a 53 y.o. female with medical history of allergies, CHEPE on CPAP, GERD, hypothyroidism presenting to the ED with the chief complaint of psychiatry evaluation.     Advised by her PCP to come to the ED for psychiatry evaluation. Patient expresses concern she has been hallucinating her family's voices over the last 4-5 months. Reports hearing her 's voice when she is alone at home. Reports the voices have negative comments which have been causing her to feel depressed. She has tried to tape the voices for proof and that her family state they do not hear anything. Reports having to run out of her house due to the voices. Reports she has had thoughts about harming herself, but states she will never kill herself. Denies having a suicidal plan. Reports she is here in the ED today for a "schizophrenia test." Denies previous psychiatric disorders or psychiatric hospitalizations. She uses medical marijuana for pain control for RA. Denies recreational drug use otherwise. Social ETOH use. No recent medication changes.    Review of patient's allergies indicates:   Allergen Reactions    Bactrim [sulfamethoxazole-trimethoprim] Rash     Past Medical History:   Diagnosis Date    Chronic allergic conjunctivitis     Chronic allergic rhinitis due to animal hair and dander     COPD (chronic obstructive pulmonary disease)     Fibromyalgia     Hypothyroidism     Immune disorder     Long-term use of Plaquenil 5/7/2019    Lupus     Mild persistent asthma without complication 11/12/2019    Pt with reports daily cough, dyspnea, freqent rescue inhaler use. Recommend maintenance therapy  Treat " triggers-sinus, gerd, smoking cessation.  Normal pulmonary studies.     CHEPE (obstructive sleep apnea) 11/12/2019    CHEPE on CPAP 11/12/2019    Patient with symptoms of snoring, witnessed apnea, excessive daytime sleepiness, fatigue and difficulty staying asleep. Dx with CHEPE in 2015 but difficulty tolerating cpap at time due to sinus problems.  HST 1/22/2020: AHI 7 RDI 15      Osteoarthritis of cervical spine 3/10/2023    Palpitations 11/12/2019    May be related to breathing issues    Recurrent urticaria 1/6/2017    S/P laparoscopic cholecystectomy 8/4/2017    Subacute cutaneous lupus erythematosus      Past Surgical History:   Procedure Laterality Date    CHOLECYSTECTOMY      COLONOSCOPY N/A 3/9/2020    Procedure: COLONOSCOPY;  Surgeon: Judah Lopez MD;  Location: [facility] (4TH FLR);  Service: Endoscopy;  Laterality: N/A;    ESOPHAGOGASTRODUODENOSCOPY N/A 3/9/2020    Procedure: EGD (ESOPHAGOGASTRODUODENOSCOPY);  Surgeon: Judah Lopez MD;  Location: [facility] ([facility] FLR);  Service: Endoscopy;  Laterality: N/A;     Family History   Problem Relation Age of Onset    Hypertension Mother     Thyroid disease Mother     Cataracts Mother     Cancer Mother         duodenal    Arthritis Father     Hyperlipidemia Father     Cancer Father         Multiple Myeloma     Cirrhosis Father     Cataracts Father     Lupus Sister     Asthma Sister     Scoliosis Sister     No Known Problems Brother     No Known Problems Maternal Aunt     No Known Problems Maternal Uncle     No Known Problems Paternal Aunt     Pancreatic cancer Paternal Uncle     No Known Problems Maternal Grandmother     Lung cancer Maternal Grandfather     No Known Problems Paternal Grandmother     Lung cancer Paternal Grandfather     No Known Problems Other     Amblyopia Neg Hx     Blindness Neg Hx     Diabetes Neg Hx     Glaucoma Neg Hx     Macular degeneration Neg Hx     Retinal detachment Neg Hx     Strabismus Neg Hx     Stroke Neg Hx      Social History      Tobacco Use    Smoking status: Former     Current packs/day: 0.00     Average packs/day: 1 pack/day for 32.0 years (32.0 ttl pk-yrs)     Types: Cigarettes     Start date: 1991     Quit date: 2023     Years since quittin.6    Smokeless tobacco: Former     Quit date: 2023    Tobacco comments:     Started age 17   Substance Use Topics    Alcohol use: No    Drug use: No     Review of Systems   Psychiatric/Behavioral:  Positive for dysphoric mood and hallucinations.        Physical Exam     Initial Vitals [24 1250]   BP Pulse Resp Temp SpO2   (!) 197/88 (!) 114 18 98.2 °F (36.8 °C) 98 %      MAP       --         Physical Exam    Constitutional: She appears well-developed and well-nourished. She is not diaphoretic. No distress.   HENT:   Head: Normocephalic and atraumatic.   Mouth/Throat: Oropharynx is clear and moist. No oropharyngeal exudate.   Eyes: Conjunctivae and EOM are normal. Pupils are equal, round, and reactive to light. No scleral icterus.   Neck: Neck supple.   Normal range of motion.  Cardiovascular:  Normal rate and regular rhythm.           Pulmonary/Chest: Breath sounds normal. No respiratory distress. She has no wheezes.   Abdominal: Abdomen is soft. She exhibits no distension. There is no abdominal tenderness. There is no rebound.   Musculoskeletal:         General: No tenderness or edema. Normal range of motion.      Cervical back: Normal range of motion and neck supple.     Neurological: She is alert and oriented to person, place, and time. She has normal strength. No sensory deficit.   Skin: Skin is warm and dry. No rash noted. No erythema.   Psychiatric: Her speech is normal and behavior is normal. Her mood appears anxious. Thought content is paranoid. She exhibits a depressed mood. She expresses suicidal ideation. She expresses no suicidal plans.   Tearful         ED Course   Procedures  Labs Reviewed   CBC W/ AUTO DIFFERENTIAL - Abnormal; Notable for the following  components:       Result Value    Gran % 78.2 (*)     Lymph % 15.5 (*)     All other components within normal limits    Narrative:     Release to patient->Immediate   COMPREHENSIVE METABOLIC PANEL - Abnormal; Notable for the following components:    Potassium 2.6 (*)     All other components within normal limits    Narrative:     Release to patient->Immediate   URINALYSIS, REFLEX TO URINE CULTURE - Abnormal; Notable for the following components:    Color, UA Colorless (*)     Specific Bud, UA 1.000 (*)     All other components within normal limits    Narrative:     Specimen Source->Urine   DRUG SCREEN PANEL, URINE EMERGENCY - Abnormal; Notable for the following components:    THC Presumptive Positive (*)     Creatinine, Urine 8.0 (*)     All other components within normal limits    Narrative:     Specimen Source->Urine   ACETAMINOPHEN LEVEL - Abnormal; Notable for the following components:    Acetaminophen (Tylenol), Serum <3.0 (*)     All other components within normal limits    Narrative:     Release to patient->Immediate   HIV 1 / 2 ANTIBODY    Narrative:     Release to patient->Immediate   HEPATITIS C ANTIBODY    Narrative:     Release to patient->Immediate   TSH    Narrative:     Release to patient->Immediate   ALCOHOL,MEDICAL (ETHANOL)    Narrative:     Release to patient->Immediate   MAGNESIUM   MAGNESIUM    Narrative:     Release to patient->Immediate  Add on MG per PA-C BENTLEY Epic order [id number]  03/12/2024  17:46         ECG Results              EKG 12-lead (Final result)        Collection Time Result Time QRS Duration OHS QTC Calculation    03/12/24 13:03:00 03/12/24 17:52:30 68 468                     Final result by Interface, Lab In [facility] (03/12/24 17:52:38)                   Narrative:    Test Reason : R00.0,    Vent. Rate : 109 BPM     Atrial Rate : 109 BPM     P-R Int : 158 ms          QRS Dur : 068 ms      QT Int : 348 ms       P-R-T Axes : 061 073 030 degrees     QTc Int : 468 ms    Sinus  tachycardia  Right atrial enlargement  Nonspecific T wave abnormality  Abnormal ECG  When compared with ECG of 20-APR-2023 11:23,  No significant change was found  Confirmed by Scotty MARRERO MD ([id number]) on 3/12/2024 5:52:28 PM    Referred By:             Confirmed By:Scotty MARRERO MD                                  Imaging Results              CT Head Without Contrast (Final result)  Result time 03/12/24 17:35:07      Final result by Jose Hannon MD (03/12/24 17:35:07)                   Impression:      No acute abnormality.      Electronically signed by: Jose Hannon MD  Date:    03/12/2024  Time:    17:35               Narrative:    EXAMINATION:  CT HEAD WITHOUT CONTRAST    CLINICAL HISTORY:  Mental status change, unknown cause;    TECHNIQUE:  Low dose axial CT images obtained throughout the head without intravenous contrast. Sagittal and coronal reconstructions were performed.    COMPARISON:  None.    FINDINGS:  Intracranial compartment:    Ventricles and sulci are normal in size for age without evidence of hydrocephalus. No extra-axial blood or fluid collections.    The brain parenchyma appears normal. No parenchymal mass, hemorrhage, edema or major vascular distribution infarct.    Skull/extracranial contents (limited evaluation): No fracture. Mastoid air cells and paranasal sinuses are essentially clear.                                       Medications   nicotine 21 mg/24 hr 1 patch (1 patch Transdermal Patch Applied 3/12/24 1828)   LORazepam tablet 1 mg (1 mg Oral Given 3/12/24 1550)   potassium chloride SA CR tablet 40 mEq (40 mEq Oral Given 3/12/24 1826)     Medical Decision Making  53 y.o. female with medical history of allergies, CHEPE on CPAP, GERD, hypothyroidism presenting to the ED c/o auditory hallucinations for 4-5 months. Voices are causing her to feel depressed. She had to leave her house the other day and has had thoughts of self-harm.     DDx includes but not limited to psychosis, manic episode,  suicidal ideation, medication non-compliance, encephalopathy, social stressors, anxiety, alcohol or drug abuse, metabolic abnormality, delirium.     PEC placed for grave disability. Medical clearance labs and CT head ordered.     Amount and/or Complexity of Data Reviewed  Labs: ordered. Decision-making details documented in ED Course.  Radiology: ordered and independent interpretation performed.  ECG/medicine tests: ordered and independent interpretation performed.    Risk  OTC drugs.  Prescription drug management.               ED Course as of 03/12/24 1831   Tue Mar 12, 2024   1308 EKG 12-lead  No STEMI [AC]   1527 Sinus tachycardia 109 bpm. TWI in III, V3, V4. QTc 468. No STEMI [BA]   1744 Potassium(!!): 2.6  PO replacement ordered. Will additionally check Mg. No concerning ECG findings. [BA]   1744 Marijuana (THC) Metabolite(!): Presumptive Positive [BA]   1744 WBC: 8.04 [BA]   1744 Hemoglobin: 13.1 [BA]   1744 CT head without acute findings [BA]   1816 Magnesium : 1.9  Mg normal  [BA]   1831 Patient medically cleared for psychiatric placement. I have discussed the care of this patient with my supervising physician.  [BA]      ED Course User Index  [AC] Patrick Alanis,   [BA] Kyler Quintero PA-C       Medically cleared for psychiatry placement: 3/12/2024  5:41 PM                   Clinical Impression:  Final diagnoses:  [R00.0] Tachycardia  [E87.6] Hypokalemia  [R44.0] Auditory hallucination (Primary)          ED Disposition Condition    Transfer to Psych Facility Stable          ED Prescriptions    None       Follow-up Information    None          Kyler Quintero PA-C  03/12/24 1831

## 2024-03-12 NOTE — ED NOTES
Pt states she started hearing voices that weren't there x5 months ago. Pt states the voices sound like her family members saying mean things to her and that she has tried to record the voices with her phone.

## 2024-03-12 NOTE — ED NOTES
Pt belongings placed in belongings locker:   2 black shoes   1 purple bra   1 black pants   1 black shirt     Pt belongings given to charge RN to be placed in safe:   1 wallet   1 cell phone   8 $1   1 $5  1 $20   1 purse

## 2024-03-13 VITALS
SYSTOLIC BLOOD PRESSURE: 159 MMHG | RESPIRATION RATE: 18 BRPM | HEART RATE: 93 BPM | DIASTOLIC BLOOD PRESSURE: 94 MMHG | HEIGHT: 61 IN | WEIGHT: 144 LBS | BODY MASS INDEX: 27.19 KG/M2 | TEMPERATURE: 98 F | OXYGEN SATURATION: 100 %

## 2024-03-13 NOTE — ED NOTES
Appears asleep, snoring w/ eyes closed, rise/fall of chest noted. DVC maintained for safety, sitter present.

## 2024-03-13 NOTE — ED NOTES
The patient is escorted to [facility] ambulance via gurney per EDT & hospital security. All belongings accounted for to include a valuable envelope & a belongings bag. Patient's mother, Kathy notified of her transfer as per patient's request. Kathy @ [phone].

## 2024-03-13 NOTE — ED NOTES
"The patient is recv'd lying supine in bed awake. The bed is in the lowest locked position w/ both side rails in the upright, locked position. She is attired in a hospital provided scrub gown w/ good grooming & hygiene. Her affect is pleasant, smiles appropriately while engaging, mood is congruent. She denies S/HI, VH. She endorses AH. She states that she hears the voices of her family telling her "bad things." She states the onset of her AH is 5 mos. She denies any needs or concerns @ present. She is maintained on DVC for safety, sitter present.  "

## 2024-03-24 DIAGNOSIS — M79.7 FIBROMYALGIA: ICD-10-CM

## 2024-03-24 DIAGNOSIS — G89.4 CHRONIC PAIN SYNDROME: ICD-10-CM

## 2024-03-24 DIAGNOSIS — Z72.0 TOBACCO ABUSE: ICD-10-CM

## 2024-03-24 DIAGNOSIS — L93.1 SUBACUTE CUTANEOUS LUPUS ERYTHEMATOSUS: ICD-10-CM

## 2024-03-24 DIAGNOSIS — Z92.25 HISTORY OF IMMUNOSUPPRESSION THERAPY: ICD-10-CM

## 2024-03-25 RX ORDER — HYDROXYCHLOROQUINE SULFATE 200 MG/1
400 TABLET, FILM COATED ORAL
Qty: 60 TABLET | Refills: 6 | Status: SHIPPED | OUTPATIENT
Start: 2024-03-25

## 2024-03-28 DIAGNOSIS — Z72.0 TOBACCO ABUSE: ICD-10-CM

## 2024-03-28 DIAGNOSIS — M25.50 ARTHRALGIA, UNSPECIFIED JOINT: ICD-10-CM

## 2024-03-28 DIAGNOSIS — Z92.25 HISTORY OF IMMUNOSUPPRESSION THERAPY: ICD-10-CM

## 2024-03-28 DIAGNOSIS — G89.4 CHRONIC PAIN SYNDROME: ICD-10-CM

## 2024-03-28 DIAGNOSIS — L93.1 SUBACUTE CUTANEOUS LUPUS ERYTHEMATOSUS: ICD-10-CM

## 2024-03-28 DIAGNOSIS — M79.7 FIBROMYALGIA: ICD-10-CM

## 2024-03-28 RX ORDER — HYDROCODONE BITARTRATE AND ACETAMINOPHEN 7.5; 325 MG/1; MG/1
1 TABLET ORAL
Qty: 30 TABLET | Refills: 0 | Status: SHIPPED | OUTPATIENT
Start: 2024-03-28 | End: 2024-04-30 | Stop reason: SDUPTHER

## 2024-04-08 ENCOUNTER — LAB VISIT (OUTPATIENT)
Dept: LAB | Facility: HOSPITAL | Age: 54
End: 2024-04-08
Attending: INTERNAL MEDICINE
Payer: MEDICARE

## 2024-04-08 ENCOUNTER — OFFICE VISIT (OUTPATIENT)
Dept: RHEUMATOLOGY | Facility: CLINIC | Age: 54
End: 2024-04-08
Payer: MEDICARE

## 2024-04-08 VITALS
WEIGHT: 145.5 LBS | BODY MASS INDEX: 27.47 KG/M2 | HEIGHT: 61 IN | DIASTOLIC BLOOD PRESSURE: 85 MMHG | SYSTOLIC BLOOD PRESSURE: 145 MMHG | HEART RATE: 79 BPM

## 2024-04-08 DIAGNOSIS — Z79.899 LONG-TERM USE OF PLAQUENIL: ICD-10-CM

## 2024-04-08 DIAGNOSIS — I73.00 RAYNAUD'S PHENOMENON WITHOUT GANGRENE: ICD-10-CM

## 2024-04-08 DIAGNOSIS — M79.7 FIBROMYALGIA: ICD-10-CM

## 2024-04-08 DIAGNOSIS — L93.1 SUBACUTE CUTANEOUS LUPUS ERYTHEMATOSUS: Primary | ICD-10-CM

## 2024-04-08 DIAGNOSIS — R44.3 HALLUCINATIONS: ICD-10-CM

## 2024-04-08 DIAGNOSIS — L93.1 SUBACUTE CUTANEOUS LUPUS ERYTHEMATOSUS: ICD-10-CM

## 2024-04-08 LAB
ANION GAP SERPL CALC-SCNC: 8 MMOL/L (ref 8–16)
BUN SERPL-MCNC: 13 MG/DL (ref 6–20)
C3 SERPL-MCNC: 136 MG/DL (ref 50–180)
C4 SERPL-MCNC: 22 MG/DL (ref 11–44)
CALCIUM SERPL-MCNC: 9.3 MG/DL (ref 8.7–10.5)
CHLORIDE SERPL-SCNC: 101 MMOL/L (ref 95–110)
CK SERPL-CCNC: 62 U/L (ref 20–180)
CO2 SERPL-SCNC: 28 MMOL/L (ref 23–29)
CREAT SERPL-MCNC: 0.8 MG/DL (ref 0.5–1.4)
CRP SERPL-MCNC: 3.7 MG/L (ref 0–8.2)
ERYTHROCYTE [SEDIMENTATION RATE] IN BLOOD BY PHOTOMETRIC METHOD: 56 MM/HR (ref 0–36)
EST. GFR  (NO RACE VARIABLE): >60 ML/MIN/1.73 M^2
GLUCOSE SERPL-MCNC: 94 MG/DL (ref 70–110)
POTASSIUM SERPL-SCNC: 4 MMOL/L (ref 3.5–5.1)
SODIUM SERPL-SCNC: 137 MMOL/L (ref 136–145)

## 2024-04-08 PROCEDURE — 1159F MED LIST DOCD IN RCRD: CPT | Mod: CPTII,S$GLB,, | Performed by: INTERNAL MEDICINE

## 2024-04-08 PROCEDURE — 86225 DNA ANTIBODY NATIVE: CPT | Performed by: INTERNAL MEDICINE

## 2024-04-08 PROCEDURE — 82550 ASSAY OF CK (CPK): CPT | Performed by: INTERNAL MEDICINE

## 2024-04-08 PROCEDURE — 85652 RBC SED RATE AUTOMATED: CPT | Performed by: INTERNAL MEDICINE

## 2024-04-08 PROCEDURE — 3079F DIAST BP 80-89 MM HG: CPT | Mod: CPTII,S$GLB,, | Performed by: INTERNAL MEDICINE

## 2024-04-08 PROCEDURE — 86147 CARDIOLIPIN ANTIBODY EA IG: CPT | Performed by: INTERNAL MEDICINE

## 2024-04-08 PROCEDURE — 83516 IMMUNOASSAY NONANTIBODY: CPT | Performed by: INTERNAL MEDICINE

## 2024-04-08 PROCEDURE — 3008F BODY MASS INDEX DOCD: CPT | Mod: CPTII,S$GLB,, | Performed by: INTERNAL MEDICINE

## 2024-04-08 PROCEDURE — 3077F SYST BP >= 140 MM HG: CPT | Mod: CPTII,S$GLB,, | Performed by: INTERNAL MEDICINE

## 2024-04-08 PROCEDURE — 99215 OFFICE O/P EST HI 40 MIN: CPT | Mod: S$GLB,,, | Performed by: INTERNAL MEDICINE

## 2024-04-08 PROCEDURE — 86160 COMPLEMENT ANTIGEN: CPT | Mod: 59 | Performed by: INTERNAL MEDICINE

## 2024-04-08 PROCEDURE — 85613 RUSSELL VIPER VENOM DILUTED: CPT | Performed by: INTERNAL MEDICINE

## 2024-04-08 PROCEDURE — 1160F RVW MEDS BY RX/DR IN RCRD: CPT | Mod: CPTII,S$GLB,, | Performed by: INTERNAL MEDICINE

## 2024-04-08 PROCEDURE — 85610 PROTHROMBIN TIME: CPT | Performed by: INTERNAL MEDICINE

## 2024-04-08 PROCEDURE — 82085 ASSAY OF ALDOLASE: CPT | Performed by: INTERNAL MEDICINE

## 2024-04-08 PROCEDURE — 86160 COMPLEMENT ANTIGEN: CPT | Performed by: INTERNAL MEDICINE

## 2024-04-08 PROCEDURE — 86146 BETA-2 GLYCOPROTEIN ANTIBODY: CPT | Mod: 59 | Performed by: INTERNAL MEDICINE

## 2024-04-08 PROCEDURE — 86140 C-REACTIVE PROTEIN: CPT | Performed by: INTERNAL MEDICINE

## 2024-04-08 PROCEDURE — 99999 PR PBB SHADOW E&M-EST. PATIENT-LVL IV: CPT | Mod: PBBFAC,,, | Performed by: INTERNAL MEDICINE

## 2024-04-08 PROCEDURE — 80048 BASIC METABOLIC PNL TOTAL CA: CPT | Performed by: INTERNAL MEDICINE

## 2024-04-08 NOTE — PROGRESS NOTES
History of present illness:  54-year-old female I saw initially in 2005. She has lupus erythematosus with primarily skin involvement.  She has had joint aching but we felt it was more likely fibromyalgia.  She has not had joint swelling.  She has a history of Raynaud's phenomena.  She does have positive anti phospholipid antibody but has no definite history of ischemic disease.  She was on Plaquenil, Flexeril, gabapentin, and Cymbalta.  She had a virtual visit in May.  I started her on methotrexate.  She was seen again in August.  She was not tolerating the methotrexate and she stopped it.  I started her on azathioprine but she never started it because of concerned about side effects.    Her rash persists.  She continues to follow with Dermatology.  Her rashes been worse around her eyes.  She has not using topical medications.    She was seen in the emergency room 1 month ago because of auditory hallucinations.  This had been going on for several months.  She had no other thought abnormality.  She was having no headache.  She was hospitalized for 2 weeks.  She was placed on Prozac and her hallucinations are doing better.  She was also given a long-acting antipsychotic by injection.  She was discharged on gabapentin 300 mg 3 times daily.    She has had no unexplained fevers.  She has no conjunctivitis or oral ulcers.  She complains of dry mouth but not dry eye.  Her Raynaud's is getting worse but she has no digital ulcers.  She has no pleurisy, chronic or bloody diarrhea, vaginal discharge or ulcers.  Her aching is stable.  She has no joint swelling.  She has had no numbness or tingling.  She has had no abdominal pain or swallowing difficulty.      Physical examination:  Skin:  She has erythema on the upper and lower lids.  Rashes otherwise stable.  ENT:  Adequate tears in saliva.  No conjunctivitis or oral ulcers.    Chest:  Clear to auscultation  Cardiac:  No murmurs, gallops, rubs  Abdomen:  No organomegaly or  masses.  No tenderness to palpation   Extremities:  She has livedo pattern on her arms and legs   Musculoskeletal:  Full range of motion of all joints.  No synovitis.  Neurologic:  Normal muscle strength testing    Assessment:  She has a history of SLE with skin involvement.  She has chronic Raynaud's phenomena.  She is developing sicca symptoms.  She has livedo pattern on her legs.  The question is whether she is developing CNS lupus as a cause of her hallucinations     Plans:  1.  Laboratory studies obtained  2.  I have ordered an MRI of the brain  3.  Continue Plaquenil and gabapentin as before  4. I am not placing her on another DMARD until the workup is completed  5.  Return in 1 month

## 2024-04-09 LAB
ALDOLASE SERPL-CCNC: 4 U/L (ref 1.2–7.6)
DSDNA AB SER-ACNC: NORMAL [IU]/ML
RIBOSOMAL P IGG SER-ACNC: <0.2 U

## 2024-04-10 LAB
CONFIRM DRVVT STA-STACLOT: ABNORMAL S
DRVVT SCREEN TO CONFIRM RATIO: ABNORMAL {RATIO}
HEPARIN NT PPP QL: ABNORMAL
LA 3 SCREEN W REFLEX-IMP: ABNORMAL
LMW HEPARIN IND PLT AB SER QL: ABNORMAL
MIXING DRVVT/NORMAL: ABNORMAL %
NEUTRALIZED DRVVT SCREEN RATIO: ABNORMAL
PROTHROMBIN TIME: 11.4 S (ref 12–15.5)
SCREEN APTT/NORMAL: 0.91
SCREEN APTT/NORMAL: ABNORMAL
SCREEN DRVVT/NORMAL: 0.93 %
THROMBIN TIME: ABNORMAL S

## 2024-04-12 LAB
B2 GLYCOPROT1 IGA SER QL: 6.8 U/ML
B2 GLYCOPROT1 IGG SER QL: 2.5 U/ML
B2 GLYCOPROT1 IGM SER QL: 3.9 U/ML
CARDIOLIPIN IGG SER IA-ACNC: <9.4 GPL (ref 0–14.99)
CARDIOLIPIN IGM SER IA-ACNC: <9.4 MPL (ref 0–12.49)

## 2024-04-30 DIAGNOSIS — M25.50 ARTHRALGIA, UNSPECIFIED JOINT: ICD-10-CM

## 2024-04-30 DIAGNOSIS — Z92.25 HISTORY OF IMMUNOSUPPRESSION THERAPY: ICD-10-CM

## 2024-04-30 DIAGNOSIS — L93.1 SUBACUTE CUTANEOUS LUPUS ERYTHEMATOSUS: ICD-10-CM

## 2024-04-30 DIAGNOSIS — Z72.0 TOBACCO ABUSE: ICD-10-CM

## 2024-04-30 DIAGNOSIS — G89.4 CHRONIC PAIN SYNDROME: ICD-10-CM

## 2024-04-30 DIAGNOSIS — M79.7 FIBROMYALGIA: ICD-10-CM

## 2024-04-30 RX ORDER — HYDROCODONE BITARTRATE AND ACETAMINOPHEN 7.5; 325 MG/1; MG/1
1 TABLET ORAL
Qty: 30 TABLET | Refills: 0 | Status: SHIPPED | OUTPATIENT
Start: 2024-04-30 | End: 2024-06-06 | Stop reason: SDUPTHER

## 2024-05-02 ENCOUNTER — PATIENT MESSAGE (OUTPATIENT)
Dept: PSYCHIATRY | Facility: CLINIC | Age: 54
End: 2024-05-02
Payer: MEDICARE

## 2024-05-30 ENCOUNTER — TELEPHONE (OUTPATIENT)
Dept: SMOKING CESSATION | Facility: CLINIC | Age: 54
End: 2024-05-30
Payer: MEDICARE

## 2024-05-30 NOTE — TELEPHONE ENCOUNTER
Called patient about 12 month follow up. Left message for patient to call [phone]. Resolved quit episode.

## 2024-06-06 DIAGNOSIS — Z72.0 TOBACCO ABUSE: ICD-10-CM

## 2024-06-06 DIAGNOSIS — Z92.25 HISTORY OF IMMUNOSUPPRESSION THERAPY: ICD-10-CM

## 2024-06-06 DIAGNOSIS — L93.1 SUBACUTE CUTANEOUS LUPUS ERYTHEMATOSUS: ICD-10-CM

## 2024-06-06 DIAGNOSIS — M79.7 FIBROMYALGIA: ICD-10-CM

## 2024-06-06 DIAGNOSIS — M25.50 ARTHRALGIA, UNSPECIFIED JOINT: ICD-10-CM

## 2024-06-06 DIAGNOSIS — G89.4 CHRONIC PAIN SYNDROME: ICD-10-CM

## 2024-06-06 RX ORDER — GABAPENTIN 600 MG/1
600 TABLET ORAL DAILY
Qty: 90 TABLET | Refills: 1 | Status: SHIPPED | OUTPATIENT
Start: 2024-06-06

## 2024-06-06 RX ORDER — HYDROCODONE BITARTRATE AND ACETAMINOPHEN 7.5; 325 MG/1; MG/1
1 TABLET ORAL
Qty: 30 TABLET | Refills: 0 | Status: SHIPPED | OUTPATIENT
Start: 2024-06-06 | End: 2024-07-06

## 2024-06-11 ENCOUNTER — TELEPHONE (OUTPATIENT)
Dept: OPTOMETRY | Facility: CLINIC | Age: 54
End: 2024-06-11
Payer: MEDICARE

## 2024-06-11 NOTE — TELEPHONE ENCOUNTER
----- Message from Lily Wisdom OD sent at 6/11/2024  2:23 PM CDT -----  Pt scheduled for 9/6 for plaquenil, needs HVF/OCT

## 2024-06-11 NOTE — TELEPHONE ENCOUNTER
I called pt to reschedule appt with Dr. Wisdom. Pt needs 10-2 hvf,and oct. No answer left message for pt to call us back.

## 2024-06-19 ENCOUNTER — HOSPITAL ENCOUNTER (OUTPATIENT)
Dept: RADIOLOGY | Facility: HOSPITAL | Age: 54
Discharge: HOME | End: 2024-06-19
Attending: INTERNAL MEDICINE
Payer: MEDICARE

## 2024-06-19 DIAGNOSIS — L93.1 SUBACUTE CUTANEOUS LUPUS ERYTHEMATOSUS: ICD-10-CM

## 2024-06-19 DIAGNOSIS — R44.3 HALLUCINATIONS: ICD-10-CM

## 2024-06-19 PROCEDURE — 70553 MRI BRAIN STEM W/O & W/DYE: CPT | Mod: TC

## 2024-06-19 PROCEDURE — 25500020 PHARM REV CODE 255: Performed by: INTERNAL MEDICINE

## 2024-06-19 PROCEDURE — 70553 MRI BRAIN STEM W/O & W/DYE: CPT | Mod: 26,,, | Performed by: RADIOLOGY

## 2024-06-19 PROCEDURE — A9585 GADOBUTROL INJECTION: HCPCS | Performed by: INTERNAL MEDICINE

## 2024-06-19 RX ORDER — GADOBUTROL 604.72 MG/ML
7 INJECTION INTRAVENOUS
Status: COMPLETED | OUTPATIENT
Start: 2024-06-19 | End: 2024-06-19

## 2024-06-19 RX ADMIN — GADOBUTROL 7 ML: 604.72 INJECTION INTRAVENOUS at 04:06

## 2024-06-20 ENCOUNTER — TELEPHONE (OUTPATIENT)
Dept: RHEUMATOLOGY | Facility: CLINIC | Age: 54
End: 2024-06-20
Payer: MEDICARE

## 2024-06-20 DIAGNOSIS — L93.1 SUBACUTE CUTANEOUS LUPUS ERYTHEMATOSUS: Primary | ICD-10-CM

## 2024-06-20 DIAGNOSIS — R93.0 ABNORMAL MRI OF HEAD: ICD-10-CM

## 2024-06-20 DIAGNOSIS — R44.3 HALLUCINATIONS: ICD-10-CM

## 2024-06-27 ENCOUNTER — PATIENT MESSAGE (OUTPATIENT)
Dept: RHEUMATOLOGY | Facility: CLINIC | Age: 54
End: 2024-06-27
Payer: MEDICARE

## 2024-07-08 DIAGNOSIS — M25.50 ARTHRALGIA, UNSPECIFIED JOINT: ICD-10-CM

## 2024-07-08 DIAGNOSIS — Z92.25 HISTORY OF IMMUNOSUPPRESSION THERAPY: ICD-10-CM

## 2024-07-08 DIAGNOSIS — M79.7 FIBROMYALGIA: ICD-10-CM

## 2024-07-08 DIAGNOSIS — Z72.0 TOBACCO ABUSE: ICD-10-CM

## 2024-07-08 DIAGNOSIS — L93.1 SUBACUTE CUTANEOUS LUPUS ERYTHEMATOSUS: ICD-10-CM

## 2024-07-08 DIAGNOSIS — G89.4 CHRONIC PAIN SYNDROME: ICD-10-CM

## 2024-07-08 RX ORDER — HYDROCODONE BITARTRATE AND ACETAMINOPHEN 7.5; 325 MG/1; MG/1
1 TABLET ORAL
Qty: 30 TABLET | Refills: 0 | Status: SHIPPED | OUTPATIENT
Start: 2024-07-08 | End: 2024-08-07

## 2024-07-15 ENCOUNTER — OFFICE VISIT (OUTPATIENT)
Dept: RHEUMATOLOGY | Facility: CLINIC | Age: 54
End: 2024-07-15
Payer: MEDICARE

## 2024-07-15 VITALS
BODY MASS INDEX: 26.22 KG/M2 | HEART RATE: 83 BPM | SYSTOLIC BLOOD PRESSURE: 130 MMHG | DIASTOLIC BLOOD PRESSURE: 82 MMHG | WEIGHT: 138.88 LBS | HEIGHT: 61 IN

## 2024-07-15 DIAGNOSIS — M79.7 FIBROMYALGIA: ICD-10-CM

## 2024-07-15 DIAGNOSIS — I73.00 RAYNAUD'S PHENOMENON WITHOUT GANGRENE: ICD-10-CM

## 2024-07-15 DIAGNOSIS — L93.1 SUBACUTE CUTANEOUS LUPUS ERYTHEMATOSUS: Primary | ICD-10-CM

## 2024-07-15 DIAGNOSIS — Z79.899 LONG-TERM USE OF PLAQUENIL: ICD-10-CM

## 2024-07-15 PROBLEM — D89.9 DISORDER OF IMMUNE SYSTEM: Status: RESOLVED | Noted: 2017-12-06 | Resolved: 2024-07-15

## 2024-07-15 PROCEDURE — 1160F RVW MEDS BY RX/DR IN RCRD: CPT | Mod: CPTII,S$GLB,, | Performed by: INTERNAL MEDICINE

## 2024-07-15 PROCEDURE — 99214 OFFICE O/P EST MOD 30 MIN: CPT | Mod: S$GLB,,, | Performed by: INTERNAL MEDICINE

## 2024-07-15 PROCEDURE — 99999 PR PBB SHADOW E&M-EST. PATIENT-LVL IV: CPT | Mod: PBBFAC,,, | Performed by: INTERNAL MEDICINE

## 2024-07-15 PROCEDURE — 3008F BODY MASS INDEX DOCD: CPT | Mod: CPTII,S$GLB,, | Performed by: INTERNAL MEDICINE

## 2024-07-15 PROCEDURE — 3075F SYST BP GE 130 - 139MM HG: CPT | Mod: CPTII,S$GLB,, | Performed by: INTERNAL MEDICINE

## 2024-07-15 PROCEDURE — 3079F DIAST BP 80-89 MM HG: CPT | Mod: CPTII,S$GLB,, | Performed by: INTERNAL MEDICINE

## 2024-07-15 PROCEDURE — 1159F MED LIST DOCD IN RCRD: CPT | Mod: CPTII,S$GLB,, | Performed by: INTERNAL MEDICINE

## 2024-07-15 RX ORDER — RISPERIDONE 4 MG/1
4 TABLET ORAL NIGHTLY
COMMUNITY
Start: 2024-03-23

## 2024-07-15 RX ORDER — MIRTAZAPINE 15 MG/1
15 TABLET, FILM COATED ORAL NIGHTLY
COMMUNITY
Start: 2024-03-23

## 2024-07-17 NOTE — PROGRESS NOTES
History of present illness:  54-year-old female I saw initially in 2005. She has lupus erythematosus with primarily skin involvement.  She has had joint aching but we felt it was more likely fibromyalgia.  She has not had joint swelling.  She has a history of Raynaud's phenomena.  She does have positive anti phospholipid antibody but has no definite history of ischemic disease.  She was on Plaquenil, Flexeril, gabapentin, and Cymbalta.  She had a virtual visit in May.  I started her on methotrexate.  She was seen again in August.  She was not tolerating the methotrexate and she stopped it.  I started her on azathioprine but she never started it because of concerned about side effects.     I saw her in April because of a history of auditory hallucinations.  This had been going on for several months.  She was hospitalized and started on Prozac.  She was also given long-acting antipsychotic injection.  She was placed on Prozac and gabapentin.  She had no evidence of active lupus.  I ordered further studies.  I made no change in her medications.    She still has occasional hallucinations but not as much.  She has developed some diarrhea.  Her aching has been stable.  Her rashes progressing.  She has had no fever.  She has occasional headache.  She has dry mouth.  She has no hearing loss but has occasional tinnitus.  She has occasional vertigo.  She has had no recent Raynaud's phenomena or digital ulcers.  She does have occasional shortness of breath.  She has had no joint swelling.  She has had paresthesias in her hands and feet.    Physical examination:   Skin: She has livedo pattern on her arm, upper chest, and leg.    ENT:  Adequate tears in saliva.  No conjunctivitis or oral ulcers.  Chest: Clear to auscultation   Cardiac:  No murmurs, gallops, rubs   Abdomen: No organomegaly or masses.  No tenderness to palpation   Extremities:  No sclerodactyly   Musculoskeletal: Full range of motion of all joints.  No  synovitis.  Laboratory: Laboratory studies were negative in April   Radiology:  MRI of the brain revealed a few nonspecific T2 FLAIR white matter hyperintense foci in the frontal lobes.    Assessment: Active SLE     Plans:   1.  She is scheduled to see Neurology in September.  I feel this can wait until September.  She is not acutely decompensating at this time   2. Further laboratory studies obtained   3. Continue medications as before   4. Return in 3 months  5. She will probably need another DMARD but I wish to wait until after the Neurology appointment.  My initial inclination is to try Benlysta.  If it is felt she does have evidence of CNS lupus, mycophenolate may be more appropriate.      Answers submitted by the patient for this visit:  Rheumatology Questionnaire (Submitted on 7/15/2024)  fever: No  eye redness: No  mouth sores: No  headaches: Yes  shortness of breath: Yes  chest pain: No  trouble swallowing: No  diarrhea: Yes  constipation: No  unexpected weight change: No  genital sore: No  dysuria: No  During the last 3 days, have you had a skin rash?: Yes  Bruises or bleeds easily: Yes  cough: No

## 2024-07-25 DIAGNOSIS — Z79.899 LONG-TERM USE OF PLAQUENIL: ICD-10-CM

## 2024-07-25 DIAGNOSIS — L93.1 SUBACUTE CUTANEOUS LUPUS ERYTHEMATOSUS: Primary | ICD-10-CM

## 2024-08-08 DIAGNOSIS — L93.1 SUBACUTE CUTANEOUS LUPUS ERYTHEMATOSUS: ICD-10-CM

## 2024-08-08 DIAGNOSIS — M25.50 ARTHRALGIA, UNSPECIFIED JOINT: ICD-10-CM

## 2024-08-08 DIAGNOSIS — G89.4 CHRONIC PAIN SYNDROME: ICD-10-CM

## 2024-08-08 DIAGNOSIS — Z72.0 TOBACCO ABUSE: ICD-10-CM

## 2024-08-08 DIAGNOSIS — Z92.25 HISTORY OF IMMUNOSUPPRESSION THERAPY: ICD-10-CM

## 2024-08-08 DIAGNOSIS — M79.7 FIBROMYALGIA: ICD-10-CM

## 2024-08-10 RX ORDER — HYDROCODONE BITARTRATE AND ACETAMINOPHEN 7.5; 325 MG/1; MG/1
1 TABLET ORAL
Qty: 30 TABLET | Refills: 0 | Status: SHIPPED | OUTPATIENT
Start: 2024-08-10 | End: 2024-09-09

## 2024-08-16 ENCOUNTER — CLINICAL SUPPORT (OUTPATIENT)
Dept: OPHTHALMOLOGY | Facility: CLINIC | Age: 54
End: 2024-08-16
Payer: MEDICARE

## 2024-08-16 ENCOUNTER — OFFICE VISIT (OUTPATIENT)
Dept: OPTOMETRY | Facility: CLINIC | Age: 54
End: 2024-08-16
Payer: MEDICARE

## 2024-08-16 DIAGNOSIS — H25.013 CORTICAL AGE-RELATED CATARACT, BILATERAL: ICD-10-CM

## 2024-08-16 DIAGNOSIS — H52.7 REFRACTIVE ERROR: ICD-10-CM

## 2024-08-16 DIAGNOSIS — L93.1 SUBACUTE CUTANEOUS LUPUS ERYTHEMATOSUS: ICD-10-CM

## 2024-08-16 DIAGNOSIS — Z79.899 LONG-TERM USE OF PLAQUENIL: ICD-10-CM

## 2024-08-16 DIAGNOSIS — H01.119 EYELID DERMATITIS, ALLERGIC/CONTACT: ICD-10-CM

## 2024-08-16 DIAGNOSIS — L93.1 SUBACUTE CUTANEOUS LUPUS ERYTHEMATOSUS: Primary | ICD-10-CM

## 2024-08-16 PROCEDURE — 99999 PR PBB SHADOW E&M-EST. PATIENT-LVL I: CPT | Mod: PBBFAC,,, | Performed by: OPTOMETRIST

## 2024-08-16 NOTE — PROGRESS NOTES
Subjective:       Patient ID: Emily Pina is a 54 y.o. female      Chief Complaint   Patient presents with    Plaquenil Eye Exam    Concerns About Ocular Health     History of Present Illness    Dls: 02/17/2023     53 y/o female presents today for plaquenil ck.   Pt haf HVF 10-2 , OCT macula test done today  Pt c/o blurry vision at distance and near ou. Pt stated she feel her vision is getting worse      + ou  tearing  + ou  itching  + burning  + pain  No ha's  + ou floaters  No flashes    Eye meds  Otc gtts ou prn        Assessment/Plan:     1. Subacute cutaneous lupus erythematosus  2. Long-term use of Plaquenil  Pt sees Dr. Ad Spaulding (rheumatology), DLS 07/15/2024 on plaquenil since 2012+, taking 200 twice daily . No evidence of plaquenil maculopathy on exam, can continue with plaquenil therapy. OCT mac and HVF done today. Color vision normal. Return in 1 years for DFE, HVF 10-2, and OCT macula.       3. Cortical age-related cataract, bilateral  Educated pt on presence of cataracts and effects on vision. No surgery at this time. Recheck in one year, sooner PRN.    4. Eyelid dermatitis, allergic/contact  Continue care with dermatology.     5. Refractive error  Advised FTW for distance to help improve visual comfort. Educated patient on refractive error and discussed lens options. Dispensed updated spectacle Rx. Educated about adaptation period to new specs.    Eyeglass Final Rx       Eyeglass Final Rx         Sphere Cylinder Axis Add    Right -0.75 +1.00 160 +2.00    Left -0.25 +0.25 030 +2.00      Expiration Date: 8/16/2025                    Today's visit is associated with current and anticipated ongoing medical care related to this patient's single serious/complex condition (plaquenil). Follow up is to be continued indefinitely to monitor the condition.       Follow up in about 1 year (around 8/16/2025), or if symptoms worsen or fail to improve, for Plaquenil check, HVF 10-2, OCT  macula.

## 2024-08-16 NOTE — PROGRESS NOTES
HVF/OCT rel/fix coop good OU/chart checked for latex allergy/-1.00 + 1.00 x 160 OD -0.25 + 0.50 x 030 OS - BJ

## 2024-08-17 ENCOUNTER — PATIENT MESSAGE (OUTPATIENT)
Dept: FAMILY MEDICINE | Facility: CLINIC | Age: 54
End: 2024-08-17
Payer: MEDICARE

## 2024-08-17 DIAGNOSIS — F41.9 ANXIETY: ICD-10-CM

## 2024-08-18 NOTE — TELEPHONE ENCOUNTER
No care due was identified.  [facility] Embedded Care Due Messages. Reference number: [id number].   8/17/2024 11:04:43 PM CDT

## 2024-08-20 ENCOUNTER — PATIENT MESSAGE (OUTPATIENT)
Dept: DERMATOLOGY | Facility: CLINIC | Age: 54
End: 2024-08-20
Payer: MEDICARE

## 2024-08-21 RX ORDER — FLUOXETINE 10 MG/1
10 CAPSULE ORAL DAILY
Qty: 30 CAPSULE | Refills: 11 | Status: SHIPPED | OUTPATIENT
Start: 2024-08-21 | End: 2025-08-21

## 2024-08-27 ENCOUNTER — PATIENT MESSAGE (OUTPATIENT)
Dept: FAMILY MEDICINE | Facility: CLINIC | Age: 54
End: 2024-08-27
Payer: MEDICARE

## 2024-09-09 ENCOUNTER — OFFICE VISIT (OUTPATIENT)
Dept: DERMATOLOGY | Facility: CLINIC | Age: 54
End: 2024-09-09
Payer: MEDICARE

## 2024-09-09 DIAGNOSIS — L93.2 CUTANEOUS LUPUS ERYTHEMATOSUS: Primary | ICD-10-CM

## 2024-09-09 PROCEDURE — 99999 PR PBB SHADOW E&M-EST. PATIENT-LVL IV: CPT | Mod: PBBFAC,,,

## 2024-09-09 RX ORDER — TRIAMCINOLONE ACETONIDE 1 MG/G
OINTMENT TOPICAL
Qty: 454 G | Refills: 1 | Status: SHIPPED | OUTPATIENT
Start: 2024-09-09 | End: 2024-09-12 | Stop reason: SDUPTHER

## 2024-09-09 RX ORDER — TRIAMCINOLONE ACETONIDE 0.25 MG/G
OINTMENT TOPICAL
Qty: 80 G | Refills: 2 | Status: SHIPPED | OUTPATIENT
Start: 2024-09-09 | End: 2024-09-09 | Stop reason: SDUPTHER

## 2024-09-09 RX ORDER — TRIAMCINOLONE ACETONIDE 0.25 MG/G
OINTMENT TOPICAL
Qty: 80 G | Refills: 2 | Status: SHIPPED | OUTPATIENT
Start: 2024-09-09 | End: 2024-09-12 | Stop reason: SDUPTHER

## 2024-09-09 RX ORDER — TRIAMCINOLONE ACETONIDE 1 MG/G
OINTMENT TOPICAL
Qty: 454 G | Refills: 1 | Status: SHIPPED | OUTPATIENT
Start: 2024-09-09 | End: 2024-09-09 | Stop reason: SDUPTHER

## 2024-09-09 NOTE — PROGRESS NOTES
Subjective:      Patient ID:  Emily Pina is a 54 y.o. female who presents for   Chief Complaint   Patient presents with    Rash     HPI  Ms. 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 She has been on plaquenil x 30 years. Over last week has developed pruritic and burning eruption around eyelids, neck/upper chest, and upper and lower extremities. States the rash came on overnight. She denies excessive sun exposure. Previously had a rash similar in the past but was precipitated by sun exposure. Has been taking hydroxyzine for pruritus with some improvement. Patient follows with Dr. Spaulding for her Lupus. He is planning to initiate Benlysta after patient is evaluated by Neruology. Patient has been endorsing auditory hallucinations so would like neurology's input prior to beginning systemic therapy but believes patient likely needs to be re-initiated on a DMARD.    Review of Systems   Constitutional:  Negative for fever and chills.   Skin:  Positive for itching, rash, dry skin, sun sensitivity and daily sunscreen use.       Objective:   Physical Exam   Constitutional: She appears well-developed and well-nourished. No distress.   Neurological: She is alert and oriented to person, place, and time. She is not disoriented.   Psychiatric: She has a normal mood and affect.   Skin:   Areas Examined (abnormalities noted in diagram):   Scalp / Hair Palpated and Inspected  Head / Face Inspection Performed  Neck Inspection Performed  Chest / Axilla Inspection Performed  RUE Inspected  LUE Inspection Performed  RLE Inspected  LLE Inspection Performed            Diagram  Legend     Erythematous scaling macule/papule c/w actinic keratosis       Vascular papule c/w angioma      Pigmented verrucoid papule/plaque c/w seborrheic keratosis      Yellow umbilicated papule c/w sebaceous hyperplasia      Irregularly shaped tan macule c/w lentigo     1-2 mm smooth white papules consistent with Milia      Movable subcutaneous cyst with punctum c/w epidermal inclusion cyst      Subcutaneous movable cyst c/w pilar cyst      Firm pink to brown papule c/w dermatofibroma      Pedunculated fleshy papule(s) c/w skin tag(s)      Evenly pigmented macule c/w junctional nevus     Mildly variegated pigmented, slightly irregular-bordered macule c/w mildly atypical nevus      Flesh colored to evenly pigmented papule c/w intradermal nevus       Pink pearly papule/plaque c/w basal cell carcinoma      Erythematous hyperkeratotic cursted plaque c/w SCC      Surgical scar with no sign of skin cancer recurrence      Open and closed comedones      Inflammatory papules and pustules      Verrucoid papule consistent consistent with wart     Erythematous eczematous patches and plaques     Dystrophic onycholytic nail with subungual debris c/w onychomycosis     Umbilicated papule    Erythematous-base heme-crusted tan verrucoid plaque consistent with inflamed seborrheic keratosis     Erythematous Silvery Scaling Plaque c/w Psoriasis     See annotation      Assessment / Plan:   Ms. 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 She has been on plaquenil x 30 years now with 1 week history of pruritic and burning eruption to chest/neck, upper extremities, lower extremities despite sun  exposure. Patients clinical presentation c/w active cutaneous lupus and likely would benefit from re-initiation of DMARD therapy.     Cutaneous lupus erythematosus  -     triamcinolone acetonide 0.1% (KENALOG) 0.1 % ointment; Apply to rash on chest, abdomen, back, arms, and legs twice daily for 2-4 weeks. Take one week break and repeat as necessary.  Dispense: 454 g; Refill: 1  -     triamcinolone acetonide 0.025% (KENALOG) 0.025 % Oint; Apply to rash on face, under arms, groin twice daily for 2-4 weeks. Take one week break and repeat as needed.  Dispense: 80 g; Refill: 2    -With evidence of active disease patient would benefit from systemic therapy. Patient planning to follow up with Neurology prior to initiation of systemic therapy (possibly Benlysta) by Rheumatology.   -Start triamcinolone 0.1% ointment twice daily for 2-4 weeks to rash on trunk and extremities. Take one week break and repeat as needed.   -Start triamcinolone 0.025% ointment twice daily for 2-4 weeks to rash on face, under arms, groin. Take one week break and repeat as needed.   -Patient instructed in importance in daily sun protection of at least spf 50. Sun avoidance and topical protection discussed.   -Patient encouraged to wear hat for all outdoor exposure.     RTC 3 mo

## 2024-09-09 NOTE — Clinical Note
Arthur Spaulding,   I'm one of the Dermatology residents working with Dr. Jessica Thomas. We just saw this patient in acute dermatology clinic and suspect her Lupus is flaring. I noticed that you are having her follow up with Neurology with plan for systemic therapy following but just wanted to make you aware. Have a great day.

## 2024-09-10 ENCOUNTER — PATIENT MESSAGE (OUTPATIENT)
Dept: DERMATOLOGY | Facility: CLINIC | Age: 54
End: 2024-09-10
Payer: MEDICARE

## 2024-09-10 ENCOUNTER — PATIENT MESSAGE (OUTPATIENT)
Dept: OPTOMETRY | Facility: CLINIC | Age: 54
End: 2024-09-10
Payer: MEDICARE

## 2024-09-10 ENCOUNTER — PATIENT MESSAGE (OUTPATIENT)
Dept: RHEUMATOLOGY | Facility: CLINIC | Age: 54
End: 2024-09-10
Payer: MEDICARE

## 2024-09-10 DIAGNOSIS — G89.4 CHRONIC PAIN SYNDROME: ICD-10-CM

## 2024-09-10 DIAGNOSIS — Z72.0 TOBACCO ABUSE: ICD-10-CM

## 2024-09-10 DIAGNOSIS — M25.50 ARTHRALGIA, UNSPECIFIED JOINT: ICD-10-CM

## 2024-09-10 DIAGNOSIS — L93.1 SUBACUTE CUTANEOUS LUPUS ERYTHEMATOSUS: ICD-10-CM

## 2024-09-10 DIAGNOSIS — M79.7 FIBROMYALGIA: ICD-10-CM

## 2024-09-10 DIAGNOSIS — Z92.25 HISTORY OF IMMUNOSUPPRESSION THERAPY: ICD-10-CM

## 2024-09-12 DIAGNOSIS — L93.2 CUTANEOUS LUPUS ERYTHEMATOSUS: ICD-10-CM

## 2024-09-12 RX ORDER — HYDROCODONE BITARTRATE AND ACETAMINOPHEN 7.5; 325 MG/1; MG/1
1 TABLET ORAL
Qty: 30 TABLET | Refills: 0 | Status: SHIPPED | OUTPATIENT
Start: 2024-09-12 | End: 2024-10-12

## 2024-09-12 RX ORDER — TRIAMCINOLONE ACETONIDE 0.25 MG/G
OINTMENT TOPICAL
Qty: 80 G | Refills: 2 | Status: SHIPPED | OUTPATIENT
Start: 2024-09-12

## 2024-09-12 RX ORDER — TRIAMCINOLONE ACETONIDE 1 MG/G
OINTMENT TOPICAL
Qty: 454 G | Refills: 1 | Status: SHIPPED | OUTPATIENT
Start: 2024-09-12

## 2024-09-23 ENCOUNTER — OFFICE VISIT (OUTPATIENT)
Dept: NEUROLOGY | Facility: CLINIC | Age: 54
End: 2024-09-23
Payer: MEDICARE

## 2024-09-23 ENCOUNTER — TELEPHONE (OUTPATIENT)
Dept: RHEUMATOLOGY | Facility: CLINIC | Age: 54
End: 2024-09-23
Payer: MEDICARE

## 2024-09-23 VITALS
DIASTOLIC BLOOD PRESSURE: 97 MMHG | HEART RATE: 108 BPM | BODY MASS INDEX: 25.3 KG/M2 | SYSTOLIC BLOOD PRESSURE: 156 MMHG | HEIGHT: 61 IN | WEIGHT: 134 LBS

## 2024-09-23 DIAGNOSIS — G47.30 SLEEP APNEA, UNSPECIFIED TYPE: Primary | ICD-10-CM

## 2024-09-23 DIAGNOSIS — R93.0 ABNORMAL MRI OF HEAD: ICD-10-CM

## 2024-09-23 DIAGNOSIS — R44.3 HALLUCINATIONS: ICD-10-CM

## 2024-09-23 DIAGNOSIS — L93.1 SUBACUTE CUTANEOUS LUPUS ERYTHEMATOSUS: ICD-10-CM

## 2024-09-23 DIAGNOSIS — F45.8 OTHER SOMATOFORM DISORDERS: ICD-10-CM

## 2024-09-23 PROCEDURE — 99999 PR PBB SHADOW E&M-EST. PATIENT-LVL III: CPT | Mod: PBBFAC,,, | Performed by: STUDENT IN AN ORGANIZED HEALTH CARE EDUCATION/TRAINING PROGRAM

## 2024-09-23 NOTE — PROGRESS NOTES
[facility]  Clinic Note    Date of Service: 9/23/2024  Patient seen at the request of: Ad Spaulding MD    Reason for Consultation  Abnormal MRI findings    Assessment:  Emily Pina is a 54 y.o. female with history of lupus/dermatomyositis with recent auditory hallucination presented to neurology clinic for abnormal MRI findings.  MRI suggests incidental findings nonspecific does not have clinical correlation at this time.  I do not think those findings correlate with her auditory hallucination.  She does report possible restless leg syndrome.  Neuro exam unremarkable.      Plan:    - sent for CTA head and neck to evaluate vessels giving history of lupus  -ambulatory referral to sleep Clinic  -iron-deficiency panel to to investigate the possible diagnosis of restless leg syndrome    - RTC in 4 months      Signed:    Carmine Jang MD  Neurology/Vascular neurology   09/23/2024 9:44 AM      HPI:  Emily Pina is a 54 y.o. female with   Past Medical History:   Diagnosis Date    Chronic allergic conjunctivitis     Chronic allergic rhinitis due to animal hair and dander     COPD (chronic obstructive pulmonary disease)     Fibromyalgia     Hypothyroidism     Immune disorder     Long-term use of Plaquenil 5/7/2019    Lupus     Mild persistent asthma without complication 11/12/2019    Pt with reports daily cough, dyspnea, freqent rescue inhaler use. Recommend maintenance therapy  Treat triggers-sinus, gerd, smoking cessation.  Normal pulmonary studies.     CHEPE (obstructive sleep apnea) 11/12/2019    CHEPE on CPAP 11/12/2019    Patient with symptoms of snoring, witnessed apnea, excessive daytime sleepiness, fatigue and difficulty staying asleep. Dx with CHEPE in 2015 but difficulty tolerating cpap at time due to sinus problems.  HST 1/22/2020: AHI 7 RDI 15      Osteoarthritis of cervical spine 3/10/2023    Palpitations 11/12/2019    May be related to breathing issues    Recurrent  urticaria 1/6/2017    S/P laparoscopic cholecystectomy 8/4/2017    Subacute cutaneous lupus erythematosus    History of autoimmune connective tissue disease, lupus and dermatomyositis overlapped with reported clinical feature more with dermatomyositis but antibody suggests lupus with history of positive double strand DNA and myositis panel.  Longstanding history    Referred to neurology clinic for MRI findings, MRI brain with and without done for hallucination history of lupus showed 3 small focus T2 FLAIR white matter hyperintense foci the right frontal lobes and tow bilaterally in the white matter with no clinical correlation    Patient with recent auditory hallucination that has been going on for several months she was hospitalized and started on Prozac also started on long-acting antipsychotic injection.  Currently following with Psychiatry  Still hearing voices, described it as her little ones saying bad things about her, getting worse in terms frequency, reporting bothering her but does not interfere with her daily life.    Reported leg movement while asleep interfere with her sleep, reported nightmares as well.     Her autoimmune condition is fairly controlled, she is following with Rheumatology and Dermatology.     This is the extent of the patient's complaints at this time.    TSH   Date Value Ref Range Status   03/12/2024 3.676 0.400 - 4.000 uIU/mL Final   01/23/2020 2.001 0.400 - 4.000 uIU/mL Final         Review of Systems:  ROS negative unless noted in HPI    Past Surgical History:  Past Surgical History:   Procedure Laterality Date    CHOLECYSTECTOMY      COLONOSCOPY N/A 3/9/2020    Procedure: COLONOSCOPY;  Surgeon: Judah Lopez MD;  Location: [address]);  Service: Endoscopy;  Laterality: N/A;    ESOPHAGOGASTRODUODENOSCOPY N/A 3/9/2020    Procedure: EGD (ESOPHAGOGASTRODUODENOSCOPY);  Surgeon: Judah Lopez MD;  Location: [address]);  Service: Endoscopy;  Laterality: N/A;        Family History:  Family History   Problem Relation Name Age of Onset    Hypertension Mother      Thyroid disease Mother      Cataracts Mother      Cancer Mother          duodenal    Arthritis Father      Hyperlipidemia Father      Cancer Father          Multiple Myeloma     Cirrhosis Father      Cataracts Father      Lupus Sister Rachel     Asthma Sister Rachel     Scoliosis Sister [place]     No Known Problems Brother Pierre     No Known Problems Maternal Aunt      No Known Problems Maternal Uncle      No Known Problems Paternal Aunt      Pancreatic cancer Paternal Uncle      No Known Problems Maternal Grandmother      Lung cancer Maternal Grandfather      No Known Problems Paternal Grandmother      Lung cancer Paternal Grandfather      No Known Problems Other      Amblyopia Neg Hx      Blindness Neg Hx      Diabetes Neg Hx      Glaucoma Neg Hx      Macular degeneration Neg Hx      Retinal detachment Neg Hx      Strabismus Neg Hx      Stroke Neg Hx         Social History:  Social History     Tobacco Use    Smoking status: Former     Current packs/day: 0.00     Average packs/day: 1 pack/day for 32.0 years (32.0 ttl pk-yrs)     Types: Cigarettes     Start date: 1991     Quit date: 2023     Years since quittin.2    Smokeless tobacco: Former     Quit date: 2023    Tobacco comments:     Started age 17   Substance Use Topics    Alcohol use: No    Drug use: No       Allergies:  Bactrim [sulfamethoxazole-trimethoprim]    Outpatient Medications:  Prior to Admission medications    Medication Sig Start Date End Date Taking? Authorizing Provider   albuterol (PROVENTIL/VENTOLIN HFA) 90 mcg/actuation inhaler Inhale 1-2 puffs into the lungs every 6 (six) hours as needed for Wheezing or Shortness of Breath. 23  Jun Duran, FNP   albuterol-ipratropium (DUO-NEB) 2.5 mg-0.5 mg/3 mL nebulizer solution Take 3 mLs by nebulization every 6 (six) hours as needed for Wheezing. Rescue 22   Emma Faulkner MD   azaTHIOprine (IMURAN) 50 mg Tab TAKE 3 TABLETS BY MOUTH EVERY DAY 1/9/24   Ad Spaulding MD   azelastine (ASTELIN) 137 mcg (0.1 %) nasal spray USE 2 SPRAYS (274 MCG TOTAL) BY NASAL ROUTE 2 (TWO) TIMES DAILY. 8/19/22   Shirin Leggett NP   azelastine (OPTIVAR) 0.05 % ophthalmic solution Place 1 drop into both eyes 2 (two) times daily as needed (itchy eyes). 5/2/23   Radha Rizo MD   budesonide-formoterol 160-4.5 mcg (SYMBICORT) 160-4.5 mcg/actuation HFAA Inhale 2 puffs into the lungs every 12 (twelve) hours. Controller 7/29/21 2/2/24  Shirin Leggett NP   clobetasol 0.05% (TEMOVATE) 0.05 % Oint Apply topically 2 (two) times daily. To rash on body PRN 8/15/23   Jessica Thomas MD   cyclobenzaprine (FLEXERIL) 5 MG tablet Take 1 tablet by mouth 3 (three) times daily as needed. 2/25/22   Provider, Historical   DULoxetine (CYMBALTA) 60 MG capsule Take 1 capsule by mouth. 2/4/22   Provider, Historical   EPINEPHrine (EPIPEN 2-GIGI) 0.3 mg/0.3 mL AtIn Inject 0.3 mLs (0.3 mg total) into the muscle once. for 1 dose 12/21/18 7/18/23  Francoise Turner MD   estradioL (ESTRACE) 0.01 % (0.1 mg/gram) vaginal cream PLACE 1 G VAGINALLY ONCE DAILY. 10/3/22   Emma Faulkner MD   fluorometholone 0.1% (FML) 0.1 % DrpS 1 DROP IN BOTH EYES TWICE A DAY FOR 2 WEEKS THEN DAILY FOR 2 WEEKS 11/2/17   Provider, Historical   FLUoxetine 10 MG capsule Take 1 capsule (10 mg total) by mouth once daily. 8/21/24 8/21/25  Emma Faulkner MD   folic acid (FOLVITE) 1 MG tablet Take 1 tablet (1 mg total) by mouth once daily. 5/5/23 5/4/24  Ad Spaulding MD   gabapentin (NEURONTIN) 600 MG tablet Take 1 tablet (600 mg total) by mouth once daily. 6/6/24   Ad Spaulding MD   HYDROcodone-acetaminophen (NORCO) 7.5-325 mg per tablet Take 1 tablet by mouth every 24 hours as needed for Pain. 9/12/24 10/12/24  Ad Spaulding MD   hydrocortisone 2.5 % ointment Apply topically 2 (two) times daily. 6/7/21    "Haydee Elizabeth MD   hydroxychloroquine (PLAQUENIL) 200 mg tablet TAKE 2 TABLETS BY MOUTH EVERY DAY 3/25/24   Ad Spaulding MD   hydrOXYzine HCL (ATARAX) 25 MG tablet TAKE 1 TAB BY MOUTH EVERY EVENING AS NEEDED FOR PRURITUS. DO NOT DRIVE OR OPERATE MACHINERY 1/23/23   Emma Faulkner MD   levothyroxine (SYNTHROID) 100 MCG tablet TAKE 1 TABLET MONDAY THROUGH SATURDAY AND TAKE 1/2 TABLET ON SUNDAY  Strength: 100 mcg 8/28/23   Emma Faulkner MD   mirtazapine (REMERON) 15 MG tablet Take 15 mg by mouth every evening. 3/23/24   Provider, Historical   montelukast (SINGULAIR) 10 mg tablet TAKE 1 TABLET BY MOUTH EVERY DAY IN THE EVENING 6/16/22   Sharif Cage MD   nicotine (NICODERM CQ) 14 mg/24 hr Place 1 patch onto the skin once daily. 8/16/23   Manuela iFgueroa MD   pantoprazole (PROTONIX) 40 MG tablet Take 1 tablet by mouth. 3/22/22   Provider, Historical   pimecrolimus (ELIDEL) 1 % cream Apply topically  to face 2 (two) times daily. 8/15/23   Jessica Thomas MD   risperiDONE (RISPERDAL) 4 MG tablet Take 4 mg by mouth every evening. 3/23/24   Provider, Historical   soy isofl/blk coh/gr tea/yerba (ESTROVEN ENERGY ORAL) Take by mouth.    Provider, Historical   tacrolimus (PROTOPIC) 0.1 % ointment Apply to skin 2 (two) times daily. 6/7/21   Haydee Elizabeth MD   triamcinolone acetonide 0.025% (KENALOG) 0.025 % Oint Apply to rash on face, under arms, groin twice daily for 2-4 weeks. Take one week break and repeat as needed. 9/12/24   Jessica Bautista MD   triamcinolone acetonide 0.1% (KENALOG) 0.1 % ointment Apply to rash on chest, abdomen, back, arms, and legs twice daily for 2-4 weeks. Take one week break and repeat as necessary. 9/12/24   Jessica Bautista MD       Physical exam:    Vitals: BP (!) 156/97 (BP Location: Right arm, Patient Position: Sitting, BP Method: Medium (Automatic))   Pulse 108   Ht 5' 1" (1.549 m)   Wt 60.8 kg (134 lb)   LMP 05/14/2014 (Approximate)   BMI 25.32 kg/m² "     General:   Sitting in chair, in no distress, well-nourished, well-developed, appears stated age.  Head/Neck:   Normocephalic,atraumatic  Pulm:  Non-labored breathing     Mental Status: Alert and oriented to person, time, place, situation. Speech spontaneous and fluent without paraphasias; no dysarthria  CN:  II: visual fields full  III, IV, VI: EOM intact without nystagmus or diplopia.   V: Sensation to light touch full and symmetric in V1-3. [place] contraction full bilaterally.   VII: Facial movement full and symmetric.   VIII: Hearing grossly normal to conversation.  IX, X: Palate midline with symmetric elevation.    XI: SCM and trapezius: 5/5 bilaterally.   XII: Tongue midline without fasciculations.  Motor: Normal bulk and tone throughout all four extremities.   RUE: D: 5/5; B: 5/5; T:  5/5; WF:5/5; WE:  5/5; IO: 5/5   LUE: D: 5/5; B: 5/5; T:  5/5; WF: 5/5; WE:  5/5; IO: 5/5   RLE: HF: 5/5, KE: 5/5, KF: 5/5, DF: 5/5, PF: 5/5  LLE: HF: 5/5, KE: 5/5, KF: 5/5, DF: 5/5, PF: 5/5  No tremors   Sensory: Intact and symmetric to light touch throughout.  Reflexes: RUE: Triceps 2+, biceps 2+, brachioradialis 2+  LUE: Triceps 2+, biceps 2+ brachioradialis 2+  RLE: Knee 2+, ankle 2+  LLE: Knee 2+, ankle 2+  Coordination:  Intact and symmetric to finger-to-nose and heel-to-shin.  Gait:  Intact to casual gait.    Imaging:  All pertinent imaging was personally reviewed.      No results found for this or any previous visit.    No results found for this or any previous visit.    Results for orders placed during the hospital encounter of 06/19/24    MRI Brain W WO Contrast    Narrative  EXAMINATION:  MRI BRAIN W WO CONTRAST    CLINICAL HISTORY:  hallucination;  Subacute cutaneous lupus erythematosus    TECHNIQUE:  Multiplanar multisequence MR imaging of the brain was performed with without the use of intravenous contrast.    COMPARISON:  CT head without contrast dated 03/12/2024    FINDINGS:  No abnormal focus of diffusion  restriction.  No parenchymal susceptibility to suggest sequela of remote microhemorrhage.  A few nonspecific T2 FLAIR white matter hyperintense foci at the frontal lobes.  No extra-axial collection or midline shift.  No hydrocephalus.  Major T2 intracranial vascular flow voids are maintained.    Postcontrast images demonstrate no abnormal parenchymal or dural enhancement.    Impression  No acute intracranial abnormality.    A few nonspecific T2 FLAIR white matter hyperintense foci at the frontal lobes.      Electronically signed by: Bry Panchal  Date:    06/19/2024  Time:    17:17    No results found. However, due to the size of the patient record, not all encounters were searched. Please check Results Review for a complete set of results.      I spent a total of 40 minutes on the day of the visit. This includes face to face time and non-face to face time preparing to see the patient (eg, review of tests), obtaining and/or reviewing separately obtained history, documenting clinical information in the electronic or other health record, independently interpreting results and communicating results to the patient/family/caregiver, or care coordinator.

## 2024-09-24 ENCOUNTER — OFFICE VISIT (OUTPATIENT)
Dept: RHEUMATOLOGY | Facility: CLINIC | Age: 54
End: 2024-09-24
Payer: MEDICARE

## 2024-09-24 VITALS
BODY MASS INDEX: 25.62 KG/M2 | SYSTOLIC BLOOD PRESSURE: 156 MMHG | DIASTOLIC BLOOD PRESSURE: 107 MMHG | WEIGHT: 135.56 LBS | HEART RATE: 82 BPM

## 2024-09-24 DIAGNOSIS — Z79.899 LONG-TERM USE OF PLAQUENIL: ICD-10-CM

## 2024-09-24 DIAGNOSIS — L93.1 SUBACUTE CUTANEOUS LUPUS ERYTHEMATOSUS: Primary | ICD-10-CM

## 2024-09-24 DIAGNOSIS — M47.812 OSTEOARTHRITIS OF CERVICAL SPINE, UNSPECIFIED SPINAL OSTEOARTHRITIS COMPLICATION STATUS: ICD-10-CM

## 2024-09-24 DIAGNOSIS — M79.7 FIBROMYALGIA: ICD-10-CM

## 2024-09-24 PROCEDURE — 1160F RVW MEDS BY RX/DR IN RCRD: CPT | Mod: CPTII,S$GLB,, | Performed by: INTERNAL MEDICINE

## 2024-09-24 PROCEDURE — 99999 PR PBB SHADOW E&M-EST. PATIENT-LVL II: CPT | Mod: PBBFAC,,, | Performed by: INTERNAL MEDICINE

## 2024-09-24 PROCEDURE — 99214 OFFICE O/P EST MOD 30 MIN: CPT | Mod: S$GLB,,, | Performed by: INTERNAL MEDICINE

## 2024-09-24 PROCEDURE — 3080F DIAST BP >= 90 MM HG: CPT | Mod: CPTII,S$GLB,, | Performed by: INTERNAL MEDICINE

## 2024-09-24 PROCEDURE — 1159F MED LIST DOCD IN RCRD: CPT | Mod: CPTII,S$GLB,, | Performed by: INTERNAL MEDICINE

## 2024-09-24 PROCEDURE — 3077F SYST BP >= 140 MM HG: CPT | Mod: CPTII,S$GLB,, | Performed by: INTERNAL MEDICINE

## 2024-09-24 PROCEDURE — 3008F BODY MASS INDEX DOCD: CPT | Mod: CPTII,S$GLB,, | Performed by: INTERNAL MEDICINE

## 2024-09-24 RX ORDER — BELIMUMAB 200 MG/ML
200 SOLUTION SUBCUTANEOUS
Qty: 4 EACH | Refills: 6 | Status: SHIPPED | OUTPATIENT
Start: 2024-09-24

## 2024-09-28 NOTE — PROGRESS NOTES
History of present illness:  54-year-old female I saw initially in 2005. She has lupus erythematosus with primarily skin involvement.  She has had joint aching but we felt it was more likely fibromyalgia.  She has not had joint swelling.  She has a history of Raynaud's phenomena.  She does have positive anti phospholipid antibody but has no definite history of ischemic disease.  She was on Plaquenil, Flexeril, gabapentin, and Cymbalta.  She had a virtual visit in May.  I started her on methotrexate.  She was seen again in August.  She was not tolerating the methotrexate and she stopped it.  I started her on azathioprine but she never started it because of concerned about side effects.      I saw her in April because of a history of auditory hallucinations.  This had been going on for several months.  She was hospitalized and started on Prozac.  She was also given long-acting antipsychotic injection.  She was placed on Prozac and gabapentin.  She had no evidence of active lupus.  I ordered further studies.  I made no change in her medications.  She was last seen in July.    She is still having hallucinations.  They have been less frequent.  It is worse when she is under stress.  She was evaluated by Neurology.  The MRI was felt to be nondiagnostic.  She is scheduled for it CTA.  Her rashes doing better with topical medications.  Her aching is unchanged.  She has been having intermittent episodes nausea, vomiting, and feeling flushed.  She has had no fevers.    Physical examination:   Musculoskeletal:  She has good range of motion of all joints.  She has no synovitis.    Assessment:  SLE     Plans:    1.  I discussed with her various options and elected to place her on Benlysta 200 mg subcu weekly.    2. Continue other medications as before  3.  Return in 3 months

## 2024-10-02 ENCOUNTER — OFFICE VISIT (OUTPATIENT)
Dept: PSYCHIATRY | Facility: CLINIC | Age: 54
End: 2024-10-02
Payer: MEDICARE

## 2024-10-02 VITALS
DIASTOLIC BLOOD PRESSURE: 96 MMHG | SYSTOLIC BLOOD PRESSURE: 168 MMHG | BODY MASS INDEX: 25.85 KG/M2 | HEART RATE: 104 BPM | WEIGHT: 136.81 LBS

## 2024-10-02 DIAGNOSIS — R44.3 HALLUCINATIONS, UNSPECIFIED: ICD-10-CM

## 2024-10-02 DIAGNOSIS — F41.9 ANXIETY: Primary | ICD-10-CM

## 2024-10-02 DIAGNOSIS — F32.0 CURRENT MILD EPISODE OF MAJOR DEPRESSIVE DISORDER, UNSPECIFIED WHETHER RECURRENT: ICD-10-CM

## 2024-10-02 PROCEDURE — 99999 PR PBB SHADOW E&M-EST. PATIENT-LVL I: CPT | Mod: PBBFAC,,,

## 2024-10-02 PROCEDURE — 90792 PSYCH DIAG EVAL W/MED SRVCS: CPT | Mod: S$GLB,,,

## 2024-10-02 PROCEDURE — 3080F DIAST BP >= 90 MM HG: CPT | Mod: CPTII,S$GLB,,

## 2024-10-02 PROCEDURE — 3077F SYST BP >= 140 MM HG: CPT | Mod: CPTII,S$GLB,,

## 2024-10-02 RX ORDER — FLUOXETINE HYDROCHLORIDE 20 MG/1
20 CAPSULE ORAL DAILY
Qty: 90 CAPSULE | Refills: 0 | Status: SHIPPED | OUTPATIENT
Start: 2024-10-02
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
## 2024-10-06 DIAGNOSIS — E03.9 HYPOTHYROIDISM, UNSPECIFIED TYPE: ICD-10-CM

## 2024-10-06 DIAGNOSIS — J30.2 SEASONAL ALLERGIC RHINITIS, UNSPECIFIED TRIGGER: ICD-10-CM

## 2024-10-06 DIAGNOSIS — R21 RASH: ICD-10-CM

## 2024-10-06 NOTE — TELEPHONE ENCOUNTER
No care due was identified.  [facility] Embedded Care Due Messages. Reference number: [id number].   10/06/2024 1:23:41 PM CDT

## 2024-10-06 NOTE — TELEPHONE ENCOUNTER
No care due was identified.  [facility] Embedded Care Due Messages. Reference number: [id number].   10/06/2024 1:24:38 PM CDT

## 2024-10-07 ENCOUNTER — PATIENT MESSAGE (OUTPATIENT)
Dept: PSYCHIATRY | Facility: CLINIC | Age: 54
End: 2024-10-07
Payer: MEDICARE

## 2024-10-07 DIAGNOSIS — R44.3 HALLUCINATIONS, UNSPECIFIED: Primary | ICD-10-CM

## 2024-10-07 RX ORDER — MONTELUKAST SODIUM 10 MG/1
10 TABLET ORAL NIGHTLY
Qty: 90 TABLET | Refills: 1 | Status: SHIPPED | OUTPATIENT
Start: 2024-10-07

## 2024-10-07 RX ORDER — RISPERIDONE 1 MG/1
1 TABLET ORAL NIGHTLY
Qty: 30 TABLET | Refills: 1 | Status: SHIPPED | OUTPATIENT
Start: 2024-10-07

## 2024-10-07 RX ORDER — RISPERIDONE 4 MG/1
4 TABLET ORAL NIGHTLY
Qty: 30 TABLET | Refills: 1 | Status: SHIPPED | OUTPATIENT
Start: 2024-10-07

## 2024-10-07 NOTE — TELEPHONE ENCOUNTER
Refill Routing Note   Medication(s) are not appropriate for processing by [facility] for the following reason(s):        Due for refill >6 months ago    [facility] action(s):  Defer               Appointments  past 12m or future 3m with PCP    Date Provider   Last Visit   3/12/2024 Emma Faulkner MD   Next Visit   Visit date not found Emma Faulkner MD   ED visits in past 90 days: 0        Note composed:2:52 PM 10/07/2024

## 2024-10-08 RX ORDER — HYDROXYZINE HYDROCHLORIDE 25 MG/1
TABLET, FILM COATED ORAL
Qty: 30 TABLET | Refills: 2 | Status: SHIPPED | OUTPATIENT
Start: 2024-10-08

## 2024-10-08 RX ORDER — LEVOTHYROXINE SODIUM 100 UG/1
TABLET ORAL
Qty: 85 TABLET | Refills: 2 | Status: SHIPPED | OUTPATIENT
Start: 2024-10-08

## 2024-10-14 DIAGNOSIS — Z92.25 HISTORY OF IMMUNOSUPPRESSION THERAPY: ICD-10-CM

## 2024-10-14 DIAGNOSIS — Z72.0 TOBACCO ABUSE: ICD-10-CM

## 2024-10-14 DIAGNOSIS — M79.7 FIBROMYALGIA: ICD-10-CM

## 2024-10-14 DIAGNOSIS — M25.50 ARTHRALGIA, UNSPECIFIED JOINT: ICD-10-CM

## 2024-10-14 DIAGNOSIS — L93.1 SUBACUTE CUTANEOUS LUPUS ERYTHEMATOSUS: ICD-10-CM

## 2024-10-14 DIAGNOSIS — G89.4 CHRONIC PAIN SYNDROME: ICD-10-CM

## 2024-10-14 RX ORDER — HYDROCODONE BITARTRATE AND ACETAMINOPHEN 7.5; 325 MG/1; MG/1
1 TABLET ORAL
Qty: 30 TABLET | Refills: 0 | Status: SHIPPED | OUTPATIENT
Start: 2024-10-14 | End: 2024-11-13

## 2024-11-12 ENCOUNTER — TELEPHONE (OUTPATIENT)
Dept: PULMONOLOGY | Facility: CLINIC | Age: 54
End: 2024-11-12
Payer: MEDICARE

## 2024-11-12 DIAGNOSIS — Z72.0 TOBACCO ABUSE: Primary | ICD-10-CM

## 2024-11-12 DIAGNOSIS — Z87.891 HISTORY OF TOBACCO USE: ICD-10-CM

## 2024-11-12 NOTE — TELEPHONE ENCOUNTER
Called and spoke with pt to confirm scheduling of follow up LDCT scan.  Agreeable with scheduling on November 20th, instructions given on where to go.

## 2024-11-15 DIAGNOSIS — L93.1 SUBACUTE CUTANEOUS LUPUS ERYTHEMATOSUS: ICD-10-CM

## 2024-11-15 DIAGNOSIS — M79.7 FIBROMYALGIA: ICD-10-CM

## 2024-11-15 DIAGNOSIS — Z92.25 HISTORY OF IMMUNOSUPPRESSION THERAPY: ICD-10-CM

## 2024-11-15 DIAGNOSIS — Z72.0 TOBACCO ABUSE: ICD-10-CM

## 2024-11-15 DIAGNOSIS — M25.50 ARTHRALGIA, UNSPECIFIED JOINT: ICD-10-CM

## 2024-11-15 DIAGNOSIS — G89.4 CHRONIC PAIN SYNDROME: ICD-10-CM

## 2024-11-15 RX ORDER — HYDROCODONE BITARTRATE AND ACETAMINOPHEN 7.5; 325 MG/1; MG/1
1 TABLET ORAL
Qty: 30 TABLET | Refills: 0 | Status: SHIPPED | OUTPATIENT
Start: 2024-11-15 | End: 2024-12-15

## 2024-12-10 ENCOUNTER — PATIENT MESSAGE (OUTPATIENT)
Dept: FAMILY MEDICINE | Facility: CLINIC | Age: 54
End: 2024-12-10
Payer: MEDICARE

## 2024-12-10 DIAGNOSIS — J45.41 MODERATE PERSISTENT ASTHMA WITH EXACERBATION: ICD-10-CM

## 2024-12-10 DIAGNOSIS — E03.9 HYPOTHYROIDISM, UNSPECIFIED TYPE: ICD-10-CM

## 2024-12-11 NOTE — TELEPHONE ENCOUNTER
No care due was identified.  [facility] Embedded Care Due Messages. Reference number: [id number].   12/11/2024 1:45:22 PM CST

## 2024-12-12 RX ORDER — LEVOTHYROXINE SODIUM 100 UG/1
TABLET ORAL
Qty: 85 TABLET | Refills: 0 | Status: SHIPPED | OUTPATIENT
Start: 2024-12-12

## 2024-12-12 RX ORDER — IPRATROPIUM BROMIDE AND ALBUTEROL SULFATE 2.5; .5 MG/3ML; MG/3ML
3 SOLUTION RESPIRATORY (INHALATION) EVERY 6 HOURS PRN
Qty: 75 ML | Refills: 0 | Status: SHIPPED | OUTPATIENT
Start: 2024-12-12 | End: 2025-12-12

## 2024-12-23 DIAGNOSIS — Z92.25 HISTORY OF IMMUNOSUPPRESSION THERAPY: ICD-10-CM

## 2024-12-23 DIAGNOSIS — L93.1 SUBACUTE CUTANEOUS LUPUS ERYTHEMATOSUS: ICD-10-CM

## 2024-12-23 DIAGNOSIS — G89.4 CHRONIC PAIN SYNDROME: ICD-10-CM

## 2024-12-23 DIAGNOSIS — M25.50 ARTHRALGIA, UNSPECIFIED JOINT: ICD-10-CM

## 2024-12-23 DIAGNOSIS — M79.7 FIBROMYALGIA: ICD-10-CM

## 2024-12-23 DIAGNOSIS — Z72.0 TOBACCO ABUSE: ICD-10-CM

## 2024-12-23 RX ORDER — HYDROCODONE BITARTRATE AND ACETAMINOPHEN 7.5; 325 MG/1; MG/1
1 TABLET ORAL
Qty: 30 TABLET | Refills: 0 | Status: SHIPPED | OUTPATIENT
Start: 2024-12-23 | End: 2025-01-22

## 2025-01-25 DIAGNOSIS — L93.1 SUBACUTE CUTANEOUS LUPUS ERYTHEMATOSUS: ICD-10-CM

## 2025-01-25 DIAGNOSIS — Z92.25 HISTORY OF IMMUNOSUPPRESSION THERAPY: ICD-10-CM

## 2025-01-25 DIAGNOSIS — G89.4 CHRONIC PAIN SYNDROME: ICD-10-CM

## 2025-01-25 DIAGNOSIS — M79.7 FIBROMYALGIA: ICD-10-CM

## 2025-01-25 DIAGNOSIS — Z72.0 TOBACCO ABUSE: ICD-10-CM

## 2025-01-25 DIAGNOSIS — M25.50 ARTHRALGIA, UNSPECIFIED JOINT: ICD-10-CM

## 2025-01-28 ENCOUNTER — PATIENT OUTREACH (OUTPATIENT)
Dept: ADMINISTRATIVE | Facility: HOSPITAL | Age: 55
End: 2025-01-28
Payer: MEDICARE

## 2025-01-30 RX ORDER — HYDROCODONE BITARTRATE AND ACETAMINOPHEN 7.5; 325 MG/1; MG/1
1 TABLET ORAL
Qty: 30 TABLET | Refills: 0 | Status: SHIPPED | OUTPATIENT
Start: 2025-01-30 | End: 2025-03-01

## 2025-01-30 NOTE — TELEPHONE ENCOUNTER
Please see the attached refill request.   Graft Donor Site Bandage (Optional-Leave Blank If You Don't Want In Note): Steri-strips and a pressure bandage were applied to the donor site.

## 2025-03-05 ENCOUNTER — PATIENT MESSAGE (OUTPATIENT)
Dept: PSYCHIATRY | Facility: CLINIC | Age: 55
End: 2025-03-05
Payer: MEDICARE

## 2025-03-05 DIAGNOSIS — Z92.25 HISTORY OF IMMUNOSUPPRESSION THERAPY: ICD-10-CM

## 2025-03-05 DIAGNOSIS — Z72.0 TOBACCO ABUSE: ICD-10-CM

## 2025-03-05 DIAGNOSIS — G89.4 CHRONIC PAIN SYNDROME: ICD-10-CM

## 2025-03-05 DIAGNOSIS — L93.1 SUBACUTE CUTANEOUS LUPUS ERYTHEMATOSUS: ICD-10-CM

## 2025-03-05 DIAGNOSIS — M79.7 FIBROMYALGIA: ICD-10-CM

## 2025-03-05 DIAGNOSIS — M25.50 ARTHRALGIA, UNSPECIFIED JOINT: ICD-10-CM

## 2025-03-05 RX ORDER — HYDROCODONE BITARTRATE AND ACETAMINOPHEN 7.5; 325 MG/1; MG/1
1 TABLET ORAL
Qty: 30 TABLET | Refills: 0 | Status: SHIPPED | OUTPATIENT
Start: 2025-03-05 | End: 2025-04-04

## 2025-03-24 DIAGNOSIS — Z00.00 ENCOUNTER FOR MEDICARE ANNUAL WELLNESS EXAM: ICD-10-CM

## 2025-04-07 DIAGNOSIS — Z92.25 HISTORY OF IMMUNOSUPPRESSION THERAPY: ICD-10-CM

## 2025-04-07 DIAGNOSIS — M79.7 FIBROMYALGIA: ICD-10-CM

## 2025-04-07 DIAGNOSIS — M25.50 ARTHRALGIA, UNSPECIFIED JOINT: ICD-10-CM

## 2025-04-07 DIAGNOSIS — G89.4 CHRONIC PAIN SYNDROME: ICD-10-CM

## 2025-04-07 DIAGNOSIS — Z72.0 TOBACCO ABUSE: ICD-10-CM

## 2025-04-07 DIAGNOSIS — L93.1 SUBACUTE CUTANEOUS LUPUS ERYTHEMATOSUS: ICD-10-CM

## 2025-04-07 RX ORDER — HYDROCODONE BITARTRATE AND ACETAMINOPHEN 7.5; 325 MG/1; MG/1
1 TABLET ORAL
Qty: 30 TABLET | Refills: 0 | Status: SHIPPED | OUTPATIENT
Start: 2025-04-07 | End: 2025-05-07

## 2025-05-08 DIAGNOSIS — L93.1 SUBACUTE CUTANEOUS LUPUS ERYTHEMATOSUS: ICD-10-CM

## 2025-05-08 DIAGNOSIS — M25.50 ARTHRALGIA, UNSPECIFIED JOINT: ICD-10-CM

## 2025-05-08 DIAGNOSIS — M79.7 FIBROMYALGIA: ICD-10-CM

## 2025-05-08 DIAGNOSIS — Z92.25 HISTORY OF IMMUNOSUPPRESSION THERAPY: ICD-10-CM

## 2025-05-08 DIAGNOSIS — Z72.0 TOBACCO ABUSE: ICD-10-CM

## 2025-05-08 DIAGNOSIS — G89.4 CHRONIC PAIN SYNDROME: ICD-10-CM

## 2025-05-09 RX ORDER — HYDROCODONE BITARTRATE AND ACETAMINOPHEN 7.5; 325 MG/1; MG/1
1 TABLET ORAL
Qty: 30 TABLET | Refills: 0 | Status: SHIPPED | OUTPATIENT
Start: 2025-05-09 | End: 2025-06-08

## 2025-06-12 DIAGNOSIS — M25.50 ARTHRALGIA, UNSPECIFIED JOINT: ICD-10-CM

## 2025-06-12 DIAGNOSIS — G89.4 CHRONIC PAIN SYNDROME: ICD-10-CM

## 2025-06-12 DIAGNOSIS — Z92.25 HISTORY OF IMMUNOSUPPRESSION THERAPY: ICD-10-CM

## 2025-06-12 DIAGNOSIS — Z72.0 TOBACCO ABUSE: ICD-10-CM

## 2025-06-12 DIAGNOSIS — L93.1 SUBACUTE CUTANEOUS LUPUS ERYTHEMATOSUS: ICD-10-CM

## 2025-06-12 DIAGNOSIS — M79.7 FIBROMYALGIA: ICD-10-CM

## 2025-06-12 RX ORDER — HYDROCODONE BITARTRATE AND ACETAMINOPHEN 7.5; 325 MG/1; MG/1
1 TABLET ORAL
Qty: 30 TABLET | Refills: 0 | Status: SHIPPED | OUTPATIENT
Start: 2025-06-12 | End: 2025-07-12

## 2025-06-28 ENCOUNTER — PATIENT MESSAGE (OUTPATIENT)
Dept: PSYCHIATRY | Facility: CLINIC | Age: 55
End: 2025-06-28
Payer: MEDICARE

## 2025-07-15 ENCOUNTER — PATIENT MESSAGE (OUTPATIENT)
Dept: RHEUMATOLOGY | Facility: CLINIC | Age: 55
End: 2025-07-15
Payer: MEDICARE

## 2025-07-24 DIAGNOSIS — M79.7 FIBROMYALGIA: ICD-10-CM

## 2025-07-24 DIAGNOSIS — G89.4 CHRONIC PAIN SYNDROME: ICD-10-CM

## 2025-07-24 DIAGNOSIS — M25.50 ARTHRALGIA, UNSPECIFIED JOINT: ICD-10-CM

## 2025-07-24 DIAGNOSIS — L93.1 SUBACUTE CUTANEOUS LUPUS ERYTHEMATOSUS: ICD-10-CM

## 2025-07-24 DIAGNOSIS — Z72.0 TOBACCO ABUSE: ICD-10-CM

## 2025-07-24 DIAGNOSIS — Z92.25 HISTORY OF IMMUNOSUPPRESSION THERAPY: ICD-10-CM

## 2025-07-25 RX ORDER — HYDROCODONE BITARTRATE AND ACETAMINOPHEN 7.5; 325 MG/1; MG/1
1 TABLET ORAL
Qty: 30 TABLET | Refills: 0 | Status: SHIPPED | OUTPATIENT
Start: 2025-07-25 | End: 2025-08-24

## 2025-08-12 ENCOUNTER — OFFICE VISIT (OUTPATIENT)
Dept: DERMATOLOGY | Facility: CLINIC | Age: 55
End: 2025-08-12
Payer: MEDICARE

## 2025-08-12 DIAGNOSIS — L93.2 CUTANEOUS LUPUS ERYTHEMATOSUS: Primary | ICD-10-CM

## 2025-08-12 DIAGNOSIS — L30.9 DERMATITIS: ICD-10-CM

## 2025-08-12 PROCEDURE — G2211 COMPLEX E/M VISIT ADD ON: HCPCS | Mod: S$GLB,,, | Performed by: DERMATOLOGY

## 2025-08-12 PROCEDURE — 99999 PR PBB SHADOW E&M-EST. PATIENT-LVL IV: CPT | Mod: PBBFAC,,, | Performed by: DERMATOLOGY

## 2025-08-12 PROCEDURE — 1159F MED LIST DOCD IN RCRD: CPT | Mod: CPTII,S$GLB,, | Performed by: DERMATOLOGY

## 2025-08-12 PROCEDURE — 1160F RVW MEDS BY RX/DR IN RCRD: CPT | Mod: CPTII,S$GLB,, | Performed by: DERMATOLOGY

## 2025-08-12 PROCEDURE — 99214 OFFICE O/P EST MOD 30 MIN: CPT | Mod: S$GLB,,, | Performed by: DERMATOLOGY

## 2025-08-12 RX ORDER — CLOBETASOL PROPIONATE 0.5 MG/G
OINTMENT TOPICAL 2 TIMES DAILY
Qty: 60 G | Refills: 3 | Status: SHIPPED | OUTPATIENT
Start: 2025-08-12

## 2025-08-12 RX ORDER — PREDNISONE 20 MG/1
TABLET ORAL
Qty: 42 TABLET | Refills: 0 | Status: SHIPPED | OUTPATIENT
Start: 2025-08-12

## 2025-08-12 RX ORDER — HYDROXYZINE HYDROCHLORIDE 25 MG/1
25 TABLET, FILM COATED ORAL 3 TIMES DAILY
Qty: 90 TABLET | Refills: 1 | Status: SHIPPED | OUTPATIENT
Start: 2025-08-12

## 2025-08-14 ENCOUNTER — PATIENT MESSAGE (OUTPATIENT)
Dept: NEUROLOGY | Facility: CLINIC | Age: 55
End: 2025-08-14

## 2025-08-14 ENCOUNTER — PATIENT MESSAGE (OUTPATIENT)
Dept: RHEUMATOLOGY | Facility: CLINIC | Age: 55
End: 2025-08-14
Payer: MEDICARE

## 2025-08-14 ENCOUNTER — OFFICE VISIT (OUTPATIENT)
Dept: PSYCHIATRY | Facility: CLINIC | Age: 55
End: 2025-08-14
Payer: MEDICARE

## 2025-08-14 DIAGNOSIS — F29 ATYPICAL PSYCHOSIS: Primary | ICD-10-CM

## 2025-08-14 DIAGNOSIS — F32.0 CURRENT MILD EPISODE OF MAJOR DEPRESSIVE DISORDER, UNSPECIFIED WHETHER RECURRENT: ICD-10-CM

## 2025-08-14 DIAGNOSIS — R44.3 HALLUCINATIONS, UNSPECIFIED: ICD-10-CM

## 2025-08-14 DIAGNOSIS — F41.9 ANXIETY: ICD-10-CM

## 2025-08-15 ENCOUNTER — PATIENT MESSAGE (OUTPATIENT)
Dept: PSYCHIATRY | Facility: CLINIC | Age: 55
End: 2025-08-15
Payer: MEDICARE

## 2025-08-15 ENCOUNTER — HOSPITAL ENCOUNTER (OUTPATIENT)
Dept: RADIOLOGY | Facility: HOSPITAL | Age: 55
Discharge: HOME | End: 2025-08-15
Attending: STUDENT IN AN ORGANIZED HEALTH CARE EDUCATION/TRAINING PROGRAM
Payer: MEDICARE

## 2025-08-15 DIAGNOSIS — L93.1 SUBACUTE CUTANEOUS LUPUS ERYTHEMATOSUS: ICD-10-CM

## 2025-08-15 DIAGNOSIS — R44.3 HALLUCINATIONS: ICD-10-CM

## 2025-08-15 DIAGNOSIS — R93.0 ABNORMAL MRI OF HEAD: ICD-10-CM

## 2025-08-15 PROCEDURE — 70496 CT ANGIOGRAPHY HEAD: CPT | Mod: 26,,, | Performed by: RADIOLOGY

## 2025-08-15 PROCEDURE — 70498 CT ANGIOGRAPHY NECK: CPT | Mod: 26,,, | Performed by: RADIOLOGY

## 2025-08-15 PROCEDURE — 70496 CT ANGIOGRAPHY HEAD: CPT | Mod: TC

## 2025-08-15 PROCEDURE — 25500020 PHARM REV CODE 255: Performed by: STUDENT IN AN ORGANIZED HEALTH CARE EDUCATION/TRAINING PROGRAM

## 2025-08-15 RX ORDER — RISPERIDONE 1 MG/1
TABLET ORAL
Qty: 15 TABLET | Refills: 0 | Status: SHIPPED | OUTPATIENT
Start: 2025-08-15 | End: 2025-08-20

## 2025-08-15 RX ORDER — RISPERIDONE 4 MG/1
4 TABLET ORAL NIGHTLY
Qty: 90 TABLET | Refills: 1 | Status: SHIPPED | OUTPATIENT
Start: 2025-08-15

## 2025-08-15 RX ORDER — BELIMUMAB 200 MG/ML
200 SOLUTION SUBCUTANEOUS
Qty: 4 EACH | Refills: 6 | Status: SHIPPED | OUTPATIENT
Start: 2025-08-15

## 2025-08-15 RX ADMIN — IOHEXOL 75 ML: 350 INJECTION, SOLUTION INTRAVENOUS at 04:08

## 2025-08-16 DIAGNOSIS — F41.9 ANXIETY: ICD-10-CM

## 2025-08-18 ENCOUNTER — LAB VISIT (OUTPATIENT)
Dept: LAB | Facility: HOSPITAL | Age: 55
End: 2025-08-18
Attending: STUDENT IN AN ORGANIZED HEALTH CARE EDUCATION/TRAINING PROGRAM
Payer: MEDICARE

## 2025-08-18 DIAGNOSIS — F45.8 OTHER SOMATOFORM DISORDERS: ICD-10-CM

## 2025-08-18 LAB
FERRITIN SERPL-MCNC: 57 NG/ML (ref 20–300)
TRANSFERRIN SERPL-MCNC: 297 MG/DL (ref 200–375)

## 2025-08-18 PROCEDURE — 36415 COLL VENOUS BLD VENIPUNCTURE: CPT | Mod: PO

## 2025-08-18 PROCEDURE — 82728 ASSAY OF FERRITIN: CPT

## 2025-08-18 PROCEDURE — 84466 ASSAY OF TRANSFERRIN: CPT

## 2025-08-18 RX ORDER — FLUOXETINE 20 MG/1
20 CAPSULE ORAL DAILY
Qty: 90 CAPSULE | Refills: 0 | Status: SHIPPED | OUTPATIENT
Start: 2025-08-18

## 2025-08-19 ENCOUNTER — TELEPHONE (OUTPATIENT)
Dept: NEUROLOGY | Facility: CLINIC | Age: 55
End: 2025-08-19
Payer: MEDICARE

## 2025-08-20 ENCOUNTER — OFFICE VISIT (OUTPATIENT)
Dept: NEUROLOGY | Facility: CLINIC | Age: 55
End: 2025-08-20
Payer: MEDICARE

## 2025-08-20 VITALS
BODY MASS INDEX: 25.84 KG/M2 | HEART RATE: 116 BPM | HEIGHT: 61 IN | DIASTOLIC BLOOD PRESSURE: 98 MMHG | SYSTOLIC BLOOD PRESSURE: 175 MMHG | WEIGHT: 136.88 LBS

## 2025-08-20 DIAGNOSIS — G47.30 SLEEP APNEA, UNSPECIFIED TYPE: ICD-10-CM

## 2025-08-20 DIAGNOSIS — R93.0 ABNORMAL MRI OF HEAD: Primary | ICD-10-CM

## 2025-08-20 DIAGNOSIS — R44.3 HALLUCINATIONS: ICD-10-CM

## 2025-08-20 DIAGNOSIS — F45.8 OTHER SOMATOFORM DISORDERS: ICD-10-CM

## 2025-08-20 PROCEDURE — 99213 OFFICE O/P EST LOW 20 MIN: CPT | Mod: S$GLB,,, | Performed by: STUDENT IN AN ORGANIZED HEALTH CARE EDUCATION/TRAINING PROGRAM

## 2025-08-20 PROCEDURE — 1159F MED LIST DOCD IN RCRD: CPT | Mod: CPTII,S$GLB,, | Performed by: STUDENT IN AN ORGANIZED HEALTH CARE EDUCATION/TRAINING PROGRAM

## 2025-08-20 PROCEDURE — 3008F BODY MASS INDEX DOCD: CPT | Mod: CPTII,S$GLB,, | Performed by: STUDENT IN AN ORGANIZED HEALTH CARE EDUCATION/TRAINING PROGRAM

## 2025-08-20 PROCEDURE — 3077F SYST BP >= 140 MM HG: CPT | Mod: CPTII,S$GLB,, | Performed by: STUDENT IN AN ORGANIZED HEALTH CARE EDUCATION/TRAINING PROGRAM

## 2025-08-20 PROCEDURE — 99999 PR PBB SHADOW E&M-EST. PATIENT-LVL V: CPT | Mod: PBBFAC,,, | Performed by: STUDENT IN AN ORGANIZED HEALTH CARE EDUCATION/TRAINING PROGRAM

## 2025-08-20 PROCEDURE — 3080F DIAST BP >= 90 MM HG: CPT | Mod: CPTII,S$GLB,, | Performed by: STUDENT IN AN ORGANIZED HEALTH CARE EDUCATION/TRAINING PROGRAM

## 2025-08-21 ENCOUNTER — PATIENT MESSAGE (OUTPATIENT)
Dept: FAMILY MEDICINE | Facility: CLINIC | Age: 55
End: 2025-08-21
Payer: MEDICARE

## 2025-08-26 ENCOUNTER — PATIENT MESSAGE (OUTPATIENT)
Dept: RHEUMATOLOGY | Facility: CLINIC | Age: 55
End: 2025-08-26
Payer: MEDICARE

## 2025-08-26 ENCOUNTER — PATIENT MESSAGE (OUTPATIENT)
Dept: FAMILY MEDICINE | Facility: CLINIC | Age: 55
End: 2025-08-26
Payer: MEDICARE

## 2025-08-26 DIAGNOSIS — M79.7 FIBROMYALGIA: ICD-10-CM

## 2025-08-26 DIAGNOSIS — Z92.25 HISTORY OF IMMUNOSUPPRESSION THERAPY: ICD-10-CM

## 2025-08-26 DIAGNOSIS — Z72.0 TOBACCO ABUSE: ICD-10-CM

## 2025-08-26 DIAGNOSIS — G89.4 CHRONIC PAIN SYNDROME: ICD-10-CM

## 2025-08-26 DIAGNOSIS — L93.1 SUBACUTE CUTANEOUS LUPUS ERYTHEMATOSUS: ICD-10-CM

## 2025-08-26 DIAGNOSIS — M25.50 ARTHRALGIA, UNSPECIFIED JOINT: ICD-10-CM

## 2025-08-26 RX ORDER — HYDROCODONE BITARTRATE AND ACETAMINOPHEN 7.5; 325 MG/1; MG/1
1 TABLET ORAL
Qty: 30 TABLET | Refills: 0 | Status: SHIPPED | OUTPATIENT
Start: 2025-08-26 | End: 2025-09-25

## 2025-08-28 RX ORDER — NIRMATRELVIR AND RITONAVIR 300-100 MG
KIT ORAL
Qty: 30 TABLET | Refills: 0 | Status: SHIPPED | OUTPATIENT
Start: 2025-08-28 | End: 2025-09-02

## 2025-08-31 DIAGNOSIS — Z92.25 HISTORY OF IMMUNOSUPPRESSION THERAPY: ICD-10-CM

## 2025-08-31 DIAGNOSIS — L93.1 SUBACUTE CUTANEOUS LUPUS ERYTHEMATOSUS: ICD-10-CM

## 2025-08-31 DIAGNOSIS — M79.7 FIBROMYALGIA: ICD-10-CM

## 2025-08-31 DIAGNOSIS — Z72.0 TOBACCO ABUSE: ICD-10-CM

## 2025-08-31 DIAGNOSIS — G89.4 CHRONIC PAIN SYNDROME: ICD-10-CM

## 2025-09-02 RX ORDER — HYDROXYCHLOROQUINE SULFATE 200 MG/1
400 TABLET, FILM COATED ORAL DAILY
Qty: 60 TABLET | Refills: 0 | Status: SHIPPED | OUTPATIENT
Start: 2025-09-02

## (undated) DEVICE — SEE MEDLINE ITEM 152622

## (undated) DEVICE — TROCAR ENDOPATH XCEL 5X75MM

## (undated) DEVICE — TROCAR ENDOPATH EXCEL DILATING

## (undated) DEVICE — WARMER DRAPE STERILE LF

## (undated) DEVICE — SUT MCRYL PLUS 4-0 PS2 27IN

## (undated) DEVICE — ADHESIVE MASTISOL VIAL 48/BX

## (undated) DEVICE — DRAPE ABDOMINAL TIBURON 14X11

## (undated) DEVICE — TROCAR ENDOPATH EXCEL

## (undated) DEVICE — KIT ANTIFOG

## (undated) DEVICE — SOL NS 1000CC

## (undated) DEVICE — DRESSING TRANS 4X4 TEGADERM

## (undated) DEVICE — IRRIGATOR ENDOSCOPY DISP.

## (undated) DEVICE — TROCAR SPACEMAKER BLUNT 10MM

## (undated) DEVICE — TUBING HF INSUFFLATION W/ FLTR

## (undated) DEVICE — CLIP HEMO-LOK ML

## (undated) DEVICE — SCISSOR 5MMX35CM DIRECT DRIVE

## (undated) DEVICE — DRESSING TELFA PAD N ADH 2X3

## (undated) DEVICE — DRESSING LEUKOPLAST FLEX 1X3IN

## (undated) DEVICE — BANDAGE ADHESIVE

## (undated) DEVICE — CLOSURE SKIN STERI STRIP 1/2X4

## (undated) DEVICE — ELECTRODE REM PLYHSV RETURN 9

## (undated) DEVICE — TRAY MINOR GEN SURG

## (undated) DEVICE — BLADE SURG CARBON STEEL SZ11